# Patient Record
Sex: MALE | Race: WHITE | Employment: OTHER | URBAN - METROPOLITAN AREA
[De-identification: names, ages, dates, MRNs, and addresses within clinical notes are randomized per-mention and may not be internally consistent; named-entity substitution may affect disease eponyms.]

---

## 2017-02-15 ENCOUNTER — OFFICE VISIT (OUTPATIENT)
Dept: BARIATRICS/WEIGHT MGMT | Age: 47
End: 2017-02-15

## 2017-02-15 VITALS
DIASTOLIC BLOOD PRESSURE: 90 MMHG | HEIGHT: 69 IN | HEART RATE: 92 BPM | SYSTOLIC BLOOD PRESSURE: 134 MMHG | WEIGHT: 315 LBS | BODY MASS INDEX: 46.65 KG/M2

## 2017-02-15 DIAGNOSIS — L02.212 BACK ABSCESS: ICD-10-CM

## 2017-02-15 DIAGNOSIS — E13.9 DIABETES 1.5, MANAGED AS TYPE 2 (HCC): Primary | ICD-10-CM

## 2017-02-15 PROCEDURE — 99204 OFFICE O/P NEW MOD 45 MIN: CPT | Performed by: SURGERY

## 2017-02-16 ASSESSMENT — ENCOUNTER SYMPTOMS
WHEEZING: 0
SORE THROAT: 0
CONSTIPATION: 0
PHOTOPHOBIA: 0
COUGH: 0
NAUSEA: 0
ABDOMINAL PAIN: 0
TROUBLE SWALLOWING: 0
SHORTNESS OF BREATH: 0
COLOR CHANGE: 0
VOICE CHANGE: 0
DIARRHEA: 0
VOMITING: 0
BLOOD IN STOOL: 0
ANAL BLEEDING: 0

## 2017-03-04 PROBLEM — T81.49XA WOUND INFECTION FOLLOWING PROCEDURE: Status: ACTIVE | Noted: 2017-03-04

## 2017-05-12 PROBLEM — J44.9 COPD (CHRONIC OBSTRUCTIVE PULMONARY DISEASE) (HCC): Status: ACTIVE | Noted: 2017-05-12

## 2017-05-12 PROBLEM — G43.909 MIGRAINE: Status: ACTIVE | Noted: 2017-05-12

## 2017-05-12 PROBLEM — F41.9 ANXIETY: Status: ACTIVE | Noted: 2017-05-12

## 2017-05-12 PROBLEM — E11.65 HYPERGLYCEMIA DUE TO TYPE 2 DIABETES MELLITUS (HCC): Status: ACTIVE | Noted: 2017-05-12

## 2017-05-12 PROBLEM — E78.5 HYPERLIPIDEMIA: Status: ACTIVE | Noted: 2017-05-12

## 2017-05-15 PROBLEM — E11.65 HYPERGLYCEMIA DUE TO TYPE 2 DIABETES MELLITUS (HCC): Status: RESOLVED | Noted: 2017-05-12 | Resolved: 2017-05-15

## 2017-05-31 PROBLEM — E11.10 DKA, TYPE 2 (HCC): Status: ACTIVE | Noted: 2017-05-31

## 2017-06-01 PROBLEM — E87.6 HYPOKALEMIA: Status: ACTIVE | Noted: 2017-06-01

## 2017-06-01 PROBLEM — D69.6 THROMBOCYTOPENIA (HCC): Status: ACTIVE | Noted: 2017-06-01

## 2018-06-08 ENCOUNTER — TELEPHONE (OUTPATIENT)
Dept: CARDIOLOGY CLINIC | Age: 48
End: 2018-06-08

## 2018-11-26 ENCOUNTER — HOSPITAL ENCOUNTER (OUTPATIENT)
Age: 48
Discharge: HOME | End: 2018-11-26
Payer: MEDICARE

## 2018-11-26 ENCOUNTER — HOSPITAL ENCOUNTER (OUTPATIENT)
Dept: GENERAL RADIOLOGY | Age: 48
Discharge: HOME | End: 2018-11-26
Payer: MEDICARE

## 2018-11-26 DIAGNOSIS — M25.552 LEFT HIP PAIN: ICD-10-CM

## 2018-11-26 DIAGNOSIS — M54.5 LOW BACK PAIN, UNSPECIFIED BACK PAIN LATERALITY, UNSPECIFIED CHRONICITY, WITH SCIATICA PRESENCE UNSPECIFIED: ICD-10-CM

## 2018-11-26 PROCEDURE — 72110 X-RAY EXAM L-2 SPINE 4/>VWS: CPT

## 2018-11-26 PROCEDURE — 73501 X-RAY EXAM HIP UNI 1 VIEW: CPT

## 2018-12-23 ENCOUNTER — APPOINTMENT (OUTPATIENT)
Dept: GENERAL RADIOLOGY | Age: 48
DRG: 191 | End: 2018-12-23
Payer: MEDICARE

## 2018-12-23 ENCOUNTER — HOSPITAL ENCOUNTER (INPATIENT)
Age: 48
LOS: 1 days | Discharge: HOME | DRG: 191 | End: 2018-12-24
Attending: EMERGENCY MEDICINE | Admitting: FAMILY MEDICINE
Payer: MEDICARE

## 2018-12-23 DIAGNOSIS — J43.1 PANLOBULAR EMPHYSEMA (HCC): ICD-10-CM

## 2018-12-23 DIAGNOSIS — R73.9 HYPERGLYCEMIA: ICD-10-CM

## 2018-12-23 DIAGNOSIS — R65.10 SIRS (SYSTEMIC INFLAMMATORY RESPONSE SYNDROME) (HCC): ICD-10-CM

## 2018-12-23 DIAGNOSIS — R06.02 SHORTNESS OF BREATH: Primary | ICD-10-CM

## 2018-12-23 DIAGNOSIS — R05.9 COUGH: ICD-10-CM

## 2018-12-23 DIAGNOSIS — I10 ESSENTIAL HYPERTENSION: ICD-10-CM

## 2018-12-23 DIAGNOSIS — E87.20 LACTIC ACIDOSIS: ICD-10-CM

## 2018-12-23 DIAGNOSIS — J06.9 UPPER RESPIRATORY TRACT INFECTION, UNSPECIFIED TYPE: ICD-10-CM

## 2018-12-23 PROBLEM — J44.1 COPD EXACERBATION (HCC): Status: ACTIVE | Noted: 2018-12-23

## 2018-12-23 PROBLEM — E11.9 TYPE 2 DIABETES MELLITUS (HCC): Status: ACTIVE | Noted: 2018-12-23

## 2018-12-23 PROBLEM — E78.5 HYPERLIPIDEMIA: Status: ACTIVE | Noted: 2018-12-23

## 2018-12-23 PROBLEM — F32.A DEPRESSION: Status: ACTIVE | Noted: 2018-12-23

## 2018-12-23 LAB
ADENOVIRUS DETECTION BY PCR: NOT DETECTED
ALBUMIN SERPL-MCNC: 4.1 GM/DL (ref 3.4–5)
ALP BLD-CCNC: 139 IU/L (ref 40–129)
ALT SERPL-CCNC: 30 U/L (ref 10–40)
ANION GAP SERPL CALCULATED.3IONS-SCNC: 6 MMOL/L (ref 4–16)
ANION GAP SERPL CALCULATED.3IONS-SCNC: 7 MMOL/L (ref 4–16)
AST SERPL-CCNC: 22 IU/L (ref 15–37)
BASOPHILS ABSOLUTE: 0.1 K/CU MM
BASOPHILS ABSOLUTE: 0.1 K/CU MM
BASOPHILS RELATIVE PERCENT: 1 % (ref 0–1)
BASOPHILS RELATIVE PERCENT: 1.2 % (ref 0–1)
BILIRUB SERPL-MCNC: 0.2 MG/DL (ref 0–1)
BORDETELLA PERTUSSIS PCR: NOT DETECTED
BUN BLDV-MCNC: 12 MG/DL (ref 6–23)
BUN BLDV-MCNC: 12 MG/DL (ref 6–23)
CALCIUM SERPL-MCNC: 10 MG/DL (ref 8.3–10.6)
CALCIUM SERPL-MCNC: 9.8 MG/DL (ref 8.3–10.6)
CHLAMYDOPHILA PNEUMONIA PCR: NOT DETECTED
CHLORIDE BLD-SCNC: 109 MMOL/L (ref 99–110)
CHLORIDE BLD-SCNC: 110 MMOL/L (ref 99–110)
CO2: 26 MMOL/L (ref 21–32)
CO2: 29 MMOL/L (ref 21–32)
CORONAVIRUS 229E PCR: NOT DETECTED
CORONAVIRUS HKU1 PCR: NOT DETECTED
CORONAVIRUS NL63 PCR: NOT DETECTED
CORONAVIRUS OC43 PCR: NOT DETECTED
CREAT SERPL-MCNC: 0.8 MG/DL (ref 0.9–1.3)
CREAT SERPL-MCNC: 0.9 MG/DL (ref 0.9–1.3)
DIFFERENTIAL TYPE: ABNORMAL
DIFFERENTIAL TYPE: ABNORMAL
EOSINOPHILS ABSOLUTE: 0.3 K/CU MM
EOSINOPHILS ABSOLUTE: 0.3 K/CU MM
EOSINOPHILS RELATIVE PERCENT: 2.8 % (ref 0–3)
EOSINOPHILS RELATIVE PERCENT: 2.9 % (ref 0–3)
GFR AFRICAN AMERICAN: >60 ML/MIN/1.73M2
GFR AFRICAN AMERICAN: >60 ML/MIN/1.73M2
GFR NON-AFRICAN AMERICAN: >60 ML/MIN/1.73M2
GFR NON-AFRICAN AMERICAN: >60 ML/MIN/1.73M2
GLUCOSE BLD-MCNC: 182 MG/DL (ref 70–99)
GLUCOSE BLD-MCNC: 200 MG/DL (ref 70–99)
GLUCOSE BLD-MCNC: 240 MG/DL (ref 70–99)
GLUCOSE BLD-MCNC: 293 MG/DL (ref 70–99)
GLUCOSE BLD-MCNC: 316 MG/DL (ref 70–99)
GLUCOSE BLD-MCNC: 332 MG/DL (ref 70–99)
GLUCOSE BLD-MCNC: 385 MG/DL (ref 70–99)
HCT VFR BLD CALC: 38.8 % (ref 42–52)
HCT VFR BLD CALC: 40.2 % (ref 42–52)
HEMOGLOBIN: 13 GM/DL (ref 13.5–18)
HEMOGLOBIN: 13.6 GM/DL (ref 13.5–18)
HUMAN METAPNEUMOVIRUS PCR: NOT DETECTED
IMMATURE NEUTROPHIL %: 0.3 % (ref 0–0.43)
IMMATURE NEUTROPHIL %: 0.4 % (ref 0–0.43)
INFLUENZA A BY PCR: NOT DETECTED
INFLUENZA A H1 (2009) PCR: NOT DETECTED
INFLUENZA A H1 PANDEMIC PCR: NOT DETECTED
INFLUENZA A H3 PCR: NOT DETECTED
INFLUENZA B BY PCR: NOT DETECTED
LACTATE: 2.2 MMOL/L (ref 0.4–2)
LACTATE: 2.7 MMOL/L (ref 0.4–2)
LYMPHOCYTES ABSOLUTE: 2.7 K/CU MM
LYMPHOCYTES ABSOLUTE: 3.1 K/CU MM
LYMPHOCYTES RELATIVE PERCENT: 29.2 % (ref 24–44)
LYMPHOCYTES RELATIVE PERCENT: 30.9 % (ref 24–44)
MCH RBC QN AUTO: 30.9 PG (ref 27–31)
MCH RBC QN AUTO: 31 PG (ref 27–31)
MCHC RBC AUTO-ENTMCNC: 33.5 % (ref 32–36)
MCHC RBC AUTO-ENTMCNC: 33.8 % (ref 32–36)
MCV RBC AUTO: 91.6 FL (ref 78–100)
MCV RBC AUTO: 92.2 FL (ref 78–100)
MONOCYTES ABSOLUTE: 0.8 K/CU MM
MONOCYTES ABSOLUTE: 0.9 K/CU MM
MONOCYTES RELATIVE PERCENT: 8.7 % (ref 0–4)
MONOCYTES RELATIVE PERCENT: 9.1 % (ref 0–4)
MYCOPLASMA PNEUMONIAE PCR: NOT DETECTED
PARAINFLUENZA 1 PCR: NOT DETECTED
PARAINFLUENZA 2 PCR: NOT DETECTED
PARAINFLUENZA 3 PCR: NOT DETECTED
PARAINFLUENZA 4 PCR: NOT DETECTED
PDW BLD-RTO: 12.9 % (ref 11.7–14.9)
PDW BLD-RTO: 13 % (ref 11.7–14.9)
PLATELET # BLD: 139 K/CU MM (ref 140–440)
PLATELET # BLD: 149 K/CU MM (ref 140–440)
PMV BLD AUTO: 11.2 FL (ref 7.5–11.1)
PMV BLD AUTO: 11.2 FL (ref 7.5–11.1)
POTASSIUM SERPL-SCNC: 3.8 MMOL/L (ref 3.5–5.1)
POTASSIUM SERPL-SCNC: 4.1 MMOL/L (ref 3.5–5.1)
RBC # BLD: 4.21 M/CU MM (ref 4.6–6.2)
RBC # BLD: 4.39 M/CU MM (ref 4.6–6.2)
RHINOVIRUS ENTEROVIRUS PCR: NOT DETECTED
RSV PCR: NOT DETECTED
SEGMENTED NEUTROPHILS ABSOLUTE COUNT: 4.8 K/CU MM
SEGMENTED NEUTROPHILS ABSOLUTE COUNT: 6 K/CU MM
SEGMENTED NEUTROPHILS RELATIVE PERCENT: 55.6 % (ref 36–66)
SEGMENTED NEUTROPHILS RELATIVE PERCENT: 57.9 % (ref 36–66)
SODIUM BLD-SCNC: 143 MMOL/L (ref 135–145)
SODIUM BLD-SCNC: 144 MMOL/L (ref 135–145)
TOTAL IMMATURE NEUTOROPHIL: 0.03 K/CU MM
TOTAL IMMATURE NEUTOROPHIL: 0.04 K/CU MM
TOTAL PROTEIN: 6.7 GM/DL (ref 6.4–8.2)
WBC # BLD: 10.4 K/CU MM (ref 4–10.5)
WBC # BLD: 8.7 K/CU MM (ref 4–10.5)

## 2018-12-23 PROCEDURE — 94640 AIRWAY INHALATION TREATMENT: CPT

## 2018-12-23 PROCEDURE — 87040 BLOOD CULTURE FOR BACTERIA: CPT

## 2018-12-23 PROCEDURE — 99285 EMERGENCY DEPT VISIT HI MDM: CPT

## 2018-12-23 PROCEDURE — 83605 ASSAY OF LACTIC ACID: CPT

## 2018-12-23 PROCEDURE — 85025 COMPLETE CBC W/AUTO DIFF WBC: CPT

## 2018-12-23 PROCEDURE — 84145 PROCALCITONIN (PCT): CPT

## 2018-12-23 PROCEDURE — 80053 COMPREHEN METABOLIC PANEL: CPT

## 2018-12-23 PROCEDURE — 82962 GLUCOSE BLOOD TEST: CPT

## 2018-12-23 PROCEDURE — 6360000002 HC RX W HCPCS: Performed by: NURSE PRACTITIONER

## 2018-12-23 PROCEDURE — 6370000000 HC RX 637 (ALT 250 FOR IP): Performed by: NURSE PRACTITIONER

## 2018-12-23 PROCEDURE — 2580000003 HC RX 258: Performed by: NURSE PRACTITIONER

## 2018-12-23 PROCEDURE — 94660 CPAP INITIATION&MGMT: CPT

## 2018-12-23 PROCEDURE — 6360000002 HC RX W HCPCS: Performed by: EMERGENCY MEDICINE

## 2018-12-23 PROCEDURE — 2580000003 HC RX 258: Performed by: EMERGENCY MEDICINE

## 2018-12-23 PROCEDURE — 93010 ELECTROCARDIOGRAM REPORT: CPT | Performed by: INTERNAL MEDICINE

## 2018-12-23 PROCEDURE — 96375 TX/PRO/DX INJ NEW DRUG ADDON: CPT

## 2018-12-23 PROCEDURE — 80048 BASIC METABOLIC PNL TOTAL CA: CPT

## 2018-12-23 PROCEDURE — 93005 ELECTROCARDIOGRAM TRACING: CPT | Performed by: EMERGENCY MEDICINE

## 2018-12-23 PROCEDURE — 96365 THER/PROPH/DIAG IV INF INIT: CPT

## 2018-12-23 PROCEDURE — 6370000000 HC RX 637 (ALT 250 FOR IP): Performed by: EMERGENCY MEDICINE

## 2018-12-23 PROCEDURE — 71045 X-RAY EXAM CHEST 1 VIEW: CPT

## 2018-12-23 PROCEDURE — 87798 DETECT AGENT NOS DNA AMP: CPT

## 2018-12-23 PROCEDURE — 2140000000 HC CCU INTERMEDIATE R&B

## 2018-12-23 PROCEDURE — 36415 COLL VENOUS BLD VENIPUNCTURE: CPT

## 2018-12-23 RX ORDER — DEXTROSE MONOHYDRATE 50 MG/ML
100 INJECTION, SOLUTION INTRAVENOUS PRN
Status: DISCONTINUED | OUTPATIENT
Start: 2018-12-23 | End: 2018-12-24 | Stop reason: HOSPADM

## 2018-12-23 RX ORDER — ZONISAMIDE 100 MG/1
300 CAPSULE ORAL NIGHTLY
Status: DISCONTINUED | OUTPATIENT
Start: 2018-12-23 | End: 2018-12-24 | Stop reason: HOSPADM

## 2018-12-23 RX ORDER — ASPIRIN 81 MG/1
81 TABLET ORAL DAILY
Status: DISCONTINUED | OUTPATIENT
Start: 2018-12-23 | End: 2018-12-24 | Stop reason: HOSPADM

## 2018-12-23 RX ORDER — BENZONATATE 100 MG/1
100 CAPSULE ORAL 3 TIMES DAILY PRN
Status: DISCONTINUED | OUTPATIENT
Start: 2018-12-23 | End: 2018-12-24 | Stop reason: HOSPADM

## 2018-12-23 RX ORDER — GUAIFENESIN/DEXTROMETHORPHAN 100-10MG/5
5 SYRUP ORAL EVERY 4 HOURS PRN
Status: DISCONTINUED | OUTPATIENT
Start: 2018-12-23 | End: 2018-12-24 | Stop reason: HOSPADM

## 2018-12-23 RX ORDER — HYDROCODONE BITARTRATE AND ACETAMINOPHEN 5; 325 MG/1; MG/1
1 TABLET ORAL EVERY 6 HOURS PRN
Status: DISCONTINUED | OUTPATIENT
Start: 2018-12-23 | End: 2018-12-24 | Stop reason: HOSPADM

## 2018-12-23 RX ORDER — BUDESONIDE 0.5 MG/2ML
1 INHALANT ORAL 2 TIMES DAILY PRN
Status: DISCONTINUED | OUTPATIENT
Start: 2018-12-23 | End: 2018-12-24 | Stop reason: HOSPADM

## 2018-12-23 RX ORDER — BENZONATATE 100 MG/1
200 CAPSULE ORAL ONCE
Status: COMPLETED | OUTPATIENT
Start: 2018-12-23 | End: 2018-12-23

## 2018-12-23 RX ORDER — ALBUTEROL SULFATE 90 UG/1
2 AEROSOL, METERED RESPIRATORY (INHALATION) EVERY 6 HOURS PRN
Status: DISCONTINUED | OUTPATIENT
Start: 2018-12-23 | End: 2018-12-24 | Stop reason: HOSPADM

## 2018-12-23 RX ORDER — FLUTICASONE PROPIONATE 220 UG/1
1 AEROSOL, METERED RESPIRATORY (INHALATION) 2 TIMES DAILY PRN
Status: DISCONTINUED | OUTPATIENT
Start: 2018-12-23 | End: 2018-12-23 | Stop reason: CLARIF

## 2018-12-23 RX ORDER — ALBUTEROL SULFATE 2.5 MG/3ML
SOLUTION RESPIRATORY (INHALATION)
Status: DISCONTINUED
Start: 2018-12-23 | End: 2018-12-23

## 2018-12-23 RX ORDER — CARVEDILOL 25 MG/1
25 TABLET ORAL 2 TIMES DAILY WITH MEALS
Status: DISCONTINUED | OUTPATIENT
Start: 2018-12-23 | End: 2018-12-24 | Stop reason: HOSPADM

## 2018-12-23 RX ORDER — OMEGA-3-ACID ETHYL ESTERS 1 G/1
2 CAPSULE, LIQUID FILLED ORAL 2 TIMES DAILY
Status: DISCONTINUED | OUTPATIENT
Start: 2018-12-23 | End: 2018-12-24 | Stop reason: HOSPADM

## 2018-12-23 RX ORDER — ALBUTEROL SULFATE 2.5 MG/3ML
2.5 SOLUTION RESPIRATORY (INHALATION) ONCE
Status: COMPLETED | OUTPATIENT
Start: 2018-12-23 | End: 2018-12-23

## 2018-12-23 RX ORDER — 0.9 % SODIUM CHLORIDE 0.9 %
1000 INTRAVENOUS SOLUTION INTRAVENOUS ONCE
Status: COMPLETED | OUTPATIENT
Start: 2018-12-23 | End: 2018-12-23

## 2018-12-23 RX ORDER — DOXAZOSIN MESYLATE 1 MG/1
1 TABLET ORAL DAILY
Status: DISCONTINUED | OUTPATIENT
Start: 2018-12-23 | End: 2018-12-24 | Stop reason: HOSPADM

## 2018-12-23 RX ORDER — GUAIFENESIN 600 MG/1
600 TABLET, EXTENDED RELEASE ORAL 2 TIMES DAILY
Status: DISCONTINUED | OUTPATIENT
Start: 2018-12-23 | End: 2018-12-24 | Stop reason: HOSPADM

## 2018-12-23 RX ORDER — ONDANSETRON 2 MG/ML
4 INJECTION INTRAMUSCULAR; INTRAVENOUS EVERY 6 HOURS PRN
Status: DISCONTINUED | OUTPATIENT
Start: 2018-12-23 | End: 2018-12-24 | Stop reason: HOSPADM

## 2018-12-23 RX ORDER — NITROGLYCERIN 0.4 MG/1
0.4 TABLET SUBLINGUAL EVERY 5 MIN PRN
Status: DISCONTINUED | OUTPATIENT
Start: 2018-12-23 | End: 2018-12-24 | Stop reason: HOSPADM

## 2018-12-23 RX ORDER — LOSARTAN POTASSIUM 50 MG/1
50 TABLET ORAL DAILY
COMMUNITY
End: 2020-02-29

## 2018-12-23 RX ORDER — QUETIAPINE FUMARATE 100 MG/1
100 TABLET, FILM COATED ORAL DAILY
Status: DISCONTINUED | OUTPATIENT
Start: 2018-12-23 | End: 2018-12-24 | Stop reason: HOSPADM

## 2018-12-23 RX ORDER — SODIUM CHLORIDE 9 MG/ML
INJECTION, SOLUTION INTRAVENOUS CONTINUOUS
Status: DISCONTINUED | OUTPATIENT
Start: 2018-12-23 | End: 2018-12-24 | Stop reason: HOSPADM

## 2018-12-23 RX ORDER — TRAZODONE HYDROCHLORIDE 50 MG/1
50 TABLET ORAL NIGHTLY
Status: DISCONTINUED | OUTPATIENT
Start: 2018-12-23 | End: 2018-12-24 | Stop reason: HOSPADM

## 2018-12-23 RX ORDER — SODIUM CHLORIDE 0.9 % (FLUSH) 0.9 %
10 SYRINGE (ML) INJECTION EVERY 12 HOURS SCHEDULED
Status: DISCONTINUED | OUTPATIENT
Start: 2018-12-23 | End: 2018-12-24 | Stop reason: HOSPADM

## 2018-12-23 RX ORDER — IPRATROPIUM BROMIDE AND ALBUTEROL SULFATE 2.5; .5 MG/3ML; MG/3ML
1 SOLUTION RESPIRATORY (INHALATION) ONCE
Status: COMPLETED | OUTPATIENT
Start: 2018-12-23 | End: 2018-12-23

## 2018-12-23 RX ORDER — DEXTROSE MONOHYDRATE 25 G/50ML
12.5 INJECTION, SOLUTION INTRAVENOUS PRN
Status: DISCONTINUED | OUTPATIENT
Start: 2018-12-23 | End: 2018-12-24 | Stop reason: HOSPADM

## 2018-12-23 RX ORDER — ONDANSETRON 2 MG/ML
4 INJECTION INTRAMUSCULAR; INTRAVENOUS EVERY 30 MIN PRN
Status: DISCONTINUED | OUTPATIENT
Start: 2018-12-23 | End: 2018-12-23

## 2018-12-23 RX ORDER — ALBUTEROL SULFATE 2.5 MG/3ML
2.5 SOLUTION RESPIRATORY (INHALATION)
Status: DISCONTINUED | OUTPATIENT
Start: 2018-12-23 | End: 2018-12-24 | Stop reason: HOSPADM

## 2018-12-23 RX ORDER — NICOTINE POLACRILEX 4 MG
15 LOZENGE BUCCAL PRN
Status: DISCONTINUED | OUTPATIENT
Start: 2018-12-23 | End: 2018-12-24 | Stop reason: HOSPADM

## 2018-12-23 RX ORDER — SODIUM CHLORIDE 0.9 % (FLUSH) 0.9 %
10 SYRINGE (ML) INJECTION PRN
Status: DISCONTINUED | OUTPATIENT
Start: 2018-12-23 | End: 2018-12-24 | Stop reason: HOSPADM

## 2018-12-23 RX ORDER — METHYLPREDNISOLONE SODIUM SUCCINATE 40 MG/ML
40 INJECTION, POWDER, LYOPHILIZED, FOR SOLUTION INTRAMUSCULAR; INTRAVENOUS DAILY
Status: DISCONTINUED | OUTPATIENT
Start: 2018-12-23 | End: 2018-12-24

## 2018-12-23 RX ORDER — LANOLIN ALCOHOL/MO/W.PET/CERES
3 CREAM (GRAM) TOPICAL NIGHTLY
Status: DISCONTINUED | OUTPATIENT
Start: 2018-12-23 | End: 2018-12-24 | Stop reason: HOSPADM

## 2018-12-23 RX ORDER — DEXTROMETHORPHAN HYDROBROMIDE AND PROMETHAZINE HYDROCHLORIDE 15; 6.25 MG/5ML; MG/5ML
5 SYRUP ORAL ONCE
Status: COMPLETED | OUTPATIENT
Start: 2018-12-23 | End: 2018-12-23

## 2018-12-23 RX ORDER — TRAZODONE HYDROCHLORIDE 50 MG/1
100 TABLET ORAL NIGHTLY
COMMUNITY

## 2018-12-23 RX ORDER — LOSARTAN POTASSIUM 50 MG/1
50 TABLET ORAL DAILY
Status: DISCONTINUED | OUTPATIENT
Start: 2018-12-23 | End: 2018-12-24 | Stop reason: HOSPADM

## 2018-12-23 RX ORDER — GLIMEPIRIDE 2 MG/1
4 TABLET ORAL 2 TIMES DAILY WITH MEALS
Status: DISCONTINUED | OUTPATIENT
Start: 2018-12-23 | End: 2018-12-24 | Stop reason: HOSPADM

## 2018-12-23 RX ORDER — FOLIC ACID 1 MG/1
1 TABLET ORAL DAILY
Status: DISCONTINUED | OUTPATIENT
Start: 2018-12-23 | End: 2018-12-24 | Stop reason: HOSPADM

## 2018-12-23 RX ORDER — HYDROCHLOROTHIAZIDE 25 MG/1
12.5 TABLET ORAL DAILY
Status: DISCONTINUED | OUTPATIENT
Start: 2018-12-23 | End: 2018-12-24 | Stop reason: HOSPADM

## 2018-12-23 RX ORDER — IPRATROPIUM BROMIDE AND ALBUTEROL SULFATE 2.5; .5 MG/3ML; MG/3ML
1 SOLUTION RESPIRATORY (INHALATION) EVERY 4 HOURS
Status: DISCONTINUED | OUTPATIENT
Start: 2018-12-23 | End: 2018-12-24 | Stop reason: HOSPADM

## 2018-12-23 RX ORDER — ATORVASTATIN CALCIUM 40 MG/1
40 TABLET, FILM COATED ORAL NIGHTLY
Status: DISCONTINUED | OUTPATIENT
Start: 2018-12-23 | End: 2018-12-24 | Stop reason: HOSPADM

## 2018-12-23 RX ORDER — SODIUM CHLORIDE 9 MG/ML
INJECTION, SOLUTION INTRAVENOUS CONTINUOUS
Status: DISCONTINUED | OUTPATIENT
Start: 2018-12-23 | End: 2018-12-23

## 2018-12-23 RX ADMIN — INSULIN LISPRO 3 UNITS: 100 INJECTION, SOLUTION INTRAVENOUS; SUBCUTANEOUS at 12:48

## 2018-12-23 RX ADMIN — METHYLPREDNISOLONE SODIUM SUCCINATE 40 MG: 40 INJECTION, POWDER, FOR SOLUTION INTRAMUSCULAR; INTRAVENOUS at 09:50

## 2018-12-23 RX ADMIN — DOXAZOSIN 1 MG: 1 TABLET ORAL at 09:48

## 2018-12-23 RX ADMIN — ZONISAMIDE 300 MG: 100 CAPSULE ORAL at 20:47

## 2018-12-23 RX ADMIN — LOSARTAN POTASSIUM 50 MG: 50 TABLET ORAL at 09:49

## 2018-12-23 RX ADMIN — SODIUM CHLORIDE: 9 INJECTION, SOLUTION INTRAVENOUS at 18:19

## 2018-12-23 RX ADMIN — SODIUM CHLORIDE: 9 INJECTION, SOLUTION INTRAVENOUS at 04:58

## 2018-12-23 RX ADMIN — SODIUM CHLORIDE: 9 INJECTION, SOLUTION INTRAVENOUS at 02:46

## 2018-12-23 RX ADMIN — IPRATROPIUM BROMIDE AND ALBUTEROL SULFATE 3 ML: .5; 3 SOLUTION RESPIRATORY (INHALATION) at 11:14

## 2018-12-23 RX ADMIN — INSULIN LISPRO 6 UNITS: 100 INJECTION, SOLUTION INTRAVENOUS; SUBCUTANEOUS at 09:57

## 2018-12-23 RX ADMIN — OMEGA-3-ACID ETHYL ESTERS 2 G: 1 CAPSULE, LIQUID FILLED ORAL at 20:46

## 2018-12-23 RX ADMIN — BENZONATATE 200 MG: 100 CAPSULE ORAL at 02:47

## 2018-12-23 RX ADMIN — CEFTRIAXONE SODIUM 1 G: 1 INJECTION, POWDER, FOR SOLUTION INTRAMUSCULAR; INTRAVENOUS at 03:33

## 2018-12-23 RX ADMIN — CARVEDILOL 25 MG: 25 TABLET, FILM COATED ORAL at 17:13

## 2018-12-23 RX ADMIN — IPRATROPIUM BROMIDE AND ALBUTEROL SULFATE 1 AMPULE: .5; 3 SOLUTION RESPIRATORY (INHALATION) at 02:25

## 2018-12-23 RX ADMIN — OMEGA-3-ACID ETHYL ESTERS 2 G: 1 CAPSULE, LIQUID FILLED ORAL at 09:49

## 2018-12-23 RX ADMIN — GLIMEPIRIDE 4 MG: 2 TABLET ORAL at 09:48

## 2018-12-23 RX ADMIN — INSULIN LISPRO 15 UNITS: 100 INJECTION, SOLUTION INTRAVENOUS; SUBCUTANEOUS at 17:14

## 2018-12-23 RX ADMIN — HYDROCODONE BITARTRATE AND ACETAMINOPHEN 1 TABLET: 5; 325 TABLET ORAL at 20:46

## 2018-12-23 RX ADMIN — CARVEDILOL 25 MG: 25 TABLET, FILM COATED ORAL at 09:49

## 2018-12-23 RX ADMIN — GUAIFENESIN 600 MG: 600 TABLET, EXTENDED RELEASE ORAL at 20:47

## 2018-12-23 RX ADMIN — SODIUM CHLORIDE 1000 ML: 9 INJECTION, SOLUTION INTRAVENOUS at 03:34

## 2018-12-23 RX ADMIN — MELATONIN TAB 3 MG 3 MG: 3 TAB at 20:47

## 2018-12-23 RX ADMIN — ALBUTEROL SULFATE 2.5 MG: 2.5 SOLUTION RESPIRATORY (INHALATION) at 02:32

## 2018-12-23 RX ADMIN — IPRATROPIUM BROMIDE AND ALBUTEROL SULFATE 3 ML: .5; 3 SOLUTION RESPIRATORY (INHALATION) at 21:08

## 2018-12-23 RX ADMIN — ONDANSETRON 4 MG: 2 INJECTION INTRAMUSCULAR; INTRAVENOUS at 02:47

## 2018-12-23 RX ADMIN — INSULIN GLARGINE 100 UNITS: 100 INJECTION, SOLUTION SUBCUTANEOUS at 20:47

## 2018-12-23 RX ADMIN — Medication 5 ML: at 02:46

## 2018-12-23 RX ADMIN — HYDROCHLOROTHIAZIDE 12.5 MG: 25 TABLET ORAL at 09:49

## 2018-12-23 RX ADMIN — INSULIN LISPRO 25 UNITS: 100 INJECTION, SOLUTION INTRAVENOUS; SUBCUTANEOUS at 12:49

## 2018-12-23 RX ADMIN — ASPIRIN 81 MG: 81 TABLET, COATED ORAL at 09:49

## 2018-12-23 RX ADMIN — ATORVASTATIN CALCIUM 40 MG: 40 TABLET, FILM COATED ORAL at 20:47

## 2018-12-23 RX ADMIN — GUAIFENESIN 600 MG: 600 TABLET, EXTENDED RELEASE ORAL at 09:49

## 2018-12-23 RX ADMIN — INSULIN LISPRO 25 UNITS: 100 INJECTION, SOLUTION INTRAVENOUS; SUBCUTANEOUS at 17:16

## 2018-12-23 RX ADMIN — INSULIN LISPRO 2 UNITS: 100 INJECTION, SOLUTION INTRAVENOUS; SUBCUTANEOUS at 20:48

## 2018-12-23 RX ADMIN — FOLIC ACID 1 MG: 1 TABLET ORAL at 09:48

## 2018-12-23 RX ADMIN — GLIMEPIRIDE 4 MG: 2 TABLET ORAL at 17:14

## 2018-12-23 RX ADMIN — SODIUM CHLORIDE, PRESERVATIVE FREE 10 ML: 5 INJECTION INTRAVENOUS at 09:53

## 2018-12-23 RX ADMIN — IPRATROPIUM BROMIDE AND ALBUTEROL SULFATE 3 ML: .5; 3 SOLUTION RESPIRATORY (INHALATION) at 06:02

## 2018-12-23 RX ADMIN — INSULIN LISPRO 25 UNITS: 100 INJECTION, SOLUTION INTRAVENOUS; SUBCUTANEOUS at 09:59

## 2018-12-23 RX ADMIN — LINAGLIPTIN 5 MG: 5 TABLET, FILM COATED ORAL at 09:48

## 2018-12-23 RX ADMIN — SERTRALINE HYDROCHLORIDE 50 MG: 50 TABLET ORAL at 20:47

## 2018-12-23 RX ADMIN — QUETIAPINE FUMARATE 100 MG: 100 TABLET ORAL at 09:48

## 2018-12-23 RX ADMIN — TRAZODONE HYDROCHLORIDE 50 MG: 50 TABLET ORAL at 20:47

## 2018-12-23 ASSESSMENT — PAIN SCALES - GENERAL
PAINLEVEL_OUTOF10: 0
PAINLEVEL_OUTOF10: 6
PAINLEVEL_OUTOF10: 3
PAINLEVEL_OUTOF10: 0
PAINLEVEL_OUTOF10: 9
PAINLEVEL_OUTOF10: 0

## 2018-12-23 ASSESSMENT — PAIN DESCRIPTION - LOCATION
LOCATION: CHEST
LOCATION: CHEST

## 2018-12-23 ASSESSMENT — PAIN DESCRIPTION - PAIN TYPE
TYPE: ACUTE PAIN

## 2018-12-23 ASSESSMENT — PAIN DESCRIPTION - FREQUENCY: FREQUENCY: INTERMITTENT

## 2018-12-23 ASSESSMENT — PAIN DESCRIPTION - DESCRIPTORS: DESCRIPTORS: HEADACHE

## 2018-12-23 NOTE — ED PROVIDER NOTES
vial 0-6 Units  0-6 Units Subcutaneous TID  TAMEKA Gama - CNP        insulin lispro (HUMALOG) injection vial 0-3 Units  0-3 Units Subcutaneous Nightly TAMEKA Gama CNP        guaiFENesin (MUCINEX) extended release tablet 600 mg  600 mg Oral BID TAMEKA Gama CNP         Current Outpatient Prescriptions   Medication Sig Dispense Refill    traZODone (DESYREL) 50 MG tablet Take 50 mg by mouth nightly      losartan (COZAAR) 50 MG tablet Take 50 mg by mouth daily      nitroGLYCERIN (NITROSTAT) 0.4 MG SL tablet up to max of 3 total doses. If no relief after 1 dose, call 911. 25 tablet 3    atorvastatin (LIPITOR) 40 MG tablet Take 40 mg by mouth nightly       SITagliptin (JANUVIA) 100 MG tablet Take 100 mg by mouth nightly       Insulin Glargine (TOUJEO SOLOSTAR SC) Inject 128 Units into the skin nightly       glimepiride (AMARYL) 4 MG tablet Take 4 mg by mouth 2 times daily      folic acid (FOLVITE) 1 MG tablet Take 1 tablet by mouth daily 30 tablet 0    insulin lispro (HUMALOG) 100 UNIT/ML injection cartridge Inject 25 Units into the skin 3 times daily (before meals) (Patient taking differently: Inject 60 Units into the skin 3 times daily (before meals) ) 5 Cartridge 3    insulin lispro (HUMALOG) 100 UNIT/ML injection vial Inject 0-18 Units into the skin 3 times daily (with meals) Glucose: Dose:               No Insulin  140-199           3 Units  200-249 6 Units  250-299 9 Units  300-349 12 Units  350-400 15 Units  Over 400  18 Units 1 vial 0    terazosin (HYTRIN) 2 MG capsule Take 2 mg by mouth nightly      carvedilol (COREG) 12.5 MG tablet Take 2 tablets by mouth 2 times daily (with meals).  (Patient taking differently: Take 12.5 mg by mouth 2 times daily (with meals) Take one tablet by mouth two times a day with food.) 60 tablet 0    sertraline (ZOLOFT) 25 MG tablet Take 50 mg by mouth nightly       hydrochlorothiazide (HYDRODIURIL) 25 MG tablet Take 12.5 mg by mouth (L) 140 - 440 K/CU MM    MPV 11.2 (H) 7.5 - 11.1 FL    Differential Type AUTOMATED DIFFERENTIAL     Segs Relative 55.6 36 - 66 %    Lymphocytes % 30.9 24 - 44 %    Monocytes % 9.1 (H) 0 - 4 %    Eosinophils % 2.9 0 - 3 %    Basophils % 1.2 (H) 0 - 1 %    Segs Absolute 4.8 K/CU MM    Lymphocytes # 2.7 K/CU MM    Monocytes # 0.8 K/CU MM    Eosinophils # 0.3 K/CU MM    Basophils # 0.1 K/CU MM    Immature Neutrophil % 0.3 0 - 0.43 %    Total Immature Neutrophil 0.03 K/CU MM   Comprehensive Metabolic Panel   Result Value Ref Range    Sodium 144 135 - 145 MMOL/L    Potassium 3.8 3.5 - 5.1 MMOL/L    Chloride 109 99 - 110 mMol/L    CO2 29 21 - 32 MMOL/L    BUN 12 6 - 23 MG/DL    CREATININE 0.9 0.9 - 1.3 MG/DL    Glucose 332 (H) 70 - 99 MG/DL    Calcium 10.0 8.3 - 10.6 MG/DL    Alb 4.1 3.4 - 5.0 GM/DL    Total Protein 6.7 6.4 - 8.2 GM/DL    Total Bilirubin 0.2 0.0 - 1.0 MG/DL    ALT 30 10 - 40 U/L    AST 22 15 - 37 IU/L    Alkaline Phosphatase 139 (H) 40 - 129 IU/L    GFR Non-African American >60 >60 mL/min/1.73m2    GFR African American >60 >60 mL/min/1.73m2    Anion Gap 6 4 - 16   Lactic Acid, Plasma   Result Value Ref Range    Lactate 2.7 (HH) 0.4 - 2.0 mMOL/L   EKG 12 Lead   Result Value Ref Range    Ventricular Rate 85 BPM    Atrial Rate 85 BPM    P-R Interval 152 ms    QRS Duration 94 ms    Q-T Interval 362 ms    QTc Calculation (Bazett) 430 ms    P Axis 31 degrees    R Axis -9 degrees    T Axis 46 degrees    Diagnosis       Normal sinus rhythm  Normal ECG  When compared with ECG of 30-MAY-2018 15:04,  No significant change was found         EKG (if obtained): (All EKG's are interpreted by myself in the absence of a cardiologist)  The Ekg interpreted by me in the absence of a cardiologist shows. normal sinus rhythm with a rate of 85  Axis is   Normal  QTc is  430  Intervals and Durations are unremarkable. No specific ST-T wave changes appreciated. No evidence of acute ischemia.    No significant change from prior EKG dated 30 May 2018      Radiographs (if obtained):  [] The following radiograph was interpreted by myself in the absence of a radiologist:  [x] Radiologist's Report reviewed at time of ED visit:  XR CHEST PORTABLE   Final Result   1. Low lung volumes, unchanged. ED Course and MDM:  A since x-rays negative. He does have a lactic acidosis with a lactate level of 2.7. His white count is normal.  He did receive 2 breathing treatments and is doing better however he feels he needs admitted to the hospital.  Patient does have a blood sugar that is elevated and does meet surge criteria. The patient was discussed with Minor the hospitalist who has agreed to admit this patient. Final Impression:  1. Shortness of breath    2. Cough    3. Upper respiratory tract infection, unspecified type    4. Hyperglycemia    5. Lactic acidosis    6.  SIRS (systemic inflammatory response syndrome) ([facility]      DISPOSITION Admitted    Patient referred to:  Jay Conrad, [address] [address] [place] [address]  [phone]          Discharge medications:  New Prescriptions    No medications on file     (Please note that portions of this note may have been completed with a voice recognition program. Efforts were made to edit the dictations but occasionally words are mis-transcribed.)    Kerline Rogers DO, [facility]  Board certified in 3928 DO Juanjose  12/23/18 DO Joss  12/23/18 8311

## 2018-12-23 NOTE — H&P
History and Physical      Name:  Francisco Cordero /Age/Sex: 1970  (46 y.o. male)   MRN & CSN:  [id number] & [id number] Admission Date/Time: 2018  1:54 AM   Location:   PCP: GINNY Lopez       Francisco Cordero is a 46 y.o.  male  who presents with Shortness of Breath (Reports of having cough for the past 2 weeks along with other cold like S&S. Called EMS due to having some SOB and vomiting. Given duoneb in route via [facility]. ) and Cough     Day 1; Assessment and Plan:     1. COPD Exacerbation     Shortness of breath, Cough, wheezing     Oxygen supplement,    Breathing treatment (Albuterol, Duoneb)    Steroid (Solumedrol)    GI and DVT prophylaxis    2. Nausea and Vomiting    Zofran 4mg Q hrsPRN    3. Diabetes     Hyperglycemia (Glucose 332 (H) MG/DL)     POCT glucose     Home medication     Sliding scale Insulin with meal coverage. 4. Elevated lactate level and Hyponatremia     Lactate 2.7 (HH) mMOL/L     IV fluid    5. Hypertension    Continue Coreg and HCTZ, losartan    6. Depression    Continue Zoloft    7. Hyperlipidemia    Continue Lipitor    8. Obesity    Life style modification of diet and exercise encouraged.        Medications:   Medications:    albuterol        sodium chloride  1,000 mL Intravenous Once      Infusions:    sodium chloride Stopped (18)     PRN Meds:   ondansetron 4 mg Q30 Min PRN       Current Facility-Administered Medications:     ondansetron (ZOFRAN) injection 4 mg, 4 mg, Intravenous, Q30 Min PRN, Dorthea [place], DO, 4 mg at 18 0247    0.9 % sodium chloride infusion, , Intravenous, Continuous, Dorthea [place], DO, Stopped at 18    albuterol (PROVENTIL) (2.5 MG/3ML) 0.083% nebulizer solution, , , ,     0.9 % sodium chloride bolus, 1,000 mL, Intravenous, Once, Dorthea [place], DO, Last Rate: 1,000 mL/hr at 18, 1,000 mL at 18    Current Outpatient Prescriptions:     traZODone (DESYREL) 50 MG tablet, Take 2 times daily as needed. , Disp: , Rfl:     omega-3 acid ethyl esters (LOVAZA) 1 G capsule, Take 2 g by mouth 2 times daily. , Disp: , Rfl:     albuterol (PROVENTIL HFA) 108 (90 BASE) MCG/ACT inhaler, Inhale 2 puffs into the lungs every 6 hours as needed for Wheezing or Shortness of Breath., Disp: 1 Inhaler, Rfl: 0    aspirin 81 MG EC tablet, Take 81 mg by mouth daily. , Disp: , Rfl:     History of present illness     Chief Complaint: Shortness of Breath (Reports of having cough for the past 2 weeks along with other cold like S&S. Called EMS due to having some SOB and vomiting. Given duoneb in route via [facility]. ) and Cough      Alyssia Sol is a 46 y.o.  male   With PMH that include  Diabetes, depression, COPD, CHF and hypertension. He  presents with  Complaint of cough, body ache, nausea and vomiting that started 3 days ago and got worse last night. He denies abdominal and chest pain. Review of Systems        GENERAL:  Denies fever, chills, night sweats, or changes in weight. EYES:  Denies recent visual changes. ENT:  Denies ear pain, hearing loss or tinnitus  RESP:  Positive for  cough, dyspnea, or wheezing. CV:  Denies any chest pain with exertion or at rest, palpitations, syncope, or edema. GI:  Positive for  nausea, vomiting, denies abdominal pain, heartburn, changes in bowel habit, melena or rectal bleeding  MUSCULOSKELETAL:  Denies any joint swelling, joint pain, or loss of range of motion. NEURO:  Denies any headaches, tremors, dizziness, vertigo, memory loss, confusion, weakness, numbness or tingling. PSYCH:  Denies any sleeping problems, history of abuse,  HEME/LYMPHATIC/IMMUNO:  Denies , bruising, bleeding abnormalities   ENDO:  Denies any heat or cold intolerance, panemiaolyuria or polydipsia.       Objective:   No intake or output data in the 24 hours ending 12/23/18 0407   Vitals:   Vitals:    12/23/18 0350   BP: 129/71   Pulse: 82   Resp: 21   Temp:    SpO2: 94%     Physical grandfather, paternal grandmother, and paternal uncle; Heart Disease in his father.        Soc HX:   Social History     Social History    Marital status: Single     Spouse name: N/A    Number of children: 3    Years of education: N/A     Social History Main Topics    Smoking status: Former Smoker     Packs/day: 0.25     Years: 0.00     Types: Cigarettes     Quit date: 12/13/2017    Smokeless tobacco: Never Used      Comment: quit in 1994, started again 08/2014    Alcohol use No    Drug use: No    Sexual activity: Not Currently     Other Topics Concern    None     Social History Narrative    unemployed       Electronically signed by TAMEKA Garcia CNP on 12/23/2018 at 4:07 AM

## 2018-12-24 VITALS
BODY MASS INDEX: 46.65 KG/M2 | RESPIRATION RATE: 16 BRPM | TEMPERATURE: 96.8 F | HEIGHT: 69 IN | OXYGEN SATURATION: 95 % | SYSTOLIC BLOOD PRESSURE: 139 MMHG | WEIGHT: 315 LBS | DIASTOLIC BLOOD PRESSURE: 77 MMHG | HEART RATE: 92 BPM

## 2018-12-24 PROBLEM — J44.1 COPD EXACERBATION (HCC): Status: RESOLVED | Noted: 2018-12-23 | Resolved: 2018-12-24

## 2018-12-24 LAB
ANION GAP SERPL CALCULATED.3IONS-SCNC: 12 MMOL/L (ref 4–16)
BASOPHILS ABSOLUTE: 0.1 K/CU MM
BASOPHILS RELATIVE PERCENT: 0.4 % (ref 0–1)
BUN BLDV-MCNC: 16 MG/DL (ref 6–23)
CALCIUM SERPL-MCNC: 9.6 MG/DL (ref 8.3–10.6)
CHLORIDE BLD-SCNC: 109 MMOL/L (ref 99–110)
CO2: 23 MMOL/L (ref 21–32)
CREAT SERPL-MCNC: 0.9 MG/DL (ref 0.9–1.3)
DIFFERENTIAL TYPE: ABNORMAL
EOSINOPHILS ABSOLUTE: 0.1 K/CU MM
EOSINOPHILS RELATIVE PERCENT: 0.6 % (ref 0–3)
GFR AFRICAN AMERICAN: >60 ML/MIN/1.73M2
GFR NON-AFRICAN AMERICAN: >60 ML/MIN/1.73M2
GLUCOSE BLD-MCNC: 192 MG/DL (ref 70–99)
GLUCOSE BLD-MCNC: 209 MG/DL (ref 70–99)
GLUCOSE BLD-MCNC: 242 MG/DL (ref 70–99)
HCT VFR BLD CALC: 39.3 % (ref 42–52)
HEMOGLOBIN: 13.1 GM/DL (ref 13.5–18)
HIGH SENSITIVE C-REACTIVE PROTEIN: 2.5 MG/L
IMMATURE NEUTROPHIL %: 0.4 % (ref 0–0.43)
LYMPHOCYTES ABSOLUTE: 3.2 K/CU MM
LYMPHOCYTES RELATIVE PERCENT: 19.9 % (ref 24–44)
MCH RBC QN AUTO: 31 PG (ref 27–31)
MCHC RBC AUTO-ENTMCNC: 33.3 % (ref 32–36)
MCV RBC AUTO: 92.9 FL (ref 78–100)
MONOCYTES ABSOLUTE: 1.2 K/CU MM
MONOCYTES RELATIVE PERCENT: 7.4 % (ref 0–4)
PDW BLD-RTO: 13.2 % (ref 11.7–14.9)
PLATELET # BLD: 155 K/CU MM (ref 140–440)
PMV BLD AUTO: 11.6 FL (ref 7.5–11.1)
POTASSIUM SERPL-SCNC: 3.9 MMOL/L (ref 3.5–5.1)
PROCALCITONIN: 0.07
RBC # BLD: 4.23 M/CU MM (ref 4.6–6.2)
SEGMENTED NEUTROPHILS ABSOLUTE COUNT: 11.6 K/CU MM
SEGMENTED NEUTROPHILS RELATIVE PERCENT: 71.3 % (ref 36–66)
SODIUM BLD-SCNC: 144 MMOL/L (ref 135–145)
TOTAL IMMATURE NEUTOROPHIL: 0.06 K/CU MM
WBC # BLD: 16.3 K/CU MM (ref 4–10.5)

## 2018-12-24 PROCEDURE — 94660 CPAP INITIATION&MGMT: CPT

## 2018-12-24 PROCEDURE — 80048 BASIC METABOLIC PNL TOTAL CA: CPT

## 2018-12-24 PROCEDURE — 6370000000 HC RX 637 (ALT 250 FOR IP): Performed by: NURSE PRACTITIONER

## 2018-12-24 PROCEDURE — 85025 COMPLETE CBC W/AUTO DIFF WBC: CPT

## 2018-12-24 PROCEDURE — 86141 C-REACTIVE PROTEIN HS: CPT

## 2018-12-24 PROCEDURE — 36415 COLL VENOUS BLD VENIPUNCTURE: CPT

## 2018-12-24 PROCEDURE — 6370000000 HC RX 637 (ALT 250 FOR IP): Performed by: FAMILY MEDICINE

## 2018-12-24 PROCEDURE — 94640 AIRWAY INHALATION TREATMENT: CPT

## 2018-12-24 PROCEDURE — 82962 GLUCOSE BLOOD TEST: CPT

## 2018-12-24 PROCEDURE — 6360000002 HC RX W HCPCS: Performed by: FAMILY MEDICINE

## 2018-12-24 PROCEDURE — 2580000003 HC RX 258

## 2018-12-24 RX ORDER — PREDNISONE 20 MG/1
40 TABLET ORAL DAILY
Qty: 6 TABLET | Refills: 0 | Status: SHIPPED | OUTPATIENT
Start: 2018-12-24 | End: 2018-12-27

## 2018-12-24 RX ORDER — DOXYCYCLINE HYCLATE 100 MG
100 TABLET ORAL EVERY 12 HOURS SCHEDULED
Status: DISCONTINUED | OUTPATIENT
Start: 2018-12-24 | End: 2018-12-24 | Stop reason: HOSPADM

## 2018-12-24 RX ORDER — BUDESONIDE AND FORMOTEROL FUMARATE DIHYDRATE 160; 4.5 UG/1; UG/1
2 AEROSOL RESPIRATORY (INHALATION) 2 TIMES DAILY
Qty: 1 INHALER | Refills: 0 | Status: SHIPPED | OUTPATIENT
Start: 2018-12-24 | End: 2019-01-29 | Stop reason: ALTCHOICE

## 2018-12-24 RX ORDER — METHYLPREDNISOLONE SODIUM SUCCINATE 125 MG/2ML
125 INJECTION, POWDER, LYOPHILIZED, FOR SOLUTION INTRAMUSCULAR; INTRAVENOUS DAILY
Status: DISCONTINUED | OUTPATIENT
Start: 2018-12-24 | End: 2018-12-24 | Stop reason: HOSPADM

## 2018-12-24 RX ORDER — HYDROCHLOROTHIAZIDE 12.5 MG/1
12.5 TABLET ORAL DAILY
Qty: 30 TABLET | Refills: 0 | Status: SHIPPED | OUTPATIENT
Start: 2018-12-24 | End: 2019-01-29 | Stop reason: ALTCHOICE

## 2018-12-24 RX ORDER — DOXYCYCLINE HYCLATE 100 MG
100 TABLET ORAL EVERY 12 HOURS SCHEDULED
Qty: 18 TABLET | Refills: 0 | Status: SHIPPED | OUTPATIENT
Start: 2018-12-24 | End: 2019-01-02

## 2018-12-24 RX ADMIN — FOLIC ACID 1 MG: 1 TABLET ORAL at 11:08

## 2018-12-24 RX ADMIN — HYDROCODONE BITARTRATE AND ACETAMINOPHEN 1 TABLET: 5; 325 TABLET ORAL at 03:27

## 2018-12-24 RX ADMIN — INSULIN LISPRO 3 UNITS: 100 INJECTION, SOLUTION INTRAVENOUS; SUBCUTANEOUS at 11:28

## 2018-12-24 RX ADMIN — DOXYCYCLINE HYCLATE 100 MG: 100 TABLET, COATED ORAL at 11:08

## 2018-12-24 RX ADMIN — GLIMEPIRIDE 4 MG: 2 TABLET ORAL at 11:07

## 2018-12-24 RX ADMIN — GUAIFENESIN 600 MG: 600 TABLET, EXTENDED RELEASE ORAL at 11:08

## 2018-12-24 RX ADMIN — LINAGLIPTIN 5 MG: 5 TABLET, FILM COATED ORAL at 11:07

## 2018-12-24 RX ADMIN — METHYLPREDNISOLONE SODIUM SUCCINATE 125 MG: 125 INJECTION, POWDER, FOR SOLUTION INTRAMUSCULAR; INTRAVENOUS at 11:06

## 2018-12-24 RX ADMIN — HYDROCHLOROTHIAZIDE 12.5 MG: 25 TABLET ORAL at 11:07

## 2018-12-24 RX ADMIN — QUETIAPINE FUMARATE 100 MG: 100 TABLET ORAL at 11:07

## 2018-12-24 RX ADMIN — ASPIRIN 81 MG: 81 TABLET, COATED ORAL at 11:07

## 2018-12-24 RX ADMIN — INSULIN LISPRO 25 UNITS: 100 INJECTION, SOLUTION INTRAVENOUS; SUBCUTANEOUS at 11:28

## 2018-12-24 RX ADMIN — LOSARTAN POTASSIUM 50 MG: 50 TABLET ORAL at 11:08

## 2018-12-24 RX ADMIN — IPRATROPIUM BROMIDE AND ALBUTEROL SULFATE 3 ML: .5; 3 SOLUTION RESPIRATORY (INHALATION) at 00:42

## 2018-12-24 RX ADMIN — OMEGA-3-ACID ETHYL ESTERS 2 G: 1 CAPSULE, LIQUID FILLED ORAL at 11:07

## 2018-12-24 RX ADMIN — DOXAZOSIN 1 MG: 1 TABLET ORAL at 11:06

## 2018-12-24 RX ADMIN — IPRATROPIUM BROMIDE AND ALBUTEROL SULFATE 3 ML: .5; 3 SOLUTION RESPIRATORY (INHALATION) at 07:41

## 2018-12-24 RX ADMIN — CARVEDILOL 25 MG: 25 TABLET, FILM COATED ORAL at 11:08

## 2018-12-24 ASSESSMENT — PAIN SCALES - GENERAL
PAINLEVEL_OUTOF10: 0
PAINLEVEL_OUTOF10: 7

## 2018-12-24 ASSESSMENT — PAIN DESCRIPTION - PAIN TYPE: TYPE: CHRONIC PAIN

## 2018-12-24 NOTE — DISCHARGE SUMMARY
Ian  1970 [id number]  PCP:  GINNY Goff    Admit date: 12/23/2018  Admitting Physician: Enrique Naik MD    Discharge date: 12/24/2018 Discharge Physician: Enrique Naik MD      Reason for admission:   Chief Complaint   Patient presents with    Shortness of Breath     Reports of having cough for the past 2 weeks along with other cold like S&S. Called EMS due to having some SOB and vomiting. Given duoneb in route via [facility].  Cough     Present on Admission:   (Resolved) COPD exacerbation ([facility]   Type 2 diabetes mellitus ([facility]   Essential hypertension   Hyperlipidemia   Depression   COPD (chronic obstructive pulmonary disease) ([facility]       Discharge Diagnoses Include:  1. COPD  2. Diabetes  3. Hypertension  4. Depression  5. Morbid obesity    Hospital Course[de-identified] Patient was admitted to the hospital with an episode of shortness of breath secondary to COPD exacerbation. During his stay in the hospital patient edition he needed BiPAP but his by shortness of breath quickly responded to the treatment and he was off the BiPAP, off supplemental oxygen at the time of discharge was doing well without needing any supplemental oxygen. He was sent home to finish his course of antibiotics, steroids and does inhalers. Patient was also made aware that his blood sugars might be running high secondary to steroids.   Patient expressed understanding     /77   Pulse 92   Temp 96.8 °F (36 °C) (Temporal)   Resp 16   Ht 5' 9\" (1.753 m)   Wt (!) 317 lb 6.4 oz (144 kg)   SpO2 95%   BMI 46.87 kg/m²         Head: atraumatic & normocephalic  Ears: TM's bialterall normal with normal canals  Eyes: without pallor or icterus  Oral cavity: moist mucous membranes with normal dentition  Neck: supple with no lymphadenopathy, JVD down, trachea central  CVS: normal S1S2 with no additional heart sounds, no lower extremity edema  Respi: good air entry in both lung fields with no other adventious breath sounds  GI: soft & non tender, with +ve bowel sounds, no guarding ,rigidity or rebound tenderness  : no inguinal lymphadenopathy, no CVA tenderness  CNS: no focal weakness or sensory deficits peripherally, DTR's equal bilaterally, CN2-12 normal  Skin: no rash, purpurea or eruptions  MS: normal muscle strength, normal ROM in all major joints      Procedures:  Briefly on BiPAP    Significant Diagnostic Studies at discharge:   None significant    Patient Instructions:   Governor Lara   Home Medication Instructions SXM:[id number]    Printed on:12/24/18 1003   Medication Information                      albuterol (PROVENTIL HFA) 108 (90 BASE) MCG/ACT inhaler  Inhale 2 puffs into the lungs every 6 hours as needed for Wheezing or Shortness of Breath. aspirin 81 MG EC tablet  Take 81 mg by mouth daily. atorvastatin (LIPITOR) 40 MG tablet  Take 40 mg by mouth nightly              budesonide-formoterol (SYMBICORT) 160-4.5 MCG/ACT AERO  Inhale 2 puffs into the lungs 2 times daily             carvedilol (COREG) 12.5 MG tablet  Take 2 tablets by mouth 2 times daily (with meals). doxycycline hyclate (VIBRA-TABS) 100 MG tablet  Take 1 tablet by mouth every 12 hours for 9 days             fluticasone (FLOVENT HFA) 220 MCG/ACT inhaler  Inhale 1 puff into the lungs 2 times daily as needed.              folic acid (FOLVITE) 1 MG tablet  Take 1 tablet by mouth daily             glimepiride (AMARYL) 4 MG tablet  Take 4 mg by mouth 2 times daily             hydrochlorothiazide (HYDRODIURIL) 12.5 MG tablet  Take 1 tablet by mouth daily             Insulin Glargine (TOUJEO SOLOSTAR SC)  Inject 128 Units into the skin nightly              insulin lispro (HUMALOG) 100 UNIT/ML injection cartridge  Inject 25 Units into the skin 3 times daily (before meals)             insulin lispro (HUMALOG) 100 UNIT/ML injection vial  Inject 0-18 Units into the skin 3 times daily (with meals) Glucose: Dose:               No Insulin  140-199           3 Units  200-249 6 Units  250-299 9 Units  300-349 12 Units  350-400 15 Units  Over 400  18 Units             losartan (COZAAR) 50 MG tablet  Take 50 mg by mouth daily             nitroGLYCERIN (NITROSTAT) 0.4 MG SL tablet  up to max of 3 total doses. If no relief after 1 dose, call 911. omega-3 acid ethyl esters (LOVAZA) 1 G capsule  Take 2 g by mouth 2 times daily. predniSONE (DELTASONE) 20 MG tablet  Take 2 tablets by mouth daily for 3 days             sertraline (ZOLOFT) 25 MG tablet  Take 50 mg by mouth nightly              SITagliptin (JANUVIA) 100 MG tablet  Take 100 mg by mouth nightly              terazosin (HYTRIN) 2 MG capsule  Take 2 mg by mouth nightly             traZODone (DESYREL) 50 MG tablet  Take 50 mg by mouth nightly             zonisamide (ZONEGRAN) 100 MG capsule  Take 300 mg by mouth nightly Take 3 capsules by mouth every night at bedtime                  Code Status: Full Code     Consults: None    Diet: Diabetic    Activity: As tolerated   Work:    Discharged Condition: Fair    Prognosis: Fair    Disposition: Home      Follow-up with   1. PCP within   2-3    Days    Follow up labs: Basic       Discharge Physician Signed: Tyshawn Dai M.D. Time spent on discharge in the examination, evaluation, counseling and review of medications and discharge plan: 45 minutes    Electronically signed by Tyshawn Dai MD on 12/24/2018 at 10:09 AM      Comment: Please note this report has been produced using speech recognition software and may contain errors related to that system including errors in grammar, punctuation, and spelling, as well as words and phrases that may be inappropriate.  If there are any questions or concerns please feel free to contact the dictating provider for clarification

## 2018-12-27 LAB
EKG ATRIAL RATE: 85 BPM
EKG DIAGNOSIS: NORMAL
EKG P AXIS: 31 DEGREES
EKG P-R INTERVAL: 152 MS
EKG Q-T INTERVAL: 362 MS
EKG QRS DURATION: 94 MS
EKG QTC CALCULATION (BAZETT): 430 MS
EKG R AXIS: -9 DEGREES
EKG T AXIS: 46 DEGREES
EKG VENTRICULAR RATE: 85 BPM

## 2018-12-28 LAB
CULTURE: NORMAL
CULTURE: NORMAL
Lab: NORMAL
Lab: NORMAL
REPORT STATUS: NORMAL
REPORT STATUS: NORMAL
SPECIMEN: NORMAL
SPECIMEN: NORMAL

## 2019-01-29 ENCOUNTER — HOSPITAL ENCOUNTER (EMERGENCY)
Age: 49
Discharge: HOME | End: 2019-01-29
Attending: EMERGENCY MEDICINE
Payer: MEDICARE

## 2019-01-29 VITALS
HEART RATE: 92 BPM | RESPIRATION RATE: 18 BRPM | TEMPERATURE: 98.8 F | WEIGHT: 309.13 LBS | OXYGEN SATURATION: 98 % | DIASTOLIC BLOOD PRESSURE: 90 MMHG | SYSTOLIC BLOOD PRESSURE: 154 MMHG | BODY MASS INDEX: 44.26 KG/M2 | HEIGHT: 70 IN

## 2019-01-29 DIAGNOSIS — I10 HYPERTENSION, UNSPECIFIED TYPE: ICD-10-CM

## 2019-01-29 DIAGNOSIS — G43.019 INTRACTABLE MIGRAINE WITHOUT AURA AND WITHOUT STATUS MIGRAINOSUS: Primary | ICD-10-CM

## 2019-01-29 DIAGNOSIS — Z91.14 NONCOMPLIANCE WITH MEDICATION REGIMEN: ICD-10-CM

## 2019-01-29 PROCEDURE — 6370000000 HC RX 637 (ALT 250 FOR IP): Performed by: EMERGENCY MEDICINE

## 2019-01-29 PROCEDURE — 2580000003 HC RX 258: Performed by: EMERGENCY MEDICINE

## 2019-01-29 PROCEDURE — 96374 THER/PROPH/DIAG INJ IV PUSH: CPT

## 2019-01-29 PROCEDURE — 99283 EMERGENCY DEPT VISIT LOW MDM: CPT

## 2019-01-29 PROCEDURE — 6360000002 HC RX W HCPCS: Performed by: EMERGENCY MEDICINE

## 2019-01-29 PROCEDURE — 96375 TX/PRO/DX INJ NEW DRUG ADDON: CPT

## 2019-01-29 RX ORDER — METFORMIN HYDROCHLORIDE 500 MG/1
1000 TABLET, EXTENDED RELEASE ORAL 2 TIMES DAILY
Refills: 6 | COMMUNITY
Start: 2019-01-28

## 2019-01-29 RX ORDER — INSULIN GLARGINE 300 U/ML
144 INJECTION, SOLUTION SUBCUTANEOUS NIGHTLY
Refills: 6 | COMMUNITY
Start: 2019-01-28 | End: 2021-01-01

## 2019-01-29 RX ORDER — ZONISAMIDE 100 MG/1
100 CAPSULE ORAL ONCE
Status: COMPLETED | OUTPATIENT
Start: 2019-01-29 | End: 2019-01-29

## 2019-01-29 RX ORDER — CARVEDILOL 12.5 MG/1
12.5 TABLET ORAL 2 TIMES DAILY WITH MEALS
Status: DISCONTINUED | OUTPATIENT
Start: 2019-01-29 | End: 2019-01-29 | Stop reason: HOSPADM

## 2019-01-29 RX ORDER — HYDROCHLOROTHIAZIDE 50 MG/1
TABLET ORAL
Refills: 6 | COMMUNITY
Start: 2019-01-28 | End: 2020-02-29

## 2019-01-29 RX ORDER — DIPHENHYDRAMINE HYDROCHLORIDE 50 MG/ML
50 INJECTION INTRAMUSCULAR; INTRAVENOUS ONCE
Status: COMPLETED | OUTPATIENT
Start: 2019-01-29 | End: 2019-01-29

## 2019-01-29 RX ORDER — SERTRALINE HYDROCHLORIDE 100 MG/1
100 TABLET, FILM COATED ORAL DAILY
Refills: 6 | COMMUNITY
Start: 2019-01-28

## 2019-01-29 RX ORDER — SODIUM CHLORIDE 0.9 % (FLUSH) 0.9 %
10 SYRINGE (ML) INJECTION 2 TIMES DAILY
Status: DISCONTINUED | OUTPATIENT
Start: 2019-01-29 | End: 2019-01-29 | Stop reason: HOSPADM

## 2019-01-29 RX ADMIN — ZONISAMIDE 100 MG: 100 CAPSULE ORAL at 20:32

## 2019-01-29 RX ADMIN — SODIUM CHLORIDE, PRESERVATIVE FREE 10 ML: 5 INJECTION INTRAVENOUS at 17:56

## 2019-01-29 RX ADMIN — CARVEDILOL 12.5 MG: 12.5 TABLET, FILM COATED ORAL at 20:31

## 2019-01-29 RX ADMIN — SODIUM CHLORIDE, PRESERVATIVE FREE 10 ML: 5 INJECTION INTRAVENOUS at 17:52

## 2019-01-29 RX ADMIN — SODIUM CHLORIDE, PRESERVATIVE FREE 10 ML: 5 INJECTION INTRAVENOUS at 19:45

## 2019-01-29 RX ADMIN — PROCHLORPERAZINE EDISYLATE 10 MG: 5 INJECTION INTRAMUSCULAR; INTRAVENOUS at 17:56

## 2019-01-29 RX ADMIN — SODIUM CHLORIDE, PRESERVATIVE FREE 10 ML: 5 INJECTION INTRAVENOUS at 17:57

## 2019-01-29 RX ADMIN — DIPHENHYDRAMINE HYDROCHLORIDE 50 MG: 50 INJECTION INTRAMUSCULAR; INTRAVENOUS at 17:50

## 2019-01-29 RX ADMIN — SODIUM CHLORIDE, PRESERVATIVE FREE 10 ML: 5 INJECTION INTRAVENOUS at 17:58

## 2019-01-29 ASSESSMENT — PAIN SCALES - GENERAL
PAINLEVEL_OUTOF10: 10

## 2019-01-29 ASSESSMENT — PAIN DESCRIPTION - LOCATION: LOCATION: HEAD

## 2019-03-26 ENCOUNTER — HOSPITAL ENCOUNTER (OUTPATIENT)
Dept: SLEEP CENTER | Age: 49
Discharge: HOME | End: 2019-03-26
Payer: MEDICARE

## 2019-03-26 VITALS
DIASTOLIC BLOOD PRESSURE: 82 MMHG | HEART RATE: 88 BPM | BODY MASS INDEX: 45.1 KG/M2 | OXYGEN SATURATION: 96 % | SYSTOLIC BLOOD PRESSURE: 131 MMHG | WEIGHT: 315 LBS | HEIGHT: 70 IN

## 2019-03-26 DIAGNOSIS — G47.33 OSA (OBSTRUCTIVE SLEEP APNEA): Primary | ICD-10-CM

## 2019-03-26 PROCEDURE — 99211 OFF/OP EST MAY X REQ PHY/QHP: CPT | Performed by: INTERNAL MEDICINE

## 2019-03-26 ASSESSMENT — SLEEP AND FATIGUE QUESTIONNAIRES
HOW LIKELY ARE YOU TO NOD OFF OR FALL ASLEEP WHILE SITTING INACTIVE IN A PUBLIC PLACE: 0
HOW LIKELY ARE YOU TO NOD OFF OR FALL ASLEEP WHILE SITTING QUIETLY AFTER LUNCH WITHOUT ALCOHOL: 3
HOW LIKELY ARE YOU TO NOD OFF OR FALL ASLEEP WHILE LYING DOWN TO REST IN THE AFTERNOON WHEN CIRCUMSTANCES PERMIT: 3
HOW LIKELY ARE YOU TO NOD OFF OR FALL ASLEEP WHILE WATCHING TV: 3
HOW LIKELY ARE YOU TO NOD OFF OR FALL ASLEEP WHILE SITTING AND TALKING TO SOMEONE: 0
HOW LIKELY ARE YOU TO NOD OFF OR FALL ASLEEP IN A CAR, WHILE STOPPED FOR A FEW MINUTES IN TRAFFIC: 0
HOW LIKELY ARE YOU TO NOD OFF OR FALL ASLEEP WHEN YOU ARE A PASSENGER IN A CAR FOR AN HOUR WITHOUT A BREAK: 3
ESS TOTAL SCORE: 15
HOW LIKELY ARE YOU TO NOD OFF OR FALL ASLEEP WHILE SITTING AND READING: 3

## 2019-05-01 ENCOUNTER — HOSPITAL ENCOUNTER (OUTPATIENT)
Dept: SLEEP CENTER | Age: 49
Discharge: HOME | End: 2019-05-01
Payer: MEDICARE

## 2019-05-01 PROCEDURE — 95810 POLYSOM 6/> YRS 4/> PARAM: CPT

## 2019-05-08 NOTE — PROGRESS NOTES
Results for the most recent sleep study on Priscilla Bailey  1970 are finalized and available. Please see media tab.     Electronically signed by Demetrius Castillo on 5/7/2019 at 11:26 PM

## 2019-05-21 ENCOUNTER — HOSPITAL ENCOUNTER (OUTPATIENT)
Dept: SLEEP CENTER | Age: 49
Discharge: HOME | End: 2019-05-21
Payer: MEDICARE

## 2019-05-21 DIAGNOSIS — G47.33 OSA (OBSTRUCTIVE SLEEP APNEA): Primary | ICD-10-CM

## 2019-05-21 PROCEDURE — 9990000010 HC NO CHARGE VISIT: Performed by: INTERNAL MEDICINE

## 2019-05-21 NOTE — PROGRESS NOTES
Alonzo Garcia MD, Eddie Cano MD, Belinda Abbott MD, Michael Dooley MD, [facility]      [address] [facility].   [address], [address] [facility]   [place] [address]: [phone]  F: [phone]     Subjective:     Patient ID: Amanuel Ariza is a 44 y.o. male, referred to the sleep center for   Chief Complaint   Patient presents with    Sleep Apnea    Daytime Sleepiness    2 Week Follow-Up   .no change    Referring physician:  aleta Mccullough    History:    Social History     Socioeconomic History    Marital status: Single     Spouse name: Not on file    Number of children: 3    Years of education: Not on file    Highest education level: Not on file   Occupational History    Not on file   Social Needs    Financial resource strain: Not on file    Food insecurity:     Worry: Not on file     Inability: Not on file    Transportation needs:     Medical: Not on file     Non-medical: Not on file   Tobacco Use    Smoking status: Former Smoker     Packs/day: 0.25     Years: 0.00     Pack years: 0.00     Types: Cigarettes     Last attempt to quit: 2017     Years since quittin.4    Smokeless tobacco: Never Used    Tobacco comment: quit in , started again 2014   Substance and Sexual Activity    Alcohol use: No    Drug use: No    Sexual activity: Not Currently   Lifestyle    Physical activity:     Days per week: Not on file     Minutes per session: Not on file    Stress: Not on file   Relationships    Social connections:     Talks on phone: Not on file     Gets together: Not on file     Attends Yazidi service: Not on file     Active member of club or organization: Not on file     Attends meetings of clubs or organizations: Not on file     Relationship status: Not on file    Intimate partner violence:     Fear of current or ex partner: Not on file     Emotionally abused: Not on file     Physically abused: Not on file     Forced sexual activity: Not on file   Other Topics Concern    Not on file   Social History Narrative    unemployed       Prior to Admission medications    Medication Sig Start Date End Date Taking? Authorizing Provider   Cyanocobalamin (VITAMIN B 12 PO) Take by mouth   Yes Historical Provider, MD   Coenzyme Q10 (CO Q 10 PO) Take by mouth   Yes Historical Provider, MD   hydrochlorothiazide (HYDRODIURIL) 50 MG tablet  1/28/19  Yes Historical Provider, MD   Arina Daley SOLOSTAR 300 UNIT/ML injection pen  1/28/19  Yes Historical Provider, MD   metFORMIN (GLUCOPHAGE-XR) 500 MG extended release tablet  1/28/19  Yes Historical Provider, MD   sertraline (ZOLOFT) 50 MG tablet  1/28/19  Yes Historical Provider, MD   traZODone (DESYREL) 50 MG tablet Take 50 mg by mouth nightly   Yes Historical Provider, MD   losartan (COZAAR) 50 MG tablet Take 50 mg by mouth daily   Yes Historical Provider, MD   nitroGLYCERIN (NITROSTAT) 0.4 MG SL tablet up to max of 3 total doses.  If no relief after 1 dose, call 911. 6/1/18  Yes Jagdish Herr MD   atorvastatin (LIPITOR) 40 MG tablet Take 40 mg by mouth nightly    Yes Historical Provider, MD   SITagliptin (JANUVIA) 100 MG tablet Take 100 mg by mouth nightly    Yes Historical Provider, MD   glimepiride (AMARYL) 4 MG tablet Take 4 mg by mouth 2 times daily   Yes Historical Provider, MD   folic acid (FOLVITE) 1 MG tablet Take 1 tablet by mouth daily 8/19/16  Yes Sai Suarez MD   insulin lispro (HUMALOG) 100 UNIT/ML injection cartridge Inject 25 Units into the skin 3 times daily (before meals)  Patient taking differently: Inject 60 Units into the skin 3 times daily (before meals) 60 units with breakfast  50 units with lunch and supper 8/4/16  Yes Shayne Dance, MD   insulin lispro (HUMALOG) 100 UNIT/ML injection vial Inject 0-18 Units into the skin 3 times daily (with meals) Glucose: Dose:               No Insulin  140-199           3 Units  200-249 6 Units  250-299 9 Units  300-349 12 impairment    Infection of wound due to methicillin resistant Staphylococcus aureus (MRSA)    Shortness of breath    Sleep apnea    Cerebral artery occlusion with cerebral infarction (HCC)    Increased frequency of urination    Urinary urgency    Hypertensive emergency    Seizures ([facility]    Syncope    Wound infection following procedure    Wound infection    Infected sebaceous cyst    COPD (chronic obstructive pulmonary disease) (HCC)    Anxiety    Migraine    Hyperlipidemia    DKA, type 2 (HCC)    Hypokalemia    Thrombocytopenia (HCC)    Type 2 diabetes mellitus ([facility]    Essential hypertension    Hyperlipidemia    Depression       Past Medical History:   Diagnosis Date    Arthritis     Asthma     Atrial fibrillation (HCC)     CHF [facility]congestive [facility] failure) [facility]     COPD (chronic obstructive pulmonary disease) ([facility]     Depression     Diabetes mellitus ([facility])     Type 2    Diastolic heart failure ([facility])     Hyperlipidemia     Hypertension     Kidney stone     Migraine     Other disorders of kidney and ureter     Pneumonia     Psychiatric problem     Sleep apnea     Unspecified cerebral artery occlusion with cerebral infarction     \"10% loss of use of right arm and leg\"       Past Surgical History:   Procedure Laterality Date    ABDOMEN SURGERY      ASD REPAIR  2005    ASD REPAIR      BACK SURGERY  January 2012    Breeding    CARDIAC SURGERY  AUG 2013    REVEAL XT MONITOR #[id number]    CARPAL TUNNEL RELEASE      jeri    CHOLECYSTECTOMY      COLONOSCOPY      ELBOW SURGERY      EYE SURGERY      EYE SURGERY      bilateral    OTHER SURGICAL HISTORY  02/16/2017    I & D mid upper back    PERICARDIUM SURGERY  2005    post ASD repair with window       Family History   Problem Relation Age of Onset    Diabetes Mother     Diabetes Father     Heart Disease Father     Diabetes Paternal Uncle     Diabetes Maternal Grandmother     Diabetes Maternal Grandfather     Diabetes Paternal Grandmother     Diabetes Paternal Grandfather          Objective: There were no vitals filed for this visit. Inches  [place] -      Gen: No distress. Eyes: PERRL. No sclera icterus. No conjunctival injection. ENT: No discharge. Pharynx clear. External appearance of ears and nose normal.  Neck: Trachea midline. No obvious mass. Resp: No accessory muscle use. No crackles. No wheezes. No rhonchi. No dullness on percussion. CV: Regular rate. Regular rhythm. No murmur or rub. No edema. GI: Non-tender. Non-distended. No hernia. Skin: Warm, dry, normal texture and turgor. No nodule on exposed extremities. Lymph: No cervical LAD. No supraclavicular LAD. M/S: No cyanosis. No clubbing. No joint deformity. Psych: Oriented x 3. No anxiety. Awake. Alert. Intact judgement and insight.     Mallampati Airway Classification:   []1 []2 [x]3 []4        Sleep Complaints/Symptoms:    Normal Bedtime:      Normal Wake Time:   Average Sleep Time:      Number of Awakenings:    Duration of Sleep Complaints: 5 years    [x] Snoring     [] Improved [x] Not Improved    [] Choking/Gasping for Breath  [] Improved [] Not Improved       [x] Witnessed Apneas              [] Improved [x] Not Improved  [x] Daytime Sleepiness             [] Improved [x] Not Improved  [] Morning Headaches    [] Improved [] Not Improved  [] Frequent Awakenings       [] Improved [] Not Improved  [] Jerky Movements   [] Improved [] Not Improved   [] Restless Legs   [] Improved [] Not Improved   [] Difficulty Initiating Sleep  [] Improved [] Not Improved   [] Difficulty Maintaining Sleep  [] Improved [] Not Improved   [] Restless Sleep    [] Improved [] Not Improved   [] Sleep Paralysis    [] Improved [] Not Improved   [] Muscle Weakness w/ Emotion  [] Improved [] Not Improved  [] Other :     CPAP Usage:    [x]  Patient has never worn CPAP  []  Patient has worn CPAP previously but discontinued use  []  Current PAP user,  [years]   [] Patient Tolerates Well   []  Patient Does Not Tolerate     []  Patient Uses CPAP      []  More Than 4 Hours      []  Less Than 4 Hours  []  CPAP/BPAP/ASV Pressure Readings   []  CPAP Pressure      cm H20   []  BPAP Pressure       cm H20   []  ASV Pressure         cm H20      Assessment:      Diagnosis:  Severe jenni       Patient Active Problem List    Diagnosis Date Noted    Type 2 diabetes mellitus ([facility] 12/23/2018    Essential hypertension 12/23/2018    Hyperlipidemia 12/23/2018    Depression 12/23/2018    Hypokalemia 06/01/2017    Thrombocytopenia ([address] 06/01/2017    DKA, type 2 ([facility] 05/31/2017    COPD (chronic obstructive pulmonary disease) ([facility] 05/12/2017    Anxiety 05/12/2017    Migraine 05/12/2017    Hyperlipidemia 05/12/2017    Wound infection     Infected sebaceous cyst     Wound infection following procedure 03/04/2017    Syncope 08/18/2016    Anxiety 04/26/2016    Asthma 04/26/2016    Cardiac septal defect 04/26/2016     Overview Note:     Overview:   had hole in heart atrial - had open heart surgery to repair  2005      Chronic obstructive pulmonary disease ([facility] 04/26/2016    Hemiparesis following cerebrovascular accident (CVA) ([facility] 04/26/2016     Overview Note:     Overview:   right weakness      Depression 04/26/2016    Hyperlipidemia 04/26/2016    Calculus of kidney 04/26/2016     Overview Note:     Overview:   in the past had lithrotrepsy      Abnormal liver enzymes 04/26/2016    Memory impairment 04/26/2016     Overview Note:     Overview:   after stroke      Infection of wound due to methicillin resistant Staphylococcus aureus (MRSA) 04/26/2016    Shortness of breath 04/26/2016    Sleep apnea 04/26/2016     Overview Note:     Overview:   had C-PAP machine instructed to bring DOS      Cerebral artery occlusion with cerebral infarction ([facility] 04/26/2016     Overview Note:     Overview:   pt states had stroke prior to finding the atrial septal defect 2005-      months   []  4 months   []  6 months   []  1 year for CPAP compliance evaluation. Patient to return sooner, as needed. [x]  Follow up after sleep study   []  Other: ______________    No orders of the defined types were placed in this encounter.          Electronically signed by Lillian Oneill MD on 5/21/2019 at 12:14 PM

## 2019-06-14 ENCOUNTER — HOSPITAL ENCOUNTER (OUTPATIENT)
Dept: SLEEP CENTER | Age: 49
Discharge: HOME | End: 2019-06-14
Payer: MEDICARE

## 2019-06-14 PROCEDURE — 95811 POLYSOM 6/>YRS CPAP 4/> PARM: CPT

## 2019-06-15 NOTE — PROGRESS NOTES
6/15/2019  sleep study  for Joy Shaffer  1970 is complete. Results are pending physician review.     HME: [address]    Electronically signed by Priyanka Mathias on 6/15/2019 at 6:06 AM

## 2019-06-25 ENCOUNTER — TELEPHONE (OUTPATIENT)
Dept: SLEEP CENTER | Age: 49
End: 2019-06-25

## 2019-06-25 NOTE — TELEPHONE ENCOUNTER
Spoke with Mabel Blanton about Estefany Lose being out of network with his insurance. He chose [facility] instead to get his cpap equipment.

## 2019-07-19 ENCOUNTER — HOSPITAL ENCOUNTER (EMERGENCY)
Age: 49
Discharge: HOME | End: 2019-07-19
Attending: EMERGENCY MEDICINE
Payer: MEDICARE

## 2019-07-19 VITALS
OXYGEN SATURATION: 95 % | DIASTOLIC BLOOD PRESSURE: 90 MMHG | HEART RATE: 95 BPM | WEIGHT: 315 LBS | HEIGHT: 70 IN | SYSTOLIC BLOOD PRESSURE: 144 MMHG | TEMPERATURE: 98.9 F | BODY MASS INDEX: 45.1 KG/M2 | RESPIRATION RATE: 18 BRPM

## 2019-07-19 DIAGNOSIS — G89.29 CHRONIC MIDLINE LOW BACK PAIN WITHOUT SCIATICA: Primary | ICD-10-CM

## 2019-07-19 DIAGNOSIS — M54.50 CHRONIC MIDLINE LOW BACK PAIN WITHOUT SCIATICA: Primary | ICD-10-CM

## 2019-07-19 PROCEDURE — 99283 EMERGENCY DEPT VISIT LOW MDM: CPT

## 2019-07-19 PROCEDURE — 96372 THER/PROPH/DIAG INJ SC/IM: CPT

## 2019-07-19 PROCEDURE — 6370000000 HC RX 637 (ALT 250 FOR IP): Performed by: EMERGENCY MEDICINE

## 2019-07-19 PROCEDURE — 6360000002 HC RX W HCPCS: Performed by: EMERGENCY MEDICINE

## 2019-07-19 RX ORDER — LIDOCAINE 4 G/G
1 PATCH TOPICAL DAILY
Status: DISCONTINUED | OUTPATIENT
Start: 2019-07-19 | End: 2019-07-19 | Stop reason: HOSPADM

## 2019-07-19 RX ORDER — KETOROLAC TROMETHAMINE 30 MG/ML
30 INJECTION, SOLUTION INTRAMUSCULAR; INTRAVENOUS ONCE
Status: COMPLETED | OUTPATIENT
Start: 2019-07-19 | End: 2019-07-19

## 2019-07-19 RX ORDER — LIDOCAINE 50 MG/G
1 PATCH TOPICAL DAILY
Qty: 12 PATCH | Refills: 0 | Status: SHIPPED | OUTPATIENT
Start: 2019-07-19 | End: 2019-08-18

## 2019-07-19 RX ORDER — ACETAMINOPHEN 500 MG
500 TABLET ORAL EVERY 6 HOURS PRN
Qty: 30 TABLET | Refills: 0 | Status: ON HOLD | OUTPATIENT
Start: 2019-07-19 | End: 2021-01-01 | Stop reason: ALTCHOICE

## 2019-07-19 RX ORDER — CYCLOBENZAPRINE HCL 10 MG
10 TABLET ORAL 3 TIMES DAILY PRN
Qty: 15 TABLET | Refills: 0 | Status: SHIPPED | OUTPATIENT
Start: 2019-07-19 | End: 2019-07-29

## 2019-07-19 RX ADMIN — KETOROLAC TROMETHAMINE 30 MG: 30 INJECTION, SOLUTION INTRAMUSCULAR at 17:06

## 2019-07-19 ASSESSMENT — PAIN SCALES - GENERAL
PAINLEVEL_OUTOF10: 10
PAINLEVEL_OUTOF10: 10

## 2019-07-19 ASSESSMENT — PAIN DESCRIPTION - LOCATION: LOCATION: BACK

## 2019-07-19 ASSESSMENT — PAIN DESCRIPTION - PAIN TYPE: TYPE: CHRONIC PAIN;ACUTE PAIN

## 2019-07-19 NOTE — ED PROVIDER NOTES
History   Problem Relation Age of Onset    Diabetes Mother     Diabetes Father     Heart Disease Father     Diabetes Paternal Uncle     Diabetes Maternal Grandmother     Diabetes Maternal Grandfather     Diabetes Paternal Grandmother     Diabetes Paternal Grandfather      Social History     Socioeconomic History    Marital status: Single     Spouse name: Not on file    Number of children: 3    Years of education: Not on file    Highest education level: Not on file   Occupational History    Not on file   Social Needs    Financial resource strain: Not on file    Food insecurity:     Worry: Not on file     Inability: Not on file    Transportation needs:     Medical: Not on file     Non-medical: Not on file   Tobacco Use    Smoking status: Current Every Day Smoker     Packs/day: 0.25     Years: 0.00     Pack years: 0.00     Types: Cigarettes    Smokeless tobacco: Never Used    Tobacco comment: quit in 1994, started again 08/2014   Substance and Sexual Activity    Alcohol use: No    Drug use: No    Sexual activity: Not Currently   Lifestyle    Physical activity:     Days per week: Not on file     Minutes per session: Not on file    Stress: Not on file   Relationships    Social connections:     Talks on phone: Not on file     Gets together: Not on file     Attends Mormonism service: Not on file     Active member of club or organization: Not on file     Attends meetings of clubs or organizations: Not on file     Relationship status: Not on file    Intimate partner violence:     Fear of current or ex partner: Not on file     Emotionally abused: Not on file     Physically abused: Not on file     Forced sexual activity: Not on file   Other Topics Concern    Not on file   Social History Narrative    unemployed     Current Facility-Administered Medications   Medication Dose Route Frequency Provider Last Rate Last Dose    lidocaine 4 % external patch 1 patch  1 patch Transdermal Daily Leyda Andrade MD   1 patch at 07/19/19 9196     Current Outpatient Medications   Medication Sig Dispense Refill    lidocaine (LIDODERM) 5 % Place 1 patch onto the skin daily 12 hours on, 12 hours off. 12 patch 0    cyclobenzaprine (FLEXERIL) 10 MG tablet Take 1 tablet by mouth 3 times daily as needed for Muscle spasms 15 tablet 0    acetaminophen (APAP EXTRA STRENGTH) 500 MG tablet Take 1 tablet by mouth every 6 hours as needed for Pain 30 tablet 0    aspirin 81 MG EC tablet Take 81 mg by mouth daily.  Cyanocobalamin (VITAMIN B 12 PO) Take by mouth      Coenzyme Q10 (CO Q 10 PO) Take by mouth      hydrochlorothiazide (HYDRODIURIL) 50 MG tablet   6    TOUJEO SOLOSTAR 300 UNIT/ML injection pen   6    metFORMIN (GLUCOPHAGE-XR) 500 MG extended release tablet   6    sertraline (ZOLOFT) 50 MG tablet   6    traZODone (DESYREL) 50 MG tablet Take 50 mg by mouth nightly      losartan (COZAAR) 50 MG tablet Take 50 mg by mouth daily      nitroGLYCERIN (NITROSTAT) 0.4 MG SL tablet up to max of 3 total doses.  If no relief after 1 dose, call 911. 25 tablet 3    atorvastatin (LIPITOR) 40 MG tablet Take 40 mg by mouth nightly       SITagliptin (JANUVIA) 100 MG tablet Take 100 mg by mouth nightly       glimepiride (AMARYL) 4 MG tablet Take 4 mg by mouth 2 times daily      folic acid (FOLVITE) 1 MG tablet Take 1 tablet by mouth daily 30 tablet 0    insulin lispro (HUMALOG) 100 UNIT/ML injection cartridge Inject 25 Units into the skin 3 times daily (before meals) (Patient taking differently: Inject 60 Units into the skin 3 times daily (before meals) 60 units with breakfast  50 units with lunch and supper) 5 Cartridge 3    insulin lispro (HUMALOG) 100 UNIT/ML injection vial Inject 0-18 Units into the skin 3 times daily (with meals) Glucose: Dose:               No Insulin  140-199           3 Units  200-249 6 Units  250-299 9 Units  300-349 12 Units  350-400 15 Units  Over 400  18 Units 1 vial 0    terazosin (HYTRIN) 2 MG (if applicable):  No results found for this visit on 07/19/19. EKG: (All EKG's are interpreted by myself in the absence of a cardiologist)      MDM:  Patient's exam is unremarkable. Specifically he has no red flags on exam including no reported history of fever or chills, weakness. He has chronic diabetic neuropathy and states that he has some diminished sensation in both of his lower extremities from this. This is unchanged. Again I did watch the patient ambulate in the hallway using a cane which he states he does use at times. I explained to the patient that we do not treat chronic pain in the emergency department with narcotics. I did give him IM Toradol and a Lidoderm patch and will send him home with lidocaine patches, Tylenol and muscle relaxers. He was instructed to follow-up with his primary care physician. Clinical Impression:  1. Chronic midline low back pain without sciatica        Disposition Vitals:  [unfilled], [unfilled], [unfilled], [unfilled]    Disposition referral (if applicable):  GINNY Stewart  [address] [place] [address]    Schedule an appointment as soon as possible for a visit   If symptoms worsen      Disposition medications (if applicable):  New Prescriptions    ACETAMINOPHEN (APAP EXTRA STRENGTH) 500 MG TABLET    Take 1 tablet by mouth every 6 hours as needed for Pain    CYCLOBENZAPRINE (FLEXERIL) 10 MG TABLET    Take 1 tablet by mouth 3 times daily as needed for Muscle spasms    LIDOCAINE (LIDODERM) 5 %    Place 1 patch onto the skin daily 12 hours on, 12 hours off.          (Please note that portions of this note may have been completed with a voice recognition program. Efforts were made to edit the dictations but occasionally words are mis-transcribed.)    MD Dexter Bashir MD  07/19/19 8922

## 2019-08-28 ENCOUNTER — HOSPITAL ENCOUNTER (EMERGENCY)
Age: 49
Discharge: HOME | End: 2019-08-28
Attending: EMERGENCY MEDICINE
Payer: MEDICARE

## 2019-08-28 ENCOUNTER — APPOINTMENT (OUTPATIENT)
Dept: CT IMAGING | Age: 49
End: 2019-08-28
Payer: MEDICARE

## 2019-08-28 VITALS
HEIGHT: 70 IN | WEIGHT: 315 LBS | BODY MASS INDEX: 45.1 KG/M2 | RESPIRATION RATE: 18 BRPM | OXYGEN SATURATION: 94 % | TEMPERATURE: 98.3 F | DIASTOLIC BLOOD PRESSURE: 87 MMHG | SYSTOLIC BLOOD PRESSURE: 152 MMHG | HEART RATE: 79 BPM

## 2019-08-28 DIAGNOSIS — R10.9 RIGHT SIDED ABDOMINAL PAIN: Primary | ICD-10-CM

## 2019-08-28 DIAGNOSIS — R11.2 NON-INTRACTABLE VOMITING WITH NAUSEA, UNSPECIFIED VOMITING TYPE: ICD-10-CM

## 2019-08-28 LAB
ALBUMIN SERPL-MCNC: 4 GM/DL (ref 3.4–5)
ALP BLD-CCNC: 92 IU/L (ref 40–129)
ALT SERPL-CCNC: 36 U/L (ref 10–40)
ANION GAP SERPL CALCULATED.3IONS-SCNC: 11 MMOL/L (ref 4–16)
AST SERPL-CCNC: 36 IU/L (ref 15–37)
BASOPHILS ABSOLUTE: 0.1 K/CU MM
BASOPHILS RELATIVE PERCENT: 0.8 % (ref 0–1)
BILIRUB SERPL-MCNC: 0.4 MG/DL (ref 0–1)
BUN BLDV-MCNC: 12 MG/DL (ref 6–23)
CALCIUM SERPL-MCNC: 9.6 MG/DL (ref 8.3–10.6)
CHLORIDE BLD-SCNC: 107 MMOL/L (ref 99–110)
CO2: 22 MMOL/L (ref 21–32)
CREAT SERPL-MCNC: 0.9 MG/DL (ref 0.9–1.3)
DIFFERENTIAL TYPE: ABNORMAL
EOSINOPHILS ABSOLUTE: 0.2 K/CU MM
EOSINOPHILS RELATIVE PERCENT: 2.1 % (ref 0–3)
GFR AFRICAN AMERICAN: >60 ML/MIN/1.73M2
GFR NON-AFRICAN AMERICAN: >60 ML/MIN/1.73M2
GLUCOSE BLD-MCNC: 188 MG/DL (ref 70–99)
HCT VFR BLD CALC: 41.6 % (ref 42–52)
HEMOGLOBIN: 14 GM/DL (ref 13.5–18)
IMMATURE NEUTROPHIL %: 0.3 % (ref 0–0.43)
LIPASE: 49 IU/L (ref 13–60)
LYMPHOCYTES ABSOLUTE: 2.6 K/CU MM
LYMPHOCYTES RELATIVE PERCENT: 24.5 % (ref 24–44)
MCH RBC QN AUTO: 31 PG (ref 27–31)
MCHC RBC AUTO-ENTMCNC: 33.7 % (ref 32–36)
MCV RBC AUTO: 92 FL (ref 78–100)
MONOCYTES ABSOLUTE: 0.9 K/CU MM
MONOCYTES RELATIVE PERCENT: 8.7 % (ref 0–4)
NUCLEATED RBC %: 0 %
PDW BLD-RTO: 12.6 % (ref 11.7–14.9)
PLATELET # BLD: 143 K/CU MM (ref 140–440)
PMV BLD AUTO: 11 FL (ref 7.5–11.1)
POTASSIUM SERPL-SCNC: 4.2 MMOL/L (ref 3.5–5.1)
RBC # BLD: 4.52 M/CU MM (ref 4.6–6.2)
SEGMENTED NEUTROPHILS ABSOLUTE COUNT: 6.8 K/CU MM
SEGMENTED NEUTROPHILS RELATIVE PERCENT: 63.6 % (ref 36–66)
SODIUM BLD-SCNC: 140 MMOL/L (ref 135–145)
TOTAL IMMATURE NEUTOROPHIL: 0.03 K/CU MM
TOTAL NUCLEATED RBC: 0 K/CU MM
TOTAL PROTEIN: 7.2 GM/DL (ref 6.4–8.2)
WBC # BLD: 10.7 K/CU MM (ref 4–10.5)

## 2019-08-28 PROCEDURE — 96375 TX/PRO/DX INJ NEW DRUG ADDON: CPT

## 2019-08-28 PROCEDURE — 85025 COMPLETE CBC W/AUTO DIFF WBC: CPT

## 2019-08-28 PROCEDURE — 74177 CT ABD & PELVIS W/CONTRAST: CPT

## 2019-08-28 PROCEDURE — 99284 EMERGENCY DEPT VISIT MOD MDM: CPT

## 2019-08-28 PROCEDURE — 80053 COMPREHEN METABOLIC PANEL: CPT

## 2019-08-28 PROCEDURE — 2580000003 HC RX 258: Performed by: PHYSICIAN ASSISTANT

## 2019-08-28 PROCEDURE — 36415 COLL VENOUS BLD VENIPUNCTURE: CPT

## 2019-08-28 PROCEDURE — 6360000002 HC RX W HCPCS: Performed by: PHYSICIAN ASSISTANT

## 2019-08-28 PROCEDURE — 6360000004 HC RX CONTRAST MEDICATION: Performed by: PHYSICIAN ASSISTANT

## 2019-08-28 PROCEDURE — 83690 ASSAY OF LIPASE: CPT

## 2019-08-28 PROCEDURE — 96374 THER/PROPH/DIAG INJ IV PUSH: CPT

## 2019-08-28 RX ORDER — ONDANSETRON 2 MG/ML
4 INJECTION INTRAMUSCULAR; INTRAVENOUS ONCE
Status: COMPLETED | OUTPATIENT
Start: 2019-08-28 | End: 2019-08-28

## 2019-08-28 RX ORDER — DICYCLOMINE HYDROCHLORIDE 10 MG/1
10 CAPSULE ORAL 4 TIMES DAILY PRN
Qty: 30 CAPSULE | Refills: 0 | Status: SHIPPED | OUTPATIENT
Start: 2019-08-28 | End: 2020-02-29

## 2019-08-28 RX ORDER — MORPHINE SULFATE 4 MG/ML
4 INJECTION, SOLUTION INTRAMUSCULAR; INTRAVENOUS ONCE
Status: DISCONTINUED | OUTPATIENT
Start: 2019-08-28 | End: 2019-08-28

## 2019-08-28 RX ORDER — ONDANSETRON 4 MG/1
4 TABLET, ORALLY DISINTEGRATING ORAL EVERY 8 HOURS PRN
Qty: 10 TABLET | Refills: 0 | Status: SHIPPED | OUTPATIENT
Start: 2019-08-28 | End: 2020-02-29

## 2019-08-28 RX ORDER — SODIUM CHLORIDE 0.9 % (FLUSH) 0.9 %
10 SYRINGE (ML) INJECTION PRN
Status: DISCONTINUED | OUTPATIENT
Start: 2019-08-28 | End: 2019-08-28 | Stop reason: HOSPADM

## 2019-08-28 RX ADMIN — Medication 10 ML: at 14:12

## 2019-08-28 RX ADMIN — ONDANSETRON 4 MG: 2 INJECTION INTRAMUSCULAR; INTRAVENOUS at 13:25

## 2019-08-28 RX ADMIN — IOPAMIDOL 75 ML: 755 INJECTION, SOLUTION INTRAVENOUS at 14:10

## 2019-08-28 ASSESSMENT — PAIN DESCRIPTION - DESCRIPTORS: DESCRIPTORS: SHARP;BURNING;STABBING

## 2019-08-28 ASSESSMENT — PAIN DESCRIPTION - PAIN TYPE: TYPE: ACUTE PAIN

## 2019-08-28 ASSESSMENT — PAIN SCALES - GENERAL: PAINLEVEL_OUTOF10: 6

## 2019-08-28 ASSESSMENT — PAIN DESCRIPTION - LOCATION: LOCATION: ABDOMEN

## 2019-08-28 ASSESSMENT — PAIN DESCRIPTION - ORIENTATION: ORIENTATION: RIGHT

## 2019-08-28 NOTE — ED NOTES
Pt received Fentanyl 100mcg and Zofran 4mg via IV in route to ED.       Petros Tobin RN  08/28/19 9614

## 2019-08-28 NOTE — ED PROVIDER NOTES
folic acid (FOLVITE) 1 MG tablet Take 1 tablet by mouth daily 30 tablet 0    insulin lispro (HUMALOG) 100 UNIT/ML injection cartridge Inject 25 Units into the skin 3 times daily (before meals) (Patient taking differently: Inject 60 Units into the skin 3 times daily (before meals) 60 units with breakfast  50 units with lunch and supper) 5 Cartridge 3    insulin lispro (HUMALOG) 100 UNIT/ML injection vial Inject 0-18 Units into the skin 3 times daily (with meals) Glucose: Dose:               No Insulin  140-199           3 Units  200-249 6 Units  250-299 9 Units  300-349 12 Units  350-400 15 Units  Over 400  18 Units 1 vial 0    terazosin (HYTRIN) 2 MG capsule Take 2 mg by mouth nightly      carvedilol (COREG) 12.5 MG tablet Take 2 tablets by mouth 2 times daily (with meals). (Patient taking differently: Take 12.5 mg by mouth 2 times daily (with meals) Take one tablet by mouth two times a day with food.) 60 tablet 0    zonisamide (ZONEGRAN) 100 MG capsule Take 300 mg by mouth nightly Take 3 capsules by mouth every night at bedtime      omega-3 acid ethyl esters (LOVAZA) 1 G capsule Take 2 g by mouth 2 times daily.  albuterol (PROVENTIL HFA) 108 (90 BASE) MCG/ACT inhaler Inhale 2 puffs into the lungs every 6 hours as needed for Wheezing or Shortness of Breath. 1 Inhaler 0    aspirin 81 MG EC tablet Take 81 mg by mouth daily.          ALLERGIES    Allergies   Allergen Reactions    Azithromycin     Ciprofloxacin Other (See Comments)     lethargic       SOCIAL AND FAMILY HISTORY    Social History     Socioeconomic History    Marital status: Single     Spouse name: None    Number of children: 3    Years of education: None    Highest education level: None   Occupational History    None   Social Needs    Financial resource strain: None    Food insecurity:     Worry: None     Inability: None    Transportation needs:     Medical: None     Non-medical: None   Tobacco Use    Smoking status: Former GFR Non-African American >60 >60 mL/min/1.73m2    GFR African American >60 >60 mL/min/1.73m2    Anion Gap 11 4 - 16   Lipase   Result Value Ref Range    Lipase 49 13 - 60 IU/L           RADIOLOGY/PROCEDURES    CT ABDOMEN PELVIS W IV CONTRAST   Final Result   Multiple bilateral nephroliths with no evidence for obstruction      Degenerative facet arthropathy most pronounced along the right side L4-5 and   L5-S1 with neural foraminal narrowing at these levels. No acute abnormality. ED COURSE & MEDICAL DECISION MAKING       Vital signs and nursing notes reviewed during ED course. I have independently evaluated this patient. Supervising physician present in the Emergency Department, available for consultation, throughout entirety of  patient care. Patient presents as above which prompted workup. While in the ED today, labs and imaging were obtained. Labs reveal mild leukocytosis of 10.7 and elevated glucose of 188 without evidence concerning for DKA. CT of abdomen and pelvis obtained today and reveals no acute abnormality. Multiple bilateral nephroliths without evidence of obstruction. There is degenerative facet arthropathy most pronounced along the right side L4-L5 and L5-S1 with neuroforaminal narrowing but patient has no back pain or neurologic symptoms at this time. We did discuss these findings. Abdominal exam without peritoneal signs. Patient was treated with IV fentanyl and IV Zofran prior to arrival by EMS with resolution of pain and improvement in nausea. Patient then treated with IV Zofran with resolution of nausea and vomiting. Patient continues to have intermittent abdominal pain. Repeat abdominal exam also without peritoneal signs. Diagnosis of appendicitis was considered. At this time with a normal CT do feel patient is reasonable for discharge home with strict return precautions and trialing outpatient medications.   Patient I discussed that this could be another capsule Take 1 capsule by mouth 4 times daily as needed (abdominal pain), Disp-30 capsule, R-0Print      ondansetron (ZOFRAN ODT) 4 MG disintegrating tablet Take 1 tablet by mouth every 8 hours as needed for Nausea or Vomiting, Disp-10 tablet, R-0Print               Comment: Please note this report has been produced using speech recognition software and may contain errors related to that system including errors in grammar, punctuation, and spelling, as well as words and phrases that may be inappropriate. If there are any questions or concerns please feel free to contact the dictating provider for clarification.        Calvin Robins PA-C  08/28/19 9729

## 2019-11-20 ENCOUNTER — HOSPITAL ENCOUNTER (EMERGENCY)
Age: 49
Discharge: HOME | End: 2019-11-20
Attending: EMERGENCY MEDICINE
Payer: MEDICARE

## 2019-11-20 VITALS
TEMPERATURE: 98.2 F | HEIGHT: 69 IN | WEIGHT: 312 LBS | DIASTOLIC BLOOD PRESSURE: 90 MMHG | RESPIRATION RATE: 18 BRPM | BODY MASS INDEX: 46.21 KG/M2 | SYSTOLIC BLOOD PRESSURE: 167 MMHG | HEART RATE: 73 BPM | OXYGEN SATURATION: 96 %

## 2019-11-20 DIAGNOSIS — G43.001 MIGRAINE WITHOUT AURA AND WITH STATUS MIGRAINOSUS, NOT INTRACTABLE: Primary | ICD-10-CM

## 2019-11-20 PROCEDURE — 6370000000 HC RX 637 (ALT 250 FOR IP): Performed by: EMERGENCY MEDICINE

## 2019-11-20 PROCEDURE — 6360000002 HC RX W HCPCS: Performed by: EMERGENCY MEDICINE

## 2019-11-20 PROCEDURE — 96372 THER/PROPH/DIAG INJ SC/IM: CPT

## 2019-11-20 PROCEDURE — 99284 EMERGENCY DEPT VISIT MOD MDM: CPT

## 2019-11-20 RX ORDER — DIPHENHYDRAMINE HCL 25 MG
25 CAPSULE ORAL ONCE
Status: COMPLETED | OUTPATIENT
Start: 2019-11-20 | End: 2019-11-20

## 2019-11-20 RX ORDER — PROMETHAZINE HYDROCHLORIDE 25 MG/ML
25 INJECTION, SOLUTION INTRAMUSCULAR; INTRAVENOUS ONCE
Status: COMPLETED | OUTPATIENT
Start: 2019-11-20 | End: 2019-11-20

## 2019-11-20 RX ADMIN — BUTORPHANOL TARTRATE 2 MG: 2 INJECTION, SOLUTION INTRAMUSCULAR; INTRAVENOUS at 21:23

## 2019-11-20 RX ADMIN — PROMETHAZINE HYDROCHLORIDE 25 MG: 25 INJECTION INTRAMUSCULAR; INTRAVENOUS at 21:26

## 2019-11-20 RX ADMIN — DIPHENHYDRAMINE HYDROCHLORIDE 25 MG: 25 CAPSULE ORAL at 21:23

## 2019-11-20 ASSESSMENT — PAIN SCALES - GENERAL
PAINLEVEL_OUTOF10: 10
PAINLEVEL_OUTOF10: 7

## 2019-11-20 ASSESSMENT — ENCOUNTER SYMPTOMS
NAUSEA: 1
RESPIRATORY NEGATIVE: 1
BLURRED VISION: 0
EYES NEGATIVE: 1
EYE PAIN: 0
DIARRHEA: 0

## 2019-11-20 ASSESSMENT — PAIN DESCRIPTION - PAIN TYPE: TYPE: ACUTE PAIN

## 2019-11-20 ASSESSMENT — PAIN DESCRIPTION - LOCATION: LOCATION: HEAD

## 2019-12-17 ENCOUNTER — HOSPITAL ENCOUNTER (OUTPATIENT)
Dept: SLEEP CENTER | Age: 49
Discharge: HOME | End: 2019-12-17
Payer: MEDICARE

## 2019-12-17 PROCEDURE — 9990000010 HC NO CHARGE VISIT: Performed by: INTERNAL MEDICINE

## 2019-12-17 ASSESSMENT — SLEEP AND FATIGUE QUESTIONNAIRES
HOW LIKELY ARE YOU TO NOD OFF OR FALL ASLEEP WHEN YOU ARE A PASSENGER IN A CAR FOR AN HOUR WITHOUT A BREAK: 0
HOW LIKELY ARE YOU TO NOD OFF OR FALL ASLEEP WHILE WATCHING TV: 2
HOW LIKELY ARE YOU TO NOD OFF OR FALL ASLEEP IN A CAR, WHILE STOPPED FOR A FEW MINUTES IN TRAFFIC: 0
ESS TOTAL SCORE: 9
HOW LIKELY ARE YOU TO NOD OFF OR FALL ASLEEP WHILE SITTING AND READING: 2
HOW LIKELY ARE YOU TO NOD OFF OR FALL ASLEEP WHILE SITTING AND TALKING TO SOMEONE: 0
HOW LIKELY ARE YOU TO NOD OFF OR FALL ASLEEP WHILE SITTING INACTIVE IN A PUBLIC PLACE: 0
HOW LIKELY ARE YOU TO NOD OFF OR FALL ASLEEP WHILE SITTING QUIETLY AFTER LUNCH WITHOUT ALCOHOL: 2
HOW LIKELY ARE YOU TO NOD OFF OR FALL ASLEEP WHILE LYING DOWN TO REST IN THE AFTERNOON WHEN CIRCUMSTANCES PERMIT: 3

## 2020-02-29 ENCOUNTER — HOSPITAL ENCOUNTER (OUTPATIENT)
Age: 50
Setting detail: OBSERVATION
Discharge: HOME | End: 2020-03-02
Attending: EMERGENCY MEDICINE | Admitting: STUDENT IN AN ORGANIZED HEALTH CARE EDUCATION/TRAINING PROGRAM
Payer: MEDICARE

## 2020-02-29 PROBLEM — E11.9 DIABETES (HCC): Status: ACTIVE | Noted: 2020-02-29

## 2020-02-29 LAB
ALBUMIN SERPL-MCNC: 4.1 GM/DL (ref 3.4–5)
ALP BLD-CCNC: 106 IU/L (ref 40–129)
ALT SERPL-CCNC: 44 U/L (ref 10–40)
ANION GAP SERPL CALCULATED.3IONS-SCNC: 9 MMOL/L (ref 4–16)
AST SERPL-CCNC: 37 IU/L (ref 15–37)
BACTERIA: ABNORMAL /HPF
BASOPHILS ABSOLUTE: 0.1 K/CU MM
BASOPHILS RELATIVE PERCENT: 1 % (ref 0–1)
BILIRUB SERPL-MCNC: 0.5 MG/DL (ref 0–1)
BILIRUBIN URINE: NEGATIVE MG/DL
BLOOD, URINE: ABNORMAL
BUN BLDV-MCNC: 13 MG/DL (ref 6–23)
CALCIUM SERPL-MCNC: 8.8 MG/DL (ref 8.3–10.6)
CAST TYPE: ABNORMAL /HPF
CHLORIDE BLD-SCNC: 99 MMOL/L (ref 99–110)
CHP ED QC CHECK: NORMAL
CLARITY: ABNORMAL
CO2: 26 MMOL/L (ref 21–32)
COLOR: YELLOW
CREAT SERPL-MCNC: 0.9 MG/DL (ref 0.9–1.3)
CRYSTAL TYPE: ABNORMAL /HPF
DIFFERENTIAL TYPE: ABNORMAL
EOSINOPHILS ABSOLUTE: 0.2 K/CU MM
EOSINOPHILS RELATIVE PERCENT: 2.2 % (ref 0–3)
EPITHELIAL CELLS, UA: ABNORMAL /HPF
GFR AFRICAN AMERICAN: >60 ML/MIN/1.73M2
GFR NON-AFRICAN AMERICAN: >60 ML/MIN/1.73M2
GLUCOSE BLD-MCNC: 446 MG/DL
GLUCOSE BLD-MCNC: 446 MG/DL (ref 70–99)
GLUCOSE BLD-MCNC: 508 MG/DL (ref 70–99)
GLUCOSE, URINE: >1000 MG/DL
HCT VFR BLD CALC: 42.6 % (ref 42–52)
HEMOGLOBIN: 14.7 GM/DL (ref 13.5–18)
IMMATURE NEUTROPHIL %: 0.3 % (ref 0–0.43)
KETONES, URINE: NEGATIVE MG/DL
LACTATE: ABNORMAL MMOL/L (ref 0.4–2)
LEUKOCYTE ESTERASE, URINE: NEGATIVE
LIPASE: 38 IU/L (ref 13–60)
LYMPHOCYTES ABSOLUTE: 3.9 K/CU MM
LYMPHOCYTES RELATIVE PERCENT: 36.2 % (ref 24–44)
MCH RBC QN AUTO: 31.3 PG (ref 27–31)
MCHC RBC AUTO-ENTMCNC: 34.5 % (ref 32–36)
MCV RBC AUTO: 90.6 FL (ref 78–100)
MONOCYTES ABSOLUTE: 0.9 K/CU MM
MONOCYTES RELATIVE PERCENT: 8.5 % (ref 0–4)
MUCUS: NEGATIVE HPF
NITRITE URINE, QUANTITATIVE: NEGATIVE
PDW BLD-RTO: 12 % (ref 11.7–14.9)
PH, URINE: 6.5 (ref 5–8)
PLATELET # BLD: 178 K/CU MM (ref 140–440)
PMV BLD AUTO: 11.5 FL (ref 7.5–11.1)
POTASSIUM SERPL-SCNC: 4 MMOL/L (ref 3.5–5.1)
PRO-BNP: 37.81 PG/ML
PROTEIN UA: NEGATIVE MG/DL
RAPID INFLUENZA  B AGN: NEGATIVE
RAPID INFLUENZA A AGN: NEGATIVE
RBC # BLD: 4.7 M/CU MM (ref 4.6–6.2)
RBC URINE: ABNORMAL /HPF (ref 0–3)
SEGMENTED NEUTROPHILS ABSOLUTE COUNT: 5.5 K/CU MM
SEGMENTED NEUTROPHILS RELATIVE PERCENT: 51.8 % (ref 36–66)
SODIUM BLD-SCNC: 134 MMOL/L (ref 135–145)
SPECIFIC GRAVITY UA: 1 (ref 1–1.03)
TOTAL IMMATURE NEUTOROPHIL: 0.03 K/CU MM
TOTAL PROTEIN: 7.2 GM/DL (ref 6.4–8.2)
TROPONIN T: <0.01 NG/ML
UROBILINOGEN, URINE: 0.2 MG/DL (ref 0.2–1)
VOLUME, (UVOL): 12 ML (ref 10–12)
WBC # BLD: 10.7 K/CU MM (ref 4–10.5)
WBC UA: ABNORMAL /HPF (ref 0–2)

## 2020-02-29 PROCEDURE — 96372 THER/PROPH/DIAG INJ SC/IM: CPT

## 2020-02-29 PROCEDURE — 6370000000 HC RX 637 (ALT 250 FOR IP): Performed by: EMERGENCY MEDICINE

## 2020-02-29 PROCEDURE — 84484 ASSAY OF TROPONIN QUANT: CPT

## 2020-02-29 PROCEDURE — 96361 HYDRATE IV INFUSION ADD-ON: CPT

## 2020-02-29 PROCEDURE — 93005 ELECTROCARDIOGRAM TRACING: CPT | Performed by: EMERGENCY MEDICINE

## 2020-02-29 PROCEDURE — 80053 COMPREHEN METABOLIC PANEL: CPT

## 2020-02-29 PROCEDURE — 83690 ASSAY OF LIPASE: CPT

## 2020-02-29 PROCEDURE — 81001 URINALYSIS AUTO W/SCOPE: CPT

## 2020-02-29 PROCEDURE — 99285 EMERGENCY DEPT VISIT HI MDM: CPT

## 2020-02-29 PROCEDURE — 6360000002 HC RX W HCPCS: Performed by: EMERGENCY MEDICINE

## 2020-02-29 PROCEDURE — 83880 ASSAY OF NATRIURETIC PEPTIDE: CPT

## 2020-02-29 PROCEDURE — 83605 ASSAY OF LACTIC ACID: CPT

## 2020-02-29 PROCEDURE — 96374 THER/PROPH/DIAG INJ IV PUSH: CPT

## 2020-02-29 PROCEDURE — G0378 HOSPITAL OBSERVATION PER HR: HCPCS

## 2020-02-29 PROCEDURE — 2580000003 HC RX 258: Performed by: EMERGENCY MEDICINE

## 2020-02-29 PROCEDURE — 83036 HEMOGLOBIN GLYCOSYLATED A1C: CPT

## 2020-02-29 PROCEDURE — 87804 INFLUENZA ASSAY W/OPTIC: CPT

## 2020-02-29 PROCEDURE — 82962 GLUCOSE BLOOD TEST: CPT

## 2020-02-29 PROCEDURE — 85025 COMPLETE CBC W/AUTO DIFF WBC: CPT

## 2020-02-29 RX ORDER — FOLIC ACID 1 MG/1
1 TABLET ORAL DAILY
Status: DISCONTINUED | OUTPATIENT
Start: 2020-03-01 | End: 2020-03-02 | Stop reason: HOSPADM

## 2020-02-29 RX ORDER — ALBUTEROL SULFATE 90 UG/1
2 AEROSOL, METERED RESPIRATORY (INHALATION) EVERY 6 HOURS PRN
Status: DISCONTINUED | OUTPATIENT
Start: 2020-02-29 | End: 2020-03-02 | Stop reason: HOSPADM

## 2020-02-29 RX ORDER — POLYETHYLENE GLYCOL 3350 17 G/17G
17 POWDER, FOR SOLUTION ORAL DAILY PRN
Status: DISCONTINUED | OUTPATIENT
Start: 2020-02-29 | End: 2020-03-02 | Stop reason: HOSPADM

## 2020-02-29 RX ORDER — DOXAZOSIN MESYLATE 1 MG/1
2 TABLET ORAL DAILY
Status: DISCONTINUED | OUTPATIENT
Start: 2020-03-01 | End: 2020-03-02 | Stop reason: HOSPADM

## 2020-02-29 RX ORDER — DICYCLOMINE HYDROCHLORIDE 10 MG/1
10 CAPSULE ORAL 4 TIMES DAILY PRN
Status: DISCONTINUED | OUTPATIENT
Start: 2020-02-29 | End: 2020-03-02 | Stop reason: HOSPADM

## 2020-02-29 RX ORDER — 0.9 % SODIUM CHLORIDE 0.9 %
500 INTRAVENOUS SOLUTION INTRAVENOUS ONCE
Status: COMPLETED | OUTPATIENT
Start: 2020-02-29 | End: 2020-03-01

## 2020-02-29 RX ORDER — ZONISAMIDE 100 MG/1
300 CAPSULE ORAL NIGHTLY
Status: DISCONTINUED | OUTPATIENT
Start: 2020-03-01 | End: 2020-03-02 | Stop reason: HOSPADM

## 2020-02-29 RX ORDER — ACETAMINOPHEN 500 MG
500 TABLET ORAL EVERY 6 HOURS PRN
Status: DISCONTINUED | OUTPATIENT
Start: 2020-02-29 | End: 2020-03-01

## 2020-02-29 RX ORDER — DEXTROSE MONOHYDRATE 50 MG/ML
100 INJECTION, SOLUTION INTRAVENOUS PRN
Status: DISCONTINUED | OUTPATIENT
Start: 2020-02-29 | End: 2020-03-02 | Stop reason: HOSPADM

## 2020-02-29 RX ORDER — ACETAMINOPHEN 325 MG/1
650 TABLET ORAL EVERY 6 HOURS PRN
Status: DISCONTINUED | OUTPATIENT
Start: 2020-02-29 | End: 2020-03-02 | Stop reason: HOSPADM

## 2020-02-29 RX ORDER — TRAZODONE HYDROCHLORIDE 50 MG/1
50 TABLET ORAL NIGHTLY
Status: DISCONTINUED | OUTPATIENT
Start: 2020-03-01 | End: 2020-03-02 | Stop reason: HOSPADM

## 2020-02-29 RX ORDER — GLIMEPIRIDE 2 MG/1
4 TABLET ORAL 2 TIMES DAILY WITH MEALS
Status: DISCONTINUED | OUTPATIENT
Start: 2020-03-01 | End: 2020-03-02 | Stop reason: HOSPADM

## 2020-02-29 RX ORDER — NITROGLYCERIN 0.4 MG/1
0.4 TABLET SUBLINGUAL EVERY 5 MIN PRN
Status: DISCONTINUED | OUTPATIENT
Start: 2020-03-01 | End: 2020-03-02 | Stop reason: HOSPADM

## 2020-02-29 RX ORDER — ATORVASTATIN CALCIUM 40 MG/1
40 TABLET, FILM COATED ORAL NIGHTLY
Status: DISCONTINUED | OUTPATIENT
Start: 2020-03-01 | End: 2020-03-02 | Stop reason: HOSPADM

## 2020-02-29 RX ORDER — ONDANSETRON 2 MG/ML
4 INJECTION INTRAMUSCULAR; INTRAVENOUS ONCE
Status: COMPLETED | OUTPATIENT
Start: 2020-02-29 | End: 2020-02-29

## 2020-02-29 RX ORDER — ONDANSETRON 2 MG/ML
4 INJECTION INTRAMUSCULAR; INTRAVENOUS EVERY 6 HOURS PRN
Status: DISCONTINUED | OUTPATIENT
Start: 2020-02-29 | End: 2020-03-02 | Stop reason: HOSPADM

## 2020-02-29 RX ORDER — 0.9 % SODIUM CHLORIDE 0.9 %
500 INTRAVENOUS SOLUTION INTRAVENOUS ONCE
Status: COMPLETED | OUTPATIENT
Start: 2020-02-29 | End: 2020-02-29

## 2020-02-29 RX ORDER — ASPIRIN 81 MG/1
81 TABLET ORAL DAILY
Status: DISCONTINUED | OUTPATIENT
Start: 2020-03-01 | End: 2020-03-02 | Stop reason: HOSPADM

## 2020-02-29 RX ORDER — HYDROCHLOROTHIAZIDE 25 MG/1
25 TABLET ORAL DAILY
Status: DISCONTINUED | OUTPATIENT
Start: 2020-03-01 | End: 2020-03-02 | Stop reason: HOSPADM

## 2020-02-29 RX ORDER — CARVEDILOL 25 MG/1
25 TABLET ORAL 2 TIMES DAILY WITH MEALS
Status: DISCONTINUED | OUTPATIENT
Start: 2020-03-01 | End: 2020-03-02 | Stop reason: HOSPADM

## 2020-02-29 RX ORDER — ACETAMINOPHEN 650 MG/1
650 SUPPOSITORY RECTAL EVERY 6 HOURS PRN
Status: DISCONTINUED | OUTPATIENT
Start: 2020-02-29 | End: 2020-03-02 | Stop reason: HOSPADM

## 2020-02-29 RX ORDER — NICOTINE POLACRILEX 4 MG
15 LOZENGE BUCCAL PRN
Status: DISCONTINUED | OUTPATIENT
Start: 2020-02-29 | End: 2020-03-02 | Stop reason: HOSPADM

## 2020-02-29 RX ORDER — PROMETHAZINE HYDROCHLORIDE 25 MG/1
12.5 TABLET ORAL EVERY 6 HOURS PRN
Status: DISCONTINUED | OUTPATIENT
Start: 2020-02-29 | End: 2020-03-02 | Stop reason: HOSPADM

## 2020-02-29 RX ORDER — SODIUM CHLORIDE 0.9 % (FLUSH) 0.9 %
10 SYRINGE (ML) INJECTION EVERY 12 HOURS SCHEDULED
Status: DISCONTINUED | OUTPATIENT
Start: 2020-03-01 | End: 2020-03-02 | Stop reason: HOSPADM

## 2020-02-29 RX ORDER — SODIUM CHLORIDE 0.9 % (FLUSH) 0.9 %
10 SYRINGE (ML) INJECTION PRN
Status: DISCONTINUED | OUTPATIENT
Start: 2020-02-29 | End: 2020-03-02 | Stop reason: HOSPADM

## 2020-02-29 RX ORDER — DEXTROSE MONOHYDRATE 25 G/50ML
12.5 INJECTION, SOLUTION INTRAVENOUS PRN
Status: DISCONTINUED | OUTPATIENT
Start: 2020-02-29 | End: 2020-03-02 | Stop reason: HOSPADM

## 2020-02-29 RX ORDER — SODIUM CHLORIDE 9 MG/ML
INJECTION, SOLUTION INTRAVENOUS CONTINUOUS
Status: DISCONTINUED | OUTPATIENT
Start: 2020-03-01 | End: 2020-03-02 | Stop reason: HOSPADM

## 2020-02-29 RX ORDER — LOSARTAN POTASSIUM 50 MG/1
50 TABLET ORAL DAILY
Status: DISCONTINUED | OUTPATIENT
Start: 2020-03-01 | End: 2020-03-02 | Stop reason: HOSPADM

## 2020-02-29 RX ADMIN — SODIUM CHLORIDE 500 ML: 9 INJECTION, SOLUTION INTRAVENOUS at 22:54

## 2020-02-29 RX ADMIN — ONDANSETRON 4 MG: 2 INJECTION INTRAMUSCULAR; INTRAVENOUS at 21:45

## 2020-02-29 RX ADMIN — INSULIN HUMAN 10 UNITS: 100 INJECTION, SOLUTION PARENTERAL at 22:54

## 2020-02-29 RX ADMIN — SODIUM CHLORIDE 500 ML: 9 INJECTION, SOLUTION INTRAVENOUS at 21:45

## 2020-02-29 ASSESSMENT — PAIN DESCRIPTION - PAIN TYPE: TYPE: ACUTE PAIN

## 2020-02-29 ASSESSMENT — PAIN SCALES - GENERAL: PAINLEVEL_OUTOF10: 7

## 2020-02-29 ASSESSMENT — PAIN DESCRIPTION - ONSET: ONSET: PROGRESSIVE

## 2020-02-29 ASSESSMENT — PAIN - FUNCTIONAL ASSESSMENT: PAIN_FUNCTIONAL_ASSESSMENT: PREVENTS OR INTERFERES SOME ACTIVE ACTIVITIES AND ADLS

## 2020-02-29 ASSESSMENT — PAIN DESCRIPTION - LOCATION: LOCATION: GENERALIZED

## 2020-02-29 ASSESSMENT — PAIN DESCRIPTION - PROGRESSION: CLINICAL_PROGRESSION: GRADUALLY WORSENING

## 2020-02-29 ASSESSMENT — PAIN DESCRIPTION - DESCRIPTORS: DESCRIPTORS: ACHING

## 2020-02-29 ASSESSMENT — PAIN DESCRIPTION - FREQUENCY: FREQUENCY: CONTINUOUS

## 2020-03-01 LAB
ANION GAP SERPL CALCULATED.3IONS-SCNC: 11 MMOL/L (ref 4–16)
BASOPHILS ABSOLUTE: 0.1 K/CU MM
BASOPHILS RELATIVE PERCENT: 1.3 % (ref 0–1)
BUN BLDV-MCNC: 12 MG/DL (ref 6–23)
CALCIUM SERPL-MCNC: 8.6 MG/DL (ref 8.3–10.6)
CHLORIDE BLD-SCNC: 107 MMOL/L (ref 99–110)
CO2: 21 MMOL/L (ref 21–32)
CREAT SERPL-MCNC: 0.9 MG/DL (ref 0.9–1.3)
DIFFERENTIAL TYPE: ABNORMAL
EKG ATRIAL RATE: 75 BPM
EKG DIAGNOSIS: NORMAL
EKG P AXIS: 22 DEGREES
EKG P-R INTERVAL: 164 MS
EKG Q-T INTERVAL: 380 MS
EKG QRS DURATION: 96 MS
EKG QTC CALCULATION (BAZETT): 424 MS
EKG R AXIS: -11 DEGREES
EKG T AXIS: 48 DEGREES
EKG VENTRICULAR RATE: 75 BPM
EOSINOPHILS ABSOLUTE: 0.3 K/CU MM
EOSINOPHILS RELATIVE PERCENT: 2.7 % (ref 0–3)
ESTIMATED AVERAGE GLUCOSE: 243 MG/DL
GFR AFRICAN AMERICAN: >60 ML/MIN/1.73M2
GFR NON-AFRICAN AMERICAN: >60 ML/MIN/1.73M2
GLUCOSE BLD-MCNC: 173 MG/DL (ref 70–99)
GLUCOSE BLD-MCNC: 235 MG/DL (ref 70–99)
GLUCOSE BLD-MCNC: 253 MG/DL (ref 70–99)
GLUCOSE BLD-MCNC: 255 MG/DL (ref 70–99)
GLUCOSE BLD-MCNC: 300 MG/DL (ref 70–99)
GLUCOSE BLD-MCNC: 336 MG/DL (ref 70–99)
HBA1C MFR BLD: 10.1 % (ref 4.2–6.3)
HCT VFR BLD CALC: 41.3 % (ref 42–52)
HEMOGLOBIN: 14.1 GM/DL (ref 13.5–18)
IMMATURE NEUTROPHIL %: 0.2 % (ref 0–0.43)
LYMPHOCYTES ABSOLUTE: 4 K/CU MM
LYMPHOCYTES RELATIVE PERCENT: 41.2 % (ref 24–44)
MCH RBC QN AUTO: 30.8 PG (ref 27–31)
MCHC RBC AUTO-ENTMCNC: 34.1 % (ref 32–36)
MCV RBC AUTO: 90.2 FL (ref 78–100)
MONOCYTES ABSOLUTE: 0.9 K/CU MM
MONOCYTES RELATIVE PERCENT: 9.3 % (ref 0–4)
PDW BLD-RTO: 12.1 % (ref 11.7–14.9)
PLATELET # BLD: 168 K/CU MM (ref 140–440)
PMV BLD AUTO: 11.1 FL (ref 7.5–11.1)
POTASSIUM SERPL-SCNC: 3.8 MMOL/L (ref 3.5–5.1)
RBC # BLD: 4.58 M/CU MM (ref 4.6–6.2)
SEGMENTED NEUTROPHILS ABSOLUTE COUNT: 4.4 K/CU MM
SEGMENTED NEUTROPHILS RELATIVE PERCENT: 45.3 % (ref 36–66)
SODIUM BLD-SCNC: 139 MMOL/L (ref 135–145)
TOTAL IMMATURE NEUTOROPHIL: 0.02 K/CU MM
WBC # BLD: 9.6 K/CU MM (ref 4–10.5)

## 2020-03-01 PROCEDURE — 6360000002 HC RX W HCPCS: Performed by: STUDENT IN AN ORGANIZED HEALTH CARE EDUCATION/TRAINING PROGRAM

## 2020-03-01 PROCEDURE — 6360000002 HC RX W HCPCS: Performed by: NURSE PRACTITIONER

## 2020-03-01 PROCEDURE — 6370000000 HC RX 637 (ALT 250 FOR IP): Performed by: NURSE PRACTITIONER

## 2020-03-01 PROCEDURE — G0378 HOSPITAL OBSERVATION PER HR: HCPCS

## 2020-03-01 PROCEDURE — 94660 CPAP INITIATION&MGMT: CPT

## 2020-03-01 PROCEDURE — 85025 COMPLETE CBC W/AUTO DIFF WBC: CPT

## 2020-03-01 PROCEDURE — 6370000000 HC RX 637 (ALT 250 FOR IP): Performed by: STUDENT IN AN ORGANIZED HEALTH CARE EDUCATION/TRAINING PROGRAM

## 2020-03-01 PROCEDURE — 96372 THER/PROPH/DIAG INJ SC/IM: CPT

## 2020-03-01 PROCEDURE — 36415 COLL VENOUS BLD VENIPUNCTURE: CPT

## 2020-03-01 PROCEDURE — 82962 GLUCOSE BLOOD TEST: CPT

## 2020-03-01 PROCEDURE — 2580000003 HC RX 258: Performed by: NURSE PRACTITIONER

## 2020-03-01 PROCEDURE — 96375 TX/PRO/DX INJ NEW DRUG ADDON: CPT

## 2020-03-01 PROCEDURE — 80048 BASIC METABOLIC PNL TOTAL CA: CPT

## 2020-03-01 PROCEDURE — 93010 ELECTROCARDIOGRAM REPORT: CPT | Performed by: INTERNAL MEDICINE

## 2020-03-01 RX ORDER — KETOROLAC TROMETHAMINE 30 MG/ML
30 INJECTION, SOLUTION INTRAMUSCULAR; INTRAVENOUS ONCE
Status: COMPLETED | OUTPATIENT
Start: 2020-03-01 | End: 2020-03-01

## 2020-03-01 RX ORDER — DIPHENHYDRAMINE HCL 25 MG
50 CAPSULE ORAL ONCE
Status: COMPLETED | OUTPATIENT
Start: 2020-03-01 | End: 2020-03-01

## 2020-03-01 RX ORDER — PROCHLORPERAZINE EDISYLATE 5 MG/ML
10 INJECTION INTRAMUSCULAR; INTRAVENOUS ONCE
Status: COMPLETED | OUTPATIENT
Start: 2020-03-01 | End: 2020-03-01

## 2020-03-01 RX ADMIN — TRAZODONE HYDROCHLORIDE 50 MG: 50 TABLET ORAL at 00:57

## 2020-03-01 RX ADMIN — KETOROLAC TROMETHAMINE 30 MG: 30 INJECTION, SOLUTION INTRAMUSCULAR; INTRAVENOUS at 16:49

## 2020-03-01 RX ADMIN — PROCHLORPERAZINE EDISYLATE 10 MG: 5 INJECTION INTRAMUSCULAR; INTRAVENOUS at 16:49

## 2020-03-01 RX ADMIN — INSULIN LISPRO 3 UNITS: 100 INJECTION, SOLUTION INTRAVENOUS; SUBCUTANEOUS at 20:36

## 2020-03-01 RX ADMIN — INSULIN LISPRO 4 UNITS: 100 INJECTION, SOLUTION INTRAVENOUS; SUBCUTANEOUS at 00:57

## 2020-03-01 RX ADMIN — ATORVASTATIN CALCIUM 40 MG: 40 TABLET, FILM COATED ORAL at 20:31

## 2020-03-01 RX ADMIN — ZONISAMIDE 300 MG: 100 CAPSULE ORAL at 20:31

## 2020-03-01 RX ADMIN — SODIUM CHLORIDE: 9 INJECTION, SOLUTION INTRAVENOUS at 00:56

## 2020-03-01 RX ADMIN — LINAGLIPTIN 5 MG: 5 TABLET, FILM COATED ORAL at 09:12

## 2020-03-01 RX ADMIN — ATORVASTATIN CALCIUM 40 MG: 40 TABLET, FILM COATED ORAL at 00:57

## 2020-03-01 RX ADMIN — FOLIC ACID 1 MG: 1 TABLET ORAL at 09:16

## 2020-03-01 RX ADMIN — SODIUM CHLORIDE: 9 INJECTION, SOLUTION INTRAVENOUS at 12:15

## 2020-03-01 RX ADMIN — TRAZODONE HYDROCHLORIDE 50 MG: 50 TABLET ORAL at 20:31

## 2020-03-01 RX ADMIN — ZONISAMIDE 300 MG: 100 CAPSULE ORAL at 00:57

## 2020-03-01 RX ADMIN — CARVEDILOL 25 MG: 25 TABLET, FILM COATED ORAL at 09:13

## 2020-03-01 RX ADMIN — SERTRALINE 25 MG: 50 TABLET, FILM COATED ORAL at 09:13

## 2020-03-01 RX ADMIN — DIPHENHYDRAMINE HYDROCHLORIDE 50 MG: 25 CAPSULE ORAL at 16:49

## 2020-03-01 RX ADMIN — LOSARTAN POTASSIUM 50 MG: 50 TABLET ORAL at 09:13

## 2020-03-01 RX ADMIN — DOXAZOSIN 2 MG: 1 TABLET ORAL at 09:11

## 2020-03-01 RX ADMIN — ASPIRIN 81 MG: 81 TABLET, COATED ORAL at 09:13

## 2020-03-01 RX ADMIN — CARVEDILOL 25 MG: 25 TABLET, FILM COATED ORAL at 16:49

## 2020-03-01 RX ADMIN — ENOXAPARIN SODIUM 40 MG: 40 INJECTION SUBCUTANEOUS at 09:10

## 2020-03-01 ASSESSMENT — PAIN SCALES - GENERAL
PAINLEVEL_OUTOF10: 0
PAINLEVEL_OUTOF10: 0
PAINLEVEL_OUTOF10: 8
PAINLEVEL_OUTOF10: 0

## 2020-03-01 NOTE — PROGRESS NOTES
Mago was reported, by his nurse, that he initially refused his bed alarm. Mago was educated that the bed alarm was for his safety and using the bed alarm could help prevent him from falling is he woke up groggy and attempted to use the restroom in the dark. He was asked again if he would use the bed alarm and he then stated, \"I guess so. \" Michael Rivera RN was present for this conversation.

## 2020-03-01 NOTE — ED PROVIDER NOTES
Emergency Department Encounter    Patient: Aaron Okeefe  MRN: [id number]  : 1970  Date of Evaluation: 3/1/2020  ED Physician:  Yaquelin Higgins    Triage Chief Complaint:   Emesis (Patient c/o of N/V for the last couple days with generalized fatigue noted. Patient's blood glucose has been runningin the 500 at home. ); Nausea; and Fatigue    Tribal:  Aaron Okeefe is a 44 y.o. male that presents due to fatigue, vomiting, diarrhea for 3 days. Patient  states he has been very tired and has had a lot of trouble getting out of bed over the past 3 days. He has had repeated episodes of nonbloody diarrhea and nonbloody nonbilious vomiting. He denies fevers or chills. Denies new chest pain or shortness of breath. Has had some mild generalized abdominal pain, no radiation, worse with vomiting diarrhea, no other aggravating or relieving factors. Feels generally weak but denies focal weakness, numbness, sudden vision changes, dysarthria, facial asymmetry. Also states that his blood glucose has been running high over the past day.     ROS - a 10 point review of systems was performed and is negative except as per HPI    Past Medical History:   Diagnosis Date    Arthritis     Asthma     Atrial fibrillation ([address]     CHF (congestive heart failure) (HCC)     COPD (chronic obstructive pulmonary disease) ([address]     Depression     Diabetes mellitus ([address]     Type 2    Diastolic heart failure ([address]     Hyperlipidemia     Hypertension     Kidney stone     Migraine     Other disorders of kidney and ureter     Pneumonia     Psychiatric problem     Sleep apnea     Unspecified cerebral artery occlusion with cerebral infarction     \"10% loss of use of right arm and leg\"     Past Surgical History:   Procedure Laterality Date    ABDOMEN SURGERY      ASD REPAIR      ASD REPAIR      BACK SURGERY  2012    [facility] CARDIAC SURGERY  AUG 2013    REVEAL XT MONITOR #[id number]    CARPAL TUNNEL RELEASE jeri    CHOLECYSTECTOMY      COLONOSCOPY      ELBOW SURGERY      EYE SURGERY      EYE SURGERY      bilateral    OTHER SURGICAL HISTORY  02/16/2017    I & D mid upper back    PERICARDIUM SURGERY  2005    post ASD repair with window     Family History   Problem Relation Age of Onset    Diabetes Mother     Diabetes Father     Heart Disease Father     Diabetes Paternal Uncle     Diabetes Maternal Grandmother     Diabetes Maternal Grandfather     Diabetes Paternal Grandmother     Diabetes Paternal Grandfather      Social History     Socioeconomic History    Marital status: Single     Spouse name: Not on file    Number of children: 3    Years of education: Not on file    Highest education level: Not on file   Occupational History    Not on file   Social Needs    Financial resource strain: Not on file    Food insecurity:     Worry: Not on file     Inability: Not on file    Transportation needs:     Medical: Not on file     Non-medical: Not on file   Tobacco Use    Smoking status: Former Smoker     Packs/day: 0.25     Years: 0.00     Pack years: 0.00     Types: Cigarettes    Smokeless tobacco: Never Used    Tobacco comment: quit in 1994, started again 08/2014; quit in 7/2019   Substance and Sexual Activity    Alcohol use: No    Drug use: No    Sexual activity: Not Currently   Lifestyle    Physical activity:     Days per week: Not on file     Minutes per session: Not on file    Stress: Not on file   Relationships    Social connections:     Talks on phone: Not on file     Gets together: Not on file     Attends Mandaen service: Not on file     Active member of club or organization: Not on file     Attends meetings of clubs or organizations: Not on file     Relationship status: Not on file    Intimate partner violence:     Fear of current or ex partner: Not on file     Emotionally abused: Not on file     Physically abused: Not on file     Forced sexual activity: Not on file   Other Topics Concern    Not on file   Social History Narrative    unemployed     Current Facility-Administered Medications   Medication Dose Route Frequency Provider Last Rate Last Dose    acetaminophen (TYLENOL) tablet 500 mg  500 mg Oral Q6H PRN Arcadio Sanchez APRN - CNP        albuterol sulfate  (90 Base) MCG/ACT inhaler 2 puff  2 puff Inhalation Q6H PRN Arcadio Sanchez, APRN - CNP        aspirin EC tablet 81 mg  81 mg Oral Daily Arcadio Sanchez, APRN - RAVINDRA        atorvastatin (LIPITOR) tablet 40 mg  40 mg Oral Nightly Arcadio Akaeze, APRN - CNP        carvedilol (COREG) tablet 25 mg  25 mg Oral BID  Arcadio Sanchez APRN - RAVINDRA        dicyclomine (BENTYL) capsule 10 mg  10 mg Oral 4x Daily PRN Arcadio Sanchez, APRN - RAVINDRA        folic acid (FOLVITE) tablet 1 mg  1 mg Oral Daily Arcadio Laura, APRN - CNP        glimepiride (AMARYL) tablet 4 mg  4 mg Oral BID Arcadio Sanchez, APRN - CNP        hydroCHLOROthiazide (HYDRODIURIL) tablet 25 mg  25 mg Oral Daily Arcadio Sanchez, APRN - CNP        insulin lispro (HUMALOG) injection cartridge 25 Units  25 Units Subcutaneous TID  Arcadio Sanchez APRN - CNP        insulin lispro (HUMALOG) injection vial 0-18 Units  0-18 Units Subcutaneous TID  Arcadio Sanchez APRN - CNP        losartan (COZAAR) tablet 50 mg  50 mg Oral Daily Arcadio Sanchez, APRN - CNP        nitroGLYCERIN (NITROSTAT) SL tablet 0.4 mg  0.4 mg Sublingual TID Arcadio Sanchez, APRN - CNP        sertraline (ZOLOFT) tablet 25 mg  25 mg Oral Daily Arcadio Sanchez, APRN - CNP        alogliptin (NESINA) tablet 12.5 mg  12.5 mg Oral Daily Arcadio Sanchez, APRN - CNP        doxazosin (CARDURA) tablet 2 mg  2 mg Oral Daily Arcadio Akcarmelo, APRN - CNP        traZODone (DESYREL) tablet 50 mg  50 mg Oral Nightly Arcadio Akaeze, APRN - CNP        zonisamide (ZONEGRAN) capsule 300 mg  300 mg Oral Nightly Arcadio Akcarmelo, APRN - CNP        sodium chloride flush 0.9 % injection 10 mL  10 mL Intravenous 2 times per day Arcadio Levy Fish, APRN - CNP        sodium chloride flush 0.9 % injection 10 mL  10 mL Intravenous PRN Arcadio Akaeze, APRN - CNP        acetaminophen (TYLENOL) tablet 650 mg  650 mg Oral Q6H PRN Arcadio Akaeze, APRN - CNP        Or    acetaminophen (TYLENOL) suppository 650 mg  650 mg Rectal Q6H PRN Arcadio Akaeze, APRN - CNP        polyethylene glycol (GLYCOLAX) packet 17 g  17 g Oral Daily PRN Arcadio Akaeze, APRN - CNP        promethazine (PHENERGAN) tablet 12.5 mg  12.5 mg Oral Q6H PRN Arcadio Akaeze, APRN - CNP        Or    ondansetron (ZOFRAN) injection 4 mg  4 mg Intravenous Q6H PRN Arcadio Akaeze, APRN - CNP        enoxaparin (LOVENOX) injection 40 mg  40 mg Subcutaneous Daily Arcadio Akaeze, APRN - CNP        glucose (GLUTOSE) 40 % oral gel 15 g  15 g Oral PRN Arcadio Akaeze, APRN - CNP        dextrose 50 % IV solution  12.5 g Intravenous PRN Arcadio Akaeze, APRN - CNP        glucagon (rDNA) injection 1 mg  1 mg Intramuscular PRN Arcadio Akaeze, APRN - CNP        dextrose 5 % solution  100 mL/hr Intravenous PRN Arcadio Akaeze, APRN - CNP        insulin lispro (HUMALOG) injection vial 0-12 Units  0-12 Units Subcutaneous TID WC Arcadio Akaeze, APRN - CNP        insulin lispro (HUMALOG) injection vial 0-6 Units  0-6 Units Subcutaneous Nightly Arcadio Akaeze, APRN - CNP        0.9 % sodium chloride infusion   Intravenous Continuous Arcadio Akaeze, APRN - CNP         Allergies   Allergen Reactions    Azithromycin     Ciprofloxacin Other (See Comments)     lethargic       Nursing Notes Reviewed    Physical Exam:  Triage VS:    ED Triage Vitals [02/29/20 2033]   Enc Vitals Group      BP (!) 156/98      Pulse 80      Resp 18      Temp 97.7 °F (36.5 °C)      Temp Source Oral      SpO2 96 %      Weight (!) 312 lb (141.5 kg)      Height 5' 9\" (1.753 m)      Head Circumference       Peak Flow       Pain Score       Pain Loc       Pain Edu? Excl. in [address]?         Physical Exam  Vitals signs and User Index  [JH] Polina Robertson MD        No orders to display       Discussion:  49-year-old male with significant medical comorbidities presents due to vomiting, diarrhea, and fatigue. He was found to be significantly hyperglycemic here but no HHS or DKA on chemistry panel. Had mild lactic acidosis and mild leukocytosis likely as a result of dehydration from vomiting diarrhea. His abdominal exam was benign as he had very mild diffuse tenderness without any guarding or rebound. I do not believe that he warrants CT of his abdomen and pelvis at this time given his benign exam and because his vitals are within normal limits. Given his evidence of dehydration on labs as well as his many medical comorbidities I believe he warrants observation and admission for IV rehydration. He was admitted to the hospitalist for further care. Clinical Impression:  1. Dehydration    2. Nausea vomiting and diarrhea      Disposition referral (if applicable):  [facility]  [phone]          Disposition medications (if applicable):  Current Discharge Medication List        ED Provider Disposition Time  DISPOSITION Admitted 02/29/2020 11:09:20 PM      Comment: Please note this report has been produced using speech recognition software and may contain errors related to that system including errors in grammar, punctuation, and spelling, as well as words and phrases that may be inappropriate. Efforts were made to edit the dictations.        Polina Robertson MD  03/01/20 8693

## 2020-03-01 NOTE — H&P
injection vial 0-12 Units, 0-12 Units, Subcutaneous, TID WC, Arcadio Starze, APRN - CNP    insulin lispro (HUMALOG) injection vial 0-6 Units, 0-6 Units, Subcutaneous, Nightly, Arcadio Sanchez, APRN - CNP    0.9 % sodium chloride infusion, , Intravenous, Continuous, Arcadio Akjovanyze, APRN - CNP    Current Outpatient Medications:     acetaminophen (APAP EXTRA STRENGTH) 500 MG tablet, Take 1 tablet by mouth every 6 hours as needed for Pain, Disp: 30 tablet, Rfl: 0    Cyanocobalamin (VITAMIN B 12 PO), Take by mouth, Disp: , Rfl:     Coenzyme Q10 (CO Q 10 PO), Take by mouth, Disp: , Rfl:     TOUJEO SOLOSTAR 300 UNIT/ML injection pen, , Disp: , Rfl: 6    metFORMIN (GLUCOPHAGE-XR) 500 MG extended release tablet, , Disp: , Rfl: 6    sertraline (ZOLOFT) 50 MG tablet, , Disp: , Rfl: 6    traZODone (DESYREL) 50 MG tablet, Take 50 mg by mouth nightly, Disp: , Rfl:     nitroGLYCERIN (NITROSTAT) 0.4 MG SL tablet, up to max of 3 total doses.  If no relief after 1 dose, call 911., Disp: 25 tablet, Rfl: 3    atorvastatin (LIPITOR) 40 MG tablet, Take 40 mg by mouth nightly , Disp: , Rfl:     SITagliptin (JANUVIA) 100 MG tablet, Take 100 mg by mouth nightly , Disp: , Rfl:     folic acid (FOLVITE) 1 MG tablet, Take 1 tablet by mouth daily, Disp: 30 tablet, Rfl: 0    insulin lispro (HUMALOG) 100 UNIT/ML injection cartridge, Inject 25 Units into the skin 3 times daily (before meals) (Patient taking differently: Inject 60 Units into the skin 3 times daily (before meals) 60 units with breakfast  50 units with lunch and supper), Disp: 5 Cartridge, Rfl: 3    insulin lispro (HUMALOG) 100 UNIT/ML injection vial, Inject 0-18 Units into the skin 3 times daily (with meals) Glucose: Dose:              No Insulin 140-199           3 Units 200-249 6 Units 250-299 9 Units 300-349 12 Units 350-400 15 Units Over 400  18 Units, Disp: 1 vial, Rfl: 0    terazosin (HYTRIN) 2 MG capsule, Take 2 mg by mouth nightly, Disp: , Rfl:    HEME/LYMPHATIC/IMMUNO:  Denies , bruising, bleeding abnormalities   ENDO:  Denies any heat or cold intolerance, panemiaolyuria       Objective: Intake/Output Summary (Last 24 hours) at 2/29/2020 2314  Last data filed at 2/29/2020 2245  Gross per 24 hour   Intake 500 ml   Output --   Net 500 ml      Vitals:   Vitals:    02/29/20 2303   BP: (!) 158/86   Pulse: 74   Resp: 15   Temp:    SpO2: 97%     Physical Exam:     General:  awake, alert, cooperative, no  acute  distress  EYES:Lids and lashes normal, pupils equal, round ,extra ocular muscles intact, sclera clear, conjunctiva normal  ENT:  Normocephalic, oral pharynx with moist mucus membranes  NECK:  Supple, symmetrical, trachea midline, no adenopathy,  LUNGS:  Clear to auscultate bilaterally, no rales ronchi or wheezing noted. CARDIOVASCULAR:  regular rate and rhythm, normal S1 and S2,no murmur noted, peripheral pulses 2+, no pitting edema  ABDOMEN: Normal BS, Non tender, non distended, . MUSCULOSKELETAL:  ROM of all extremities grossly wnl  NEUROLOGIC: AOx 3,  Cranial nerves II-XII are grossly intact. Motor is 5 out of 5 bilaterally. Sensory is intact, no lateralizing findings. SKIN:  no bruising or bleeding, normal skin color, turgor, no redness, warmth,       Past Medical History:      Past Medical History:   Diagnosis Date    Arthritis     Asthma     [facility] ([facility]     [facility]     COPD (chronic obstructive pulmonary disease) ([facility])     Depression     Diabetes mellitus (HCC)     Type 2    Diastolic heart failure ([facility])     Hyperlipidemia     Hypertension     Kidney stone     Migraine     Other disorders of kidney and ureter     Pneumonia     Psychiatric problem     Sleep apnea     Unspecified cerebral artery occlusion with cerebral infarction     \"10% loss of use of right arm and leg\"     PSHX:  has a past surgical history that includes Cholecystectomy;  Carpal tunnel release; Elbow surgery; ASD repair (2005); back surgery (January 2012); Pericardium surgery (2005); Abdomen surgery; Colonoscopy; eye surgery; eye surgery; ASD repair; other surgical history (02/16/2017); and Cardiac surgery (AUG 2013). Allergies: Allergies   Allergen Reactions    Azithromycin     Ciprofloxacin Other (See Comments)     lethargic       FAM HX: family history includes Diabetes in his father, maternal grandfather, maternal grandmother, mother, paternal grandfather, paternal grandmother, and paternal uncle; Heart Disease in his father.       Soc HX:   Social History     Socioeconomic History    Marital status: Single     Spouse name: None    Number of children: 3    Years of education: None    Highest education level: None   Occupational History    None   Social Needs    Financial resource strain: None    Food insecurity:     Worry: None     Inability: None    Transportation needs:     Medical: None     Non-medical: None   Tobacco Use    Smoking status: Former Smoker     Packs/day: 0.25     Years: 0.00     Pack years: 0.00     Types: Cigarettes    Smokeless tobacco: Never Used    Tobacco comment: quit in 1994, started again 08/2014; quit in 7/2019   Substance and Sexual Activity    Alcohol use: No    Drug use: No    Sexual activity: Not Currently   Lifestyle    Physical activity:     Days per week: None     Minutes per session: None    Stress: None   Relationships    Social connections:     Talks on phone: None     Gets together: None     Attends Scientology service: None     Active member of club or organization: None     Attends meetings of clubs or organizations: None     Relationship status: None    Intimate partner violence:     Fear of current or ex partner: None     Emotionally abused: None     Physically abused: None     Forced sexual activity: None   Other Topics Concern    None   Social History Narrative    unemployed       Electronically signed by TAMEKA Chou CNP on 2/29/2020 at 11:14 PM

## 2020-03-01 NOTE — ED NOTES
Patient arrived to room 3 by [facility] with complaints of hyperglycemia. Patient states he hasnt been feeling well the past 3 days, started having nausea/vomiting and diarrhea today. Patient states home blood glucose today 505 and 525. Patient contacted insurance tele nurse who recommended patient be evaluated in the ED. Patient has NOT taken any medication today. In route [facility] established IV in the left [facility] and obtained BS of 330.       Ramy Green RN  02/29/20 0714

## 2020-03-01 NOTE — ED NOTES
Arcadio called and speaking with Dr Trell Maravilla regarding admission of this pt.      Lorri Heimlich, RN  02/29/20 8501

## 2020-03-02 VITALS
TEMPERATURE: 97.6 F | HEIGHT: 69 IN | SYSTOLIC BLOOD PRESSURE: 121 MMHG | RESPIRATION RATE: 16 BRPM | HEART RATE: 78 BPM | WEIGHT: 312 LBS | DIASTOLIC BLOOD PRESSURE: 59 MMHG | OXYGEN SATURATION: 96 % | BODY MASS INDEX: 46.21 KG/M2

## 2020-03-02 LAB
ANION GAP SERPL CALCULATED.3IONS-SCNC: 11 MMOL/L (ref 4–16)
BASOPHILS ABSOLUTE: 0.1 K/CU MM
BASOPHILS RELATIVE PERCENT: 1 % (ref 0–1)
BUN BLDV-MCNC: 14 MG/DL (ref 6–23)
CALCIUM SERPL-MCNC: 8.9 MG/DL (ref 8.3–10.6)
CHLORIDE BLD-SCNC: 107 MMOL/L (ref 99–110)
CO2: 22 MMOL/L (ref 21–32)
CREAT SERPL-MCNC: 1 MG/DL (ref 0.9–1.3)
DIFFERENTIAL TYPE: ABNORMAL
EOSINOPHILS ABSOLUTE: 0.3 K/CU MM
EOSINOPHILS RELATIVE PERCENT: 2.9 % (ref 0–3)
ESTIMATED AVERAGE GLUCOSE: 246 MG/DL
GFR AFRICAN AMERICAN: >60 ML/MIN/1.73M2
GFR NON-AFRICAN AMERICAN: >60 ML/MIN/1.73M2
GLUCOSE BLD-MCNC: 238 MG/DL (ref 70–99)
GLUCOSE BLD-MCNC: 245 MG/DL (ref 70–99)
GLUCOSE BLD-MCNC: 261 MG/DL (ref 70–99)
HBA1C MFR BLD: 10.2 % (ref 4.2–6.3)
HCT VFR BLD CALC: 39.6 % (ref 42–52)
HEMOGLOBIN: 13.1 GM/DL (ref 13.5–18)
IMMATURE NEUTROPHIL %: 0.2 % (ref 0–0.43)
LYMPHOCYTES ABSOLUTE: 3.2 K/CU MM
LYMPHOCYTES RELATIVE PERCENT: 36.7 % (ref 24–44)
MCH RBC QN AUTO: 30.8 PG (ref 27–31)
MCHC RBC AUTO-ENTMCNC: 33.1 % (ref 32–36)
MCV RBC AUTO: 93 FL (ref 78–100)
MONOCYTES ABSOLUTE: 0.7 K/CU MM
MONOCYTES RELATIVE PERCENT: 8.3 % (ref 0–4)
PDW BLD-RTO: 12.4 % (ref 11.7–14.9)
PLATELET # BLD: 146 K/CU MM (ref 140–440)
PMV BLD AUTO: 11.5 FL (ref 7.5–11.1)
POTASSIUM SERPL-SCNC: 3.9 MMOL/L (ref 3.5–5.1)
RBC # BLD: 4.26 M/CU MM (ref 4.6–6.2)
SEGMENTED NEUTROPHILS ABSOLUTE COUNT: 4.4 K/CU MM
SEGMENTED NEUTROPHILS RELATIVE PERCENT: 50.9 % (ref 36–66)
SODIUM BLD-SCNC: 140 MMOL/L (ref 135–145)
TOTAL IMMATURE NEUTOROPHIL: 0.02 K/CU MM
WBC # BLD: 8.6 K/CU MM (ref 4–10.5)

## 2020-03-02 PROCEDURE — 85025 COMPLETE CBC W/AUTO DIFF WBC: CPT

## 2020-03-02 PROCEDURE — 83036 HEMOGLOBIN GLYCOSYLATED A1C: CPT

## 2020-03-02 PROCEDURE — 6360000002 HC RX W HCPCS: Performed by: NURSE PRACTITIONER

## 2020-03-02 PROCEDURE — G0378 HOSPITAL OBSERVATION PER HR: HCPCS

## 2020-03-02 PROCEDURE — 6370000000 HC RX 637 (ALT 250 FOR IP): Performed by: NURSE PRACTITIONER

## 2020-03-02 PROCEDURE — 82962 GLUCOSE BLOOD TEST: CPT

## 2020-03-02 PROCEDURE — 80048 BASIC METABOLIC PNL TOTAL CA: CPT

## 2020-03-02 PROCEDURE — 2580000003 HC RX 258: Performed by: NURSE PRACTITIONER

## 2020-03-02 PROCEDURE — 96372 THER/PROPH/DIAG INJ SC/IM: CPT

## 2020-03-02 RX ORDER — GLIMEPIRIDE 4 MG/1
4 TABLET ORAL 2 TIMES DAILY
Qty: 30 TABLET | Refills: 0 | Status: SHIPPED | OUTPATIENT
Start: 2020-03-02 | End: 2020-09-16 | Stop reason: ALTCHOICE

## 2020-03-02 RX ADMIN — SODIUM CHLORIDE, PRESERVATIVE FREE 10 ML: 5 INJECTION INTRAVENOUS at 09:00

## 2020-03-02 RX ADMIN — LINAGLIPTIN 5 MG: 5 TABLET, FILM COATED ORAL at 08:31

## 2020-03-02 RX ADMIN — FOLIC ACID 1 MG: 1 TABLET ORAL at 08:33

## 2020-03-02 RX ADMIN — HYDROCHLOROTHIAZIDE 25 MG: 25 TABLET ORAL at 08:33

## 2020-03-02 RX ADMIN — SERTRALINE 25 MG: 50 TABLET, FILM COATED ORAL at 08:32

## 2020-03-02 RX ADMIN — ASPIRIN 81 MG: 81 TABLET, COATED ORAL at 08:15

## 2020-03-02 RX ADMIN — DOXAZOSIN 2 MG: 1 TABLET ORAL at 08:32

## 2020-03-02 RX ADMIN — SODIUM CHLORIDE: 9 INJECTION, SOLUTION INTRAVENOUS at 01:42

## 2020-03-02 RX ADMIN — LOSARTAN POTASSIUM 50 MG: 50 TABLET ORAL at 08:31

## 2020-03-02 RX ADMIN — GLIMEPIRIDE 4 MG: 2 TABLET ORAL at 08:50

## 2020-03-02 RX ADMIN — CARVEDILOL 25 MG: 25 TABLET, FILM COATED ORAL at 08:31

## 2020-03-02 RX ADMIN — ENOXAPARIN SODIUM 40 MG: 40 INJECTION SUBCUTANEOUS at 08:33

## 2020-03-02 ASSESSMENT — PAIN SCALES - GENERAL
PAINLEVEL_OUTOF10: 0

## 2020-03-02 NOTE — CARE COORDINATION
CM met with the patient for discharge planning. Patient lives at home with his son, has insurance with Rx coverage & PCP, states he is independent with ADL's, and does not drive (uses [employer] for transportation). Patient uses a cane at home, an inhaler, and CPAP at bedtime. Patient is unable to identify any needs at this time, CM available if needs arise.

## 2020-03-02 NOTE — DISCHARGE SUMMARY
Discharge Summary    Name:  Aaron Okeefe /Age/Sex: 1970  (44 y.o. male)   MRN & CSN:  [id number] & [id number] Admission Date/Time: 2020  8:28 PM   Attending:  Daniela Henry DO Discharging Physician: Daniela Henry DO     HPI and Hospital Course:   Aaron Okeefe is a 44 y.o.  male  who presents with      Uncontrolled DM2 with long term insulin. No DKA.  Hyperglycemia due to poor DM2 control  · Before he left he went through his medications with the DM educator. He states his PCP has him connected with a nutritionalist.   · He seems eager to learn and take control of his DM2.  Hyponatremia, 134. Corrected with IVF   HTN, controlled with medications   HLD, medicated   Depression   COPD, chronic respiratory failure   GOPI on CPAP, compliant    The patient expressed appropriate understanding of and agreement with the discharge recommendations, medications, and plan. Consults this admission:  IP CONSULT TO HOSPITALIST  IP CONSULT TO SOCIAL WORK    Discharge Instruction:   Follow up appointments: none  Primary care physician:  within 2 weeks    Diet:  General/cardiac/ADA/as tolerated  Activity: {discharge activity: as tolerated  Disposition: Discharged to:   [x]Home, []C, []SNF, []Acute Rehab, []Hospice   Condition on discharge: Stable    Discharge Medications:      Mariela Meilssa   [facility] Medication Instructions WQU:[id number]    Printed on:20 1148   Medication Information                      acetaminophen (APAP EXTRA STRENGTH) 500 MG tablet  Take 1 tablet by mouth every 6 hours as needed for Pain             albuterol (PROVENTIL HFA) 108 (90 BASE) MCG/ACT inhaler  Inhale 2 puffs into the lungs every 6 hours as needed for Wheezing or Shortness of Breath. aspirin 81 MG EC tablet  Take 81 mg by mouth daily.              atorvastatin (LIPITOR) 40 MG tablet  Take 40 mg by mouth nightly              carvedilol (COREG) 12.5 MG tablet  Take 2 tablets by mouth 2 air.  CARDIO/VASC Peripheral pulses equal bilaterally and palpable. No peripheral edema. GI Abdomen is not distended. Rectal exam deferred. Central obesity.  Herr catheter is not present. HEME/LYMPH No petechiae or ecchymoses. MSK Spontaneous movement of BL upper extremities  SKIN Normal coloration, warm, dry. NEURO Cranial nerves appear grossly intact  PSYCH Awake, alert.  Oriented x3    BMP/CBC  Recent Labs     02/29/20  2130 03/01/20  0538 03/02/20  0430   * 139 140   K 4.0 3.8 3.9   CL 99 107 107   CO2 26 21 22   BUN 13 12 14   CREATININE 0.9 0.9 1.0   WBC 10.7* 9.6 8.6   HCT 42.6 41.3* 39.6*    168 146     SIGNIFICANT IMAGING AND LABS:  none    Discharge Time of 38 minutes    Electronically signed by Sayra Thacker DO on 3/2/2020 at 11:48 AM

## 2020-03-24 ENCOUNTER — HOSPITAL ENCOUNTER (EMERGENCY)
Age: 50
Discharge: HOME | End: 2020-03-24
Attending: EMERGENCY MEDICINE
Payer: MEDICARE

## 2020-03-24 ENCOUNTER — APPOINTMENT (OUTPATIENT)
Dept: CT IMAGING | Age: 50
End: 2020-03-24
Payer: MEDICARE

## 2020-03-24 VITALS
TEMPERATURE: 98.2 F | DIASTOLIC BLOOD PRESSURE: 90 MMHG | RESPIRATION RATE: 18 BRPM | HEART RATE: 88 BPM | SYSTOLIC BLOOD PRESSURE: 138 MMHG | OXYGEN SATURATION: 95 %

## 2020-03-24 LAB
ALBUMIN SERPL-MCNC: 4.1 GM/DL (ref 3.4–5)
ALP BLD-CCNC: 114 IU/L (ref 40–129)
ALT SERPL-CCNC: 42 U/L (ref 10–40)
ANION GAP SERPL CALCULATED.3IONS-SCNC: 12 MMOL/L (ref 4–16)
AST SERPL-CCNC: 34 IU/L (ref 15–37)
BACTERIA: ABNORMAL /HPF
BASOPHILS ABSOLUTE: 0.1 K/CU MM
BASOPHILS RELATIVE PERCENT: 1.1 % (ref 0–1)
BILIRUB SERPL-MCNC: 0.5 MG/DL (ref 0–1)
BILIRUBIN URINE: NEGATIVE MG/DL
BLOOD, URINE: NEGATIVE
BUN BLDV-MCNC: 13 MG/DL (ref 6–23)
CALCIUM SERPL-MCNC: 9.6 MG/DL (ref 8.3–10.6)
CAST TYPE: ABNORMAL /HPF
CHLORIDE BLD-SCNC: 100 MMOL/L (ref 99–110)
CLARITY: CLEAR
CO2: 24 MMOL/L (ref 21–32)
COLOR: YELLOW
CREAT SERPL-MCNC: 0.9 MG/DL (ref 0.9–1.3)
CRYSTAL TYPE: ABNORMAL /HPF
DIFFERENTIAL TYPE: ABNORMAL
EOSINOPHILS ABSOLUTE: 0.2 K/CU MM
EOSINOPHILS RELATIVE PERCENT: 2.7 % (ref 0–3)
EPITHELIAL CELLS, UA: ABNORMAL /HPF
GFR AFRICAN AMERICAN: >60 ML/MIN/1.73M2
GFR NON-AFRICAN AMERICAN: >60 ML/MIN/1.73M2
GLUCOSE BLD-MCNC: 450 MG/DL (ref 70–99)
GLUCOSE, URINE: >1000 MG/DL
HCT VFR BLD CALC: 42.6 % (ref 42–52)
HEMOGLOBIN: 14.5 GM/DL (ref 13.5–18)
IMMATURE NEUTROPHIL %: 0.4 % (ref 0–0.43)
KETONES, URINE: NEGATIVE MG/DL
LEUKOCYTE ESTERASE, URINE: NEGATIVE
LYMPHOCYTES ABSOLUTE: 2.6 K/CU MM
LYMPHOCYTES RELATIVE PERCENT: 34.6 % (ref 24–44)
MCH RBC QN AUTO: 31.3 PG (ref 27–31)
MCHC RBC AUTO-ENTMCNC: 34 % (ref 32–36)
MCV RBC AUTO: 92 FL (ref 78–100)
MONOCYTES ABSOLUTE: 0.6 K/CU MM
MONOCYTES RELATIVE PERCENT: 8.4 % (ref 0–4)
MUCUS: NEGATIVE HPF
NITRITE URINE, QUANTITATIVE: NEGATIVE
PDW BLD-RTO: 12.5 % (ref 11.7–14.9)
PH, URINE: 7 (ref 5–8)
PLATELET # BLD: 129 K/CU MM (ref 140–440)
PMV BLD AUTO: 11.7 FL (ref 7.5–11.1)
POTASSIUM SERPL-SCNC: 4.1 MMOL/L (ref 3.5–5.1)
PROTEIN UA: NEGATIVE MG/DL
RBC # BLD: 4.63 M/CU MM (ref 4.6–6.2)
RBC URINE: ABNORMAL /HPF (ref 0–3)
SEGMENTED NEUTROPHILS ABSOLUTE COUNT: 4 K/CU MM
SEGMENTED NEUTROPHILS RELATIVE PERCENT: 52.8 % (ref 36–66)
SODIUM BLD-SCNC: 136 MMOL/L (ref 135–145)
SPECIFIC GRAVITY UA: 1 (ref 1–1.03)
TOTAL IMMATURE NEUTOROPHIL: 0.03 K/CU MM
TOTAL PROTEIN: 6.9 GM/DL (ref 6.4–8.2)
UROBILINOGEN, URINE: 0.2 MG/DL (ref 0.2–1)
VOLUME, (UVOL): 12 ML (ref 10–12)
WBC # BLD: 7.5 K/CU MM (ref 4–10.5)
WBC UA: ABNORMAL /HPF (ref 0–2)

## 2020-03-24 PROCEDURE — 2580000003 HC RX 258: Performed by: EMERGENCY MEDICINE

## 2020-03-24 PROCEDURE — 96374 THER/PROPH/DIAG INJ IV PUSH: CPT

## 2020-03-24 PROCEDURE — 81001 URINALYSIS AUTO W/SCOPE: CPT

## 2020-03-24 PROCEDURE — 6360000002 HC RX W HCPCS: Performed by: EMERGENCY MEDICINE

## 2020-03-24 PROCEDURE — 96375 TX/PRO/DX INJ NEW DRUG ADDON: CPT

## 2020-03-24 PROCEDURE — 99284 EMERGENCY DEPT VISIT MOD MDM: CPT

## 2020-03-24 PROCEDURE — 80053 COMPREHEN METABOLIC PANEL: CPT

## 2020-03-24 PROCEDURE — 6360000004 HC RX CONTRAST MEDICATION: Performed by: EMERGENCY MEDICINE

## 2020-03-24 PROCEDURE — 6370000000 HC RX 637 (ALT 250 FOR IP): Performed by: EMERGENCY MEDICINE

## 2020-03-24 PROCEDURE — 85025 COMPLETE CBC W/AUTO DIFF WBC: CPT

## 2020-03-24 PROCEDURE — 74177 CT ABD & PELVIS W/CONTRAST: CPT

## 2020-03-24 RX ORDER — AMOXICILLIN 500 MG/1
500 CAPSULE ORAL 2 TIMES DAILY
COMMUNITY
End: 2020-09-16 | Stop reason: ALTCHOICE

## 2020-03-24 RX ORDER — PROMETHAZINE HYDROCHLORIDE 25 MG/1
25 TABLET ORAL EVERY 6 HOURS PRN
Qty: 12 TABLET | Refills: 0 | Status: SHIPPED | OUTPATIENT
Start: 2020-03-24 | End: 2020-03-27

## 2020-03-24 RX ORDER — METRONIDAZOLE 500 MG/1
500 TABLET ORAL 3 TIMES DAILY
Qty: 30 TABLET | Refills: 0 | Status: SHIPPED | OUTPATIENT
Start: 2020-03-24 | End: 2020-04-03

## 2020-03-24 RX ORDER — MORPHINE SULFATE 4 MG/ML
4 INJECTION, SOLUTION INTRAMUSCULAR; INTRAVENOUS ONCE
Status: COMPLETED | OUTPATIENT
Start: 2020-03-24 | End: 2020-03-24

## 2020-03-24 RX ORDER — METRONIDAZOLE 250 MG/1
500 TABLET ORAL ONCE
Status: COMPLETED | OUTPATIENT
Start: 2020-03-24 | End: 2020-03-24

## 2020-03-24 RX ORDER — ONDANSETRON 2 MG/ML
4 INJECTION INTRAMUSCULAR; INTRAVENOUS ONCE
Status: COMPLETED | OUTPATIENT
Start: 2020-03-24 | End: 2020-03-24

## 2020-03-24 RX ORDER — HYDROCODONE BITARTRATE AND ACETAMINOPHEN 5; 325 MG/1; MG/1
1-2 TABLET ORAL EVERY 8 HOURS PRN
Qty: 9 TABLET | Refills: 0 | Status: SHIPPED | OUTPATIENT
Start: 2020-03-24 | End: 2020-03-27

## 2020-03-24 RX ORDER — SODIUM CHLORIDE 9 MG/ML
1000 INJECTION, SOLUTION INTRAVENOUS CONTINUOUS
Status: DISCONTINUED | OUTPATIENT
Start: 2020-03-24 | End: 2020-03-24 | Stop reason: HOSPADM

## 2020-03-24 RX ADMIN — IOPAMIDOL 100 ML: 755 INJECTION, SOLUTION INTRAVENOUS at 18:13

## 2020-03-24 RX ADMIN — SODIUM CHLORIDE 1000 ML: 9 INJECTION, SOLUTION INTRAVENOUS at 18:28

## 2020-03-24 RX ADMIN — ONDANSETRON 4 MG: 2 INJECTION INTRAMUSCULAR; INTRAVENOUS at 18:31

## 2020-03-24 RX ADMIN — METRONIDAZOLE 500 MG: 250 TABLET ORAL at 18:30

## 2020-03-24 RX ADMIN — MORPHINE SULFATE 4 MG: 4 INJECTION, SOLUTION INTRAMUSCULAR; INTRAVENOUS at 18:32

## 2020-03-24 ASSESSMENT — PAIN DESCRIPTION - LOCATION
LOCATION: ABDOMEN
LOCATION: ABDOMEN

## 2020-03-24 ASSESSMENT — ENCOUNTER SYMPTOMS
ABDOMINAL PAIN: 1
RESPIRATORY NEGATIVE: 1
BLOOD IN STOOL: 1
NAUSEA: 1
DIARRHEA: 1

## 2020-03-24 ASSESSMENT — PAIN DESCRIPTION - FREQUENCY: FREQUENCY: CONTINUOUS

## 2020-03-24 ASSESSMENT — PAIN DESCRIPTION - PAIN TYPE: TYPE: ACUTE PAIN

## 2020-03-24 ASSESSMENT — PAIN SCALES - GENERAL
PAINLEVEL_OUTOF10: 9
PAINLEVEL_OUTOF10: 5
PAINLEVEL_OUTOF10: 9
PAINLEVEL_OUTOF10: 7

## 2020-03-24 ASSESSMENT — PAIN DESCRIPTION - DESCRIPTORS: DESCRIPTORS: STABBING

## 2020-03-24 NOTE — ED PROVIDER NOTES
History     Socioeconomic History    Marital status: Single     Spouse name: Not on file    Number of children: 3    Years of education: Not on file    Highest education level: Not on file   Occupational History    Not on file   Social Needs    Financial resource strain: Not on file    Food insecurity     Worry: Not on file     Inability: Not on file    Transportation needs     Medical: Not on file     Non-medical: Not on file   Tobacco Use    Smoking status: Former Smoker     Packs/day: 0.25     Years: 0.00     Pack years: 0.00     Types: Cigarettes    Smokeless tobacco: Never Used    Tobacco comment: quit in 1994, started again 08/2014; quit in 7/2019   Substance and Sexual Activity    Alcohol use: Yes     Comment: once in while    Drug use: No    Sexual activity: Not Currently   Lifestyle    Physical activity     Days per week: Not on file     Minutes per session: Not on file    Stress: Not on file   Relationships    Social connections     Talks on phone: Not on file     Gets together: Not on file     Attends Rastafarian service: Not on file     Active member of club or organization: Not on file     Attends meetings of clubs or organizations: Not on file     Relationship status: Not on file    Intimate partner violence     Fear of current or ex partner: Not on file     Emotionally abused: Not on file     Physically abused: Not on file     Forced sexual activity: Not on file   Other Topics Concern    Not on file   Social History Narrative    unemployed     Current Facility-Administered Medications   Medication Dose Route Frequency Provider Last Rate Last Dose    0.9 % sodium chloride infusion  1,000 mL Intravenous Continuous Jessica Lobe, DO        morphine sulfate (PF) injection 4 mg  4 mg Intravenous Once Jessica Lobe, DO        ondansetron [facility]) injection 4 mg  4 mg Intravenous Once Jessica Lobe, DO        iopamidol (ISOVUE-370) 76 % injection 100 mL  100 mL Intravenous ONCE PRN Charol Arm DO Kyle[place]         Current Outpatient Medications   Medication Sig Dispense Refill    amoxicillin (AMOXIL) 500 MG capsule Take 500 mg by mouth 2 times daily      glimepiride (AMARYL) 4 MG tablet Take 1 tablet by mouth 2 times daily 30 tablet 0    acetaminophen (APAP EXTRA STRENGTH) 500 MG tablet Take 1 tablet by mouth every 6 hours as needed for Pain 30 tablet 0    Cyanocobalamin (VITAMIN B 12 PO) Take by mouth      Coenzyme Q10 (CO Q 10 PO) Take by mouth      TOUJEO SOLOSTAR 300 UNIT/ML injection pen   6    metFORMIN (GLUCOPHAGE-XR) 500 MG extended release tablet   6    sertraline (ZOLOFT) 50 MG tablet   6    traZODone (DESYREL) 50 MG tablet Take 50 mg by mouth nightly      nitroGLYCERIN (NITROSTAT) 0.4 MG SL tablet up to max of 3 total doses. If no relief after 1 dose, call 911. 25 tablet 3    atorvastatin (LIPITOR) 40 MG tablet Take 40 mg by mouth nightly       SITagliptin (JANUVIA) 100 MG tablet Take 100 mg by mouth nightly       folic acid (FOLVITE) 1 MG tablet Take 1 tablet by mouth daily 30 tablet 0    insulin lispro (HUMALOG) 100 UNIT/ML injection cartridge Inject 25 Units into the skin 3 times daily (before meals) (Patient taking differently: Inject 60 Units into the skin 3 times daily (before meals) 60 units with breakfast  50 units with lunch and supper) 5 Cartridge 3    insulin lispro (HUMALOG) 100 UNIT/ML injection vial Inject 0-18 Units into the skin 3 times daily (with meals) Glucose: Dose:               No Insulin  140-199           3 Units  200-249 6 Units  250-299 9 Units  300-349 12 Units  350-400 15 Units  Over 400  18 Units 1 vial 0    terazosin (HYTRIN) 2 MG capsule Take 2 mg by mouth nightly      carvedilol (COREG) 12.5 MG tablet Take 2 tablets by mouth 2 times daily (with meals).  (Patient taking differently: Take 12.5 mg by mouth 2 times daily (with meals) Take one tablet by mouth two times a day with food.) 60 tablet 0    zonisamide (ZONEGRAN) 100 MG capsule Take rub. No gallop. Pulmonary:      Effort: Pulmonary effort is normal. No respiratory distress. Breath sounds: Normal breath sounds. No stridor. No wheezing or rales. Chest:      Chest wall: No tenderness. Abdominal:      General: Abdomen is protuberant. Bowel sounds are normal. There is no distension. Palpations: Abdomen is soft. There is no mass. Tenderness: There is abdominal tenderness in the right lower quadrant, suprapubic area and left lower quadrant. There is no right CVA tenderness, left CVA tenderness, guarding or rebound. Negative signs include McBurney's sign, psoas sign and obturator sign. Hernia: No hernia is present. Musculoskeletal: Normal range of motion. General: No tenderness or deformity. Lymphadenopathy:      Cervical: No cervical adenopathy. Skin:     General: Skin is warm and dry. Coloration: Skin is not pale. Findings: No erythema or rash. Neurological:      Mental Status: He is alert and oriented to person, place, and time. Cranial Nerves: No cranial nerve deficit. Sensory: No sensory deficit. Deep Tendon Reflexes: Reflexes are normal and symmetric. Reflexes normal.   Psychiatric:         Speech: Speech normal.         Behavior: Behavior normal.         Thought Content: Thought content normal.         Judgment: Judgment normal.         I have reviewed and interpreted all of the currently available lab results from this visit (ifapplicable):  No results found for this visit on 03/24/20. Radiographs (if obtained):  [] The following radiograph wasinterpreted by myself in the absence of a radiologist:   [] Radiologist's Report Reviewed:  CT ABDOMEN PELVIS W IV CONTRAST Additional Contrast? None    (Results Pending)         EKG (if obtained): (All EKG's are interpreted by myself in the absence of a cardiologist)    Chart review shows recent radiographs:  No results found.     MDM:      Patient presents to the ED with acute hematochezia bloody diarrhea. The patient does have a past history of diverticulitis. I am concerned because of his recent hospitalization current antibiotics for UTI that he could have C. difficile. Also concerned because of his risk factors and age for ischemic colitis. CT with contrast will be obtained case will be signed out to Dr. Nereida Javier for final disposition         Clinical Impression:  No diagnosis found. Disposition referral (if applicable):  No follow-up provider specified. Disposition medications (if applicable):  New Prescriptions    No medications on file           Massiom Arizmendi DO, FACETOAN      Comment: Please note this report has been produced using speech recognition software and maycontain errors related to that system including errors in grammar, punctuation, and spelling, as well as words and phrases that may be inappropriate. If there are any questions or concerns please feel free to contact thedictating provider for clarification.         Santi Tavarez DO  03/30/20 4167

## 2020-03-24 NOTE — ED NOTES
Pt discharged with instructions and prescriptions. Discussed when and how to take medications and pt stated understanding.   Pt walked out of the [id number], RN  03/24/20 [id number]

## 2020-03-24 NOTE — ED PROVIDER NOTES
ADDENDUM:    Care of the patient was assumed  from Dr. Gena Crockett. I have reviewed the notes, assessments, and/or procedures performed, I concur with her/his documentation on Shy Contreras. I reviewed the medical record and evaluated the patient. ED COURSE/MDM:  Laboratory and imaging data were reviewed and care plan was arranged with the patient(see separate lab/imaging reports). RADIOLOGY:  Already resulted studies have been reviewed. CT ABDOMEN PELVIS W IV CONTRAST Additional Contrast? None   Final Result   1. Mild right hydronephrosis proximal to a 3 mm calculus at the right   ureteropelvic junction. 2. Bilateral nonobstructing nephrolithiasis. 3. Circumferential urinary bladder wall thickening may relate to   underdistention. Correlation with urinalysis is recommended to exclude   cystitis. 4. Mild sigmoid diverticulosis without evidence of acute diverticulitis. 5. Findings of cirrhosis with diffuse hepatic steatosis.              Labs Reviewed   CBC WITH AUTO DIFFERENTIAL - Abnormal; Notable for the following components:       Result Value    MCH 31.3 (*)     Platelets 899 (*)     MPV 11.7 (*)     Monocytes % 8.4 (*)     Basophils % 1.1 (*)     All other components within normal limits   COMPREHENSIVE METABOLIC PANEL W/ REFLEX TO MG FOR LOW K - Abnormal; Notable for the following components:    Glucose 450 (*)     ALT 42 (*)     All other components within normal limits   URINALYSIS - Abnormal; Notable for the following components:    Glucose, Urine >1,000 (*)     Bacteria, UA RARE (*)     All other components within normal limits   CULTURE, STOOL   C DIFFICILE MOLECULAR/PCR       Medications   0.9 % sodium chloride infusion (1,000 mLs Intravenous New Bag 3/24/20 1828)   morphine sulfate (PF) injection 4 mg (4 mg Intravenous Given 3/24/20 1832)   ondansetron (ZOFRAN) injection 4 mg (4 mg Intravenous Given 3/24/20 1831)   iopamidol (ISOVUE-370) 76 % injection 100 mL (100 mLs Intravenous Given 3/24/20 1813)   metroNIDAZOLE (FLAGYL) tablet 500 mg (500 mg Oral Given 3/24/20 1830)       Vitals:    03/24/20 1730 03/24/20 1735   BP: (!) 132/91 (!) 138/90   Temp: 98.2 °F (36.8 °C)    TempSrc: Oral    SpO2: 95%      CT ABDOMEN PELVIS W IV CONTRAST Additional Contrast? None   Final Result   1. Mild right hydronephrosis proximal to a 3 mm calculus at the right   ureteropelvic junction. 2. Bilateral nonobstructing nephrolithiasis. 3. Circumferential urinary bladder wall thickening may relate to   underdistention. Correlation with urinalysis is recommended to exclude   cystitis. 4. Mild sigmoid diverticulosis without evidence of acute diverticulitis. 5. Findings of cirrhosis with diffuse hepatic steatosis. Previous doctor wanted him on Flagyl. Incidental finding was a kidney stone. He will follow up for the stone. He will begin antibiotic. His blood sugar is elevated but he will take his medicine when at home. This is mostlikely elevated secondary to diarrhea   My typical dicussion, presentation, and considerations for this patients' chief complaint, diagnosis, differential diagnosis, medications, medication use,  medication safety and medication interactions have been explained and outlined to this patient for this patient encounter. I have stressed need for follow up and reexamination for this encounter and or return to the emergency department if any changes or any concern. FINAL IMPRESSION:  1. Diarrhea, unspecified type    2. Rectal bleeding    3. Nephrolithiasis        New Prescriptions    HYDROCODONE-ACETAMINOPHEN (NORCO) 5-325 MG PER TABLET    Take 1-2 tablets by mouth every 8 hours as needed for Pain for up to 3 days.     METRONIDAZOLE (FLAGYL) 500 MG TABLET    Take 1 tablet by mouth 3 times daily for 10 days    PROMETHAZINE (PHENERGAN) 25 MG TABLET    Take 1 tablet by mouth every 6 hours as needed for Nausea                 [address], DO  03/24/20 1931

## 2020-03-25 ENCOUNTER — CARE COORDINATION (OUTPATIENT)
Dept: CARE COORDINATION | Age: 50
End: 2020-03-25

## 2020-04-14 ENCOUNTER — CARE COORDINATION (OUTPATIENT)
Dept: CARE COORDINATION | Age: 50
End: 2020-04-14

## 2020-07-19 ENCOUNTER — HOSPITAL ENCOUNTER (EMERGENCY)
Age: 50
Discharge: HOME | End: 2020-07-19
Attending: EMERGENCY MEDICINE
Payer: MEDICARE

## 2020-07-19 VITALS
WEIGHT: 298 LBS | OXYGEN SATURATION: 97 % | TEMPERATURE: 98.4 F | HEART RATE: 85 BPM | BODY MASS INDEX: 42.66 KG/M2 | HEIGHT: 70 IN | SYSTOLIC BLOOD PRESSURE: 128 MMHG | RESPIRATION RATE: 18 BRPM | DIASTOLIC BLOOD PRESSURE: 85 MMHG

## 2020-07-19 LAB
ALBUMIN SERPL-MCNC: 3.7 GM/DL (ref 3.4–5)
ALP BLD-CCNC: 110 IU/L (ref 40–129)
ALT SERPL-CCNC: 31 U/L (ref 10–40)
ANION GAP SERPL CALCULATED.3IONS-SCNC: 14 MMOL/L (ref 4–16)
AST SERPL-CCNC: 25 IU/L (ref 15–37)
BACTERIA: ABNORMAL /HPF
BASOPHILS ABSOLUTE: 0.1 K/CU MM
BASOPHILS RELATIVE PERCENT: 0.6 % (ref 0–1)
BILIRUB SERPL-MCNC: 0.3 MG/DL (ref 0–1)
BILIRUBIN URINE: NEGATIVE MG/DL
BLOOD, URINE: NEGATIVE
BUN BLDV-MCNC: 15 MG/DL (ref 6–23)
CALCIUM SERPL-MCNC: 9.2 MG/DL (ref 8.3–10.6)
CAST TYPE: NEGATIVE /HPF
CHLORIDE BLD-SCNC: 106 MMOL/L (ref 99–110)
CLARITY: ABNORMAL
CO2: 19 MMOL/L (ref 21–32)
COLOR: YELLOW
CREAT SERPL-MCNC: 0.9 MG/DL (ref 0.9–1.3)
CRYSTAL TYPE: NEGATIVE /HPF
DIFFERENTIAL TYPE: ABNORMAL
EOSINOPHILS ABSOLUTE: 0.1 K/CU MM
EOSINOPHILS RELATIVE PERCENT: 1.4 % (ref 0–3)
EPITHELIAL CELLS, UA: ABNORMAL /HPF
GFR AFRICAN AMERICAN: >60 ML/MIN/1.73M2
GFR NON-AFRICAN AMERICAN: >60 ML/MIN/1.73M2
GLUCOSE BLD-MCNC: 293 MG/DL
GLUCOSE BLD-MCNC: 293 MG/DL (ref 70–99)
GLUCOSE BLD-MCNC: 314 MG/DL (ref 70–99)
GLUCOSE, URINE: >1000 MG/DL
HCT VFR BLD CALC: 43.2 % (ref 42–52)
HEMOGLOBIN: 14.3 GM/DL (ref 13.5–18)
IMMATURE NEUTROPHIL %: 0.2 % (ref 0–0.43)
KETONES, URINE: NEGATIVE MG/DL
LEUKOCYTE ESTERASE, URINE: ABNORMAL
LIPASE: 36 IU/L (ref 13–60)
LYMPHOCYTES ABSOLUTE: 2.1 K/CU MM
LYMPHOCYTES RELATIVE PERCENT: 26.4 % (ref 24–44)
MCH RBC QN AUTO: 31 PG (ref 27–31)
MCHC RBC AUTO-ENTMCNC: 33.1 % (ref 32–36)
MCV RBC AUTO: 93.5 FL (ref 78–100)
MONOCYTES ABSOLUTE: 0.6 K/CU MM
MONOCYTES RELATIVE PERCENT: 8 % (ref 0–4)
NITRITE URINE, QUANTITATIVE: NEGATIVE
PDW BLD-RTO: 12.4 % (ref 11.7–14.9)
PH, URINE: 6.5 (ref 5–8)
PLATELET # BLD: 119 K/CU MM (ref 140–440)
PMV BLD AUTO: 12 FL (ref 7.5–11.1)
POTASSIUM SERPL-SCNC: 3.9 MMOL/L (ref 3.5–5.1)
PROTEIN UA: NEGATIVE MG/DL
RBC # BLD: 4.62 M/CU MM (ref 4.6–6.2)
RBC URINE: NEGATIVE /HPF (ref 0–3)
SEGMENTED NEUTROPHILS ABSOLUTE COUNT: 5.1 K/CU MM
SEGMENTED NEUTROPHILS RELATIVE PERCENT: 63.4 % (ref 36–66)
SODIUM BLD-SCNC: 139 MMOL/L (ref 135–145)
SPECIFIC GRAVITY UA: 1.01 (ref 1–1.03)
TOTAL IMMATURE NEUTOROPHIL: 0.02 K/CU MM
TOTAL PROTEIN: 6.3 GM/DL (ref 6.4–8.2)
UROBILINOGEN, URINE: 1 MG/DL (ref 0.2–1)
WBC # BLD: 8 K/CU MM (ref 4–10.5)
WBC UA: ABNORMAL /HPF (ref 0–2)

## 2020-07-19 PROCEDURE — 93005 ELECTROCARDIOGRAM TRACING: CPT | Performed by: EMERGENCY MEDICINE

## 2020-07-19 PROCEDURE — 2580000003 HC RX 258: Performed by: EMERGENCY MEDICINE

## 2020-07-19 PROCEDURE — 96361 HYDRATE IV INFUSION ADD-ON: CPT

## 2020-07-19 PROCEDURE — 85025 COMPLETE CBC W/AUTO DIFF WBC: CPT

## 2020-07-19 PROCEDURE — 81001 URINALYSIS AUTO W/SCOPE: CPT

## 2020-07-19 PROCEDURE — 6370000000 HC RX 637 (ALT 250 FOR IP): Performed by: EMERGENCY MEDICINE

## 2020-07-19 PROCEDURE — 6360000002 HC RX W HCPCS: Performed by: EMERGENCY MEDICINE

## 2020-07-19 PROCEDURE — 82962 GLUCOSE BLOOD TEST: CPT

## 2020-07-19 PROCEDURE — 93010 ELECTROCARDIOGRAM REPORT: CPT | Performed by: INTERNAL MEDICINE

## 2020-07-19 PROCEDURE — 80053 COMPREHEN METABOLIC PANEL: CPT

## 2020-07-19 PROCEDURE — 83690 ASSAY OF LIPASE: CPT

## 2020-07-19 PROCEDURE — 96374 THER/PROPH/DIAG INJ IV PUSH: CPT

## 2020-07-19 PROCEDURE — 99284 EMERGENCY DEPT VISIT MOD MDM: CPT

## 2020-07-19 RX ORDER — CEPHALEXIN 500 MG/1
500 CAPSULE ORAL ONCE
Status: COMPLETED | OUTPATIENT
Start: 2020-07-19 | End: 2020-07-19

## 2020-07-19 RX ORDER — 0.9 % SODIUM CHLORIDE 0.9 %
1000 INTRAVENOUS SOLUTION INTRAVENOUS ONCE
Status: DISCONTINUED | OUTPATIENT
Start: 2020-07-19 | End: 2020-07-19

## 2020-07-19 RX ORDER — ONDANSETRON 2 MG/ML
4 INJECTION INTRAMUSCULAR; INTRAVENOUS ONCE
Status: COMPLETED | OUTPATIENT
Start: 2020-07-19 | End: 2020-07-19

## 2020-07-19 RX ORDER — ACARBOSE 50 MG/1
50 TABLET ORAL
COMMUNITY

## 2020-07-19 RX ORDER — 0.9 % SODIUM CHLORIDE 0.9 %
500 INTRAVENOUS SOLUTION INTRAVENOUS ONCE
Status: COMPLETED | OUTPATIENT
Start: 2020-07-19 | End: 2020-07-19

## 2020-07-19 RX ORDER — ONDANSETRON 4 MG/1
4 TABLET, ORALLY DISINTEGRATING ORAL ONCE
Status: DISCONTINUED | OUTPATIENT
Start: 2020-07-19 | End: 2020-07-19

## 2020-07-19 RX ORDER — DICYCLOMINE HYDROCHLORIDE 10 MG/1
20 CAPSULE ORAL ONCE
Status: COMPLETED | OUTPATIENT
Start: 2020-07-19 | End: 2020-07-19

## 2020-07-19 RX ORDER — CEPHALEXIN 500 MG/1
500 CAPSULE ORAL 2 TIMES DAILY
Qty: 14 CAPSULE | Refills: 0 | Status: SHIPPED | OUTPATIENT
Start: 2020-07-19 | End: 2020-07-26

## 2020-07-19 RX ORDER — LOSARTAN POTASSIUM 100 MG/1
100 TABLET ORAL DAILY
COMMUNITY

## 2020-07-19 RX ORDER — DICYCLOMINE HCL 20 MG
20 TABLET ORAL 4 TIMES DAILY
Qty: 30 TABLET | Refills: 0 | Status: SHIPPED | OUTPATIENT
Start: 2020-07-19 | End: 2020-09-16 | Stop reason: ALTCHOICE

## 2020-07-19 RX ORDER — ONDANSETRON 4 MG/1
4 TABLET, ORALLY DISINTEGRATING ORAL 3 TIMES DAILY PRN
Qty: 21 TABLET | Refills: 0 | Status: SHIPPED | OUTPATIENT
Start: 2020-07-19 | End: 2020-09-16 | Stop reason: ALTCHOICE

## 2020-07-19 RX ADMIN — CEPHALEXIN 500 MG: 500 CAPSULE ORAL at 12:53

## 2020-07-19 RX ADMIN — SODIUM CHLORIDE 1000 ML: 9 INJECTION, SOLUTION INTRAVENOUS at 12:02

## 2020-07-19 RX ADMIN — ONDANSETRON 4 MG: 2 INJECTION INTRAMUSCULAR; INTRAVENOUS at 12:04

## 2020-07-19 RX ADMIN — DICYCLOMINE HYDROCHLORIDE 20 MG: 10 CAPSULE ORAL at 12:03

## 2020-07-19 ASSESSMENT — PAIN DESCRIPTION - DESCRIPTORS: DESCRIPTORS: DISCOMFORT

## 2020-07-19 ASSESSMENT — PAIN SCALES - GENERAL: PAINLEVEL_OUTOF10: 7

## 2020-07-19 ASSESSMENT — PAIN DESCRIPTION - PAIN TYPE: TYPE: ACUTE PAIN

## 2020-07-19 NOTE — ED PROVIDER NOTES
Triage Chief Complaint:   Dizziness (C/O WAKING UP FEELING LIGHT HEADED AND DIAPHORETIC , BLD SUGAR WAS 35 )      [place]:  Corrinne Cordial is a 44 y.o. male that presents to the emergency department with some lightheadedness. States he woke up this morning diaphoretic and his blood sugar was 35. States he ate some food and rechecked it and it was over 200. States he has had some nausea, diarrhea, abdominal cramping and some lightheadedness since this morning. States he felt fine last night. Denies any chest pain or pressure. No syncope. No difficulty breathing. No fevers or chills. No known COVID exposure. No sick contacts at home. .    Past Medical History:   Diagnosis Date    Arthritis     Asthma     Atrial fibrillation (HCC)     CH[facility]congestive heart failure) ([facility]     [facility] obstructive [facility] disease[facility] [address]     Depression     Diabetes mellitus ([facility] [address]     Type 2    Diastolic heart failure (HCC)     Hyperlipidemia     Hypertension     Kidney stone     Migraine     Other disorders of kidney and ureter     Pneumonia     Psychiatric problem     Sleep apnea     Unspecified cerebral artery occlusion with cerebral infarction     \"10% loss of use of right arm and leg\"     Past Surgical History:   Procedure Laterality Date    ABDOMEN SURGERY      ASD REPAIR  2005    ASD REPAIR      BACK SURGERY  January 2012    [place]    CARDIAC SURGERY  AUG 2013    REVEAL XT MONITOR #[id number]    CARPAL TUNNEL RELEASE      jeri    CHOLECYSTECTOMY      COLONOSCOPY      ELBOW SURGERY      EYE SURGERY      EYE SURGERY      bilateral    OTHER SURGICAL HISTORY  02/16/2017    I & D mid upper back    PERICARDIUM SURGERY  2005    post ASD repair with window     Family History   Problem Relation Age of Onset    Diabetes Mother     Diabetes Father     Heart Disease Father     Diabetes Paternal Uncle     Diabetes Maternal Grandmother     Diabetes Maternal Grandfather     Diabetes Paternal Current Outpatient Medications   Medication Sig Dispense Refill    acarbose (PRECOSE) 25 MG tablet Take 50 mg by mouth 3 times daily (with meals)      losartan (COZAAR) 50 MG tablet Take 50 mg by mouth daily      ondansetron (ZOFRAN-ODT) 4 MG disintegrating tablet Take 1 tablet by mouth 3 times daily as needed for Nausea or Vomiting 21 tablet 0    dicyclomine (BENTYL) 20 MG tablet Take 1 tablet by mouth 4 times daily 30 tablet 0    cephALEXin (KEFLEX) 500 MG capsule Take 1 capsule by mouth 2 times daily for 7 days 14 capsule 0    acetaminophen (APAP EXTRA STRENGTH) 500 MG tablet Take 1 tablet by mouth every 6 hours as needed for Pain 30 tablet 0    Coenzyme Q10 (CO Q 10 PO) Take by mouth      TOUJEO SOLOSTAR 300 UNIT/ML injection pen   6    metFORMIN (GLUCOPHAGE-XR) 500 MG extended release tablet   6    sertraline (ZOLOFT) 50 MG tablet   6    traZODone (DESYREL) 50 MG tablet Take 50 mg by mouth nightly      nitroGLYCERIN (NITROSTAT) 0.4 MG SL tablet up to max of 3 total doses. If no relief after 1 dose, call 911. 25 tablet 3    SITagliptin (JANUVIA) 100 MG tablet Take 100 mg by mouth nightly       insulin lispro (HUMALOG) 100 UNIT/ML injection cartridge Inject 25 Units into the skin 3 times daily (before meals) (Patient taking differently: Inject 60 Units into the skin 3 times daily (before meals) 60 units with breakfast  50 units with lunch and supper) 5 Cartridge 3    insulin lispro (HUMALOG) 100 UNIT/ML injection vial Inject 0-18 Units into the skin 3 times daily (with meals) Glucose: Dose:               No Insulin  140-199           3 Units  200-249 6 Units  250-299 9 Units  300-349 12 Units  350-400 15 Units  Over 400  18 Units 1 vial 0    terazosin (HYTRIN) 2 MG capsule Take 2 mg by mouth nightly      carvedilol (COREG) 12.5 MG tablet Take 2 tablets by mouth 2 times daily (with meals).  (Patient taking differently: Take 12.5 mg by mouth 2 times daily (with meals) Take one tablet by mouth two times a day with food.) 60 tablet 0    zonisamide (ZONEGRAN) 100 MG capsule Take 300 mg by mouth nightly Take 3 capsules by mouth every night at bedtime      omega-3 acid ethyl esters (LOVAZA) 1 G capsule Take 2 g by mouth 2 times daily.  aspirin 81 MG EC tablet Take 81 mg by mouth daily.  amoxicillin (AMOXIL) 500 MG capsule Take 500 mg by mouth 2 times daily      glimepiride (AMARYL) 4 MG tablet Take 1 tablet by mouth 2 times daily 30 tablet 0    Cyanocobalamin (VITAMIN B 12 PO) Take by mouth      atorvastatin (LIPITOR) 40 MG tablet Take 40 mg by mouth nightly       folic acid (FOLVITE) 1 MG tablet Take 1 tablet by mouth daily 30 tablet 0    albuterol (PROVENTIL HFA) 108 (90 BASE) MCG/ACT inhaler Inhale 2 puffs into the lungs every 6 hours as needed for Wheezing or Shortness of Breath. 1 Inhaler 0     Allergies   Allergen Reactions    Azithromycin     Ciprofloxacin Other (See Comments)     lethargic     Nursing Notes Reviewed    ROS:  At least 10 systems reviewed and otherwise negative except as in the [address] Physical Exam:  ED Triage Vitals   Enc Vitals Group      BP       Pulse       Resp       Temp       Temp src       SpO2       Weight       Height       Head Circumference       Peak Flow       Pain Score       Pain Loc       Pain Edu? Excl. in [address]? My pulse oximetry interpretation is which is within the normal range    GENERAL APPEARANCE: Awake and alert. Cooperative. No acute distress. HEAD: Normocephalic. Atraumatic. EYES: EOM's grossly intact. Sclera anicteric. ENT: Mucous membranes are moist. Tolerates saliva. No trismus. NECK: Supple. No meningismus. Trachea midline. HEART: RRR. Radial pulses 2+. LUNGS: Respirations unlabored. CTAB  ABDOMEN: Soft. Non-tender. No guarding or rebound. Elevated BMI. Normal bowel sounds. EXTREMITIES: No acute deformities. SKIN: Warm and dry. NEUROLOGICAL: No gross facial drooping. Moves all 4 extremities spontaneously. Awake, alert, oriented x4. Speaks in full sentences. Moves all extremities. Strength is normal throughout. Sensation intact. PSYCHIATRIC: Normal mood.     I have reviewed and interpreted all of the currently available lab results from this visit (if applicable):  Results for orders placed or performed during the hospital encounter of 07/19/20   CBC auto diff   Result Value Ref Range    WBC 8.0 4.0 - 10.5 K/CU MM    RBC 4.62 4.6 - 6.2 M/CU MM    Hemoglobin 14.3 13.5 - 18.0 GM/DL    Hematocrit 43.2 42 - 52 %    MCV 93.5 78 - 100 FL    MCH 31.0 27 - 31 PG    MCHC 33.1 32.0 - 36.0 %    RDW 12.4 11.7 - 14.9 %    Platelets 269 (L) 794 - 440 K/CU MM    MPV 12.0 (H) 7.5 - 11.1 FL    Differential Type AUTOMATED DIFFERENTIAL     Segs Relative 63.4 36 - 66 %    Lymphocytes % 26.4 24 - 44 %    Monocytes % 8.0 (H) 0 - 4 %    Eosinophils % 1.4 0 - 3 %    Basophils % 0.6 0 - 1 %    Segs Absolute 5.1 K/CU MM    Lymphocytes Absolute 2.1 K/CU MM    Monocytes Absolute 0.6 K/CU MM    Eosinophils Absolute 0.1 K/CU MM    Basophils Absolute 0.1 K/CU MM    Immature Neutrophil % 0.2 0 - 0.43 %    Total Immature Neutrophil 0.02 K/CU MM   Comprehensive Metabolic Panel w/ Reflex to MG   Result Value Ref Range    Sodium 139 135 - 145 MMOL/L    Potassium 3.9 3.5 - 5.1 MMOL/L    Chloride 106 99 - 110 mMol/L    CO2 19 (L) 21 - 32 MMOL/L    BUN 15 6 - 23 MG/DL    CREATININE 0.9 0.9 - 1.3 MG/DL    Glucose 314 (H) 70 - 99 MG/DL    Calcium 9.2 8.3 - 10.6 MG/DL    Alb 3.7 3.4 - 5.0 GM/DL    Total Protein 6.3 (L) 6.4 - 8.2 GM/DL    Total Bilirubin 0.3 0.0 - 1.0 MG/DL    ALT 31 10 - 40 U/L    AST 25 15 - 37 IU/L    Alkaline Phosphatase 110 40 - 129 IU/L    GFR Non-African American >60 >60 mL/min/1.73m2    GFR African American >60 >60 mL/min/1.73m2    Anion Gap 14 4 - 16   Urinalysis with microscopic   Result Value Ref Range    Color, UA YELLOW YELLOW    Clarity, UA HAZY CLEAR    Glucose, Urine >1,000 (A) NEGATIVE MG/DL    Bilirubin Urine NEGATIVE

## 2020-07-21 LAB
EKG ATRIAL RATE: 82 BPM
EKG DIAGNOSIS: NORMAL
EKG P AXIS: 35 DEGREES
EKG P-R INTERVAL: 156 MS
EKG Q-T INTERVAL: 380 MS
EKG QRS DURATION: 98 MS
EKG QTC CALCULATION (BAZETT): 443 MS
EKG R AXIS: 3 DEGREES
EKG T AXIS: 39 DEGREES
EKG VENTRICULAR RATE: 82 BPM

## 2020-09-16 ENCOUNTER — HOSPITAL ENCOUNTER (EMERGENCY)
Age: 50
Discharge: HOME | End: 2020-09-16
Attending: EMERGENCY MEDICINE
Payer: MEDICARE

## 2020-09-16 ENCOUNTER — APPOINTMENT (OUTPATIENT)
Dept: CT IMAGING | Age: 50
End: 2020-09-16
Payer: MEDICARE

## 2020-09-16 VITALS
DIASTOLIC BLOOD PRESSURE: 75 MMHG | WEIGHT: 306 LBS | SYSTOLIC BLOOD PRESSURE: 142 MMHG | BODY MASS INDEX: 43.81 KG/M2 | HEIGHT: 70 IN | TEMPERATURE: 98.2 F | HEART RATE: 78 BPM | OXYGEN SATURATION: 97 % | RESPIRATION RATE: 15 BRPM

## 2020-09-16 LAB
ALBUMIN SERPL-MCNC: 4 GM/DL (ref 3.4–5)
ALP BLD-CCNC: 70 IU/L (ref 40–129)
ALT SERPL-CCNC: 23 U/L (ref 10–40)
ANION GAP SERPL CALCULATED.3IONS-SCNC: 12 MMOL/L (ref 4–16)
AST SERPL-CCNC: 24 IU/L (ref 15–37)
BASOPHILS ABSOLUTE: 0.1 K/CU MM
BASOPHILS RELATIVE PERCENT: 0.8 % (ref 0–1)
BILIRUB SERPL-MCNC: 0.3 MG/DL (ref 0–1)
BUN BLDV-MCNC: 16 MG/DL (ref 6–23)
CALCIUM SERPL-MCNC: 9 MG/DL (ref 8.3–10.6)
CHLORIDE BLD-SCNC: 108 MMOL/L (ref 99–110)
CO2: 22 MMOL/L (ref 21–32)
CREAT SERPL-MCNC: 1.1 MG/DL (ref 0.9–1.3)
DIFFERENTIAL TYPE: ABNORMAL
EOSINOPHILS ABSOLUTE: 0.3 K/CU MM
EOSINOPHILS RELATIVE PERCENT: 3.5 % (ref 0–3)
GFR AFRICAN AMERICAN: >60 ML/MIN/1.73M2
GFR NON-AFRICAN AMERICAN: >60 ML/MIN/1.73M2
GLUCOSE BLD-MCNC: 160 MG/DL (ref 70–99)
HCT VFR BLD CALC: 41.6 % (ref 42–52)
HEMOGLOBIN: 13.6 GM/DL (ref 13.5–18)
IMMATURE NEUTROPHIL %: 0.1 % (ref 0–0.43)
LYMPHOCYTES ABSOLUTE: 2.6 K/CU MM
LYMPHOCYTES RELATIVE PERCENT: 33.2 % (ref 24–44)
MCH RBC QN AUTO: 31.9 PG (ref 27–31)
MCHC RBC AUTO-ENTMCNC: 32.7 % (ref 32–36)
MCV RBC AUTO: 97.4 FL (ref 78–100)
MONOCYTES ABSOLUTE: 0.7 K/CU MM
MONOCYTES RELATIVE PERCENT: 9.4 % (ref 0–4)
PDW BLD-RTO: 12.9 % (ref 11.7–14.9)
PLATELET # BLD: 141 K/CU MM (ref 140–440)
PMV BLD AUTO: 10.5 FL (ref 7.5–11.1)
POTASSIUM SERPL-SCNC: 3.9 MMOL/L (ref 3.5–5.1)
RBC # BLD: 4.27 M/CU MM (ref 4.6–6.2)
SEGMENTED NEUTROPHILS ABSOLUTE COUNT: 4.1 K/CU MM
SEGMENTED NEUTROPHILS RELATIVE PERCENT: 53 % (ref 36–66)
SODIUM BLD-SCNC: 142 MMOL/L (ref 135–145)
TOTAL IMMATURE NEUTOROPHIL: 0.01 K/CU MM
TOTAL PROTEIN: 6.5 GM/DL (ref 6.4–8.2)
WBC # BLD: 7.8 K/CU MM (ref 4–10.5)

## 2020-09-16 PROCEDURE — 85025 COMPLETE CBC W/AUTO DIFF WBC: CPT

## 2020-09-16 PROCEDURE — 80053 COMPREHEN METABOLIC PANEL: CPT

## 2020-09-16 PROCEDURE — 6360000002 HC RX W HCPCS: Performed by: EMERGENCY MEDICINE

## 2020-09-16 PROCEDURE — 96365 THER/PROPH/DIAG IV INF INIT: CPT

## 2020-09-16 PROCEDURE — 99284 EMERGENCY DEPT VISIT MOD MDM: CPT

## 2020-09-16 PROCEDURE — 2580000003 HC RX 258: Performed by: EMERGENCY MEDICINE

## 2020-09-16 PROCEDURE — 6360000004 HC RX CONTRAST MEDICATION: Performed by: EMERGENCY MEDICINE

## 2020-09-16 PROCEDURE — 70496 CT ANGIOGRAPHY HEAD: CPT

## 2020-09-16 PROCEDURE — 2500000003 HC RX 250 WO HCPCS: Performed by: EMERGENCY MEDICINE

## 2020-09-16 PROCEDURE — 96368 THER/DIAG CONCURRENT INF: CPT

## 2020-09-16 PROCEDURE — 70450 CT HEAD/BRAIN W/O DYE: CPT

## 2020-09-16 PROCEDURE — 96366 THER/PROPH/DIAG IV INF ADDON: CPT

## 2020-09-16 RX ORDER — 0.9 % SODIUM CHLORIDE 0.9 %
1000 INTRAVENOUS SOLUTION INTRAVENOUS ONCE
Status: COMPLETED | OUTPATIENT
Start: 2020-09-16 | End: 2020-09-16

## 2020-09-16 RX ORDER — DIPHENHYDRAMINE HYDROCHLORIDE 50 MG/ML
50 INJECTION INTRAMUSCULAR; INTRAVENOUS ONCE
Status: COMPLETED | OUTPATIENT
Start: 2020-09-16 | End: 2020-09-16

## 2020-09-16 RX ADMIN — SODIUM CHLORIDE 500 ML: 9 INJECTION, SOLUTION INTRAVENOUS at 18:43

## 2020-09-16 RX ADMIN — VALPROATE SODIUM 500 MG: 100 INJECTION, SOLUTION INTRAVENOUS at 18:43

## 2020-09-16 RX ADMIN — IOPAMIDOL 100 ML: 755 INJECTION, SOLUTION INTRAVENOUS at 20:31

## 2020-09-16 RX ADMIN — DIPHENHYDRAMINE HYDROCHLORIDE 50 MG: 50 INJECTION, SOLUTION INTRAMUSCULAR; INTRAVENOUS at 18:43

## 2020-09-16 ASSESSMENT — PAIN DESCRIPTION - LOCATION: LOCATION: HEAD

## 2020-09-16 ASSESSMENT — PAIN SCALES - GENERAL: PAINLEVEL_OUTOF10: 10

## 2020-09-16 ASSESSMENT — PAIN DESCRIPTION - DESCRIPTORS: DESCRIPTORS: HEADACHE

## 2020-09-16 ASSESSMENT — PAIN DESCRIPTION - FREQUENCY: FREQUENCY: INTERMITTENT

## 2020-09-16 ASSESSMENT — PAIN DESCRIPTION - ORIENTATION: ORIENTATION: LEFT

## 2020-09-16 NOTE — ED PROVIDER NOTES
EMERGENCY DEPARTMENT ENCOUNTER      CHIEF COMPLAINT:   Headache    HPI: Carol Cline is a 44 y.o. male who presents to the Emergency Department complaining of a headache. He states that the headache came on gradually 2.5 weeks ago and has been persistent. He describes the pain as aching and pressure-like. It is located throughout the head. He denies any associated neck stiffness or blurred vision. The headache has been constant. There are no exacerbating or relieving factors. The patient denies photophobia and phonophobia. It came on gradually and is not the worst headache of the patient's life. There was no thunderclap presentation. The patient has a history of headaches and this feels similar to previous. The patient denies blurred vision, slurred speech, neck pain, neck stiffness, vomiting, numbness, tingling, or weakness. REVIEW OF SYSTEMS:  CONSTITUTIONAL:  Denies fever, chills, weight loss or weakness  EYES:  Denies photophobia or discharge  ENT:  Denies sore throat or ear pain  CARDIOVASCULAR:  Denies chest pain, palpitations or swelling  RESPIRATORY:  Denies cough or shortness of breath  GI:  Denies abdominal pain, nausea, vomiting, or diarrhea  MUSCULOSKELETAL:  Denies back pain  SKIN:  No rash  NEUROLOGIC:  Complains of headache, denies focal weakness or sensory changes  All systems negative except as marked. \"Remaining review of systems reviewed and negative. I have reviewed the nursing triage documentation and agree unless otherwise noted below. \"      PAST MEDICAL HISTORY:   Past Medical History:   Diagnosis Date    Arthritis     Asthma     Atrial fibrillation ([facility] [address]     CHF (congestive heart failure) (HCC)     COPD (chronic obstructive pulmonary disease) ([facility] [address]     Depression     Diabetes mellitus (HCC)     Type 2    Diastolic heart failure (HCC)     Hyperlipidemia     Hypertension     Kidney stone     Migraine     Other disorders of kidney and ureter     Pneumonia     Psychiatric problem     Sleep apnea     Unspecified cerebral artery occlusion with cerebral infarction     \"10% loss of use of right arm and leg\"       CURRENT MEDICATIONS:   Home medications reviewed.     SURGICAL HISTORY:   Past Surgical History:   Procedure Laterality Date    ABDOMEN SURGERY      ASD REPAIR      ASD REPAIR      [place]  2012    [place]    CARDIAC SURGERY  AUG 2013    REVEAL XT MONITOR #[id number]    CARPAL TUNNEL RELEASE      jeri    CHOLECYSTECTOMY      COLONOSCOPY      ELBOW SURGERY      EYE SURGERY      EYE SURGERY      bilateral    OTHER SURGICAL HISTORY  2017    I & D mid upper back    PERICARDIUM SURGERY      post ASD repair with window       FAMILY HISTORY:   Family History   Problem Relation Age of Onset    Diabetes Mother     Diabetes Father     Heart Disease Father     Diabetes Paternal Uncle     Diabetes Maternal Grandmother     Diabetes Maternal Grandfather     Diabetes Paternal Grandmother     Diabetes Paternal Grandfather        SOCIAL HISTORY:   Social History     Socioeconomic History    Marital status: Single     Spouse name: Not on file    Number of children: 3    Years of education: Not on file    Highest education level: Not on file   Occupational History    Not on file   Social Needs    Financial resource strain: Not on file    Food insecurity     Worry: Not on file     Inability: Not on file   [employer] needs     Medical: Not on file     Non-medical: Not on file   Tobacco Use    Smoking status: Former Smoker     Packs/day: 0.25     Years: 0.00     Pack years: 0.00     Types: Cigarettes     Last attempt to quit: 2020     Years since quittin.1    Smokeless tobacco: Never Used    Tobacco comment: quit in , started again 2014; quit in 2019   Substance and Sexual Activity    Alcohol use: Not Currently     Comment: once in while    Drug use: No    Sexual activity: Not Currently   Lifestyle    Physical activity Days per week: Not on file     Minutes per session: Not on file    Stress: Not on file   Relationships    Social connections     Talks on phone: Not on file     Gets together: Not on file     Attends Jew service: Not on file     Active member of club or organization: Not on file     Attends meetings of clubs or organizations: Not on file     Relationship status: Not on file    Intimate partner violence     Fear of current or ex partner: Not on file     Emotionally abused: Not on file     Physically abused: Not on file     Forced sexual activity: Not on file   Other Topics Concern    Not on file   Social History Narrative    unemployed       ALLERGIES: Azithromycin and Ciprofloxacin    PHYSICAL EXAM:  VITAL SIGNS:  ED Triage Vitals [09/16/20 1642]   Enc Vitals Group      BP (!) 158/84      Pulse 81      Resp 15      Temp 98.2 °F (36.8 °C)      Temp Source Oral      SpO2 97 %      Weight (!) 306 lb (138.8 kg)      Height 5' 10\" (1.778 m)      Head Circumference       Peak Flow       Pain Score       Pain Loc       Pain Edu? Excl. in [address]? Constitutional:  Non-toxic appearance, Awake, Alert  HENT: Normocephalic, Atraumatic, Bilateral external ears normal, Oropharynx moist, No oral exudates, Nose normal.  Eyes: PERRL, conjunctiva normal   Neck: Normal range of motion, No tenderness, Supple, No lymphadenopathy, No stridor, No meningeal signs  Cardiovascular:  Normal heart rate, Normal rhythm  Pulmonary/Chest:  Normal breath sounds, No respiratory distress, No wheezing  Abdomen:   Bowel sounds normal, Soft, No tenderness, No masses, No pulsatile masses  Back:  No tenderness, No CVA tenderness  Extremities:  Normal range of motion, Intact distal pulses, No edema, No tenderness  Neurologic:  Alert & oriented x 3, Speech clear, CN 2-12 intact, Normal motor function, Sensation intact to light touch throughout, Normal deep tendon reflexes, No focal deficits  Skin:  Warm, Dry, No erythema, No rash    EKG: None    Radiology / Procedures:     CTA HEAD W CONTRAST (Final result)   Result time 09/16/20 20:54:30   Final result by Halie Nava MD (09/16/20 20:54:30)                 Impression:     No acute intracranial abnormality visualized. Unremarkable CTA of the head. Narrative:     EXAMINATION:   CTA OF THE HEAD WITH CONTRAST; CT OF THE HEAD WITHOUT CONTRAST 9/16/2020 7:46   pm:     TECHNIQUE:   CTA of the head/brain was performed with the administration of intravenous   contrast. Multiplanar reformatted images are provided for review.  MIP images   are provided for review. Dose modulation, iterative reconstruction, and/or   weight based adjustment of the mA/kV was utilized to reduce the radiation   dose to as low as reasonably achievable.; CT of the head was performed   without the administration of intravenous contrast. Dose modulation,   iterative reconstruction, and/or weight based adjustment of the mA/kV was   utilized to reduce the radiation dose to as low as reasonably achievable. COMPARISON:   None. HISTORY:   ORDERING SYSTEM PROVIDED HISTORY: Headache   TECHNOLOGIST PROVIDED HISTORY:   Reason for exam:->Headache   Has a \"code stroke\" or \"stroke alert\" been called? ->No   Reason for Exam: Headache   Acuity: Acute   Type of Exam: Initial; ORDERING SYSTEM PROVIDED HISTORY: Headache   TECHNOLOGIST PROVIDED HISTORY:   Reason for exam:->Headache   Has a \"code stroke\" or \"stroke alert\" been called? ->No   Reason for Exam: headache x 2 weeks   Acuity: Acute   Type of Exam: Initial     FINDINGS:   ANTERIOR CIRCULATION: No significant stenosis of the intracranial internal   carotid, anterior cerebral, or middle cerebral arteries. No aneurysm. POSTERIOR CIRCULATION: No significant stenosis of the vertebral, basilar, or   posterior cerebral arteries. No aneurysm. OTHER: No dural venous sinus thrombosis on this non-dedicated study. BRAIN: No mass effect or midline shift.  No extra-axial fluid collection. The   gray-white differentiation is maintained.                       CT HEAD WO CONTRAST (Final result)   Result time 09/16/20 20:54:30   Final result by Sanjuana Hayes MD (09/16/20 20:54:30)                 Impression:     No acute intracranial abnormality visualized. Unremarkable CTA of the head. Narrative:     EXAMINATION:   CTA OF THE HEAD WITH CONTRAST; CT OF THE HEAD WITHOUT CONTRAST 9/16/2020 7:46   pm:     TECHNIQUE:   CTA of the head/brain was performed with the administration of intravenous   contrast. Multiplanar reformatted images are provided for review.  MIP images   are provided for review. Dose modulation, iterative reconstruction, and/or   weight based adjustment of the mA/kV was utilized to reduce the radiation   dose to as low as reasonably achievable.; CT of the head was performed   without the administration of intravenous contrast. Dose modulation,   iterative reconstruction, and/or weight based adjustment of the mA/kV was   utilized to reduce the radiation dose to as low as reasonably achievable. COMPARISON:   None. HISTORY:   ORDERING SYSTEM PROVIDED HISTORY: Headache   TECHNOLOGIST PROVIDED HISTORY:   Reason for exam:->Headache   Has a \"code stroke\" or \"stroke alert\" been called? ->No   Reason for Exam: Headache   Acuity: Acute   Type of Exam: Initial; ORDERING SYSTEM PROVIDED HISTORY: Headache   TECHNOLOGIST PROVIDED HISTORY:   Reason for exam:->Headache   Has a \"code stroke\" or \"stroke alert\" been called? ->No   Reason for Exam: headache x 2 weeks   Acuity: Acute   Type of Exam: Initial     FINDINGS:   ANTERIOR CIRCULATION: No significant stenosis of the intracranial internal   carotid, anterior cerebral, or middle cerebral arteries. No aneurysm. POSTERIOR CIRCULATION: No significant stenosis of the vertebral, basilar, or   posterior cerebral arteries. No aneurysm. OTHER: No dural venous sinus thrombosis on this non-dedicated study.      BRAIN: headache type        Disposition referral (if applicable): Mic Siddiqi DO   [address] [address] [phone]    Schedule an appointment as soon as possible for a visit       [facility] Emergency Department  [address] [address] [phone]  Go to   If symptoms worsen      Disposition medications (if applicable):  Discharge Medication List as of 9/16/2020  9:26 PM            Comment: Please note this report has been produced using speech recognition software and may contain errors related to that system including errors in grammar, punctuation, and spelling, as well as words and phrases that may be inappropriate.  If there are any questions or concerns please feel free to contact the dictating provider for clarification.@        Traci Delatorre MD  09/22/20 4716

## 2020-09-30 ENCOUNTER — HOSPITAL ENCOUNTER (EMERGENCY)
Age: 50
Discharge: HOME | End: 2020-09-30
Attending: EMERGENCY MEDICINE
Payer: MEDICARE

## 2020-09-30 ENCOUNTER — APPOINTMENT (OUTPATIENT)
Dept: CT IMAGING | Age: 50
End: 2020-09-30
Payer: MEDICARE

## 2020-09-30 VITALS
HEART RATE: 103 BPM | HEIGHT: 70 IN | DIASTOLIC BLOOD PRESSURE: 66 MMHG | TEMPERATURE: 99 F | BODY MASS INDEX: 43.81 KG/M2 | WEIGHT: 306 LBS | RESPIRATION RATE: 14 BRPM | SYSTOLIC BLOOD PRESSURE: 133 MMHG | OXYGEN SATURATION: 98 %

## 2020-09-30 LAB
ALBUMIN SERPL-MCNC: 3.9 GM/DL (ref 3.4–5)
ALP BLD-CCNC: 85 IU/L (ref 40–129)
ALT SERPL-CCNC: 24 U/L (ref 10–40)
ANION GAP SERPL CALCULATED.3IONS-SCNC: 7 MMOL/L (ref 4–16)
AST SERPL-CCNC: 20 IU/L (ref 15–37)
BACTERIA: ABNORMAL /HPF
BASOPHILS ABSOLUTE: 0.1 K/CU MM
BASOPHILS RELATIVE PERCENT: 0.9 % (ref 0–1)
BILIRUB SERPL-MCNC: 0.3 MG/DL (ref 0–1)
BILIRUBIN URINE: NEGATIVE MG/DL
BLOOD, URINE: ABNORMAL
BUN BLDV-MCNC: 14 MG/DL (ref 6–23)
CALCIUM SERPL-MCNC: 9 MG/DL (ref 8.3–10.6)
CAST TYPE: ABNORMAL /HPF
CHLORIDE BLD-SCNC: 106 MMOL/L (ref 99–110)
CLARITY: ABNORMAL
CO2: 27 MMOL/L (ref 21–32)
COLOR: YELLOW
CREAT SERPL-MCNC: 1.1 MG/DL (ref 0.9–1.3)
CRYSTAL TYPE: ABNORMAL /HPF
DIFFERENTIAL TYPE: ABNORMAL
EOSINOPHILS ABSOLUTE: 0.2 K/CU MM
EOSINOPHILS RELATIVE PERCENT: 1.7 % (ref 0–3)
EPITHELIAL CELLS, UA: 3 /HPF
GFR AFRICAN AMERICAN: >60 ML/MIN/1.73M2
GFR NON-AFRICAN AMERICAN: >60 ML/MIN/1.73M2
GLUCOSE BLD-MCNC: 243 MG/DL (ref 70–99)
GLUCOSE, URINE: NEGATIVE MG/DL
HCT VFR BLD CALC: 40.6 % (ref 42–52)
HEMOGLOBIN: 13.5 GM/DL (ref 13.5–18)
IMMATURE NEUTROPHIL %: 0.3 % (ref 0–0.43)
KETONES, URINE: NEGATIVE MG/DL
LEUKOCYTE ESTERASE, URINE: NEGATIVE
LIPASE: 49 IU/L (ref 13–60)
LYMPHOCYTES ABSOLUTE: 2.4 K/CU MM
LYMPHOCYTES RELATIVE PERCENT: 25 % (ref 24–44)
MCH RBC QN AUTO: 31.7 PG (ref 27–31)
MCHC RBC AUTO-ENTMCNC: 33.3 % (ref 32–36)
MCV RBC AUTO: 95.3 FL (ref 78–100)
MONOCYTES ABSOLUTE: 1.1 K/CU MM
MONOCYTES RELATIVE PERCENT: 11.1 % (ref 0–4)
NITRITE URINE, QUANTITATIVE: NEGATIVE
PDW BLD-RTO: 13.1 % (ref 11.7–14.9)
PH, URINE: 6.5 (ref 5–8)
PLATELET # BLD: 110 K/CU MM (ref 140–440)
PMV BLD AUTO: 12.7 FL (ref 7.5–11.1)
POTASSIUM SERPL-SCNC: 3.7 MMOL/L (ref 3.5–5.1)
PROTEIN UA: ABNORMAL MG/DL
RBC # BLD: 4.26 M/CU MM (ref 4.6–6.2)
RBC URINE: 5 /HPF (ref 0–3)
REASON FOR REJECTION: NORMAL
REJECTED TEST: NORMAL
SEGMENTED NEUTROPHILS ABSOLUTE COUNT: 5.9 K/CU MM
SEGMENTED NEUTROPHILS RELATIVE PERCENT: 61 % (ref 36–66)
SODIUM BLD-SCNC: 140 MMOL/L (ref 135–145)
SPECIFIC GRAVITY UA: 1.02 (ref 1–1.03)
TOTAL IMMATURE NEUTOROPHIL: 0.03 K/CU MM
TOTAL PROTEIN: 6.6 GM/DL (ref 6.4–8.2)
UROBILINOGEN, URINE: 0.2 MG/DL (ref 0.2–1)
WBC # BLD: 9.7 K/CU MM (ref 4–10.5)
WBC UA: 26 /HPF (ref 0–2)

## 2020-09-30 PROCEDURE — 6360000004 HC RX CONTRAST MEDICATION: Performed by: EMERGENCY MEDICINE

## 2020-09-30 PROCEDURE — 6370000000 HC RX 637 (ALT 250 FOR IP): Performed by: EMERGENCY MEDICINE

## 2020-09-30 PROCEDURE — 6360000002 HC RX W HCPCS: Performed by: EMERGENCY MEDICINE

## 2020-09-30 PROCEDURE — 87086 URINE CULTURE/COLONY COUNT: CPT

## 2020-09-30 PROCEDURE — 80053 COMPREHEN METABOLIC PANEL: CPT

## 2020-09-30 PROCEDURE — 99284 EMERGENCY DEPT VISIT MOD MDM: CPT

## 2020-09-30 PROCEDURE — 74177 CT ABD & PELVIS W/CONTRAST: CPT

## 2020-09-30 PROCEDURE — 87077 CULTURE AEROBIC IDENTIFY: CPT

## 2020-09-30 PROCEDURE — 81001 URINALYSIS AUTO W/SCOPE: CPT

## 2020-09-30 PROCEDURE — 83690 ASSAY OF LIPASE: CPT

## 2020-09-30 PROCEDURE — 85025 COMPLETE CBC W/AUTO DIFF WBC: CPT

## 2020-09-30 PROCEDURE — 6360000002 HC RX W HCPCS: Performed by: FAMILY MEDICINE

## 2020-09-30 PROCEDURE — 87186 SC STD MICRODIL/AGAR DIL: CPT

## 2020-09-30 PROCEDURE — 2580000003 HC RX 258: Performed by: EMERGENCY MEDICINE

## 2020-09-30 RX ORDER — CEPHALEXIN 500 MG/1
500 CAPSULE ORAL 3 TIMES DAILY
Qty: 21 CAPSULE | Refills: 0 | Status: ON HOLD | OUTPATIENT
Start: 2020-09-30 | End: 2020-10-03 | Stop reason: HOSPADM

## 2020-09-30 RX ORDER — HYDROCODONE BITARTRATE AND ACETAMINOPHEN 5; 325 MG/1; MG/1
1 TABLET ORAL EVERY 6 HOURS PRN
Qty: 12 TABLET | Refills: 0 | Status: SHIPPED | OUTPATIENT
Start: 2020-09-30 | End: 2020-10-03

## 2020-09-30 RX ORDER — HYDROCODONE BITARTRATE AND ACETAMINOPHEN 5; 325 MG/1; MG/1
2 TABLET ORAL ONCE
Status: COMPLETED | OUTPATIENT
Start: 2020-09-30 | End: 2020-09-30

## 2020-09-30 RX ORDER — KETOROLAC TROMETHAMINE 30 MG/ML
30 INJECTION, SOLUTION INTRAMUSCULAR; INTRAVENOUS ONCE
Status: COMPLETED | OUTPATIENT
Start: 2020-09-30 | End: 2020-09-30

## 2020-09-30 RX ORDER — ONDANSETRON 2 MG/ML
4 INJECTION INTRAMUSCULAR; INTRAVENOUS EVERY 30 MIN PRN
Status: DISCONTINUED | OUTPATIENT
Start: 2020-09-30 | End: 2020-09-30 | Stop reason: HOSPADM

## 2020-09-30 RX ORDER — MORPHINE SULFATE 4 MG/ML
4 INJECTION, SOLUTION INTRAMUSCULAR; INTRAVENOUS EVERY 30 MIN PRN
Status: DISCONTINUED | OUTPATIENT
Start: 2020-09-30 | End: 2020-09-30 | Stop reason: HOSPADM

## 2020-09-30 RX ADMIN — ONDANSETRON 4 MG: 2 INJECTION INTRAMUSCULAR; INTRAVENOUS at 17:45

## 2020-09-30 RX ADMIN — MORPHINE SULFATE 4 MG: 4 INJECTION, SOLUTION INTRAMUSCULAR; INTRAVENOUS at 17:45

## 2020-09-30 RX ADMIN — CEFTRIAXONE SODIUM 1 G: 1 INJECTION, POWDER, FOR SOLUTION INTRAMUSCULAR; INTRAVENOUS at 20:11

## 2020-09-30 RX ADMIN — KETOROLAC TROMETHAMINE 30 MG: 30 INJECTION, SOLUTION INTRAMUSCULAR; INTRAVENOUS at 20:07

## 2020-09-30 RX ADMIN — LIDOCAINE HYDROCHLORIDE 138.8 MG: 20 INJECTION, SOLUTION INTRAVENOUS at 19:46

## 2020-09-30 RX ADMIN — IOPAMIDOL 100 ML: 755 INJECTION, SOLUTION INTRAVENOUS at 18:51

## 2020-09-30 RX ADMIN — HYDROCODONE BITARTRATE AND ACETAMINOPHEN 2 TABLET: 5; 325 TABLET ORAL at 21:01

## 2020-09-30 ASSESSMENT — PAIN SCALES - GENERAL
PAINLEVEL_OUTOF10: 5
PAINLEVEL_OUTOF10: 10
PAINLEVEL_OUTOF10: 4
PAINLEVEL_OUTOF10: 5
PAINLEVEL_OUTOF10: 8

## 2020-09-30 ASSESSMENT — PAIN DESCRIPTION - PAIN TYPE
TYPE: ACUTE PAIN
TYPE: ACUTE PAIN

## 2020-09-30 ASSESSMENT — PAIN DESCRIPTION - LOCATION
LOCATION: ABDOMEN

## 2020-09-30 ASSESSMENT — PAIN DESCRIPTION - ORIENTATION
ORIENTATION: RIGHT
ORIENTATION: RIGHT;LOWER
ORIENTATION: RIGHT;LOWER;MID

## 2020-09-30 NOTE — ED PROVIDER NOTES
bilateral    OTHER SURGICAL HISTORY  2017    I & D mid upper back    PERICARDIUM SURGERY  2005    post ASD repair with window     Family History   Problem Relation Age of Onset    Diabetes Mother     Diabetes Father     Heart Disease Father     Diabetes Paternal Uncle     Diabetes Maternal Grandmother     Diabetes Maternal Grandfather     Diabetes Paternal Grandmother     Diabetes Paternal Grandfather      Social History     Socioeconomic History    Marital status: Single     Spouse name: Not on file    Number of children: 3    Years of education: Not on file    Highest education level: Not on file   Occupational History    Not on file   Social Needs    Financial resource strain: Not on file    Food insecurity     Worry: Not on file     Inability: Not on file   [employer] needs     Medical: Not on file     Non-medical: Not on file   Tobacco Use    Smoking status: Former Smoker     Packs/day: 0.25     Years: 0.00     Pack years: 0.00     Types: Cigarettes     Last attempt to quit: 2020     Years since quittin.2    Smokeless tobacco: Never Used    Tobacco comment: quit in , started again 2014; quit in 2019   Substance and Sexual Activity    Alcohol use: Not Currently     Comment: once in while    Drug use: No    Sexual activity: Not Currently   Lifestyle    Physical activity     Days per week: Not on file     Minutes per session: Not on file    Stress: Not on file   Relationships    Social connections     Talks on phone: Not on file     Gets together: Not on file     Attends [employer] service: Not on file     Active member of club or organization: Not on file     Attends meetings of clubs or organizations: Not on file     Relationship status: Not on file    Intimate partner violence     Fear of current or ex partner: Not on file     Emotionally abused: Not on file     Physically abused: Not on file     Forced sexual activity: Not on file   Other Topics Concern  Not on file   Social History Narrative    unemployed     Current Facility-Administered Medications   Medication Dose Route Frequency Provider Last Rate Last Dose    morphine sulfate (PF) injection 4 mg  4 mg Intravenous Q30 Min PRN Kavita Card,         ondansetron [facility]) injection 4 mg  4 mg Intravenous Q30 Min PRN Kavita Card, DO         Current Outpatient Medications   Medication Sig Dispense Refill    acarbose (PRECOSE) 25 MG tablet Take 50 mg by mouth 3 times daily (with meals)      losartan (COZAAR) 50 MG tablet Take 50 mg by mouth daily      acetaminophen (APAP EXTRA STRENGTH) 500 MG tablet Take 1 tablet by mouth every 6 hours as needed for Pain 30 tablet 0    TOUJEO SOLOSTAR 300 UNIT/ML injection pen   6    metFORMIN (GLUCOPHAGE-XR) 500 MG extended release tablet   6    sertraline (ZOLOFT) 50 MG tablet   6    traZODone (DESYREL) 50 MG tablet Take 50 mg by mouth nightly      nitroGLYCERIN (NITROSTAT) 0.4 MG SL tablet up to max of 3 total doses. If no relief after 1 dose, call 911. 25 tablet 3    atorvastatin (LIPITOR) 40 MG tablet Take 40 mg by mouth nightly       SITagliptin (JANUVIA) 100 MG tablet Take 100 mg by mouth nightly       insulin lispro (HUMALOG) 100 UNIT/ML injection cartridge Inject 25 Units into the skin 3 times daily (before meals) (Patient taking differently: Inject 60 Units into the skin 3 times daily (before meals) 60 units with breakfast  50 units with lunch and supper) 5 Cartridge 3    terazosin (HYTRIN) 2 MG capsule Take 2 mg by mouth nightly      carvedilol (COREG) 12.5 MG tablet Take 2 tablets by mouth 2 times daily (with meals).  (Patient taking differently: Take 12.5 mg by mouth 2 times daily (with meals) Take one tablet by mouth two times a day with food.) 60 tablet 0    zonisamide (ZONEGRAN) 100 MG capsule Take 300 mg by mouth nightly Take 3 capsules by mouth every night at bedtime      omega-3 acid ethyl esters (LOVAZA) 1 G capsule Take 2 g by mouth 2 times daily.  albuterol (PROVENTIL HFA) 108 (90 BASE) MCG/ACT inhaler Inhale 2 puffs into the lungs every 6 hours as needed for Wheezing or Shortness of Breath. 1 Inhaler 0    aspirin 81 MG EC tablet Take 81 mg by mouth daily. Allergies   Allergen Reactions    Azithromycin     Ciprofloxacin Other (See Comments)     lethargic       Nursing Notes Reviewed    Physical Exam:  ED Triage Vitals [09/30/20 1710]   Enc Vitals Group      BP (!) 164/80      Pulse 84      Resp 16      Temp 99 °F (37.2 °C)      Temp Source Oral      SpO2 98 %      Weight (!) 306 lb (138.8 kg)      Height 5' 10\" (1.778 m)      Head Circumference       Peak Flow       Pain Score       Pain Loc       Pain Edu? Excl. in [address]? General appearance: Awake, alert, No acute distress. Neck:  Supple without rigidity  ENT: Normal posterior pharynx, moist mucus membranes  Heart:  Regular rate and rhythm, no murmurs  Respiratory:  Lungs clear to auscultation bilaterally. Normal effort. Abdominal:  Moderate RLQ tenderness to palpation, no rebound guarding or rigidity, normal bowel sounds, no masses, no organomegaly   Back:  No CVA tenderness. Extremity: normal ROM, no edema  Skin: Warm. Dry. No rash. Neurological:  Alert and oriented. No focal deficits. Speech normal.  Psyc: Normal mood and affect    I have reviewed and interpreted all of the currently available lab results from this visit (if applicable):  No results found for this visit on 09/30/20.    Labs Reviewed   CBC WITH AUTO DIFFERENTIAL - Abnormal; Notable for the following components:       Result Value    RBC 4.26 (*)     Hematocrit 40.6 (*)     MCH 31.7 (*)     Platelets 654 (*)     MPV 12.7 (*)     Monocytes % 11.1 (*)     All other components within normal limits   URINALYSIS - Abnormal; Notable for the following components:    Clarity, UA SLIGHTLY CLOUDY (*)     Blood, Urine SMALL (*)     Protein, UA TRACE (*)     RBC, UA 5 (*)     WBC, UA 26 (*)     Bacteria, UA FEW (*)     All other components within normal limits   COMPREHENSIVE METABOLIC PANEL - Abnormal; Notable for the following components:    Glucose 243 (*)     All other components within normal limits   CULTURE, URINE   SPECIMEN REJECTION   LIPASE       Chart review shows recent radiographs:  Ct Head Wo Contrast    Result Date: 9/16/2020  EXAMINATION: CTA OF THE HEAD WITH CONTRAST; CT OF THE HEAD WITHOUT CONTRAST 9/16/2020 7:46 pm: TECHNIQUE: CTA of the head/brain was performed with the administration of intravenous contrast. Multiplanar reformatted images are provided for review. MIP images are provided for review. Dose modulation, iterative reconstruction, and/or weight based adjustment of the mA/kV was utilized to reduce the radiation dose to as low as reasonably achievable.; CT of the head was performed without the administration of intravenous contrast. Dose modulation, iterative reconstruction, and/or weight based adjustment of the mA/kV was utilized to reduce the radiation dose to as low as reasonably achievable. COMPARISON: None. HISTORY: ORDERING SYSTEM PROVIDED HISTORY: Headache TECHNOLOGIST PROVIDED HISTORY: Reason for exam:->Headache Has a \"code stroke\" or \"stroke alert\" been called? ->No Reason for Exam: Headache Acuity: Acute Type of Exam: Initial; ORDERING SYSTEM PROVIDED HISTORY: Headache TECHNOLOGIST PROVIDED HISTORY: Reason for exam:->Headache Has a \"code stroke\" or \"stroke alert\" been called? ->No Reason for Exam: headache x 2 weeks Acuity: Acute Type of Exam: Initial FINDINGS: ANTERIOR CIRCULATION: No significant stenosis of the intracranial internal carotid, anterior cerebral, or middle cerebral arteries. No aneurysm. POSTERIOR CIRCULATION: No significant stenosis of the vertebral, basilar, or posterior cerebral arteries. No aneurysm. OTHER: No dural venous sinus thrombosis on this non-dedicated study. BRAIN: No mass effect or midline shift. No extra-axial fluid collection.  The gray-white differentiation is maintained. No acute intracranial abnormality visualized. Unremarkable CTA of the head. Ct Abdomen Pelvis W Iv Contrast    Result Date: 9/30/2020  EXAMINATION: CT OF THE ABDOMEN AND PELVIS WITH CONTRAST 9/30/2020 6:34 pm TECHNIQUE: CT of the abdomen and pelvis was performed with the administration of intravenous contrast. Multiplanar reformatted images are provided for review. Dose modulation, iterative reconstruction, and/or weight based adjustment of the mA/kV was utilized to reduce the radiation dose to as low as reasonably achievable. COMPARISON: 03/24/2020. HISTORY: ORDERING SYSTEM PROVIDED HISTORY: RLQ abdominal pain TECHNOLOGIST PROVIDED HISTORY: IV contrast only. Thank you. Reason for exam:->RLQ abdominal pain Reason for exam:->IV contrast only. Thank you. Reason for Exam: right abdominal pain Acuity: Acute Type of Exam: Initial Additional signs and symptoms: nausea, vomiting Relevant Medical/Surgical History: back sx, cholecysectomy FINDINGS: Lower Chest: The heart size is normal.  Calcified granulomas are noted in the lower lobes. No focal lung infiltrate. Organs: The liver has a nodular contour suggesting underlying cirrhosis. There is a small fluid collection adjacent to cholecystectomy clips along the undersurface of the right lobe of the liver measuring 2.5 x 1.8 cm, not appreciably changed allowing for differences in measurement. The adrenal glands and pancreas are unremarkable. The spleen is unremarkable. There are numerous bilateral nonobstructing renal calculi. There is an obstructing proximal right ureteral stone measuring 7 mm at the level of the L3 vertebral body with moderate right-sided hydronephrosis and hydroureter. No obstructing left ureteral calculi. GI/Bowel: Debris is noted in the stomach lumen. The duodenal sweep is unremarkable. There are several scattered colonic diverticula. The appendix is visualized and normal in appearance.   No evidence of a Petrona Estimable, DO  09/30/20 1912

## 2020-10-01 NOTE — ED PROVIDER NOTES
RLQ abdominal pain TECHNOLOGIST PROVIDED HISTORY: IV contrast only. Thank you. Reason for exam:->RLQ abdominal pain Reason for exam:->IV contrast only. Thank you. Reason for Exam: right abdominal pain Acuity: Acute Type of Exam: Initial Additional signs and symptoms: nausea, vomiting Relevant Medical/Surgical History: back sx, cholecysectomy FINDINGS: Lower Chest: The heart size is normal.  Calcified granulomas are noted in the lower lobes. No focal lung infiltrate. Organs: The liver has a nodular contour suggesting underlying cirrhosis. There is a small fluid collection adjacent to cholecystectomy clips along the undersurface of the right lobe of the liver measuring 2.5 x 1.8 cm, not appreciably changed allowing for differences in measurement. The adrenal glands and pancreas are unremarkable. The spleen is unremarkable. There are numerous bilateral nonobstructing renal calculi. There is an obstructing proximal right ureteral stone measuring 7 mm at the level of the L3 vertebral body with moderate right-sided hydronephrosis and hydroureter. No obstructing left ureteral calculi. GI/Bowel: Debris is noted in the stomach lumen. The duodenal sweep is unremarkable. There are several scattered colonic diverticula. The appendix is visualized and normal in appearance. No evidence of a bowel obstruction. Pelvis: The bladder, prostate, and seminal vesicles are unremarkable. No inguinal or pelvic sidewall adenopathy. There is a right inguinal hernia containing fat. Peritoneum/Retroperitoneum: The abdominal aorta is normal in caliber. No retroperitoneal adenopathy. There is an umbilical hernia containing fat. No appreciable soft tissue swelling is identified. Bones/Soft Tissues: Degenerative changes are noted in the spine and sacroiliac joints. No fracture.      1. Obstructing right proximal ureteral stone, at the level of the L3 vertebral body measuring 7 mm with resultant moderate right-sided hydronephrosis and in 50 mL D5W minibag  ONCE     Question:  Antimicrobial Indications  Answer:  Urinary Tract Infection    Last MAR action:  New Bag - by Tara HAMLIN on 09/30/20 at [address] [facility]    09/30/20 1915 09/30/20 1909  lidocaine (cardiac) (XYLOCAINE) 138.8 mg in sodium chloride 0.9 % 100 mL IVPB  ONCE      Last MAR action:  Stopped - by Tara HAMLIN on 09/30/20 at [place] [facility]    09/30/20 1915 09/30/20 1909  HYDROcodone-acetaminophen (NORCO) 5-325 MG per tablet 2 tablet  ONCE      Ordered NI[facility]OS[facility]    09/30/20 1846 09/30/20 1848  iopamidol (ISOVUE-370) 76 % injection 100 mL  IMG ONCE PRN      Last MAR action:  Given - by Bev RODRIGUEZ on 09/30/20 at [address] [facility]    09/30/20 1718 09/30/20 1718  ondansetron (ZOFRAN) injection 4 mg  EVERY 30 MIN PRN      Last MAR action:  Given - by Timur Davila on 09/30/20 at [facility]    09/30/20 1718 09/30/20 1718  morphine sulfate (PF) injection 4 mg  EVERY 30 MIN PRN      Last MAR action:  Given - by Timur Davila on 09/30/20 at [address]          Final Impression      1.  Ureterolithiasis        DISPOSITION Decision To Discharge 09/30/2020 08:13:41 PM     (Please note that portions of this note may have been completed with a voice recognition program. Efforts were made to edit the dictations but occasionally words are mis-transcribed.)    Kwame Mcelroy MD  [address] MD  09/30/20 2016

## 2020-10-01 NOTE — ED NOTES
\"If she would just keep giving me shots of morphine I would be fine\".        Mia Barrientos RN  09/30/20 2005

## 2020-10-02 ENCOUNTER — HOSPITAL ENCOUNTER (OUTPATIENT)
Age: 50
Setting detail: OBSERVATION
Discharge: HOME HEALTH | End: 2020-10-03
Attending: EMERGENCY MEDICINE | Admitting: INTERNAL MEDICINE
Payer: MEDICARE

## 2020-10-02 ENCOUNTER — APPOINTMENT (OUTPATIENT)
Dept: GENERAL RADIOLOGY | Age: 50
End: 2020-10-02
Payer: MEDICARE

## 2020-10-02 PROBLEM — N20.1 URETEROLITHIASIS: Status: ACTIVE | Noted: 2020-10-02

## 2020-10-02 LAB
ALBUMIN SERPL-MCNC: 3.9 GM/DL (ref 3.4–5)
ALP BLD-CCNC: 72 IU/L (ref 40–129)
ALT SERPL-CCNC: 21 U/L (ref 10–40)
AMORPHOUS: ABNORMAL /HPF
ANION GAP SERPL CALCULATED.3IONS-SCNC: 10 MMOL/L (ref 4–16)
AST SERPL-CCNC: 19 IU/L (ref 15–37)
BACTERIA: NEGATIVE /HPF
BASOPHILS ABSOLUTE: 0.1 K/CU MM
BASOPHILS RELATIVE PERCENT: 0.8 % (ref 0–1)
BILIRUB SERPL-MCNC: 0.3 MG/DL (ref 0–1)
BILIRUBIN URINE: NEGATIVE MG/DL
BLOOD, URINE: NEGATIVE
BUN BLDV-MCNC: 15 MG/DL (ref 6–23)
CALCIUM SERPL-MCNC: 8.9 MG/DL (ref 8.3–10.6)
CHLORIDE BLD-SCNC: 107 MMOL/L (ref 99–110)
CLARITY: ABNORMAL
CO2: 23 MMOL/L (ref 21–32)
COLOR: YELLOW
CREAT SERPL-MCNC: 1.1 MG/DL (ref 0.9–1.3)
DIFFERENTIAL TYPE: ABNORMAL
EOSINOPHILS ABSOLUTE: 0.2 K/CU MM
EOSINOPHILS RELATIVE PERCENT: 2.3 % (ref 0–3)
GFR AFRICAN AMERICAN: >60 ML/MIN/1.73M2
GFR NON-AFRICAN AMERICAN: >60 ML/MIN/1.73M2
GLUCOSE BLD-MCNC: 119 MG/DL (ref 70–99)
GLUCOSE BLD-MCNC: 74 MG/DL (ref 70–99)
GLUCOSE, URINE: NEGATIVE MG/DL
HCT VFR BLD CALC: 38 % (ref 42–52)
HEMOGLOBIN: 12.6 GM/DL (ref 13.5–18)
IMMATURE NEUTROPHIL %: 0.2 % (ref 0–0.43)
KETONES, URINE: NEGATIVE MG/DL
LEUKOCYTE ESTERASE, URINE: NEGATIVE
LYMPHOCYTES ABSOLUTE: 2.6 K/CU MM
LYMPHOCYTES RELATIVE PERCENT: 30.7 % (ref 24–44)
MCH RBC QN AUTO: 32.4 PG (ref 27–31)
MCHC RBC AUTO-ENTMCNC: 33.2 % (ref 32–36)
MCV RBC AUTO: 97.7 FL (ref 78–100)
MONOCYTES ABSOLUTE: 1.2 K/CU MM
MONOCYTES RELATIVE PERCENT: 13.6 % (ref 0–4)
MUCUS: ABNORMAL HPF
NITRITE URINE, QUANTITATIVE: NEGATIVE
NUCLEATED RBC %: 0 %
PDW BLD-RTO: 12.8 % (ref 11.7–14.9)
PH, URINE: 7 (ref 5–8)
PLATELET # BLD: 143 K/CU MM (ref 140–440)
PMV BLD AUTO: 10.9 FL (ref 7.5–11.1)
POTASSIUM SERPL-SCNC: 3.8 MMOL/L (ref 3.5–5.1)
PROTEIN UA: NEGATIVE MG/DL
RBC # BLD: 3.89 M/CU MM (ref 4.6–6.2)
RBC URINE: ABNORMAL /HPF (ref 0–3)
SEGMENTED NEUTROPHILS ABSOLUTE COUNT: 4.5 K/CU MM
SEGMENTED NEUTROPHILS RELATIVE PERCENT: 52.4 % (ref 36–66)
SODIUM BLD-SCNC: 140 MMOL/L (ref 135–145)
SPECIFIC GRAVITY UA: 1.01 (ref 1–1.03)
SQUAMOUS EPITHELIAL: 1 /HPF
TOTAL IMMATURE NEUTOROPHIL: 0.02 K/CU MM
TOTAL NUCLEATED RBC: 0 K/CU MM
TOTAL PROTEIN: 6.5 GM/DL (ref 6.4–8.2)
TRICHOMONAS: ABNORMAL /HPF
UROBILINOGEN, URINE: NORMAL MG/DL (ref 0.2–1)
WBC # BLD: 8.6 K/CU MM (ref 4–10.5)
WBC UA: ABNORMAL /HPF (ref 0–2)

## 2020-10-02 PROCEDURE — 85025 COMPLETE CBC W/AUTO DIFF WBC: CPT

## 2020-10-02 PROCEDURE — 96375 TX/PRO/DX INJ NEW DRUG ADDON: CPT

## 2020-10-02 PROCEDURE — 96374 THER/PROPH/DIAG INJ IV PUSH: CPT

## 2020-10-02 PROCEDURE — G0378 HOSPITAL OBSERVATION PER HR: HCPCS

## 2020-10-02 PROCEDURE — 96376 TX/PRO/DX INJ SAME DRUG ADON: CPT

## 2020-10-02 PROCEDURE — 96361 HYDRATE IV INFUSION ADD-ON: CPT

## 2020-10-02 PROCEDURE — 1200000000 HC SEMI PRIVATE

## 2020-10-02 PROCEDURE — 82962 GLUCOSE BLOOD TEST: CPT

## 2020-10-02 PROCEDURE — 74018 RADEX ABDOMEN 1 VIEW: CPT

## 2020-10-02 PROCEDURE — 96365 THER/PROPH/DIAG IV INF INIT: CPT

## 2020-10-02 PROCEDURE — 81001 URINALYSIS AUTO W/SCOPE: CPT

## 2020-10-02 PROCEDURE — 36415 COLL VENOUS BLD VENIPUNCTURE: CPT

## 2020-10-02 PROCEDURE — 80053 COMPREHEN METABOLIC PANEL: CPT

## 2020-10-02 PROCEDURE — 99285 EMERGENCY DEPT VISIT HI MDM: CPT

## 2020-10-02 PROCEDURE — 2580000003 HC RX 258: Performed by: EMERGENCY MEDICINE

## 2020-10-02 PROCEDURE — 6360000002 HC RX W HCPCS: Performed by: EMERGENCY MEDICINE

## 2020-10-02 RX ORDER — NICOTINE POLACRILEX 4 MG
15 LOZENGE BUCCAL PRN
Status: DISCONTINUED | OUTPATIENT
Start: 2020-10-02 | End: 2020-10-03 | Stop reason: HOSPADM

## 2020-10-02 RX ORDER — PROMETHAZINE HYDROCHLORIDE 25 MG/1
12.5 TABLET ORAL EVERY 6 HOURS PRN
Status: CANCELLED | OUTPATIENT
Start: 2020-10-02

## 2020-10-02 RX ORDER — ONDANSETRON 2 MG/ML
4 INJECTION INTRAMUSCULAR; INTRAVENOUS EVERY 6 HOURS PRN
Status: DISCONTINUED | OUTPATIENT
Start: 2020-10-02 | End: 2020-10-03 | Stop reason: HOSPADM

## 2020-10-02 RX ORDER — TRAZODONE HYDROCHLORIDE 50 MG/1
50 TABLET ORAL NIGHTLY
Status: CANCELLED | OUTPATIENT
Start: 2020-10-02

## 2020-10-02 RX ORDER — ACETAMINOPHEN 325 MG/1
650 TABLET ORAL EVERY 4 HOURS PRN
Status: CANCELLED | OUTPATIENT
Start: 2020-10-02

## 2020-10-02 RX ORDER — DEXTROSE MONOHYDRATE 50 MG/ML
100 INJECTION, SOLUTION INTRAVENOUS PRN
Status: DISCONTINUED | OUTPATIENT
Start: 2020-10-02 | End: 2020-10-03 | Stop reason: HOSPADM

## 2020-10-02 RX ORDER — KETOROLAC TROMETHAMINE 30 MG/ML
15 INJECTION, SOLUTION INTRAMUSCULAR; INTRAVENOUS EVERY 6 HOURS PRN
Status: CANCELLED | OUTPATIENT
Start: 2020-10-02 | End: 2020-10-07

## 2020-10-02 RX ORDER — LOSARTAN POTASSIUM 50 MG/1
50 TABLET ORAL DAILY
Status: CANCELLED | OUTPATIENT
Start: 2020-10-02

## 2020-10-02 RX ORDER — IPRATROPIUM BROMIDE AND ALBUTEROL SULFATE 2.5; .5 MG/3ML; MG/3ML
1 SOLUTION RESPIRATORY (INHALATION) EVERY 4 HOURS PRN
Status: CANCELLED | OUTPATIENT
Start: 2020-10-02

## 2020-10-02 RX ORDER — DOXAZOSIN MESYLATE 1 MG/1
2 TABLET ORAL DAILY
Status: CANCELLED | OUTPATIENT
Start: 2020-10-02

## 2020-10-02 RX ORDER — POLYETHYLENE GLYCOL 3350 17 G/17G
17 POWDER, FOR SOLUTION ORAL DAILY PRN
Status: CANCELLED | OUTPATIENT
Start: 2020-10-02

## 2020-10-02 RX ORDER — DEXTROSE MONOHYDRATE 25 G/50ML
12.5 INJECTION, SOLUTION INTRAVENOUS PRN
Status: DISCONTINUED | OUTPATIENT
Start: 2020-10-02 | End: 2020-10-03 | Stop reason: HOSPADM

## 2020-10-02 RX ORDER — HYDROCODONE BITARTRATE AND ACETAMINOPHEN 5; 325 MG/1; MG/1
1 TABLET ORAL EVERY 6 HOURS PRN
Status: CANCELLED | OUTPATIENT
Start: 2020-10-02

## 2020-10-02 RX ORDER — CARVEDILOL 12.5 MG/1
12.5 TABLET ORAL 2 TIMES DAILY WITH MEALS
Status: CANCELLED | OUTPATIENT
Start: 2020-10-02

## 2020-10-02 RX ORDER — SODIUM CHLORIDE 0.9 % (FLUSH) 0.9 %
10 SYRINGE (ML) INJECTION EVERY 12 HOURS SCHEDULED
Status: CANCELLED | OUTPATIENT
Start: 2020-10-02

## 2020-10-02 RX ORDER — ZONISAMIDE 100 MG/1
300 CAPSULE ORAL NIGHTLY
Status: CANCELLED | OUTPATIENT
Start: 2020-10-02

## 2020-10-02 RX ORDER — MORPHINE SULFATE 4 MG/ML
4 INJECTION, SOLUTION INTRAMUSCULAR; INTRAVENOUS EVERY 30 MIN PRN
Status: DISCONTINUED | OUTPATIENT
Start: 2020-10-02 | End: 2020-10-02 | Stop reason: ALTCHOICE

## 2020-10-02 RX ORDER — ATORVASTATIN CALCIUM 40 MG/1
40 TABLET, FILM COATED ORAL NIGHTLY
Status: CANCELLED | OUTPATIENT
Start: 2020-10-02

## 2020-10-02 RX ORDER — NITROGLYCERIN 0.4 MG/1
0.4 TABLET SUBLINGUAL EVERY 5 MIN PRN
Status: CANCELLED | OUTPATIENT
Start: 2020-10-02

## 2020-10-02 RX ORDER — OMEGA-3-ACID ETHYL ESTERS 1 G/1
2 CAPSULE, LIQUID FILLED ORAL 2 TIMES DAILY
Status: CANCELLED | OUTPATIENT
Start: 2020-10-02

## 2020-10-02 RX ORDER — MORPHINE SULFATE 2 MG/ML
2 INJECTION, SOLUTION INTRAMUSCULAR; INTRAVENOUS EVERY 4 HOURS PRN
Status: DISCONTINUED | OUTPATIENT
Start: 2020-10-02 | End: 2020-10-03 | Stop reason: HOSPADM

## 2020-10-02 RX ORDER — 0.9 % SODIUM CHLORIDE 0.9 %
1000 INTRAVENOUS SOLUTION INTRAVENOUS ONCE
Status: COMPLETED | OUTPATIENT
Start: 2020-10-02 | End: 2020-10-02

## 2020-10-02 RX ORDER — ONDANSETRON 2 MG/ML
4 INJECTION INTRAMUSCULAR; INTRAVENOUS EVERY 6 HOURS PRN
Status: CANCELLED | OUTPATIENT
Start: 2020-10-02

## 2020-10-02 RX ORDER — SODIUM CHLORIDE 0.9 % (FLUSH) 0.9 %
10 SYRINGE (ML) INJECTION PRN
Status: CANCELLED | OUTPATIENT
Start: 2020-10-02

## 2020-10-02 RX ORDER — MORPHINE SULFATE 4 MG/ML
2 INJECTION, SOLUTION INTRAMUSCULAR; INTRAVENOUS EVERY 4 HOURS PRN
Status: CANCELLED | OUTPATIENT
Start: 2020-10-02 | End: 2020-10-03

## 2020-10-02 RX ADMIN — SODIUM CHLORIDE 1000 ML: 9 INJECTION, SOLUTION INTRAVENOUS at 18:58

## 2020-10-02 RX ADMIN — MORPHINE SULFATE 4 MG: 4 INJECTION, SOLUTION INTRAMUSCULAR; INTRAVENOUS at 18:58

## 2020-10-02 RX ADMIN — ONDANSETRON 4 MG: 2 INJECTION INTRAMUSCULAR; INTRAVENOUS at 18:58

## 2020-10-02 RX ADMIN — CEFTRIAXONE 1 G: 1 INJECTION, POWDER, FOR SOLUTION INTRAMUSCULAR; INTRAVENOUS at 21:58

## 2020-10-02 RX ADMIN — MORPHINE SULFATE 4 MG: 4 INJECTION, SOLUTION INTRAMUSCULAR; INTRAVENOUS at 20:20

## 2020-10-02 ASSESSMENT — PAIN DESCRIPTION - PAIN TYPE
TYPE: ACUTE PAIN
TYPE: ACUTE PAIN

## 2020-10-02 ASSESSMENT — PAIN DESCRIPTION - PROGRESSION
CLINICAL_PROGRESSION: NOT CHANGED
CLINICAL_PROGRESSION: GRADUALLY IMPROVING

## 2020-10-02 ASSESSMENT — PAIN DESCRIPTION - ONSET: ONSET: ON-GOING

## 2020-10-02 ASSESSMENT — PAIN SCALES - GENERAL
PAINLEVEL_OUTOF10: 7
PAINLEVEL_OUTOF10: 10
PAINLEVEL_OUTOF10: 7

## 2020-10-02 ASSESSMENT — PAIN DESCRIPTION - DESCRIPTORS: DESCRIPTORS: STABBING

## 2020-10-02 ASSESSMENT — PAIN - FUNCTIONAL ASSESSMENT: PAIN_FUNCTIONAL_ASSESSMENT: ACTIVITIES ARE NOT PREVENTED

## 2020-10-02 ASSESSMENT — PAIN DESCRIPTION - ORIENTATION: ORIENTATION: RIGHT

## 2020-10-02 ASSESSMENT — PAIN DESCRIPTION - LOCATION: LOCATION: OTHER (COMMENT)

## 2020-10-02 ASSESSMENT — PAIN DESCRIPTION - FREQUENCY: FREQUENCY: CONTINUOUS

## 2020-10-02 NOTE — ED PROVIDER NOTES
eMERGENCY dEPARTMENT eNCOUnter        [address] [facility]    Chief Complaint   Patient presents with    Flank Pain     right flank pain, recently dx with a 7mm kidney stone       HPI    Chad Acosta is a 44 y.o. male who presents with Right flank pain. States that he continues to have right flank pain after he was diagnosed with kidney stone 2 days ago. States he was unable to fill his pain medication because he is under a pain contract and it was not time for him to refill his medication yet, thus they would not fill a new prescription. States that he did fill the Keflex and has been taking that. Denies fevers or chills. No nausea or vomiting. States pain is in the right flank, right lower quadrant, pain is constant, without exacerbating or alleviating factors. Denies dysuria, hematuria. He states that he did not contact the urology office for follow-up. REVIEW OF SYSTEMS    Constitutional:  Denies fever or chills. Eyes:  Denies vision change. HENT:  Denies sore throat or ear pain   Respiratory:  Denies cough or shortness of breath   Cardiovascular:  Denies chest pain, palpitations or swelling.    : Denies dysuria, hematuria, + flank pain  GI:  Denies diarrhea, constipation  Musculoskeletal:  Other than flank pain, denies back pain   Skin:  Denies rash, color change  Neurologic:  Denies headache, focal weakness or sensory changes     See HPI and nursing notes additional information  All other review of systems negative at this time      PAST MEDICAL HISTORY/SURGICAL HISTORY    Past Medical History:   Diagnosis Date    Arthritis     Asthma     Atrial fibrillation ([address]     CHF (congestive heart failure) (HCC)     COPD (chronic obstructive pulmonary disease) ([address]     Depression     Diabetes mellitus ([address]     Type 2    Diastolic heart failure (HCC)     Hyperlipidemia     Hypertension     Kidney stone     Migraine     Other disorders of kidney and ureter     Pneumonia     Psychiatric problem     Sleep apnea     Unspecified cerebral artery occlusion with cerebral infarction     \"10% loss of use of right arm and leg\"     Past Surgical History:   Procedure Laterality Date    ABDOMEN SURGERY      ASD REPAIR  2005    ASD REPAIR      [place]    CARDIAC SURGERY  AUG 2013    REVEAL XT MONITOR #[id number]    CARPAL TUNNEL RELEASE      jeri    CHOLECYSTECTOMY      COLONOSCOPY      ELBOW SURGERY      EYE SURGERY      EYE SURGERY      bilateral    OTHER SURGICAL HISTORY  02/16/2017    I & D mid upper back    PERICARDIUM SURGERY  2005    post ASD repair with window       CURRENT MEDICATIONS    Current Outpatient Rx   Medication Sig Dispense Refill    HYDROcodone-acetaminophen (NORCO) 5-325 MG per tablet Take 1 tablet by mouth every 6 hours as needed for Pain for up to 3 days. Intended supply: 3 days. Take lowest dose possible to manage pain 12 tablet 0    cephALEXin (KEFLEX) 500 MG capsule Take 1 capsule by mouth 3 times daily for 7 days 21 capsule 0    acarbose (PRECOSE) 25 MG tablet Take 50 mg by mouth 3 times daily (with meals)      losartan (COZAAR) 50 MG tablet Take 50 mg by mouth daily      acetaminophen (APAP EXTRA STRENGTH) 500 MG tablet Take 1 tablet by mouth every 6 hours as needed for Pain 30 tablet 0    TOUJEO SOLOSTAR 300 UNIT/ML injection pen   6    metFORMIN (GLUCOPHAGE-XR) 500 MG extended release tablet   6    sertraline (ZOLOFT) 50 MG tablet   6    traZODone (DESYREL) 50 MG tablet Take 50 mg by mouth nightly      nitroGLYCERIN (NITROSTAT) 0.4 MG SL tablet up to max of 3 total doses.  If no relief after 1 dose, call 911. 25 tablet 3    atorvastatin (LIPITOR) 40 MG tablet Take 40 mg by mouth nightly       SITagliptin (JANUVIA) 100 MG tablet Take 100 mg by mouth nightly       insulin lispro (HUMALOG) 100 UNIT/ML injection cartridge Inject 25 Units into the skin 3 times daily (before meals) (Patient taking differently: Inject 60 Units into the skin 3 times daily (before meals) 60 units with breakfast  50 units with lunch and supper) 5 Cartridge 3    terazosin (HYTRIN) 2 MG capsule Take 2 mg by mouth nightly      carvedilol (COREG) 12.5 MG tablet Take 2 tablets by mouth 2 times daily (with meals). (Patient taking differently: Take 12.5 mg by mouth 2 times daily (with meals) Take one tablet by mouth two times a day with food.) 60 tablet 0    zonisamide (ZONEGRAN) 100 MG capsule Take 300 mg by mouth nightly Take 3 capsules by mouth every night at bedtime      omega-3 acid ethyl esters (LOVAZA) 1 G capsule Take 2 g by mouth 2 times daily.  albuterol (PROVENTIL HFA) 108 (90 BASE) MCG/ACT inhaler Inhale 2 puffs into the lungs every 6 hours as needed for Wheezing or Shortness of Breath. 1 Inhaler 0    aspirin 81 MG EC tablet Take 81 mg by mouth daily.          ALLERGIES    Allergies   Allergen Reactions    Azithromycin     Ciprofloxacin Other (See Comments)     lethargic       FAMILY HISTORY/SOCIAL HISTORY  Family History   Problem Relation Age of Onset    Diabetes Mother     Diabetes Father     Heart Disease Father     Diabetes Paternal Uncle     Diabetes Maternal Grandmother     Diabetes Maternal Grandfather     Diabetes Paternal Grandmother     Diabetes Paternal Grandfather      Social History     Socioeconomic History    Marital status: Single     Spouse name: None    Number of children: 3    Years of education: None    Highest education level: None   Occupational History    None   Social Needs    Financial resource strain: None    Food insecurity     Worry: None     Inability: None    Transportation needs     Medical: None     Non-medical: None   Tobacco Use    Smoking status: Former Smoker     Packs/day: 0.25     Years: 0.00     Pack years: 0.00     Types: Cigarettes     Last attempt to quit: 2020     Years since quittin.2    Smokeless tobacco: Never Used    Tobacco comment: quit in , started again 2014; quit in 7/2019   Substance and Sexual Activity    Alcohol use: Not Currently     Comment: once in while    Drug use: No    Sexual activity: Not Currently   Lifestyle    Physical activity     Days per week: None     Minutes per session: None    Stress: None   Relationships    Social connections     Talks on phone: None     Gets together: None     Attends Cheondoism service: None     Active member of club or organization: None     Attends meetings of clubs or organizations: None     Relationship status: None    Intimate partner violence     Fear of current or ex partner: None     Emotionally abused: None     Physically abused: None     Forced sexual activity: None   Other Topics Concern    None   Social History Narrative    unemployed       PHYSICAL EXAM    VITAL SIGNS: BP (!) 163/81   Pulse 82   Temp 99 °F (37.2 °C) (Oral)   Resp 18   Ht 6' (1.829 m)   Wt 300 lb (136.1 kg)   SpO2 99%   BMI 40.69 kg/m²    Constitutional:  Awake, alert. No acute distress. Eyes:  PERRL. EOM intact. HENT:  Normocephalic, atraumatic, external ears normal, nose normal, oropharynx moist.  Neck: supple without rigidity  Respiratory:  No respiratory distress, normal breath sounds   Cardiovascular:  Regular rate and rhythm, no murmurs  GI:  RLQ tenderness, nondistended. No discoloration. Bowel sounds present. No abdominal bruit. No guarding or rebound. :  right CVA tenderness  Integument:  Well hydrated, nondiaphoretic, no obvious rashes,      LABS:  Labs Reviewed   CBC WITH AUTO DIFFERENTIAL   COMPREHENSIVE METABOLIC PANEL   URINALYSIS         RADIOLOGY/PROCEDURES    Ct Head Wo Contrast    Result Date: 9/16/2020  EXAMINATION: CTA OF THE HEAD WITH CONTRAST; CT OF THE HEAD WITHOUT CONTRAST 9/16/2020 7:46 pm: TECHNIQUE: CTA of the head/brain was performed with the administration of intravenous contrast. Multiplanar reformatted images are provided for review. MIP images are provided for review.  Dose modulation, iterative TECHNOLOGIST PROVIDED HISTORY: IV contrast only. Thank you. Reason for exam:->RLQ abdominal pain Reason for exam:->IV contrast only. Thank you. Reason for Exam: right abdominal pain Acuity: Acute Type of Exam: Initial Additional signs and symptoms: nausea, vomiting Relevant Medical/Surgical History: back sx, cholecysectomy FINDINGS: Lower Chest: The heart size is normal.  Calcified granulomas are noted in the lower lobes. No focal lung infiltrate. Organs: The liver has a nodular contour suggesting underlying cirrhosis. There is a small fluid collection adjacent to cholecystectomy clips along the undersurface of the right lobe of the liver measuring 2.5 x 1.8 cm, not appreciably changed allowing for differences in measurement. The adrenal glands and pancreas are unremarkable. The spleen is unremarkable. There are numerous bilateral nonobstructing renal calculi. There is an obstructing proximal right ureteral stone measuring 7 mm at the level of the L3 vertebral body with moderate right-sided hydronephrosis and hydroureter. No obstructing left ureteral calculi. GI/Bowel: Debris is noted in the stomach lumen. The duodenal sweep is unremarkable. There are several scattered colonic diverticula. The appendix is visualized and normal in appearance. No evidence of a bowel obstruction. Pelvis: The bladder, prostate, and seminal vesicles are unremarkable. No inguinal or pelvic sidewall adenopathy. There is a right inguinal hernia containing fat. Peritoneum/Retroperitoneum: The abdominal aorta is normal in caliber. No retroperitoneal adenopathy. There is an umbilical hernia containing fat. No appreciable soft tissue swelling is identified. Bones/Soft Tissues: Degenerative changes are noted in the spine and sacroiliac joints. No fracture. 1. Obstructing right proximal ureteral stone, at the level of the L3 vertebral body measuring 7 mm with resultant moderate right-sided hydronephrosis and hydroureter.  2. the head. ED COURSE & MEDICAL DECISION MAKING      Vital signs and nursing notes reviewed during ED course. All pertinent Lab data and radiographic results reviewed with patient at bedside. The patient and / or the family were informed of the results of any tests, a time was given to answer questions, a plan was proposed and they agreed with plan. This is a 28-year-old male who presented with flank pain. He was found to have a 7 mm ureterolithiasis 2 days ago on a previous visit. KUB today shows no movement at this stone, appears in the same position. Urinalysis shows no sign of infection, creatinine is 1.1, white count is 8.6. Was given morphine, Zofran and IV fluids in the emergency department. Given that the stone has not progressed and the patient is continued to have pain I did speak with Evonne Alicia the physician assistant with urology. We will plan for admission to the hospital, stent placement likely tomorrow. Clinical  IMPRESSION    Ureterolithiasis      Diagnosis and plan discussed in detail with patient who understands and agrees. Patient agrees to return emergency department if symptoms worsen or any new symptoms develop.       (Please note the MDM and HPI sections of this note were completed with a voice recognition program.  Efforts were made to edit the dictations but occasionally words are mis-transcribed.)      Jonas Dakins, DO  10/02/20 2051

## 2020-10-03 ENCOUNTER — ANESTHESIA (OUTPATIENT)
Dept: OPERATING ROOM | Age: 50
End: 2020-10-03
Payer: MEDICARE

## 2020-10-03 ENCOUNTER — ANESTHESIA EVENT (OUTPATIENT)
Dept: OPERATING ROOM | Age: 50
End: 2020-10-03
Payer: MEDICARE

## 2020-10-03 ENCOUNTER — APPOINTMENT (OUTPATIENT)
Dept: GENERAL RADIOLOGY | Age: 50
End: 2020-10-03
Payer: MEDICARE

## 2020-10-03 VITALS
TEMPERATURE: 98.1 F | WEIGHT: 315 LBS | HEIGHT: 73 IN | SYSTOLIC BLOOD PRESSURE: 162 MMHG | DIASTOLIC BLOOD PRESSURE: 82 MMHG | HEART RATE: 79 BPM | OXYGEN SATURATION: 95 % | RESPIRATION RATE: 16 BRPM | BODY MASS INDEX: 41.75 KG/M2

## 2020-10-03 VITALS
OXYGEN SATURATION: 95 % | RESPIRATION RATE: 2 BRPM | DIASTOLIC BLOOD PRESSURE: 84 MMHG | TEMPERATURE: 98.6 F | SYSTOLIC BLOOD PRESSURE: 124 MMHG

## 2020-10-03 PROBLEM — N20.1 URETERAL STONE: Status: ACTIVE | Noted: 2020-10-03

## 2020-10-03 LAB
CULTURE: ABNORMAL
CULTURE: ABNORMAL
GLUCOSE BLD-MCNC: 59 MG/DL (ref 70–99)
GLUCOSE BLD-MCNC: 64 MG/DL (ref 70–99)
Lab: ABNORMAL
SPECIMEN: ABNORMAL

## 2020-10-03 PROCEDURE — G0378 HOSPITAL OBSERVATION PER HR: HCPCS

## 2020-10-03 PROCEDURE — 7100000000 HC PACU RECOVERY - FIRST 15 MIN: Performed by: UROLOGY

## 2020-10-03 PROCEDURE — 2580000003 HC RX 258: Performed by: NURSE PRACTITIONER

## 2020-10-03 PROCEDURE — 82962 GLUCOSE BLOOD TEST: CPT

## 2020-10-03 PROCEDURE — 2709999900 HC NON-CHARGEABLE SUPPLY: Performed by: UROLOGY

## 2020-10-03 PROCEDURE — 3600000003 HC SURGERY LEVEL 3 BASE: Performed by: UROLOGY

## 2020-10-03 PROCEDURE — 3700000000 HC ANESTHESIA ATTENDED CARE: Performed by: UROLOGY

## 2020-10-03 PROCEDURE — 3700000001 HC ADD 15 MINUTES (ANESTHESIA): Performed by: UROLOGY

## 2020-10-03 PROCEDURE — C2617 STENT, NON-COR, TEM W/O DEL: HCPCS | Performed by: UROLOGY

## 2020-10-03 PROCEDURE — 6360000002 HC RX W HCPCS: Performed by: NURSE ANESTHETIST, CERTIFIED REGISTERED

## 2020-10-03 PROCEDURE — 96376 TX/PRO/DX INJ SAME DRUG ADON: CPT

## 2020-10-03 PROCEDURE — 6360000002 HC RX W HCPCS: Performed by: PHYSICIAN ASSISTANT

## 2020-10-03 PROCEDURE — 2580000003 HC RX 258: Performed by: INTERNAL MEDICINE

## 2020-10-03 PROCEDURE — 7100000001 HC PACU RECOVERY - ADDTL 15 MIN: Performed by: UROLOGY

## 2020-10-03 PROCEDURE — 76000 FLUOROSCOPY <1 HR PHYS/QHP: CPT

## 2020-10-03 PROCEDURE — 6360000004 HC RX CONTRAST MEDICATION: Performed by: UROLOGY

## 2020-10-03 PROCEDURE — 6360000002 HC RX W HCPCS: Performed by: INTERNAL MEDICINE

## 2020-10-03 PROCEDURE — 2580000003 HC RX 258: Performed by: NURSE ANESTHETIST, CERTIFIED REGISTERED

## 2020-10-03 PROCEDURE — 3600000013 HC SURGERY LEVEL 3 ADDTL 15MIN: Performed by: UROLOGY

## 2020-10-03 PROCEDURE — 2500000003 HC RX 250 WO HCPCS: Performed by: NURSE ANESTHETIST, CERTIFIED REGISTERED

## 2020-10-03 DEVICE — VARIABLE LENGTH URETERAL STENT
Type: IMPLANTABLE DEVICE | Site: URETER | Status: FUNCTIONAL
Brand: CONTOUR VL™

## 2020-10-03 RX ORDER — ONDANSETRON 2 MG/ML
4 INJECTION INTRAMUSCULAR; INTRAVENOUS
Status: DISCONTINUED | OUTPATIENT
Start: 2020-10-03 | End: 2020-10-03 | Stop reason: HOSPADM

## 2020-10-03 RX ORDER — LABETALOL HYDROCHLORIDE 5 MG/ML
5 INJECTION, SOLUTION INTRAVENOUS EVERY 10 MIN PRN
Status: DISCONTINUED | OUTPATIENT
Start: 2020-10-03 | End: 2020-10-03 | Stop reason: HOSPADM

## 2020-10-03 RX ORDER — DEXAMETHASONE SODIUM PHOSPHATE 4 MG/ML
INJECTION, SOLUTION INTRA-ARTICULAR; INTRALESIONAL; INTRAMUSCULAR; INTRAVENOUS; SOFT TISSUE PRN
Status: DISCONTINUED | OUTPATIENT
Start: 2020-10-03 | End: 2020-10-03 | Stop reason: SDUPTHER

## 2020-10-03 RX ORDER — SODIUM CHLORIDE, SODIUM LACTATE, POTASSIUM CHLORIDE, CALCIUM CHLORIDE 600; 310; 30; 20 MG/100ML; MG/100ML; MG/100ML; MG/100ML
INJECTION, SOLUTION INTRAVENOUS CONTINUOUS PRN
Status: DISCONTINUED | OUTPATIENT
Start: 2020-10-03 | End: 2020-10-03 | Stop reason: SDUPTHER

## 2020-10-03 RX ORDER — HYDRALAZINE HYDROCHLORIDE 20 MG/ML
5 INJECTION INTRAMUSCULAR; INTRAVENOUS EVERY 10 MIN PRN
Status: DISCONTINUED | OUTPATIENT
Start: 2020-10-03 | End: 2020-10-03 | Stop reason: HOSPADM

## 2020-10-03 RX ORDER — FENTANYL CITRATE 50 UG/ML
50 INJECTION, SOLUTION INTRAMUSCULAR; INTRAVENOUS EVERY 5 MIN PRN
Status: DISCONTINUED | OUTPATIENT
Start: 2020-10-03 | End: 2020-10-03 | Stop reason: HOSPADM

## 2020-10-03 RX ORDER — ONDANSETRON 2 MG/ML
INJECTION INTRAMUSCULAR; INTRAVENOUS PRN
Status: DISCONTINUED | OUTPATIENT
Start: 2020-10-03 | End: 2020-10-03 | Stop reason: SDUPTHER

## 2020-10-03 RX ORDER — LIDOCAINE HYDROCHLORIDE 20 MG/ML
INJECTION, SOLUTION INFILTRATION; PERINEURAL PRN
Status: DISCONTINUED | OUTPATIENT
Start: 2020-10-03 | End: 2020-10-03 | Stop reason: SDUPTHER

## 2020-10-03 RX ORDER — FENTANYL CITRATE 50 UG/ML
INJECTION, SOLUTION INTRAMUSCULAR; INTRAVENOUS PRN
Status: DISCONTINUED | OUTPATIENT
Start: 2020-10-03 | End: 2020-10-03 | Stop reason: SDUPTHER

## 2020-10-03 RX ORDER — AMOXICILLIN 500 MG/1
500 CAPSULE ORAL 3 TIMES DAILY
Qty: 9 CAPSULE | Refills: 0 | Status: SHIPPED | OUTPATIENT
Start: 2020-10-03 | End: 2020-10-06

## 2020-10-03 RX ORDER — FENTANYL CITRATE 50 UG/ML
25 INJECTION, SOLUTION INTRAMUSCULAR; INTRAVENOUS EVERY 5 MIN PRN
Status: DISCONTINUED | OUTPATIENT
Start: 2020-10-03 | End: 2020-10-03 | Stop reason: HOSPADM

## 2020-10-03 RX ORDER — SODIUM CHLORIDE 9 MG/ML
INJECTION, SOLUTION INTRAVENOUS CONTINUOUS
Status: DISCONTINUED | OUTPATIENT
Start: 2020-10-03 | End: 2020-10-03 | Stop reason: HOSPADM

## 2020-10-03 RX ORDER — PROPOFOL 10 MG/ML
INJECTION, EMULSION INTRAVENOUS PRN
Status: DISCONTINUED | OUTPATIENT
Start: 2020-10-03 | End: 2020-10-03 | Stop reason: SDUPTHER

## 2020-10-03 RX ADMIN — MORPHINE SULFATE 2 MG: 2 INJECTION, SOLUTION INTRAMUSCULAR; INTRAVENOUS at 13:18

## 2020-10-03 RX ADMIN — SODIUM CHLORIDE: 9 INJECTION, SOLUTION INTRAVENOUS at 14:28

## 2020-10-03 RX ADMIN — SODIUM CHLORIDE, POTASSIUM CHLORIDE, SODIUM LACTATE AND CALCIUM CHLORIDE: 600; 310; 30; 20 INJECTION, SOLUTION INTRAVENOUS at 09:49

## 2020-10-03 RX ADMIN — CEFTRIAXONE SODIUM 1 G: 1 INJECTION, POWDER, FOR SOLUTION INTRAMUSCULAR; INTRAVENOUS at 10:02

## 2020-10-03 RX ADMIN — SODIUM CHLORIDE: 9 INJECTION, SOLUTION INTRAVENOUS at 11:17

## 2020-10-03 RX ADMIN — ONDANSETRON 4 MG: 2 INJECTION INTRAMUSCULAR; INTRAVENOUS at 10:06

## 2020-10-03 RX ADMIN — LIDOCAINE HYDROCHLORIDE 100 MG: 20 INJECTION, SOLUTION INFILTRATION; PERINEURAL at 09:57

## 2020-10-03 RX ADMIN — MORPHINE SULFATE 2 MG: 2 INJECTION, SOLUTION INTRAMUSCULAR; INTRAVENOUS at 00:01

## 2020-10-03 RX ADMIN — DEXAMETHASONE SODIUM PHOSPHATE 4 MG: 4 INJECTION, SOLUTION INTRAMUSCULAR; INTRAVENOUS at 10:06

## 2020-10-03 RX ADMIN — FENTANYL CITRATE 100 MCG: 50 INJECTION INTRAMUSCULAR; INTRAVENOUS at 09:54

## 2020-10-03 RX ADMIN — PROPOFOL 200 MG: 10 INJECTION, EMULSION INTRAVENOUS at 09:57

## 2020-10-03 RX ADMIN — MORPHINE SULFATE 2 MG: 2 INJECTION, SOLUTION INTRAMUSCULAR; INTRAVENOUS at 05:06

## 2020-10-03 ASSESSMENT — PAIN DESCRIPTION - ORIENTATION
ORIENTATION: RIGHT

## 2020-10-03 ASSESSMENT — PAIN DESCRIPTION - PAIN TYPE
TYPE: ACUTE PAIN

## 2020-10-03 ASSESSMENT — PULMONARY FUNCTION TESTS
PIF_VALUE: 13
PIF_VALUE: 13
PIF_VALUE: 12
PIF_VALUE: 13
PIF_VALUE: 0
PIF_VALUE: 2
PIF_VALUE: 13
PIF_VALUE: 12
PIF_VALUE: 13
PIF_VALUE: 19
PIF_VALUE: 1
PIF_VALUE: 13
PIF_VALUE: 0
PIF_VALUE: 12
PIF_VALUE: 13
PIF_VALUE: 2
PIF_VALUE: 0
PIF_VALUE: 4
PIF_VALUE: 11
PIF_VALUE: 1
PIF_VALUE: 13
PIF_VALUE: 7
PIF_VALUE: 12
PIF_VALUE: 1
PIF_VALUE: 13
PIF_VALUE: 13
PIF_VALUE: 1
PIF_VALUE: 0
PIF_VALUE: 13
PIF_VALUE: 25
PIF_VALUE: 5

## 2020-10-03 ASSESSMENT — PAIN - FUNCTIONAL ASSESSMENT
PAIN_FUNCTIONAL_ASSESSMENT: ACTIVITIES ARE NOT PREVENTED

## 2020-10-03 ASSESSMENT — PAIN DESCRIPTION - FREQUENCY
FREQUENCY: CONTINUOUS

## 2020-10-03 ASSESSMENT — PAIN DESCRIPTION - LOCATION
LOCATION: OTHER (COMMENT)

## 2020-10-03 ASSESSMENT — PAIN DESCRIPTION - ONSET
ONSET: ON-GOING

## 2020-10-03 ASSESSMENT — PAIN SCALES - GENERAL
PAINLEVEL_OUTOF10: 9
PAINLEVEL_OUTOF10: 9
PAINLEVEL_OUTOF10: 0
PAINLEVEL_OUTOF10: 10
PAINLEVEL_OUTOF10: 0
PAINLEVEL_OUTOF10: 9

## 2020-10-03 ASSESSMENT — PAIN DESCRIPTION - PROGRESSION
CLINICAL_PROGRESSION: GRADUALLY IMPROVING
CLINICAL_PROGRESSION: NOT CHANGED

## 2020-10-03 ASSESSMENT — PAIN DESCRIPTION - DESCRIPTORS
DESCRIPTORS: STABBING

## 2020-10-03 NOTE — H&P
History and Physical      Name:  Narciso Benjamin /Age/Sex: 1970  (44 y.o. male)   MRN & CSN:  [id number] & [id number] Admission Date/Time: 10/2/2020  6:25 PM   Location:  ED[facility] PCP: Lyric Moralez DO       Admitting Physicians : Dr. Raheem Thomas is a 44 y.o.  male  who presents with Flank Pain (right flank pain, recently dx with a 7mm kidney stone)    Assessment and Plan:   Ureterolithiasis  # Moderate right-sided hydronephrosis and hydroureter  CT abd: Obstructing right proximal ureteral stone at the level of the L3 vertebral body measuring 7 mm with resultang moderate right-sided hydronephrosis and hydroureter. Bilateral nonobstructing renal calculi.    -Aggressive hydration  -Rocephin IV empirically  -Pain control  -Terazosin changed to Cardura as we do not carry  -Urology consulted in ED    Obesity class III with BMI of 41.11  -Educated about lifestyle changes    Other chronic issues  -COPD  -CHF  -A. Fib  -GOPI  -HTN  -HLD  -Depression  -Chronic diastolic heart failure      DVT-PPX: Lovenox  Diet: Carb controlled, n.p.o. after midnight      Medications:   Medications:    cefTRIAXone (ROCEPHIN) IV  1 g Intravenous Once      Infusions:   PRN Meds: morphine, 4 mg, Q30 Min PRN  ondansetron, 4 mg, Q6H PRN        Current Facility-Administered Medications:     morphine sulfate (PF) injection 4 mg, 4 mg, Intravenous, Q30 Min PRN, Leonel Loly, DO, 4 mg at 10/02/20 1858    ondansetron [facility]) injection 4 mg, 4 mg, Intravenous, Q6H PRN, Leonel Loly, DO, 4 mg at 10/02/20 1858    cefTRIAXone (ROCEPHIN) 1 g IVPB in 50 mL D5W minibag, 1 g, Intravenous, Once, Leonel Loly, DO    Current Outpatient Medications:     HYDROcodone-acetaminophen (NORCO) 5-325 MG per tablet, Take 1 tablet by mouth every 6 hours as needed for Pain for up to 3 days. Intended supply: 3 days.  Take lowest dose possible to manage pain, Disp: 12 tablet, Rfl: 0    cephALEXin (KEFLEX) 500 MG capsule, Take 1 capsule by mouth 3 times daily for 7 days, Disp: 21 capsule, Rfl: 0    acarbose (PRECOSE) 25 MG tablet, Take 50 mg by mouth 3 times daily (with meals), Disp: , Rfl:     losartan (COZAAR) 50 MG tablet, Take 50 mg by mouth daily, Disp: , Rfl:     acetaminophen (APAP EXTRA STRENGTH) 500 MG tablet, Take 1 tablet by mouth every 6 hours as needed for Pain, Disp: 30 tablet, Rfl: 0    TOUJEO SOLOSTAR 300 UNIT/ML injection pen, , Disp: , Rfl: 6    metFORMIN (GLUCOPHAGE-XR) 500 MG extended release tablet, , Disp: , Rfl: 6    sertraline (ZOLOFT) 50 MG tablet, , Disp: , Rfl: 6    traZODone (DESYREL) 50 MG tablet, Take 50 mg by mouth nightly, Disp: , Rfl:     nitroGLYCERIN (NITROSTAT) 0.4 MG SL tablet, up to max of 3 total doses. If no relief after 1 dose, call 911., Disp: 25 tablet, Rfl: 3    atorvastatin (LIPITOR) 40 MG tablet, Take 40 mg by mouth nightly , Disp: , Rfl:     SITagliptin (JANUVIA) 100 MG tablet, Take 100 mg by mouth nightly , Disp: , Rfl:     insulin lispro (HUMALOG) 100 UNIT/ML injection cartridge, Inject 25 Units into the skin 3 times daily (before meals) (Patient taking differently: Inject 60 Units into the skin 3 times daily (before meals) 60 units with breakfast  50 units with lunch and supper), Disp: 5 Cartridge, Rfl: 3    terazosin (HYTRIN) 2 MG capsule, Take 2 mg by mouth nightly, Disp: , Rfl:     carvedilol (COREG) 12.5 MG tablet, Take 2 tablets by mouth 2 times daily (with meals). (Patient taking differently: Take 12.5 mg by mouth 2 times daily (with meals) Take one tablet by mouth two times a day with food.), Disp: 60 tablet, Rfl: 0    zonisamide (ZONEGRAN) 100 MG capsule, Take 300 mg by mouth nightly Take 3 capsules by mouth every night at bedtime, Disp: , Rfl:     omega-3 acid ethyl esters (LOVAZA) 1 G capsule, Take 2 g by mouth 2 times daily. , Disp: , Rfl:     albuterol (PROVENTIL HFA) 108 (90 BASE) MCG/ACT inhaler, Inhale 2 puffs into the lungs every 6 hours as needed for Wheezing or Shortness of Breath., Disp: 1 Inhaler, Rfl: 0    aspirin 81 MG EC tablet, Take 81 mg by mouth daily. , Disp: , Rfl:     History of present illness     Chief Complaint: Flank Pain (right flank pain, recently dx with a 7mm kidney stone)      Gabby Rodriguez is a 44 y.o.  male  who presents with 10 out of 10 constant right flank pain that started 2 days ago. Patient was seen at [facility] 2 days ago and was diagnosed with kidney stone of 7 mm. He was sent home with pain medication prescription and Keflex. Patient stated he was unable to fill his pain medication because he is under pain contract and they were  unable to refill his medication. Will admit for urology evaluation and possible stent placement in the morning. Denies nausea, vomiting, diarrhea, chest pain, fever, shortness of breath,or  urinary symptoms. Hx of      Review of Systems   Ten point ROS reviewed and negative, unless as noted below. GENERAL:  Denies fever, chills, night sweats, or changes in weight. EYES:  Denies recent visual changes. ENT:  Denies ear pain, hearing loss or tinnitus  RESP:  Denies any cough, dyspnea, or wheezing. CV:  Denies any chest pain with exertion or at rest, palpitations, syncope, or edema. GI:  Denies any dysphagia, nausea, vomiting, abdominal pain, heartburn, changes in bowel habit, melena or rectal bleeding  MUSCULOSKELETAL:  Denies any joint swelling, joint pain, or loss of range of motion. NEURO:  Denies any headaches, tremors, dizziness, vertigo, memory loss, confusion, weakness, numbness or tingling. PSYCH:  Denies any sleeping problems, history of abuse, marital discord. HEME/LYMPHATIC/IMMUNO:  Denies , bruising, bleeding abnormalities   ENDO:  Denies any heat or cold intolerance, panemiaolyuria or polydipsia.       Objective:   No intake or output data in the 24 hours ending 10/02/20 2148   Vitals:   Vitals:    10/02/20 1847   BP: (!) 148/73   Pulse: 77   Resp: 16   Temp:    SpO2: 98%     Physical Exam:   Gen:  awake, alert, cooperative, no apparent distress. Morbid obese  EYES:Lids and lashes normal, pupils equal, round ,extra ocular muscles intact, sclera clear, conjunctiva normal  ENT:  Normocephalic, oral pharynx with moist mucus membranes  NECK:  Supple, symmetrical, trachea midline, no adenopathy,  LUNGS:  Clear to auscultate bilaterally, no rales ronchi or wheezing noted. CARDIOVASCULAR:  regular rate and rhythm, normal S1 and S2,no murmur noted, peripheral pulses 2+, no pitting edema  ABDOMEN: Normal BS, Non tender, non distended, no HSM noted. Back: Moderate right CVA tenderness  MUSCULOSKELETAL:  ROM of all extremities grossly wnl  NEUROLOGIC: AOx 3,  Cranial nerves II-XII are grossly intact. Motor is 5 out of 5 bilaterally. Sensory is intact, no lateralizing findings. SKIN:  no bruising or bleeding, normal skin color, turgor, no redness, warmth, or swelling      Past Medical History:      Past Medical History:   Diagnosis Date    Arthritis     Asthma     [facility]     [facility]) [facility]     COPD (chronic obstructive pulmonary disease) ([facility])     Depression     Diabetes mellitus ([facility])     Type 2    Diastolic heart failure ([facility])     Hyperlipidemia     Hypertension     Kidney stone     Migraine     Other disorders of kidney and ureter     Pneumonia     Psychiatric problem     Sleep apnea     Unspecified cerebral artery occlusion with cerebral infarction     \"10% loss of use of right arm and leg\"     PSHX:  has a past surgical history that includes Cholecystectomy; Carpal tunnel release; Elbow surgery; ASD repair (2005); back surgery (January 2012); Pericardium surgery (2005); Abdomen surgery; Colonoscopy; eye surgery; eye surgery; ASD repair; other surgical history (02/16/2017); and Cardiac surgery (AUG 2013). Allergies:    Allergies   Allergen Reactions    Azithromycin     Ciprofloxacin Other (See Comments)     lethargic       FAM HX: family history includes Diabetes in his father, maternal grandfather, maternal grandmother, mother, paternal grandfather, paternal grandmother, and paternal uncle; Heart Disease in his father. Family history reviewed and is essentially negative unless as stated above.      Soc HX:   Social History     Socioeconomic History    Marital status: Single     Spouse name: None    Number of children: 3    Years of education: None    Highest education level: None   Occupational History    None   Social Needs    Financial resource strain: None    Food insecurity     Worry: None     Inability: None    Transportation needs     Medical: None     Non-medical: None   Tobacco Use    Smoking status: Former Smoker     Packs/day: 0.25     Years: 0.00     Pack years: 0.00     Types: Cigarettes     Last attempt to quit: 2020     Years since quittin.2    Smokeless tobacco: Never Used    Tobacco comment: quit in , started again 2014; quit in 2019   Substance and Sexual Activity    Alcohol use: Not Currently     Comment: once in while    Drug use: No    Sexual activity: Not Currently   Lifestyle    Physical activity     Days per week: None     Minutes per session: None    Stress: None   Relationships    Social connections     Talks on phone: None     Gets together: None     Attends Rastafari service: None     Active member of club or organization: None     Attends meetings of clubs or organizations: None     Relationship status: None    Intimate partner violence     Fear of current or ex partner: None     Emotionally abused: None     Physically abused: None     Forced sexual activity: None   Other Topics Concern    None   Social History Narrative    unemployed       Electronically signed by TAMEKA Nixon CNP on 10/2/2020 at 9:48 PM

## 2020-10-03 NOTE — ANESTHESIA PRE PROCEDURE
Department of Anesthesiology  Preprocedure Note       Name:  Chad Acosta   Age:  44 y.o.  :  1970                                          MRN:  [id number]         Date:  10/3/2020      Surgeon: Kristina Goldberg):  Eddie Logan MD    Procedure: Procedure(s):  CYSTOSCOPY RETROGRADE PYELOGRAM STENT INSERTION    Medications prior to admission:   Prior to Admission medications    Medication Sig Start Date End Date Taking? Authorizing Provider   HYDROcodone-acetaminophen (NORCO) 5-325 MG per tablet Take 1 tablet by mouth every 6 hours as needed for Pain for up to 3 days. Intended supply: 3 days. Take lowest dose possible to manage pain 9/30/20 10/3/20 Yes Jamal Hernandez DO   cephALEXin [facility]) 500 MG capsule Take 1 capsule by mouth 3 times daily for 7 days 9/30/20 10/7/20 Yes Jamal Hernandez DO   acarbose (PRECOSE) 25 MG tablet Take 50 mg by mouth 3 times daily (with meals)   Yes Historical Provider, MD   losartan (COZAAR) 50 MG tablet Take 50 mg by mouth daily   Yes Historical Provider, MD   TOUJEO SOLOSTAR 300 UNIT/ML injection pen 144 Units nightly  19  Yes Historical Provider, MD   metFORMIN (GLUCOPHAGE-XR) 500 MG extended release tablet 1,000 mg 2 times daily  19  Yes Historical Provider, MD   sertraline (ZOLOFT) 50 MG tablet 50 mg daily  19  Yes Historical Provider, MD   traZODone (DESYREL) 50 MG tablet Take 100 mg by mouth nightly    Yes Historical Provider, MD   nitroGLYCERIN (NITROSTAT) 0.4 MG SL tablet up to max of 3 total doses. If no relief after 1 dose, call 911. Patient taking differently: as needed up to max of 3 total doses.  If no relief after 1 dose, call 911. 18  Yes Asha Hugo MD   atorvastatin (LIPITOR) 40 MG tablet Take 40 mg by mouth nightly    Yes Historical Provider, MD   SITagliptin (JANUVIA) 100 MG tablet Take 100 mg by mouth nightly    Yes Historical Provider, MD   insulin lispro (HUMALOG) 100 UNIT/ML injection cartridge Inject 25 Units into the skin 3 times daily (before meals)  Patient taking differently: Inject 60 Units into the skin 3 times daily (before meals) 60 units with breakfast  50 units with lunch and supper 8/4/16  Yes Krista Tracey MD   terazosin (HYTRIN) 2 MG capsule Take 2 mg by mouth nightly   Yes Historical Provider, MD   carvedilol (COREG) 12.5 MG tablet Take 2 tablets by mouth 2 times daily (with meals). Patient taking differently: Take 12.5 mg by mouth 2 times daily (with meals) Take one tablet by mouth two times a day with food. 2/19/15  Yes Kelvin Vu MD   zonisamide (ZONEGRAN) 100 MG capsule Take 300 mg by mouth nightly Take 3 capsules by mouth every night at bedtime   Yes Historical Provider, MD   omega-3 acid ethyl esters (LOVAZA) 1 G capsule Take 2 g by mouth 2 times daily. Yes Historical Provider, MD   albuterol (PROVENTIL HFA) 108 (90 BASE) MCG/ACT inhaler Inhale 2 puffs into the lungs every 6 hours as needed for Wheezing or Shortness of Breath. 1/10/13  Yes Sylvia Marin PA-C   aspirin 81 MG EC tablet Take 81 mg by mouth daily.    Yes Historical Provider, MD   acetaminophen (APAP EXTRA STRENGTH) 500 MG tablet Take 1 tablet by mouth every 6 hours as needed for Pain 7/19/19   Abhilash West MD       Current medications:    Current Facility-Administered Medications   Medication Dose Route Frequency Provider Last Rate Last Dose    [START ON 10/4/2020] influenza quadrivalent split vaccine (FLUZONE;FLUARIX;FLULAVAL;AFLURIA) injection 0.5 mL  0.5 mL Intramuscular Once Mouna Villalobos PA-C        fentaNYL (SUBLIMAZE) injection 25 mcg  25 mcg Intravenous Q5 Min PRN Charlene Bolanos MD        fentaNYL (SUBLIMAZE) injection 50 mcg  50 mcg Intravenous Q5 Min PRN Charlene Bolanos MD        fentaNYL (SUBLIMAZE) injection 25 mcg  25 mcg Intravenous Q5 Min PRN Charlene Bolanos MD        fentaNYL (SUBLIMAZE) injection 50 mcg  50 mcg Intravenous Q5 Min PRN Charlene Bolanos MD        ondansetron [facility]) injection 4 mg  4 mg Intravenous Once PRN Junior Primo MD        ondansetron [facility]) injection 4 mg  4 mg Intravenous Once PRN Junior Primo MD        labetalol (NORMODYNE;TRANDATE) injection 5 mg  5 mg Intravenous Q10 Min PRN Junior Primo MD        hydrALAZINE (APRESOLINE) injection 5 mg  5 mg Intravenous Q10 Min PRN Junior Primo MD        ondansetron [facility]) injection 4 mg  4 mg Intravenous Q6H PRN Janice Carrasquillo DO   4 mg at 10/02/20 1858    insulin lispro (HUMALOG) injection vial 0-6 Units  0-6 Units Subcutaneous TID WC Lise Sibomana, APRN - CNP        insulin lispro (HUMALOG) injection vial 0-3 Units  0-3 Units Subcutaneous Nightly Lise Taylora, APRN - CNP        glucose (GLUTOSE) 40 % oral gel 15 g  15 g Oral PRN Melburn Deist, APRN - CNP        dextrose 50 % IV solution  12.5 g Intravenous PRN Melburn Deist, APRN - CNP        glucagon (rDNA) injection 1 mg  1 mg Intramuscular PRN Melburn Deist, APRN - CNP        dextrose 5 % solution  100 mL/hr Intravenous PRN Melburn Deist, APRN - CNP        morphine injection 2 mg  2 mg Intravenous Q4H PRN Coralyn Single, PA-C   2 mg at 10/03/20 0506     Facility-Administered Medications Ordered in Other Encounters   Medication Dose Route Frequency Provider Last Rate Last Dose    lactated ringers infusion    Continuous PRN Jessica Hoose, APRN - CRNA        propofol injection    PRN Jessica Hoose, APRN - CRNA   200 mg at 10/03/20 0957    lidocaine 2 % injection    PRN Jessica Hoose, APRN - CRNA   100 mg at 10/03/20 0957    dexamethasone (DECADRON) injection    PRN Jessica Hoose, APRN - CRNA   4 mg at 10/03/20 1006    ondansetron (ZOFRAN) injection    PRN Jessica Hoose, APRN - CRNA   4 mg at 10/03/20 1006    fentaNYL (SUBLIMAZE) injection    PRN Jessica Hoose, APRN - CRNA   100 mcg at 10/03/20 0954       Allergies:     Allergies   Allergen Reactions    Azithromycin     Ciprofloxacin Other (See Comments)     lethargic       Problem List:    Patient Active Problem List   Diagnosis Code    Hypertensive urgency I16.0    Migraine G43.909    Hypertension I10    Diabetes mellitus ([facility] Q33.6    Diastolic heart failure (HCC) I50.30    TIA (transient ischemic attack) G45.9    Persistent headaches R51.9    Cerebral infarction ([facility]) I63.9    Abdominal pain, acute, right upper quadrant R10.11    Chronic back pain M54.9, G89.29    Chronic pain G89.29    Migrainous headache without aura G43.009    COPD with acute exacerbation ([facility]) J44.1    DM (diabetes mellitus), type 2, uncontrolled ([facility] [address] E11.65    Hyperglycemia R73.9    Acute chest pain R07.9    Anxiety F41.9    Asthma J45.909    Cardiac septal defect Q21.9    Chronic obstructive pulmonary disease ([facility]) J44.9    Hemiparesis following cerebrovascular accident (CVA) ([facility]) I69.359    Depression F32.9    Displacement of lumbar intervertebral disc without myelopathy M51.26    Hyperlipidemia E78.5    Calculus of kidney N20.0    Abnormal liver enzymes R74.8    Herniated lumbar intervertebral disc M51.26    Memory impairment R41.3    Infection of wound due to methicillin resistant Staphylococcus aureus (MRSA) A49.02    Shortness of breath R06.02    Sleep apnea G47.30    Cerebral artery occlusion with cerebral infarction ([facility]) I63.50    Increased frequency of urination R35.0    Urinary urgency R39.15    Hypertensive emergency I16.1    Seizures ([facility]) R56.9    Syncope R55    Wound infection following procedure T81.49XA    Wound infection T14. 8XXA, L08.9    Infected sebaceous cyst L72.3, L08.9    COPD (chronic obstructive pulmonary disease) ([facility]) J44.9    Anxiety F41.9    Migraine G43.909    Hyperlipidemia E78.5    DKA, type 2 (HCC) E11.10    Hypokalemia E87.6    Thrombocytopenia ([facility]) D69.6    Type 2 diabetes mellitus (HCC) E11.9    Essential hypertension I10    Hyperlipidemia E78.5    Depression F32.9    Diabetes ([facility] [address] E11.9    Ureterolithiasis N20.1       Past Medical History: BP Readings from Last 3 Encounters:   10/02/20 (!) 142/76   10/03/20 124/84   09/30/20 133/66       NPO Status:                                                                                 BMI:   Wt Readings from Last 3 Encounters:   10/03/20 (!) 315 lb (142.9 kg)   09/30/20 (!) 306 lb (138.8 kg)   09/16/20 (!) 306 lb (138.8 kg)     Body mass index is 41.56 kg/m².     CBC:   Lab Results   Component Value Date    WBC 8.6 10/02/2020    RBC 3.89 10/02/2020    HGB 12.6 10/02/2020    HCT 38.0 10/02/2020    MCV 97.7 10/02/2020    RDW 12.8 10/02/2020     10/02/2020       CMP:   Lab Results   Component Value Date     10/02/2020    K 3.8 10/02/2020     10/02/2020    CO2 23 10/02/2020    BUN 15 10/02/2020    CREATININE 1.1 10/02/2020    GFRAA >60 10/02/2020    LABGLOM >60 10/02/2020    GLUCOSE 119 10/02/2020    PROT 6.5 10/02/2020    PROT 7.1 03/08/2013    CALCIUM 8.9 10/02/2020    BILITOT 0.3 10/02/2020    ALKPHOS 72 10/02/2020    AST 19 10/02/2020    ALT 21 10/02/2020       POC Tests:   Recent Labs     10/02/20  2328   POCGLU 74       Coags:   Lab Results   Component Value Date    PROTIME 11.5 05/30/2018    PROTIME 10.6 06/02/2012    INR 1.01 05/30/2018    APTT 26.0 05/30/2018       HCG (If Applicable): No results found for: PREGTESTUR, PREGSERUM, HCG, HCGQUANT     ABGs:   Lab Results   Component Value Date    PO2ART 63 04/17/2014    UQM5VCD 35.0 04/17/2014    XOZ2WJU 20.2 04/17/2014    BEART 4 04/17/2014        Type & Screen (If Applicable):  No results found for: LABABO, LABRH    Drug/Infectious Status (If Applicable):  No results found for: HIV, HEPCAB    COVID-19 Screening (If Applicable): No results found for: COVID19      Anesthesia Evaluation    Airway: Mallampati: III  TM distance: >3 FB   Neck ROM: full  Mouth opening: > = 3 FB Dental:      Comment: Poor dentition    Pulmonary:normal exam    (+) COPD:  sleep apnea:  asthma: Cardiovascular:  Exercise tolerance: poor (<4 METS),   (+) hypertension:, CHF:, hyperlipidemia        Rhythm: regular  Rate: normal                 ROS comment: S/P atrial septal defect repair 2005     Neuro/Psych:   (+) seizures:, CVA:, TIA, headaches:, depression/anxiety             GI/Hepatic/Renal:             Endo/Other:    (+) DiabetesType II DM, , .                 Abdominal:   (+) obese,         Vascular:                                        Anesthesia Plan      general     ASA 3 - emergent       Induction: intravenous. Anesthetic plan and risks discussed with patient. Plan discussed with CRNA and attending.                   TAMEKA Tripp - JULIO CESAR   10/3/2020

## 2020-10-03 NOTE — PROGRESS NOTES
1022: Patient arrived to PACU from OR. Monitors applied, alarms on. Patient drowsy but arousable. Report obtained from [facility] and [facility]. CRNA.   4067: Post op BS 59, Dr. Samanhta Benito informed. 1030: Patient tolerating apple juice at this time. 1035: Patient requested need to urinate, urinal given. 75ml of urine noted. 1040: Patient turned and repositioned in bed. Tolerated well. 1048: BS retaken, now 59.   1055: Report called to [facility] for room 1119.   1100: Patient transferred to room 1119. Scotty NA at bedside.

## 2020-10-03 NOTE — DISCHARGE SUMMARY
Discharge Summary    Name:  Sol Maldonado /Age/Sex: 1970  (44 y.o. male)   MRN & CSN:  [id number] & [id number] Admission Date/Time: 10/2/2020  6:25 PM   Attending:  Artur Cha MD Discharging Physician: Artur Cha MD     Hospital Course:   Sol Maldonado is a 44 y.o.  male  who presents with right flank pain. --- Obstructing right ureteral stone  CT abdomen-7 mm right ureteral stone with moderate right-sided hydronephrosis and hydroureter. Pain was controlled with IV opiates. Urology performed cystoscopy, right retrograde pyelogram and right ureteral stent insertion on 10/3/2020. Urologist advised patient to follow-up in clinic for ureteroscopy versus ESWL. --- Probable complicated UTI  Patient urine culture on 2020, prior to hospitalization grew pansensitive enterococcus faecalis. He denied fever or chills. He had been on oral cephalexin prior to hospitalization and received IV Rocephin x2 doses prior to urologic procedures. I discussed with urology PA who advised oral amoxicillin for another 3 days after discharge. They will follow-up patient in office in about a week and repeat urine culture at that time. Other diagnoses, medications continued unless contraindicated  Hypertension  Hyperlipidemia  Diabetes mellitus  COPD-stable  Chronic CHF-stable  Depression  Recurrent kidney stones  Sleep apnea  Morbid obesity [BMI 41]    The patient expressed appropriate understanding of and agreement with the discharge recommendations, medications, and plan.      Consults this admission:  IP CONSULT TO [address] 141 TO HOSPITALIST    Discharge Instruction:   Follow up appointments:    Urology:  in 1 week    Diet:  diabetic diet   Activity: activity as tolerated  Disposition: Discharged to:   [x]Home, []HHC, []SNF, []Acute Rehab, []Hospice   Condition on discharge: Stable    Discharge Medications:      K[place]oe , [address] [place] Medication Instructions [id number]    Printed night at bedtime                 Objective Findings at Discharge:   BP (!) 162/82   Pulse 79   Temp 98.1 °F (36.7 °C) (Oral)   Resp 16   Ht 6' 1\" (1.854 m)   Wt (!) 315 lb (142.9 kg)   SpO2 95%   BMI 41.56 kg/m²            PHYSICAL EXAM    GEN Awake male, sitting upright in bed in no apparent distress. RESP Clear to auscultation, no wheezes, rales or rhonchi. Symmetric chest movement while on room air. CARDIO/VASC S1/S2 auscultated. Regular rate without appreciable murmurs. No peripheral edema. GI Abdomen is soft without significant tenderness, masses, or guarding. Bowel sounds are normoactive.  Rt costovertebral angle tenderness. MSK No gross joint deformities. SKIN Normal coloration, warm, dry. NEURO normal speech, no lateralizing weakness. PSYCH Awake, alert, oriented x 3. BMP/CBC  Recent Labs     09/30/20  1730 09/30/20  1759 10/02/20  1932   NA  --  140 140   K  --  3.7 3.8   CL  --  106 107   CO2  --  27 23   BUN  --  14 15   CREATININE  --  1.1 1.1   WBC 9.7  --  8.6   HCT 40.6*  --  38.0*   *  --  143       IMAGING:  CT abd/pel  1. Obstructing right proximal ureteral stone, at the level of the L3    vertebral body measuring 7 mm with resultant moderate right-sided    hydronephrosis and hydroureter. 2. Bilateral nonobstructing renal calculi. 3. Small well-circumscribed fluid collection adjacent to cholecystectomy    clips along the undersurface of the liver, unchanged.     4. Colonic diverticulosis.             Discharge Time of 35 minutes    Electronically signed by Georgia Harvey MD on 10/3/2020 at 2:26 PM

## 2020-10-03 NOTE — ANESTHESIA POSTPROCEDURE EVALUATION
Department of Anesthesiology  Postprocedure Note    Patient: Elena Serrato  MRN: [id number]  YOB: 1970  Date of evaluation: 10/3/2020  Time:  10:36 AM     Procedure Summary     Date:  10/03/20 Room / Location:  [place] [address] / [facility]    Anesthesia Start:  3389 Anesthesia Stop:  6480    Procedure:  [address] (Right ) Diagnosis:  (kidney stone)    Surgeon:  Jai Szymanski MD Responsible Provider:  TAMEKA Mckeon CRNA    Anesthesia Type:  general ASA Status:  3 - Emergent          Anesthesia Type: No value filed. Carolyn Phase I: Carolyn Score: 8    Carolyn Phase II:      Last vitals: Reviewed and per EMR flowsheets.        Anesthesia Post Evaluation    Patient location during evaluation: PACU  Patient participation: complete - patient participated  Level of consciousness: awake and alert  Pain score: 0  Airway patency: patent  Nausea & Vomiting: no vomiting and no nausea  Complications: no  Cardiovascular status: blood pressure returned to baseline  Respiratory status: acceptable, face mask and spontaneous ventilation  Hydration status: stable

## 2020-10-03 NOTE — PROGRESS NOTES
43 yo diabetic male. 7 mm right proximal ureteral stone s/p stent insertion 10/3/20    If afebrile anticipate safe for discharge home. Will arrange for ureteroscopy vs. ESWL after urine confirmed sterile (stone visible on KUB)    Pt declined to have me call anyone postop. Please call with any questions, thanks.

## 2020-10-03 NOTE — DISCHARGE INSTR - COC
Continuity of Care Form    Patient Name: Kaylyn Moreno   :  1970  MRN:  [id number]    Admit date:  10/2/2020  Discharge date:  ***    Code Status Order: Prior   Advance Directives:   [facility] Documentation     Date/Time Healthcare Directive Type of Healthcare Directive Copy in [facility] [address] Agent's Name Healthcare Agent's Phone Number    10/03/20 0037  No, patient does not have an advance directive for healthcare treatment -- -- -- -- --          Admitting Physician:  Alpesh Mac MD  PCP: Cresencio Martin DO    Discharging Nurse: [facility] Unit/Room#: 1119/1119-A  Discharging Unit Phone Number: ***    Emergency Contact:   Extended Emergency Contact Information  Primary Emergency Contact: none,per pt  Home Phone: [phone]  Relation: Other   needed?  No    Past Surgical History:  Past Surgical History:   Procedure Laterality Date    ABDOMEN SURGERY      ASD REPAIR      ASD REPAIR      BACK SURGERY  2012    [facility] CARDIAC SURGERY  AUG 2013    REVEAL XT MONITOR #[id number]    CARPAL TUNNEL RELEASE      jeri    CHOLECYSTECTOMY      COLONOSCOPY      ELBOW SURGERY      EYE SURGERY      EYE SURGERY      bilateral    OTHER SURGICAL HISTORY  2017    I & D mid upper back    PERICARDIUM SURGERY      post ASD repair with window       Immunization History:   Immunization History   Administered Date(s) Administered    Influenza Virus Vaccine 2012, 2013, 10/17/2015    Pneumococcal Conjugate 7-valent (Elizabeth Stamp) 2010       Active Problems:  Patient Active Problem List   Diagnosis Code    Hypertensive urgency I16.0    Migraine G43.909    Hypertension I10    Diabetes mellitus ([address] N63.7    Diastolic heart failure (HCC) I50.30    TIA (transient ischemic attack) G45.9    Persistent headaches R51.9    Cerebral infarction ([address] I63.9    Abdominal pain, acute, right upper quadrant difficile Molecular/PCR (Ordered)        MRSA  02/20/17 02/20/17 Zakia Wilks        3/4/17 wound; 2/16/17 MRSA wound          Nurse Assessment:  Last Vital Signs: BP (!) 162/82   Pulse 79   Temp 98.1 °F (36.7 °C) (Oral)   Resp 16   Ht 6' 1\" (1.854 m)   Wt (!) 315 lb (142.9 kg)   SpO2 95%   BMI 41.56 kg/m²     Last documented pain score (0-10 scale): Pain Level: 0  Last Weight:   Wt Readings from Last 1 Encounters:   10/03/20 (!) 315 lb (142.9 kg)     Mental Status:  {IP PT MENTAL STATUS:20030}    IV Access:  { MAC IV ACCESS:668466848}    Nursing Mobility/ADLs:  Walking   {CHP DME WHXS:154773898}  Transfer  {CHP DME OSOC:202107528}  Bathing  {CHP DME PPXW:878572881}  Dressing  {CHP DME JEHL:906127256}  Toileting  {CHP DME FJXD:173201754}  Feeding  {CHP DME BMLS:084219192}  Med Admin  {CHP DME PKYJ:829924482}  Med Delivery   { MAC MED Delivery:550635779}    Wound Care Documentation and Therapy:        Elimination:  Continence:   · Bowel: {YES / OY:32522}  · Bladder: {YES / EL:73580}  Urinary Catheter: {Urinary Catheter:027551782}   Colostomy/Ileostomy/Ileal Conduit: {YES / KH:76000}       Date of Last BM: ***    Intake/Output Summary (Last 24 hours) at 10/3/2020 1439  Last data filed at 10/3/2020 1424  Gross per 24 hour   Intake 420 ml   Output 375 ml   Net 45 ml     No intake/output data recorded.     Safety Concerns:     [address] Safety Concerns:774873599}    Impairments/Disabilities:      [address] Impairments/Disabilities:525353053}    Nutrition Therapy:  Current Nutrition Therapy:   [address] Diet List:360391558}    Routes of Feeding: {CHP DME Other Feedings:964098325}  Liquids: {Slp liquid thickness:46307}  Daily Fluid Restriction: {CHP DME Yes amt example:863564060}  Last Modified Barium Swallow with Video (Video Swallowing Test): {Done Not Done LKUD:194317595}    Treatments at the Time of Hospital Discharge:   Respiratory Treatments: ***  Oxygen Therapy:  {Therapy; copd oxygen:98285}  Ventilator:    [facility] CC Vent CIG}    Rehab Therapies: {THERAPEUTIC INTERVENTION:0554953029}  Weight Bearing Status/Restrictions: {[facility] Weight Bearin}  Other Medical Equipment (for information only, NOT a DME order):  {EQUIPMENT:706168248}  Other Treatments: ***    Patient's personal belongings (please select all that are sent with patient):  {CHP DME Belongings:145075838}    RN SIGNATURE:  {Esignature:442467608}    CASE MANAGEMENT/SOCIAL WORK SECTION    Inpatient Status Date: ***    Readmission Risk Assessment Score:  Readmission Risk              Risk of Unplanned Readmission:        16           Discharging to Facility/ Agency   · Name:   · Address:  · Phone:  · Fax:    Dialysis Facility (if applicable)   · Name:  · Address:  · Dialysis Schedule:  · Phone:  · Fax:    / signature: {Esignature:817644652}    PHYSICIAN SECTION    Prognosis: {Prognosis:2042473528}    Condition at Discharge: [address] [place] [address] Patient Condition:142480004}    Rehab Potential (if transferring to Rehab): {Prognosis:2703098767}    Recommended Labs or Other Treatments After Discharge: ***    Physician Certification: I certify the above information and transfer of Andrew Torres  is necessary for the continuing treatment of the diagnosis listed and that he requires {Admit to Appropriate Level of Care:82279} for {GREATER/LESS:065634475} 30 days.      Update Admission H&P: {CHP DME Changes in OFEGW:809681737}    PHYSICIAN SIGNATURE:  {Esignature:685695244}

## 2020-10-03 NOTE — CONSULTS
[facility] [address]   Consult Note  [facility] 1 2 3 4 5    Date: 10/3/2020   Patient: Narciso Benjamin   : 1970   DOA: 10/2/2020   MRN: [id number]   ROOM#: [id number]     Reason for Consult: right ureterolithiasis  Requesting Physician:  Dr. Kirk Larkin  Collaborating Urologist on Call at time of admission: Dr. Trisha Chu:  Right flank pain    History Obtained From:  patient, electronic medical record    HISTORY OF PRESENT ILLNESS:                The patient is a 44 y.o. male with significant past medical history of afib, CHF, DM, COPD, HLD, HTN, and kidney stones requiring intervention who presented with right flank pain. Pt states pain started in right flank 3 days ago and has gradually worsened with radiation to RLQ. Unable to control pain at home. Denies f/c/n/v, gross hematuria, dysuria, or other sx. Pt endorses strong h/o kidney stones requiring intervention. Discussed tx options to include MET vs ureteroscopy. Pt would like to proceed with ureteroscopy. Discussed risks to include bleeding, infection, organ damage, and death. Pt verbalizes understanding and is agreeable to proceed. ED Provider's HPI 10/2/20: Narciso Benjamin is a 44 y.o. male who presents with Right flank pain. States that he continues to have right flank pain after he was diagnosed with kidney stone 2 days ago. States he was unable to fill his pain medication because he is under a pain contract and it was not time for him to refill his medication yet, thus they would not fill a new prescription. States that he did fill the Keflex and has been taking that. Denies fevers or chills. No nausea or vomiting. States pain is in the right flank, right lower quadrant, pain is constant, without exacerbating or alleviating factors. Denies dysuria, hematuria. He states that he did not contact the urology office for follow-up.     Past Medical History:        Diagnosis Date    Arthritis     Asthma     [facility] fibrillation [facility] [address]     CHF (congestive heart failure) (HCC)     COPD [facility]chronic obstructive pulmonary disease) [facility] [address] [facility]     Depression     Diabetes mellitus ([facility] [address]     Type 2    Diastolic heart failure ([facility] [address]     Hyperlipidemia     Hypertension     Kidney stone     Migraine     Other disorders of kidney and ureter     Pneumonia     Psychiatric problem     Sleep apnea     Unspecified cerebral artery occlusion with cerebral infarction     \"10% loss of use of right arm and leg\"     Past Surgical History:        Procedure Laterality Date    ABDOMEN SURGERY      ASD REPAIR  2005    ASD REPAIR      [place]    CARDIAC SURGERY  AUG 2013    REVEAL XT MONITOR #[id number]    CARPAL TUNNEL RELEASE      jeri    CHOLECYSTECTOMY      COLONOSCOPY      ELBOW SURGERY      EYE SURGERY      EYE SURGERY      bilateral    OTHER SURGICAL HISTORY  02/16/2017    I & D mid upper back    PERICARDIUM SURGERY  2005    post ASD repair with window     Current Medications:   Current Facility-Administered Medications: [START ON 10/4/2020] influenza quadrivalent split vaccine (FLUZONE;FLUARIX;FLULAVAL;AFLURIA) injection 0.5 mL, 0.5 mL, Intramuscular, Once  ondansetron (ZOFRAN) injection 4 mg, 4 mg, Intravenous, Q6H PRN  insulin lispro (HUMALOG) injection vial 0-6 Units, 0-6 Units, Subcutaneous, TID WC  insulin lispro (HUMALOG) injection vial 0-3 Units, 0-3 Units, Subcutaneous, Nightly  glucose (GLUTOSE) 40 % oral gel 15 g, 15 g, Oral, PRN  dextrose 50 % IV solution, 12.5 g, Intravenous, PRN  glucagon (rDNA) injection 1 mg, 1 mg, Intramuscular, PRN  dextrose 5 % solution, 100 mL/hr, Intravenous, PRN  morphine injection 2 mg, 2 mg, Intravenous, Q4H PRN    Allergies:  Azithromycin and Ciprofloxacin    Social History:   TOBACCO:   reports that he quit smoking about 2 months ago. His smoking use included cigarettes. He smoked 0.25 packs per day for 0.00 years.  He has never used smokeless tobacco.  ETOH: ONELIA  Status     ampicillin  Sensitive  <=2  Final     nitrofurantoin  Sensitive  <=16  Final     tetracycline  Sensitive  <=1  Final     vancomycin  Sensitive  1  Final           Imaging:  Xr Abdomen (kub) (single Ap View)    Result Date: 10/2/2020  EXAMINATION: ONE SUPINE XRAY VIEW(S) OF THE ABDOMEN 10/2/2020 6:56 pm COMPARISON: CT abdomen/pelvis 09/30/2020. HISTORY: ORDERING SYSTEM PROVIDED HISTORY: right ureteric stone 2 days ago TECHNOLOGIST PROVIDED HISTORY: Abd KUB Reason for exam:->right ureteric stone 2 days ago Reason for Exam: right ureteric stone 2 days ago Acuity: Acute Type of Exam: Initial Additional signs and symptoms: na Relevant Medical/Surgical History: na FINDINGS: The obstructing proximal right ureteral calculus measuring approximately 7 mm is unchanged in position from the CT study of 09/30/2020. There are bilateral nonobstructing renal calculi as noted on the recent CT study. The proximal right ureteral calculus is unchanged in position from 09/30/2020. Ct Abdomen Pelvis W Iv Contrast    Result Date: 9/30/2020  EXAMINATION: CT OF THE ABDOMEN AND PELVIS WITH CONTRAST 9/30/2020 6:34 pm TECHNIQUE: CT of the abdomen and pelvis was performed with the administration of intravenous contrast. Multiplanar reformatted images are provided for review. Dose modulation, iterative reconstruction, and/or weight based adjustment of the mA/kV was utilized to reduce the radiation dose to as low as reasonably achievable. COMPARISON: 03/24/2020. HISTORY: ORDERING SYSTEM PROVIDED HISTORY: RLQ abdominal pain TECHNOLOGIST PROVIDED HISTORY: IV contrast only. Thank you. Reason for exam:->RLQ abdominal pain Reason for exam:->IV contrast only. Thank you.  Reason for Exam: right abdominal pain Acuity: Acute Type of Exam: Initial Additional signs and symptoms: nausea, vomiting Relevant Medical/Surgical History: back sx, cholecysectomy FINDINGS: Lower Chest: The heart size is normal.  Calcified granulomas are noted a/p 9/30/20: Reviewed. Obstructing right proximal ureteral stone, at the level of the L3 vertebral body measuring 7 mm with resultant moderate right-sided hydronephrosis and hydroureter. Bilateral nonobstructing renal calculi. KUB 10/2/20: The proximal right ureteral calculus is unchanged in position from 09/30/2020. Cr 1.1   No anticoagulation   NPO since midnight   Rapid covid test pending   Plan for cystoscopy, right RGPG, possible stone manipulation, and right ureteral stent placement today  2) Complicated UTI   Urine culture 9/30/20 positive for enterococcus faecalis, pansensitive   Pt has been on Keflex since 9/30/20   UA dip 10/2/20 negative; no UTI symptoms   On IV Rocephin   Will follow    Patient seen and examined, chart reviewed.      Electronically signed by Annabelle Lal PA-C on 10/3/2020 at 8:40 AM

## 2020-10-03 NOTE — OP NOTE
with a cystoscope. It was noted to be in good position proximally fluoroscopically and distally cystoscopically. The patient's bladder was drained. Brittany Acosta tolerated the procedure well & without difficulty and was then transferred to PACU to recover. Jonathan Moore will need to have intervention of the stone as an outpatient once the urine is deemed to be sterile. This may be via ureteroscopy/laser stone manipulation or ESWL.     KUB revealed the stone is radio-opaque. Right retrograde pyelogram was as follows:  Using a cone-tip catheter 5 cc's of contrast was injected in the right collecting system opacifying it to completion. A right retrograde pyelogram with a cone tipped catheter was performed opacifying the collecting system with findings of right proximal ureteral filling defect with mild right HN.         Katlin Benito MD  Electronically signed at 10/3/2020

## 2020-10-05 ENCOUNTER — CARE COORDINATION (OUTPATIENT)
Dept: CASE MANAGEMENT | Age: 50
End: 2020-10-05

## 2020-10-05 NOTE — CARE COORDINATION
[facility] Transitions Initial Follow Up Call    Call within 2 business days of discharge: Yes    Patient: Narciso Benjamin Patient : 1970   MRN: [id number]  Reason for Admission: Obstructing right ureteral stone s/p cystoscopy, right retrograde pyelogram and right ureteral stent insertion on 1499; Probable complicated UTI  Discharge Date: 10/3/20 RARS: Readmission Risk Score: 16  Facility: [address] [place] [address]       Complaint Diagnosis Description Type Department Provider    10/2/20 Flank Pain Ureterolithiasis ED to Hosp-Admission (Discharged) (ADMITTED) [facility] Satya Colmenares MD; Leonel Salcido. .. Non-face-to-face services provided:  Obtained and reviewed discharge summary and/or continuity of care documents    Care Transitions 24 Hour Call    Schedule Follow Up Appointment with PCP:  Declined  Do you have any ongoing symptoms?:  Yes  Patient-reported symptoms:  Pain  Interventions for patient-reported symptoms:  Other (Comment: Patient denied need)  Do you have a copy of your discharge instructions?:  Yes  Do you have all of your prescriptions and are they filled?:  No  Have you been contacted by a [id number] Pharmacist?:  No  Have you scheduled your follow up appointment?:  Yes (Comment: Awaiting call back from Dr Jyoti Miranda office)  How are you going to get to your appointment?:  Car - drive self  Were you discharged with any Home Care or Post Acute Services:  No  Do you feel like you have everything you need to keep you well at home?:  Yes  Care Transitions Interventions   Home Care Waiver:  Declined        DME Assistance:  Declined        Follow Up  No future appointments. Juaquin Goldstein COVID19 RISK MONITORING  COVID19 SCREEN: Not tested this admission  PATIENT RISK FACTORS: Copd, CHF, DM, Asthma    Was this a readmission? No    Care Transition Nurse (CTN) contacted Patient by telephone to perform post hospital discharge assessment. Verified name and   as identifiers.  Provided protocols and quarantine From [facility]: Are you at higher risk for severe illness?   and How to Protect Yourself     Patient given information for [employer] Loop and agrees to enroll : Yes  Patient's preferred e-mail: [email or web address]  Patient's preferred phone number: [phone]  Loop enrollment completed by this CTN. CTN provided contact information for future needs.       Sandra Machado RN

## 2020-11-03 PROBLEM — E78.5 HYPERLIPIDEMIA: Status: RESOLVED | Noted: 2017-05-12 | Resolved: 2020-11-03

## 2020-11-03 PROBLEM — I10 HYPERTENSION: Status: RESOLVED | Noted: 2020-11-03 | Resolved: 2020-11-03

## 2021-01-01 ENCOUNTER — TELEPHONE (OUTPATIENT)
Dept: INFUSION THERAPY | Age: 51
End: 2021-01-01

## 2021-01-01 ENCOUNTER — HOSPITAL ENCOUNTER (OUTPATIENT)
Dept: RADIATION ONCOLOGY | Age: 51
Discharge: HOME | End: 2021-12-10
Attending: RADIOLOGY
Payer: MEDICARE

## 2021-01-01 ENCOUNTER — HOSPITAL ENCOUNTER (OUTPATIENT)
Dept: INFUSION THERAPY | Age: 51
Discharge: HOME | End: 2021-11-16
Payer: MEDICARE

## 2021-01-01 ENCOUNTER — APPOINTMENT (OUTPATIENT)
Dept: GENERAL RADIOLOGY | Age: 51
End: 2021-01-01
Payer: MEDICARE

## 2021-01-01 ENCOUNTER — APPOINTMENT (OUTPATIENT)
Dept: CT IMAGING | Age: 51
End: 2021-01-01
Payer: MEDICARE

## 2021-01-01 ENCOUNTER — HOSPITAL ENCOUNTER (OUTPATIENT)
Dept: RADIATION ONCOLOGY | Age: 51
Discharge: HOME | End: 2021-11-23
Attending: RADIOLOGY
Payer: MEDICARE

## 2021-01-01 ENCOUNTER — HOSPITAL ENCOUNTER (OUTPATIENT)
Dept: RADIATION ONCOLOGY | Age: 51
Discharge: HOME | End: 2021-12-09
Attending: RADIOLOGY
Payer: MEDICARE

## 2021-01-01 ENCOUNTER — HOSPITAL ENCOUNTER (OUTPATIENT)
Dept: INFUSION THERAPY | Age: 51
Discharge: HOME | End: 2021-11-15
Payer: MEDICARE

## 2021-01-01 ENCOUNTER — HOSPITAL ENCOUNTER (OUTPATIENT)
Dept: RADIATION ONCOLOGY | Age: 51
Discharge: HOME | End: 2021-12-22
Attending: RADIOLOGY
Payer: MEDICARE

## 2021-01-01 ENCOUNTER — TELEPHONE (OUTPATIENT)
Dept: ONCOLOGY | Age: 51
End: 2021-01-01

## 2021-01-01 ENCOUNTER — HOSPITAL ENCOUNTER (OUTPATIENT)
Dept: INFUSION THERAPY | Age: 51
Discharge: HOME | End: 2021-12-07
Payer: MEDICARE

## 2021-01-01 ENCOUNTER — HOSPITAL ENCOUNTER (OUTPATIENT)
Dept: RADIATION ONCOLOGY | Age: 51
Discharge: HOME | End: 2021-12-17
Attending: RADIOLOGY
Payer: MEDICARE

## 2021-01-01 ENCOUNTER — CLINICAL DOCUMENTATION (OUTPATIENT)
Dept: RADIATION ONCOLOGY | Age: 51
End: 2021-01-01

## 2021-01-01 ENCOUNTER — APPOINTMENT (OUTPATIENT)
Dept: RADIATION ONCOLOGY | Age: 51
End: 2021-01-01
Attending: RADIOLOGY
Payer: MEDICARE

## 2021-01-01 ENCOUNTER — HOSPITAL ENCOUNTER (OUTPATIENT)
Dept: RADIATION ONCOLOGY | Age: 51
Discharge: HOME | End: 2021-11-18
Attending: RADIOLOGY
Payer: MEDICARE

## 2021-01-01 ENCOUNTER — HOSPITAL ENCOUNTER (OUTPATIENT)
Age: 51
Setting detail: OBSERVATION
Discharge: HOME | End: 2021-02-19
Attending: EMERGENCY MEDICINE | Admitting: EMERGENCY MEDICINE
Payer: MEDICARE

## 2021-01-01 ENCOUNTER — HOSPITAL ENCOUNTER (EMERGENCY)
Age: 51
Discharge: HOME | End: 2021-11-09
Attending: EMERGENCY MEDICINE
Payer: MEDICARE

## 2021-01-01 ENCOUNTER — HOSPITAL ENCOUNTER (OUTPATIENT)
Dept: RADIATION ONCOLOGY | Age: 51
Discharge: HOME | End: 2021-12-13
Attending: RADIOLOGY
Payer: MEDICARE

## 2021-01-01 ENCOUNTER — INITIAL CONSULT (OUTPATIENT)
Dept: ONCOLOGY | Age: 51
End: 2021-01-01
Payer: MEDICARE

## 2021-01-01 ENCOUNTER — HOSPITAL ENCOUNTER (OUTPATIENT)
Dept: RADIATION ONCOLOGY | Age: 51
Discharge: HOME | End: 2021-11-24
Attending: RADIOLOGY
Payer: MEDICARE

## 2021-01-01 ENCOUNTER — HOSPITAL ENCOUNTER (OUTPATIENT)
Dept: RADIATION ONCOLOGY | Age: 51
Discharge: HOME | End: 2021-12-21
Attending: RADIOLOGY
Payer: MEDICARE

## 2021-01-01 ENCOUNTER — HOSPITAL ENCOUNTER (OUTPATIENT)
Age: 51
Discharge: HOME | End: 2021-11-01
Payer: MEDICARE

## 2021-01-01 ENCOUNTER — HOSPITAL ENCOUNTER (OUTPATIENT)
Age: 51
Setting detail: SPECIMEN
Discharge: HOME | End: 2021-10-06
Payer: MEDICARE

## 2021-01-01 ENCOUNTER — HOSPITAL ENCOUNTER (OUTPATIENT)
Dept: RADIATION ONCOLOGY | Age: 51
Discharge: HOME | End: 2021-12-27
Attending: RADIOLOGY
Payer: MEDICARE

## 2021-01-01 ENCOUNTER — HOSPITAL ENCOUNTER (OUTPATIENT)
Dept: RADIATION ONCOLOGY | Age: 51
Discharge: HOME | End: 2021-12-14
Attending: RADIOLOGY
Payer: MEDICARE

## 2021-01-01 ENCOUNTER — HOSPITAL ENCOUNTER (OUTPATIENT)
Dept: INFUSION THERAPY | Age: 51
Discharge: HOME | End: 2021-12-14
Payer: MEDICARE

## 2021-01-01 ENCOUNTER — HOSPITAL ENCOUNTER (OUTPATIENT)
Dept: RADIATION ONCOLOGY | Age: 51
Discharge: HOME | End: 2021-12-07
Attending: RADIOLOGY
Payer: MEDICARE

## 2021-01-01 ENCOUNTER — OFFICE VISIT (OUTPATIENT)
Dept: ONCOLOGY | Age: 51
End: 2021-01-01
Payer: MEDICARE

## 2021-01-01 ENCOUNTER — HOSPITAL ENCOUNTER (OUTPATIENT)
Dept: INFUSION THERAPY | Age: 51
Discharge: HOME | End: 2021-10-29
Payer: MEDICARE

## 2021-01-01 ENCOUNTER — HOSPITAL ENCOUNTER (OUTPATIENT)
Dept: RADIATION ONCOLOGY | Age: 51
Discharge: HOME | End: 2021-12-20
Attending: RADIOLOGY
Payer: MEDICARE

## 2021-01-01 ENCOUNTER — HOSPITAL ENCOUNTER (OUTPATIENT)
Dept: INFUSION THERAPY | Age: 51
Discharge: HOME | End: 2021-11-30
Payer: MEDICARE

## 2021-01-01 ENCOUNTER — HOSPITAL ENCOUNTER (OUTPATIENT)
Dept: RADIATION ONCOLOGY | Age: 51
Discharge: HOME | End: 2021-12-16
Attending: RADIOLOGY
Payer: MEDICARE

## 2021-01-01 ENCOUNTER — HOSPITAL ENCOUNTER (EMERGENCY)
Age: 51
Discharge: HOME | End: 2021-07-30
Attending: EMERGENCY MEDICINE
Payer: MEDICARE

## 2021-01-01 ENCOUNTER — HOSPITAL ENCOUNTER (OUTPATIENT)
Dept: RADIATION ONCOLOGY | Age: 51
Discharge: HOME | End: 2021-10-21
Payer: MEDICARE

## 2021-01-01 ENCOUNTER — HOSPITAL ENCOUNTER (OUTPATIENT)
Dept: RADIATION ONCOLOGY | Age: 51
Discharge: HOME | End: 2021-11-30
Attending: RADIOLOGY
Payer: MEDICARE

## 2021-01-01 ENCOUNTER — HOSPITAL ENCOUNTER (OUTPATIENT)
Dept: RADIATION ONCOLOGY | Age: 51
Discharge: HOME | End: 2021-12-15
Attending: RADIOLOGY
Payer: MEDICARE

## 2021-01-01 ENCOUNTER — HOSPITAL ENCOUNTER (OUTPATIENT)
Dept: RADIATION ONCOLOGY | Age: 51
Discharge: HOME | End: 2021-12-01
Attending: RADIOLOGY
Payer: MEDICARE

## 2021-01-01 ENCOUNTER — HOSPITAL ENCOUNTER (OUTPATIENT)
Dept: INFUSION THERAPY | Age: 51
Discharge: HOME | End: 2021-12-06
Payer: MEDICARE

## 2021-01-01 ENCOUNTER — HOSPITAL ENCOUNTER (OUTPATIENT)
Dept: RADIATION ONCOLOGY | Age: 51
Discharge: HOME | End: 2021-12-02
Attending: RADIOLOGY
Payer: MEDICARE

## 2021-01-01 ENCOUNTER — HOSPITAL ENCOUNTER (OUTPATIENT)
Dept: RADIATION ONCOLOGY | Age: 51
Discharge: HOME | End: 2021-11-16
Attending: RADIOLOGY
Payer: MEDICARE

## 2021-01-01 ENCOUNTER — HOSPITAL ENCOUNTER (EMERGENCY)
Age: 51
Discharge: HOME | End: 2021-09-05
Attending: EMERGENCY MEDICINE
Payer: MEDICARE

## 2021-01-01 ENCOUNTER — HOSPITAL ENCOUNTER (OUTPATIENT)
Dept: INFUSION THERAPY | Age: 51
Discharge: HOME | End: 2021-12-13
Payer: MEDICARE

## 2021-01-01 ENCOUNTER — HOSPITAL ENCOUNTER (OUTPATIENT)
Dept: INFUSION THERAPY | Age: 51
Discharge: HOME | End: 2021-11-29
Payer: MEDICARE

## 2021-01-01 ENCOUNTER — HOSPITAL ENCOUNTER (OUTPATIENT)
Dept: INFUSION THERAPY | Age: 51
Discharge: HOME | End: 2021-12-20
Payer: MEDICARE

## 2021-01-01 ENCOUNTER — HOSPITAL ENCOUNTER (OUTPATIENT)
Dept: INFUSION THERAPY | Age: 51
Discharge: HOME | End: 2021-12-10
Payer: MEDICARE

## 2021-01-01 ENCOUNTER — HOSPITAL ENCOUNTER (OUTPATIENT)
Dept: RADIATION ONCOLOGY | Age: 51
Discharge: HOME | End: 2021-12-03
Attending: RADIOLOGY
Payer: MEDICARE

## 2021-01-01 ENCOUNTER — HOSPITAL ENCOUNTER (OUTPATIENT)
Dept: INFUSION THERAPY | Age: 51
Discharge: HOME | End: 2021-12-21
Payer: MEDICARE

## 2021-01-01 ENCOUNTER — CLINICAL DOCUMENTATION (OUTPATIENT)
Dept: ONCOLOGY | Age: 51
End: 2021-01-01

## 2021-01-01 ENCOUNTER — HOSPITAL ENCOUNTER (OUTPATIENT)
Dept: RADIATION ONCOLOGY | Age: 51
Discharge: HOME | End: 2021-11-02
Attending: RADIOLOGY
Payer: MEDICARE

## 2021-01-01 ENCOUNTER — HOSPITAL ENCOUNTER (OUTPATIENT)
Dept: RADIATION ONCOLOGY | Age: 51
Discharge: HOME | End: 2021-12-08
Attending: RADIOLOGY
Payer: MEDICARE

## 2021-01-01 ENCOUNTER — HOSPITAL ENCOUNTER (OUTPATIENT)
Dept: RADIATION ONCOLOGY | Age: 51
Discharge: HOME | End: 2021-12-06
Attending: RADIOLOGY
Payer: MEDICARE

## 2021-01-01 ENCOUNTER — HOSPITAL ENCOUNTER (OUTPATIENT)
Dept: INFUSION THERAPY | Age: 51
Discharge: HOME | End: 2021-10-12
Payer: MEDICARE

## 2021-01-01 ENCOUNTER — HOSPITAL ENCOUNTER (OUTPATIENT)
Dept: RADIATION ONCOLOGY | Age: 51
Discharge: HOME | End: 2021-12-23
Attending: RADIOLOGY
Payer: MEDICARE

## 2021-01-01 ENCOUNTER — HOSPITAL ENCOUNTER (OUTPATIENT)
Dept: RADIATION ONCOLOGY | Age: 51
Discharge: HOME | End: 2021-12-28
Attending: RADIOLOGY
Payer: MEDICARE

## 2021-01-01 ENCOUNTER — HOSPITAL ENCOUNTER (OUTPATIENT)
Dept: RADIATION ONCOLOGY | Age: 51
Discharge: HOME | End: 2021-11-19
Attending: RADIOLOGY
Payer: MEDICARE

## 2021-01-01 ENCOUNTER — HOSPITAL ENCOUNTER (OUTPATIENT)
Dept: PET IMAGING | Age: 51
Discharge: HOME | End: 2021-10-28
Payer: MEDICARE

## 2021-01-01 ENCOUNTER — HOSPITAL ENCOUNTER (OUTPATIENT)
Dept: RADIATION ONCOLOGY | Age: 51
Discharge: HOME | End: 2021-11-29
Attending: RADIOLOGY
Payer: MEDICARE

## 2021-01-01 ENCOUNTER — HOSPITAL ENCOUNTER (OUTPATIENT)
Dept: RADIATION ONCOLOGY | Age: 51
Discharge: HOME | End: 2021-11-17
Attending: RADIOLOGY
Payer: MEDICARE

## 2021-01-01 ENCOUNTER — HOSPITAL ENCOUNTER (EMERGENCY)
Age: 51
Discharge: HOME | End: 2021-05-23
Attending: EMERGENCY MEDICINE
Payer: MEDICARE

## 2021-01-01 ENCOUNTER — HOSPITAL ENCOUNTER (OUTPATIENT)
Dept: RADIATION ONCOLOGY | Age: 51
Discharge: HOME | End: 2021-11-04
Attending: RADIOLOGY
Payer: MEDICARE

## 2021-01-01 ENCOUNTER — NURSE ONLY (OUTPATIENT)
Dept: ONCOLOGY | Age: 51
End: 2021-01-01

## 2021-01-01 ENCOUNTER — HOSPITAL ENCOUNTER (OUTPATIENT)
Dept: RADIATION ONCOLOGY | Age: 51
Discharge: HOME | End: 2021-11-22
Attending: RADIOLOGY
Payer: MEDICARE

## 2021-01-01 ENCOUNTER — HOSPITAL ENCOUNTER (OUTPATIENT)
Dept: INFUSION THERAPY | Age: 51
Discharge: HOME | End: 2021-11-23
Payer: MEDICARE

## 2021-01-01 ENCOUNTER — HOSPITAL ENCOUNTER (OUTPATIENT)
Dept: INFUSION THERAPY | Age: 51
Discharge: HOME | End: 2021-11-22
Payer: MEDICARE

## 2021-01-01 VITALS
HEIGHT: 70 IN | OXYGEN SATURATION: 97 % | SYSTOLIC BLOOD PRESSURE: 134 MMHG | WEIGHT: 264.4 LBS | RESPIRATION RATE: 18 BRPM | TEMPERATURE: 98.4 F | DIASTOLIC BLOOD PRESSURE: 75 MMHG | BODY MASS INDEX: 37.85 KG/M2 | HEART RATE: 71 BPM

## 2021-01-01 VITALS
WEIGHT: 297.4 LBS | BODY MASS INDEX: 44.05 KG/M2 | DIASTOLIC BLOOD PRESSURE: 63 MMHG | TEMPERATURE: 96.2 F | RESPIRATION RATE: 16 BRPM | OXYGEN SATURATION: 97 % | HEIGHT: 69 IN | HEART RATE: 76 BPM | SYSTOLIC BLOOD PRESSURE: 131 MMHG

## 2021-01-01 VITALS
WEIGHT: 278 LBS | TEMPERATURE: 97.7 F | SYSTOLIC BLOOD PRESSURE: 116 MMHG | BODY MASS INDEX: 38.92 KG/M2 | RESPIRATION RATE: 16 BRPM | HEART RATE: 86 BPM | HEIGHT: 71 IN | RESPIRATION RATE: 16 BRPM | HEART RATE: 85 BPM | OXYGEN SATURATION: 95 % | HEIGHT: 70 IN | OXYGEN SATURATION: 97 % | DIASTOLIC BLOOD PRESSURE: 70 MMHG | BODY MASS INDEX: 37.22 KG/M2 | SYSTOLIC BLOOD PRESSURE: 125 MMHG | DIASTOLIC BLOOD PRESSURE: 66 MMHG | TEMPERATURE: 98.1 F | WEIGHT: 260 LBS

## 2021-01-01 VITALS
HEIGHT: 70 IN | WEIGHT: 261.8 LBS | RESPIRATION RATE: 16 BRPM | SYSTOLIC BLOOD PRESSURE: 124 MMHG | HEART RATE: 78 BPM | OXYGEN SATURATION: 98 % | TEMPERATURE: 96 F | DIASTOLIC BLOOD PRESSURE: 77 MMHG | BODY MASS INDEX: 37.48 KG/M2

## 2021-01-01 VITALS
DIASTOLIC BLOOD PRESSURE: 52 MMHG | HEART RATE: 77 BPM | SYSTOLIC BLOOD PRESSURE: 111 MMHG | TEMPERATURE: 97.9 F | RESPIRATION RATE: 16 BRPM | OXYGEN SATURATION: 95 % | BODY MASS INDEX: 38.5 KG/M2 | WEIGHT: 275 LBS | HEIGHT: 71 IN

## 2021-01-01 VITALS
WEIGHT: 278.2 LBS | HEART RATE: 88 BPM | DIASTOLIC BLOOD PRESSURE: 60 MMHG | OXYGEN SATURATION: 98 % | BODY MASS INDEX: 38.95 KG/M2 | TEMPERATURE: 98.9 F | SYSTOLIC BLOOD PRESSURE: 121 MMHG | HEIGHT: 71 IN

## 2021-01-01 VITALS
BODY MASS INDEX: 46.65 KG/M2 | WEIGHT: 315 LBS | RESPIRATION RATE: 16 BRPM | OXYGEN SATURATION: 97 % | SYSTOLIC BLOOD PRESSURE: 152 MMHG | HEIGHT: 69 IN | TEMPERATURE: 98.6 F | HEART RATE: 64 BPM | DIASTOLIC BLOOD PRESSURE: 74 MMHG

## 2021-01-01 VITALS
OXYGEN SATURATION: 97 % | HEIGHT: 70 IN | BODY MASS INDEX: 38.08 KG/M2 | RESPIRATION RATE: 18 BRPM | TEMPERATURE: 97.2 F | HEART RATE: 79 BPM | DIASTOLIC BLOOD PRESSURE: 67 MMHG | SYSTOLIC BLOOD PRESSURE: 120 MMHG | WEIGHT: 266 LBS

## 2021-01-01 VITALS — BODY MASS INDEX: 37.97 KG/M2 | WEIGHT: 264.6 LBS

## 2021-01-01 VITALS
TEMPERATURE: 98.4 F | OXYGEN SATURATION: 96 % | SYSTOLIC BLOOD PRESSURE: 137 MMHG | WEIGHT: 261 LBS | DIASTOLIC BLOOD PRESSURE: 82 MMHG | HEART RATE: 79 BPM | HEIGHT: 70 IN | BODY MASS INDEX: 37.37 KG/M2

## 2021-01-01 VITALS
DIASTOLIC BLOOD PRESSURE: 87 MMHG | HEART RATE: 77 BPM | OXYGEN SATURATION: 97 % | WEIGHT: 268 LBS | TEMPERATURE: 97.4 F | HEIGHT: 70 IN | SYSTOLIC BLOOD PRESSURE: 129 MMHG | BODY MASS INDEX: 38.37 KG/M2

## 2021-01-01 VITALS — WEIGHT: 261 LBS | BODY MASS INDEX: 37.45 KG/M2

## 2021-01-01 VITALS
BODY MASS INDEX: 36.99 KG/M2 | WEIGHT: 258.4 LBS | OXYGEN SATURATION: 97 % | DIASTOLIC BLOOD PRESSURE: 78 MMHG | SYSTOLIC BLOOD PRESSURE: 130 MMHG | HEIGHT: 70 IN | RESPIRATION RATE: 18 BRPM

## 2021-01-01 VITALS
DIASTOLIC BLOOD PRESSURE: 84 MMHG | HEART RATE: 81 BPM | RESPIRATION RATE: 20 BRPM | SYSTOLIC BLOOD PRESSURE: 151 MMHG | TEMPERATURE: 98.4 F | OXYGEN SATURATION: 97 %

## 2021-01-01 VITALS
SYSTOLIC BLOOD PRESSURE: 147 MMHG | DIASTOLIC BLOOD PRESSURE: 92 MMHG | BODY MASS INDEX: 41.09 KG/M2 | WEIGHT: 287 LBS | OXYGEN SATURATION: 97 % | HEART RATE: 92 BPM | RESPIRATION RATE: 16 BRPM | HEIGHT: 70 IN | TEMPERATURE: 98.2 F

## 2021-01-01 VITALS
SYSTOLIC BLOOD PRESSURE: 139 MMHG | OXYGEN SATURATION: 97 % | HEIGHT: 70 IN | TEMPERATURE: 98 F | WEIGHT: 270.2 LBS | RESPIRATION RATE: 18 BRPM | HEART RATE: 69 BPM | BODY MASS INDEX: 38.68 KG/M2 | DIASTOLIC BLOOD PRESSURE: 80 MMHG

## 2021-01-01 VITALS
WEIGHT: 289 LBS | SYSTOLIC BLOOD PRESSURE: 122 MMHG | TEMPERATURE: 99.1 F | HEART RATE: 71 BPM | RESPIRATION RATE: 18 BRPM | DIASTOLIC BLOOD PRESSURE: 78 MMHG | HEIGHT: 70 IN | BODY MASS INDEX: 41.37 KG/M2 | OXYGEN SATURATION: 97 %

## 2021-01-01 DIAGNOSIS — C15.5 MALIGNANT NEOPLASM OF LOWER THIRD OF ESOPHAGUS (HCC): ICD-10-CM

## 2021-01-01 DIAGNOSIS — C15.5 MALIGNANT NEOPLASM OF LOWER THIRD OF ESOPHAGUS (HCC): Primary | ICD-10-CM

## 2021-01-01 DIAGNOSIS — R59.9 ENLARGED LYMPH NODE: ICD-10-CM

## 2021-01-01 DIAGNOSIS — R10.13 ABDOMINAL PAIN, EPIGASTRIC: Primary | ICD-10-CM

## 2021-01-01 DIAGNOSIS — N20.1 URETEROLITHIASIS: Primary | ICD-10-CM

## 2021-01-01 DIAGNOSIS — K21.00 GASTROESOPHAGEAL REFLUX DISEASE WITH ESOPHAGITIS WITHOUT HEMORRHAGE: ICD-10-CM

## 2021-01-01 DIAGNOSIS — R03.0 ELEVATED BLOOD PRESSURE READING: Primary | ICD-10-CM

## 2021-01-01 DIAGNOSIS — R07.9 CHEST PAIN, UNSPECIFIED TYPE: Primary | ICD-10-CM

## 2021-01-01 LAB
ALBUMIN SERPL-MCNC: 3.8 GM/DL (ref 3.4–5)
ALBUMIN SERPL-MCNC: 3.9 GM/DL (ref 3.4–5)
ALBUMIN SERPL-MCNC: 4 GM/DL (ref 3.4–5)
ALBUMIN SERPL-MCNC: 4.1 GM/DL (ref 3.4–5)
ALBUMIN SERPL-MCNC: 4.1 GM/DL (ref 3.4–5)
ALBUMIN SERPL-MCNC: 4.2 GM/DL (ref 3.4–5)
ALBUMIN SERPL-MCNC: 4.2 GM/DL (ref 3.4–5)
ALBUMIN SERPL-MCNC: 4.3 GM/DL (ref 3.4–5)
ALBUMIN SERPL-MCNC: 4.3 GM/DL (ref 3.4–5)
ALP BLD-CCNC: 102 IU/L (ref 40–129)
ALP BLD-CCNC: 84 IU/L (ref 40–128)
ALP BLD-CCNC: 84 IU/L (ref 40–128)
ALP BLD-CCNC: 86 IU/L (ref 40–128)
ALP BLD-CCNC: 88 IU/L (ref 40–128)
ALP BLD-CCNC: 93 IU/L (ref 40–129)
ALP BLD-CCNC: 95 IU/L (ref 40–129)
ALP BLD-CCNC: 96 IU/L (ref 40–128)
ALP BLD-CCNC: 99 IU/L (ref 40–129)
ALT SERPL-CCNC: 12 U/L (ref 10–40)
ALT SERPL-CCNC: 13 U/L (ref 10–40)
ALT SERPL-CCNC: 17 U/L (ref 10–40)
ALT SERPL-CCNC: 18 U/L (ref 10–40)
ALT SERPL-CCNC: 20 U/L (ref 10–40)
ALT SERPL-CCNC: 21 U/L (ref 10–40)
ALT SERPL-CCNC: 28 U/L (ref 10–40)
ANION GAP SERPL CALCULATED.3IONS-SCNC: 1 MMOL/L (ref 4–16)
ANION GAP SERPL CALCULATED.3IONS-SCNC: 10 MMOL/L (ref 4–16)
ANION GAP SERPL CALCULATED.3IONS-SCNC: 11 MMOL/L (ref 4–16)
ANION GAP SERPL CALCULATED.3IONS-SCNC: 11 MMOL/L (ref 4–16)
ANION GAP SERPL CALCULATED.3IONS-SCNC: 12 MMOL/L (ref 4–16)
ANION GAP SERPL CALCULATED.3IONS-SCNC: 12 MMOL/L (ref 4–16)
ANION GAP SERPL CALCULATED.3IONS-SCNC: 9 MMOL/L (ref 4–16)
AST SERPL-CCNC: 11 IU/L (ref 15–37)
AST SERPL-CCNC: 13 IU/L (ref 15–37)
AST SERPL-CCNC: 20 IU/L (ref 15–37)
AST SERPL-CCNC: 8 IU/L (ref 15–37)
AST SERPL-CCNC: 9 IU/L (ref 15–37)
BACTERIA: ABNORMAL /HPF
BACTERIA: ABNORMAL /HPF
BACTERIA: NEGATIVE /HPF
BACTERIA: NEGATIVE /HPF
BASE EXCESS MIXED: 4.1 (ref 0–1.2)
BASE EXCESS: ABNORMAL (ref 0–3.3)
BASOPHILS ABSOLUTE: 0 K/CU MM
BASOPHILS ABSOLUTE: 0.1 K/CU MM
BASOPHILS RELATIVE PERCENT: 0.3 % (ref 0–1)
BASOPHILS RELATIVE PERCENT: 0.3 % (ref 0–1)
BASOPHILS RELATIVE PERCENT: 0.4 % (ref 0–1)
BASOPHILS RELATIVE PERCENT: 0.4 % (ref 0–1)
BASOPHILS RELATIVE PERCENT: 0.5 % (ref 0–1)
BASOPHILS RELATIVE PERCENT: 0.5 % (ref 0–1)
BASOPHILS RELATIVE PERCENT: 0.7 % (ref 0–1)
BASOPHILS RELATIVE PERCENT: 0.7 % (ref 0–1)
BASOPHILS RELATIVE PERCENT: 0.9 % (ref 0–1)
BASOPHILS RELATIVE PERCENT: 1 % (ref 0–1)
BASOPHILS RELATIVE PERCENT: 1 % (ref 0–1)
BILIRUB SERPL-MCNC: 0.2 MG/DL (ref 0–1)
BILIRUB SERPL-MCNC: 0.3 MG/DL (ref 0–1)
BILIRUB SERPL-MCNC: 0.4 MG/DL (ref 0–1)
BILIRUB SERPL-MCNC: 0.5 MG/DL (ref 0–1)
BILIRUB SERPL-MCNC: 0.5 MG/DL (ref 0–1)
BILIRUB SERPL-MCNC: 0.6 MG/DL (ref 0–1)
BILIRUB SERPL-MCNC: 0.7 MG/DL (ref 0–1)
BILIRUBIN URINE: NEGATIVE MG/DL
BLOOD, URINE: ABNORMAL
BLOOD, URINE: ABNORMAL
BLOOD, URINE: NEGATIVE
BLOOD, URINE: NEGATIVE
BUN BLDV-MCNC: 11 MG/DL (ref 6–23)
BUN BLDV-MCNC: 12 MG/DL (ref 6–23)
BUN BLDV-MCNC: 13 MG/DL (ref 6–23)
BUN BLDV-MCNC: 15 MG/DL (ref 6–23)
BUN BLDV-MCNC: 15 MG/DL (ref 6–23)
BUN BLDV-MCNC: 16 MG/DL (ref 6–23)
BUN BLDV-MCNC: 18 MG/DL (ref 6–23)
BUN BLDV-MCNC: 8 MG/DL (ref 6–23)
BUN BLDV-MCNC: 8 MG/DL (ref 6–23)
CALCIUM SERPL-MCNC: 8.8 MG/DL (ref 8.3–10.6)
CALCIUM SERPL-MCNC: 8.9 MG/DL (ref 8.3–10.6)
CALCIUM SERPL-MCNC: 9 MG/DL (ref 8.3–10.6)
CALCIUM SERPL-MCNC: 9.2 MG/DL (ref 8.3–10.6)
CALCIUM SERPL-MCNC: 9.2 MG/DL (ref 8.3–10.6)
CALCIUM SERPL-MCNC: 9.3 MG/DL (ref 8.3–10.6)
CALCIUM SERPL-MCNC: 9.3 MG/DL (ref 8.3–10.6)
CAST TYPE: ABNORMAL /HPF
CAST TYPE: ABNORMAL /HPF
CAST TYPE: NEGATIVE /HPF
CHLORIDE BLD-SCNC: 101 MMOL/L (ref 99–110)
CHLORIDE BLD-SCNC: 105 MMOL/L (ref 99–110)
CHLORIDE BLD-SCNC: 106 MMOL/L (ref 99–110)
CHLORIDE BLD-SCNC: 107 MMOL/L (ref 99–110)
CHLORIDE BLD-SCNC: 108 MMOL/L (ref 99–110)
CHLORIDE BLD-SCNC: 109 MMOL/L (ref 99–110)
CHLORIDE BLD-SCNC: 109 MMOL/L (ref 99–110)
CHP ED QC CHECK: YES
CLARITY: ABNORMAL
CLARITY: CLEAR
CO2 CONTENT: 21.8 MMOL/L (ref 19–24)
CO2: 20 MMOL/L (ref 21–32)
CO2: 21 MMOL/L (ref 21–32)
CO2: 21 MMOL/L (ref 21–32)
CO2: 22 MMOL/L (ref 21–32)
CO2: 23 MMOL/L (ref 21–32)
CO2: 23 MMOL/L (ref 21–32)
CO2: 24 MMOL/L (ref 21–32)
CO2: 24 MMOL/L (ref 21–32)
CO2: 25 MMOL/L (ref 21–32)
CO2: 25 MMOL/L (ref 21–32)
CO2: 34 MMOL/L (ref 21–32)
COLOR: ABNORMAL
COLOR: ABNORMAL
COLOR: YELLOW
COLOR: YELLOW
CREAT SERPL-MCNC: 0.7 MG/DL (ref 0.9–1.3)
CREAT SERPL-MCNC: 0.8 MG/DL (ref 0.9–1.3)
CREAT SERPL-MCNC: 0.9 MG/DL (ref 0.9–1.3)
CREAT SERPL-MCNC: 1.1 MG/DL (ref 0.9–1.3)
CREAT SERPL-MCNC: 1.3 MG/DL (ref 0.9–1.3)
CRYSTAL TYPE: ABNORMAL /HPF
CRYSTAL TYPE: ABNORMAL /HPF
CRYSTAL TYPE: NEGATIVE /HPF
CULTURE: ABNORMAL
CULTURE: ABNORMAL
DIFFERENTIAL TYPE: ABNORMAL
EKG ATRIAL RATE: 64 BPM
EKG ATRIAL RATE: 73 BPM
EKG ATRIAL RATE: 74 BPM
EKG ATRIAL RATE: 80 BPM
EKG ATRIAL RATE: 94 BPM
EKG DIAGNOSIS: NORMAL
EKG P AXIS: 17 DEGREES
EKG P AXIS: 24 DEGREES
EKG P AXIS: 37 DEGREES
EKG P AXIS: 38 DEGREES
EKG P AXIS: 69 DEGREES
EKG P-R INTERVAL: 126 MS
EKG P-R INTERVAL: 152 MS
EKG P-R INTERVAL: 160 MS
EKG Q-T INTERVAL: 350 MS
EKG Q-T INTERVAL: 374 MS
EKG Q-T INTERVAL: 380 MS
EKG Q-T INTERVAL: 398 MS
EKG Q-T INTERVAL: 416 MS
EKG QRS DURATION: 92 MS
EKG QRS DURATION: 96 MS
EKG QRS DURATION: 96 MS
EKG QRS DURATION: 98 MS
EKG QRS DURATION: 98 MS
EKG QTC CALCULATION (BAZETT): 421 MS
EKG QTC CALCULATION (BAZETT): 429 MS
EKG QTC CALCULATION (BAZETT): 431 MS
EKG QTC CALCULATION (BAZETT): 437 MS
EKG QTC CALCULATION (BAZETT): 438 MS
EKG R AXIS: -12 DEGREES
EKG R AXIS: -3 DEGREES
EKG R AXIS: -5 DEGREES
EKG R AXIS: -8 DEGREES
EKG R AXIS: 6 DEGREES
EKG T AXIS: 28 DEGREES
EKG T AXIS: 30 DEGREES
EKG T AXIS: 40 DEGREES
EKG T AXIS: 50 DEGREES
EKG T AXIS: 75 DEGREES
EKG VENTRICULAR RATE: 64 BPM
EKG VENTRICULAR RATE: 73 BPM
EKG VENTRICULAR RATE: 74 BPM
EKG VENTRICULAR RATE: 80 BPM
EKG VENTRICULAR RATE: 94 BPM
EOSINOPHILS ABSOLUTE: 0 K/CU MM
EOSINOPHILS ABSOLUTE: 0 K/CU MM
EOSINOPHILS ABSOLUTE: 0.1 K/CU MM
EOSINOPHILS ABSOLUTE: 0.2 K/CU MM
EOSINOPHILS RELATIVE PERCENT: 0.3 % (ref 0–3)
EOSINOPHILS RELATIVE PERCENT: 0.5 % (ref 0–3)
EOSINOPHILS RELATIVE PERCENT: 1.2 % (ref 0–3)
EOSINOPHILS RELATIVE PERCENT: 1.5 % (ref 0–3)
EOSINOPHILS RELATIVE PERCENT: 1.5 % (ref 0–3)
EOSINOPHILS RELATIVE PERCENT: 1.6 % (ref 0–3)
EOSINOPHILS RELATIVE PERCENT: 1.7 % (ref 0–3)
EOSINOPHILS RELATIVE PERCENT: 1.8 % (ref 0–3)
EOSINOPHILS RELATIVE PERCENT: 1.9 % (ref 0–3)
EOSINOPHILS RELATIVE PERCENT: 2.2 % (ref 0–3)
EOSINOPHILS RELATIVE PERCENT: 2.5 % (ref 0–3)
EPITHELIAL CELLS, UA: ABNORMAL /HPF
GFR AFRICAN AMERICAN: >60 ML/MIN/1.73M2
GFR NON-AFRICAN AMERICAN: 58 ML/MIN/1.73M2
GFR NON-AFRICAN AMERICAN: >60 ML/MIN/1.73M2
GLUCOSE BLD-MCNC: 110 MG/DL (ref 70–99)
GLUCOSE BLD-MCNC: 122 MG/DL (ref 70–99)
GLUCOSE BLD-MCNC: 129 MG/DL (ref 70–99)
GLUCOSE BLD-MCNC: 142 MG/DL (ref 70–99)
GLUCOSE BLD-MCNC: 146 MG/DL (ref 70–99)
GLUCOSE BLD-MCNC: 152 MG/DL (ref 70–99)
GLUCOSE BLD-MCNC: 154 MG/DL (ref 70–99)
GLUCOSE BLD-MCNC: 173 MG/DL (ref 70–99)
GLUCOSE BLD-MCNC: 180 MG/DL (ref 70–99)
GLUCOSE BLD-MCNC: 193 MG/DL (ref 70–99)
GLUCOSE BLD-MCNC: 206 MG/DL (ref 70–99)
GLUCOSE BLD-MCNC: 211 MG/DL (ref 70–99)
GLUCOSE BLD-MCNC: 224 MG/DL (ref 70–99)
GLUCOSE BLD-MCNC: 304 MG/DL (ref 70–99)
GLUCOSE BLD-MCNC: 312 MG/DL
GLUCOSE BLD-MCNC: 312 MG/DL (ref 70–99)
GLUCOSE BLD-MCNC: 511 MG/DL
GLUCOSE BLD-MCNC: 511 MG/DL (ref 70–99)
GLUCOSE BLD-MCNC: 97 MG/DL (ref 70–99)
GLUCOSE BLD-MCNC: ABNORMAL MG/DL (ref 70–99)
GLUCOSE, URINE: >1000 MG/DL
GLUCOSE, URINE: NEGATIVE MG/DL
HCO3 VENOUS: 20.7 MMOL/L (ref 19–25)
HCT VFR BLD CALC: 31.8 % (ref 42–52)
HCT VFR BLD CALC: 34.2 % (ref 42–52)
HCT VFR BLD CALC: 35.7 % (ref 42–52)
HCT VFR BLD CALC: 37.1 % (ref 42–52)
HCT VFR BLD CALC: 37.4 % (ref 42–52)
HCT VFR BLD CALC: 38.3 % (ref 42–52)
HCT VFR BLD CALC: 38.7 % (ref 42–52)
HCT VFR BLD CALC: 40.8 % (ref 42–52)
HCT VFR BLD CALC: 40.8 % (ref 42–52)
HCT VFR BLD CALC: 40.9 % (ref 42–52)
HCT VFR BLD CALC: 41.4 % (ref 42–52)
HCT VFR BLD CALC: 45.2 % (ref 42–52)
HEMOGLOBIN: 11.4 GM/DL (ref 13.5–18)
HEMOGLOBIN: 12.1 GM/DL (ref 13.5–18)
HEMOGLOBIN: 12.3 GM/DL (ref 13.5–18)
HEMOGLOBIN: 12.6 GM/DL (ref 13.5–18)
HEMOGLOBIN: 12.7 GM/DL (ref 13.5–18)
HEMOGLOBIN: 12.9 GM/DL (ref 13.5–18)
HEMOGLOBIN: 13 GM/DL (ref 13.5–18)
HEMOGLOBIN: 13.8 GM/DL (ref 13.5–18)
HEMOGLOBIN: 13.8 GM/DL (ref 13.5–18)
HEMOGLOBIN: 14.1 GM/DL (ref 13.5–18)
HEMOGLOBIN: 14.3 GM/DL (ref 13.5–18)
HEMOGLOBIN: 15 GM/DL (ref 13.5–18)
HYALINE CASTS: 0 /LPF
IMMATURE NEUTROPHIL %: 0.1 % (ref 0–0.43)
IMMATURE NEUTROPHIL %: 0.3 % (ref 0–0.43)
IMMATURE NEUTROPHIL %: 0.3 % (ref 0–0.43)
IMMATURE NEUTROPHIL %: 0.4 % (ref 0–0.43)
IMMATURE NEUTROPHIL %: 0.4 % (ref 0–0.43)
KETONES, URINE: NEGATIVE MG/DL
LACTATE: 1.1 MMOL/L (ref 0.4–2)
LEUKOCYTE ESTERASE, URINE: ABNORMAL
LEUKOCYTE ESTERASE, URINE: NEGATIVE
LIPASE: 27 IU/L (ref 13–60)
LIPASE: 67 IU/L (ref 13–60)
LYMPHOCYTES ABSOLUTE: 0.2 K/CU MM
LYMPHOCYTES ABSOLUTE: 0.2 K/CU MM
LYMPHOCYTES ABSOLUTE: 0.4 K/CU MM
LYMPHOCYTES ABSOLUTE: 0.6 K/CU MM
LYMPHOCYTES ABSOLUTE: 0.9 K/CU MM
LYMPHOCYTES ABSOLUTE: 1.7 K/CU MM
LYMPHOCYTES ABSOLUTE: 2 K/CU MM
LYMPHOCYTES ABSOLUTE: 2.3 K/CU MM
LYMPHOCYTES ABSOLUTE: 2.4 K/CU MM
LYMPHOCYTES ABSOLUTE: 2.8 K/CU MM
LYMPHOCYTES ABSOLUTE: 3 K/CU MM
LYMPHOCYTES RELATIVE PERCENT: 11.4 % (ref 24–44)
LYMPHOCYTES RELATIVE PERCENT: 11.6 % (ref 24–44)
LYMPHOCYTES RELATIVE PERCENT: 20 % (ref 24–44)
LYMPHOCYTES RELATIVE PERCENT: 24.8 % (ref 24–44)
LYMPHOCYTES RELATIVE PERCENT: 24.9 % (ref 24–44)
LYMPHOCYTES RELATIVE PERCENT: 24.9 % (ref 24–44)
LYMPHOCYTES RELATIVE PERCENT: 28.9 % (ref 24–44)
LYMPHOCYTES RELATIVE PERCENT: 32.2 % (ref 24–44)
LYMPHOCYTES RELATIVE PERCENT: 5 % (ref 24–44)
LYMPHOCYTES RELATIVE PERCENT: 5.7 % (ref 24–44)
LYMPHOCYTES RELATIVE PERCENT: 9.5 % (ref 24–44)
Lab: ABNORMAL
MAGNESIUM: 1.6 MG/DL (ref 1.8–2.4)
MAGNESIUM: 1.8 MG/DL (ref 1.8–2.4)
MCH RBC QN AUTO: 29.9 PG (ref 27–31)
MCH RBC QN AUTO: 30 PG (ref 27–31)
MCH RBC QN AUTO: 30.4 PG (ref 27–31)
MCH RBC QN AUTO: 30.5 PG (ref 27–31)
MCH RBC QN AUTO: 30.9 PG (ref 27–31)
MCH RBC QN AUTO: 31.2 PG (ref 27–31)
MCH RBC QN AUTO: 31.3 PG (ref 27–31)
MCH RBC QN AUTO: 31.4 PG (ref 27–31)
MCH RBC QN AUTO: 31.4 PG (ref 27–31)
MCH RBC QN AUTO: 31.6 PG (ref 27–31)
MCH RBC QN AUTO: 32 PG (ref 27–31)
MCH RBC QN AUTO: 32 PG (ref 27–31)
MCHC RBC AUTO-ENTMCNC: 33.2 % (ref 32–36)
MCHC RBC AUTO-ENTMCNC: 33.6 % (ref 32–36)
MCHC RBC AUTO-ENTMCNC: 33.8 % (ref 32–36)
MCHC RBC AUTO-ENTMCNC: 33.8 % (ref 32–36)
MCHC RBC AUTO-ENTMCNC: 34.5 % (ref 32–36)
MCHC RBC AUTO-ENTMCNC: 35.3 % (ref 32–36)
MCHC RBC AUTO-ENTMCNC: 35.4 % (ref 32–36)
MCHC RBC AUTO-ENTMCNC: 35.8 % (ref 32–36)
MCV RBC AUTO: 89 FL (ref 78–100)
MCV RBC AUTO: 89.3 FL (ref 78–100)
MCV RBC AUTO: 90 FL (ref 78–100)
MCV RBC AUTO: 90.1 FL (ref 78–100)
MCV RBC AUTO: 90.3 FL (ref 78–100)
MCV RBC AUTO: 90.5 FL (ref 78–100)
MCV RBC AUTO: 90.6 FL (ref 78–100)
MCV RBC AUTO: 90.9 FL (ref 78–100)
MCV RBC AUTO: 91 FL (ref 78–100)
MCV RBC AUTO: 91.5 FL (ref 78–100)
MCV RBC AUTO: 91.7 FL (ref 78–100)
MCV RBC AUTO: 94 FL (ref 78–100)
MONOCYTES ABSOLUTE: 0.4 K/CU MM
MONOCYTES ABSOLUTE: 0.4 K/CU MM
MONOCYTES ABSOLUTE: 0.5 K/CU MM
MONOCYTES ABSOLUTE: 0.8 K/CU MM
MONOCYTES ABSOLUTE: 0.8 K/CU MM
MONOCYTES ABSOLUTE: 0.9 K/CU MM
MONOCYTES ABSOLUTE: 1 K/CU MM
MONOCYTES RELATIVE PERCENT: 10 % (ref 0–4)
MONOCYTES RELATIVE PERCENT: 10.3 % (ref 0–4)
MONOCYTES RELATIVE PERCENT: 10.4 % (ref 0–4)
MONOCYTES RELATIVE PERCENT: 10.4 % (ref 0–4)
MONOCYTES RELATIVE PERCENT: 11.7 % (ref 0–4)
MONOCYTES RELATIVE PERCENT: 12.9 % (ref 0–4)
MONOCYTES RELATIVE PERCENT: 15.4 % (ref 0–4)
MONOCYTES RELATIVE PERCENT: 4.7 % (ref 0–4)
MONOCYTES RELATIVE PERCENT: 9.2 % (ref 0–4)
MONOCYTES RELATIVE PERCENT: 9.3 % (ref 0–4)
MONOCYTES RELATIVE PERCENT: 9.5 % (ref 0–4)
MUCUS: ABNORMAL HPF
MUCUS: ABNORMAL HPF
MUCUS: NEGATIVE HPF
NITRITE URINE, QUANTITATIVE: NEGATIVE
NUCLEATED RBC %: 0 %
O2 SAT, VEN: 96.1 % (ref 50–70)
PCO2, VEN: 36.3 MMHG (ref 38–52)
PDW BLD-RTO: 12.2 % (ref 11.7–14.9)
PDW BLD-RTO: 12.4 % (ref 11.7–14.9)
PDW BLD-RTO: 12.7 % (ref 11.7–14.9)
PDW BLD-RTO: 12.8 % (ref 11.7–14.9)
PDW BLD-RTO: 12.9 % (ref 11.7–14.9)
PDW BLD-RTO: 13 % (ref 11.7–14.9)
PDW BLD-RTO: 13.2 % (ref 11.7–14.9)
PDW BLD-RTO: 13.2 % (ref 11.7–14.9)
PDW BLD-RTO: 13.5 % (ref 11.7–14.9)
PDW BLD-RTO: 14.8 % (ref 11.7–14.9)
PH VENOUS: 7.36 (ref 7.32–7.42)
PH, URINE: 6 (ref 5–8)
PH, URINE: 6 (ref 5–8)
PH, URINE: 7 (ref 5–8)
PH, URINE: 7 (ref 5–8)
PHOSPHORUS: 2.1 MG/DL (ref 2.5–4.9)
PLATELET # BLD: 111 K/CU MM (ref 140–440)
PLATELET # BLD: 136 K/CU MM (ref 140–440)
PLATELET # BLD: 138 K/CU MM (ref 140–440)
PLATELET # BLD: 142 K/CU MM (ref 140–440)
PLATELET # BLD: 148 K/CU MM (ref 140–440)
PLATELET # BLD: 166 K/CU MM (ref 140–440)
PLATELET # BLD: 169 K/CU MM (ref 140–440)
PLATELET # BLD: 172 K/CU MM (ref 140–440)
PLATELET # BLD: 174 K/CU MM (ref 140–440)
PLATELET # BLD: 65 K/CU MM (ref 140–440)
PLATELET # BLD: 69 K/CU MM (ref 140–440)
PLATELET # BLD: 92 K/CU MM (ref 140–440)
PMV BLD AUTO: 10.4 FL (ref 7.5–11.1)
PMV BLD AUTO: 10.9 FL (ref 7.5–11.1)
PMV BLD AUTO: 11.2 FL (ref 7.5–11.1)
PMV BLD AUTO: 11.2 FL (ref 7.5–11.1)
PMV BLD AUTO: 11.4 FL (ref 7.5–11.1)
PMV BLD AUTO: 11.5 FL (ref 7.5–11.1)
PMV BLD AUTO: 12 FL (ref 7.5–11.1)
PMV BLD AUTO: 12.3 FL (ref 7.5–11.1)
PMV BLD AUTO: 9.8 FL (ref 7.5–11.1)
PO2, VEN: 84.9 MMHG (ref 28–48)
POTASSIUM SERPL-SCNC: 3.6 MMOL/L (ref 3.5–5.1)
POTASSIUM SERPL-SCNC: 3.8 MMOL/L (ref 3.5–5.1)
POTASSIUM SERPL-SCNC: 3.8 MMOL/L (ref 3.5–5.1)
POTASSIUM SERPL-SCNC: 3.9 MMOL/L (ref 3.5–5.1)
POTASSIUM SERPL-SCNC: 3.9 MMOL/L (ref 3.5–5.1)
POTASSIUM SERPL-SCNC: 4 MMOL/L (ref 3.5–5.1)
POTASSIUM SERPL-SCNC: 4.1 MMOL/L (ref 3.5–5.1)
POTASSIUM SERPL-SCNC: 4.1 MMOL/L (ref 3.5–5.1)
POTASSIUM SERPL-SCNC: 4.3 MMOL/L (ref 3.5–5.1)
POTASSIUM SERPL-SCNC: 4.3 MMOL/L (ref 3.5–5.1)
POTASSIUM SERPL-SCNC: 4.5 MMOL/L (ref 3.5–5.1)
PROTEIN UA: 100 MG/DL
PROTEIN UA: 100 MG/DL
PROTEIN UA: NEGATIVE MG/DL
PROTEIN UA: NEGATIVE MG/DL
RBC # BLD: 3.56 M/CU MM (ref 4.6–6.2)
RBC # BLD: 3.78 M/CU MM (ref 4.6–6.2)
RBC # BLD: 4.01 M/CU MM (ref 4.6–6.2)
RBC # BLD: 4.08 M/CU MM (ref 4.6–6.2)
RBC # BLD: 4.12 M/CU MM (ref 4.6–6.2)
RBC # BLD: 4.24 M/CU MM (ref 4.6–6.2)
RBC # BLD: 4.27 M/CU MM (ref 4.6–6.2)
RBC # BLD: 4.46 M/CU MM (ref 4.6–6.2)
RBC # BLD: 4.5 M/CU MM (ref 4.6–6.2)
RBC # BLD: 4.53 M/CU MM (ref 4.6–6.2)
RBC # BLD: 4.55 M/CU MM (ref 4.6–6.2)
RBC # BLD: 4.81 M/CU MM (ref 4.6–6.2)
RBC URINE: 3 /HPF (ref 0–3)
RBC URINE: >100 /HPF (ref 0–3)
RBC URINE: >100 /HPF (ref 0–3)
RBC URINE: ABNORMAL /HPF (ref 0–3)
SEGMENTED NEUTROPHILS ABSOLUTE COUNT: 2.6 K/CU MM
SEGMENTED NEUTROPHILS ABSOLUTE COUNT: 2.7 K/CU MM
SEGMENTED NEUTROPHILS ABSOLUTE COUNT: 3 K/CU MM
SEGMENTED NEUTROPHILS ABSOLUTE COUNT: 3.9 K/CU MM
SEGMENTED NEUTROPHILS ABSOLUTE COUNT: 5 K/CU MM
SEGMENTED NEUTROPHILS ABSOLUTE COUNT: 5 K/CU MM
SEGMENTED NEUTROPHILS ABSOLUTE COUNT: 5.6 K/CU MM
SEGMENTED NEUTROPHILS ABSOLUTE COUNT: 5.7 K/CU MM
SEGMENTED NEUTROPHILS ABSOLUTE COUNT: 5.7 K/CU MM
SEGMENTED NEUTROPHILS ABSOLUTE COUNT: 6 K/CU MM
SEGMENTED NEUTROPHILS ABSOLUTE COUNT: 6.6 K/CU MM
SEGMENTED NEUTROPHILS RELATIVE PERCENT: 54.5 % (ref 36–66)
SEGMENTED NEUTROPHILS RELATIVE PERCENT: 58.7 % (ref 36–66)
SEGMENTED NEUTROPHILS RELATIVE PERCENT: 61.8 % (ref 36–66)
SEGMENTED NEUTROPHILS RELATIVE PERCENT: 61.9 % (ref 36–66)
SEGMENTED NEUTROPHILS RELATIVE PERCENT: 62.7 % (ref 36–66)
SEGMENTED NEUTROPHILS RELATIVE PERCENT: 68 % (ref 36–66)
SEGMENTED NEUTROPHILS RELATIVE PERCENT: 76.6 % (ref 36–66)
SEGMENTED NEUTROPHILS RELATIVE PERCENT: 76.7 % (ref 36–66)
SEGMENTED NEUTROPHILS RELATIVE PERCENT: 77.8 % (ref 36–66)
SEGMENTED NEUTROPHILS RELATIVE PERCENT: 81.7 % (ref 36–66)
SEGMENTED NEUTROPHILS RELATIVE PERCENT: 82 % (ref 36–66)
SODIUM BLD-SCNC: 132 MMOL/L (ref 135–145)
SODIUM BLD-SCNC: 140 MMOL/L (ref 135–145)
SODIUM BLD-SCNC: 141 MMOL/L (ref 135–145)
SODIUM BLD-SCNC: 142 MMOL/L (ref 135–145)
SOURCE, BLOOD GAS: ABNORMAL
SPECIFIC GRAVITY UA: 1.01 (ref 1–1.03)
SPECIMEN: ABNORMAL
SQUAMOUS EPITHELIAL: 1 /HPF
TOTAL IMMATURE NEUTOROPHIL: 0.01 K/CU MM
TOTAL IMMATURE NEUTOROPHIL: 0.03 K/CU MM
TOTAL IMMATURE NEUTOROPHIL: 0.03 K/CU MM
TOTAL IMMATURE NEUTOROPHIL: 0.04 K/CU MM
TOTAL IMMATURE NEUTOROPHIL: 0.04 K/CU MM
TOTAL NUCLEATED RBC: 0 K/CU MM
TOTAL PROTEIN: 5.8 GM/DL (ref 6.4–8.2)
TOTAL PROTEIN: 6.1 GM/DL (ref 6.4–8.2)
TOTAL PROTEIN: 6.2 GM/DL (ref 6.4–8.2)
TOTAL PROTEIN: 6.2 GM/DL (ref 6.4–8.2)
TOTAL PROTEIN: 6.3 GM/DL (ref 6.4–8.2)
TOTAL PROTEIN: 6.6 GM/DL (ref 6.4–8.2)
TOTAL PROTEIN: 6.7 GM/DL (ref 6.4–8.2)
TOTAL PROTEIN: 6.7 GM/DL (ref 6.4–8.2)
TOTAL PROTEIN: 7.4 GM/DL (ref 6.4–8.2)
TRICHOMONAS: ABNORMAL /HPF
TROPONIN T: <0.01 NG/ML
UROBILINOGEN, URINE: 0.2 MG/DL (ref 0.2–1)
UROBILINOGEN, URINE: 0.2 MG/DL (ref 0.2–1)
UROBILINOGEN, URINE: 1 MG/DL (ref 0.2–1)
UROBILINOGEN, URINE: 2 MG/DL (ref 0.2–1)
VOLUME, (UVOL): 12 ML (ref 10–12)
VOLUME, (UVOL): 12 ML (ref 10–12)
WBC # BLD: 3.3 K/CU MM (ref 4–10.5)
WBC # BLD: 3.4 K/CU MM (ref 4–10.5)
WBC # BLD: 3.9 K/CU MM (ref 4–10.5)
WBC # BLD: 5.1 K/CU MM (ref 4–10.5)
WBC # BLD: 8 K/CU MM (ref 4–10.5)
WBC # BLD: 8 K/CU MM (ref 4–10.5)
WBC # BLD: 8.3 K/CU MM (ref 4–10.5)
WBC # BLD: 9.1 K/CU MM (ref 4–10.5)
WBC # BLD: 9.2 K/CU MM (ref 4–10.5)
WBC # BLD: 9.6 K/CU MM (ref 4–10.5)
WBC # BLD: 9.7 K/CU MM (ref 4–10.5)
WBC # BLD: 9.7 K/CU MM (ref 4–10.5)
WBC UA: 35 /HPF (ref 0–2)
WBC UA: ABNORMAL /HPF (ref 0–2)
WBC UA: ABNORMAL /HPF (ref 0–2)
WBC UA: NEGATIVE /HPF (ref 0–2)

## 2021-01-01 PROCEDURE — 36415 COLL VENOUS BLD VENIPUNCTURE: CPT

## 2021-01-01 PROCEDURE — 80053 COMPREHEN METABOLIC PANEL: CPT

## 2021-01-01 PROCEDURE — 6360000002 HC RX W HCPCS: Performed by: INTERNAL MEDICINE

## 2021-01-01 PROCEDURE — 77386 HC NTSTY MODUL RAD TX DLVR CPLX: CPT | Performed by: RADIOLOGY

## 2021-01-01 PROCEDURE — G8484 FLU IMMUNIZE NO ADMIN: HCPCS | Performed by: INTERNAL MEDICINE

## 2021-01-01 PROCEDURE — 99215 OFFICE O/P EST HI 40 MIN: CPT | Performed by: NURSE PRACTITIONER

## 2021-01-01 PROCEDURE — 96365 THER/PROPH/DIAG IV INF INIT: CPT

## 2021-01-01 PROCEDURE — 6370000000 HC RX 637 (ALT 250 FOR IP): Performed by: EMERGENCY MEDICINE

## 2021-01-01 PROCEDURE — 77338 DESIGN MLC DEVICE FOR IMRT: CPT | Performed by: RADIOLOGY

## 2021-01-01 PROCEDURE — 6360000002 HC RX W HCPCS: Performed by: EMERGENCY MEDICINE

## 2021-01-01 PROCEDURE — 83735 ASSAY OF MAGNESIUM: CPT

## 2021-01-01 PROCEDURE — 93010 ELECTROCARDIOGRAM REPORT: CPT | Performed by: INTERNAL MEDICINE

## 2021-01-01 PROCEDURE — 77427 RADIATION TX MANAGEMENT X5: CPT | Performed by: RADIOLOGY

## 2021-01-01 PROCEDURE — 77014 PR CT GUIDANCE PLACEMENT RAD THERAPY FIELDS: CPT | Performed by: RADIOLOGY

## 2021-01-01 PROCEDURE — 96372 THER/PROPH/DIAG INJ SC/IM: CPT

## 2021-01-01 PROCEDURE — 96375 TX/PRO/DX INJ NEW DRUG ADDON: CPT

## 2021-01-01 PROCEDURE — 99203 OFFICE O/P NEW LOW 30 MIN: CPT

## 2021-01-01 PROCEDURE — 71045 X-RAY EXAM CHEST 1 VIEW: CPT

## 2021-01-01 PROCEDURE — 83690 ASSAY OF LIPASE: CPT

## 2021-01-01 PROCEDURE — 2500000003 HC RX 250 WO HCPCS: Performed by: INTERNAL MEDICINE

## 2021-01-01 PROCEDURE — G8427 DOCREV CUR MEDS BY ELIG CLIN: HCPCS | Performed by: INTERNAL MEDICINE

## 2021-01-01 PROCEDURE — 77300 RADIATION THERAPY DOSE PLAN: CPT | Performed by: RADIOLOGY

## 2021-01-01 PROCEDURE — 80048 BASIC METABOLIC PNL TOTAL CA: CPT

## 2021-01-01 PROCEDURE — 2580000003 HC RX 258: Performed by: INTERNAL MEDICINE

## 2021-01-01 PROCEDURE — 87077 CULTURE AEROBIC IDENTIFY: CPT

## 2021-01-01 PROCEDURE — 96361 HYDRATE IV INFUSION ADD-ON: CPT

## 2021-01-01 PROCEDURE — 84484 ASSAY OF TROPONIN QUANT: CPT

## 2021-01-01 PROCEDURE — 93005 ELECTROCARDIOGRAM TRACING: CPT | Performed by: EMERGENCY MEDICINE

## 2021-01-01 PROCEDURE — 4004F PT TOBACCO SCREEN RCVD TLK: CPT | Performed by: INTERNAL MEDICINE

## 2021-01-01 PROCEDURE — 83605 ASSAY OF LACTIC ACID: CPT

## 2021-01-01 PROCEDURE — 3430000000 HC RX DIAGNOSTIC RADIOPHARMACEUTICAL: Performed by: INTERNAL MEDICINE

## 2021-01-01 PROCEDURE — 82962 GLUCOSE BLOOD TEST: CPT

## 2021-01-01 PROCEDURE — 99285 EMERGENCY DEPT VISIT HI MDM: CPT

## 2021-01-01 PROCEDURE — 96413 CHEMO IV INFUSION 1 HR: CPT

## 2021-01-01 PROCEDURE — 3017F COLORECTAL CA SCREEN DOC REV: CPT | Performed by: NURSE PRACTITIONER

## 2021-01-01 PROCEDURE — 77301 RADIOTHERAPY DOSE PLAN IMRT: CPT | Performed by: RADIOLOGY

## 2021-01-01 PROCEDURE — G8417 CALC BMI ABV UP PARAM F/U: HCPCS | Performed by: NURSE PRACTITIONER

## 2021-01-01 PROCEDURE — 85025 COMPLETE CBC W/AUTO DIFF WBC: CPT

## 2021-01-01 PROCEDURE — 3017F COLORECTAL CA SCREEN DOC REV: CPT | Performed by: INTERNAL MEDICINE

## 2021-01-01 PROCEDURE — 99211 OFF/OP EST MAY X REQ PHY/QHP: CPT

## 2021-01-01 PROCEDURE — 84100 ASSAY OF PHOSPHORUS: CPT

## 2021-01-01 PROCEDURE — 74177 CT ABD & PELVIS W/CONTRAST: CPT

## 2021-01-01 PROCEDURE — 74176 CT ABD & PELVIS W/O CONTRAST: CPT

## 2021-01-01 PROCEDURE — 77336 RADIATION PHYSICS CONSULT: CPT | Performed by: RADIOLOGY

## 2021-01-01 PROCEDURE — 96374 THER/PROPH/DIAG INJ IV PUSH: CPT

## 2021-01-01 PROCEDURE — 4004F PT TOBACCO SCREEN RCVD TLK: CPT | Performed by: NURSE PRACTITIONER

## 2021-01-01 PROCEDURE — 96367 TX/PROPH/DG ADDL SEQ IV INF: CPT

## 2021-01-01 PROCEDURE — 81001 URINALYSIS AUTO W/SCOPE: CPT

## 2021-01-01 PROCEDURE — 78815 PET IMAGE W/CT SKULL-THIGH: CPT

## 2021-01-01 PROCEDURE — 6360000002 HC RX W HCPCS: Performed by: NURSE PRACTITIONER

## 2021-01-01 PROCEDURE — 99283 EMERGENCY DEPT VISIT LOW MDM: CPT

## 2021-01-01 PROCEDURE — G8427 DOCREV CUR MEDS BY ELIG CLIN: HCPCS | Performed by: NURSE PRACTITIONER

## 2021-01-01 PROCEDURE — 99214 OFFICE O/P EST MOD 30 MIN: CPT | Performed by: INTERNAL MEDICINE

## 2021-01-01 PROCEDURE — 77334 RADIATION TREATMENT AID(S): CPT | Performed by: RADIOLOGY

## 2021-01-01 PROCEDURE — 6360000004 HC RX CONTRAST MEDICATION

## 2021-01-01 PROCEDURE — G0378 HOSPITAL OBSERVATION PER HR: HCPCS

## 2021-01-01 PROCEDURE — G8484 FLU IMMUNIZE NO ADMIN: HCPCS | Performed by: NURSE PRACTITIONER

## 2021-01-01 PROCEDURE — 77293 RESPIRATOR MOTION MGMT SIMUL: CPT | Performed by: RADIOLOGY

## 2021-01-01 PROCEDURE — G8417 CALC BMI ABV UP PARAM F/U: HCPCS | Performed by: INTERNAL MEDICINE

## 2021-01-01 PROCEDURE — 99999 PR OFFICE/OUTPT VISIT,PROCEDURE ONLY: CPT | Performed by: RADIOLOGY

## 2021-01-01 PROCEDURE — 96417 CHEMO IV INFUS EACH ADDL SEQ: CPT

## 2021-01-01 PROCEDURE — 94761 N-INVAS EAR/PLS OXIMETRY MLT: CPT

## 2021-01-01 PROCEDURE — 99202 OFFICE O/P NEW SF 15 MIN: CPT

## 2021-01-01 PROCEDURE — 77470 SPECIAL RADIATION TREATMENT: CPT | Performed by: RADIOLOGY

## 2021-01-01 PROCEDURE — 82565 ASSAY OF CREATININE: CPT

## 2021-01-01 PROCEDURE — 96360 HYDRATION IV INFUSION INIT: CPT

## 2021-01-01 PROCEDURE — 99214 OFFICE O/P EST MOD 30 MIN: CPT | Performed by: NURSE PRACTITIONER

## 2021-01-01 PROCEDURE — 87086 URINE CULTURE/COLONY COUNT: CPT

## 2021-01-01 PROCEDURE — 2580000003 HC RX 258: Performed by: NURSE PRACTITIONER

## 2021-01-01 PROCEDURE — 2580000003 HC RX 258: Performed by: EMERGENCY MEDICINE

## 2021-01-01 PROCEDURE — 6360000004 HC RX CONTRAST MEDICATION: Performed by: EMERGENCY MEDICINE

## 2021-01-01 PROCEDURE — A9552 F18 FDG: HCPCS | Performed by: INTERNAL MEDICINE

## 2021-01-01 PROCEDURE — 6370000000 HC RX 637 (ALT 250 FOR IP): Performed by: INTERNAL MEDICINE

## 2021-01-01 PROCEDURE — 84520 ASSAY OF UREA NITROGEN: CPT

## 2021-01-01 PROCEDURE — 93005 ELECTROCARDIOGRAM TRACING: CPT | Performed by: INTERNAL MEDICINE

## 2021-01-01 PROCEDURE — 99205 OFFICE O/P NEW HI 60 MIN: CPT | Performed by: RADIOLOGY

## 2021-01-01 PROCEDURE — 77332 RADIATION TREATMENT AID(S): CPT | Performed by: RADIOLOGY

## 2021-01-01 PROCEDURE — 85027 COMPLETE CBC AUTOMATED: CPT

## 2021-01-01 PROCEDURE — 99204 OFFICE O/P NEW MOD 45 MIN: CPT | Performed by: INTERNAL MEDICINE

## 2021-01-01 PROCEDURE — 87186 SC STD MICRODIL/AGAR DIL: CPT

## 2021-01-01 RX ORDER — OXYCODONE HYDROCHLORIDE AND ACETAMINOPHEN 5; 325 MG/1; MG/1
TABLET ORAL
COMMUNITY
Start: 2021-01-01 | End: 2021-01-01

## 2021-01-01 RX ORDER — SODIUM CHLORIDE 9 MG/ML
INJECTION, SOLUTION INTRAVENOUS CONTINUOUS
Status: CANCELLED | OUTPATIENT
Start: 2021-01-01

## 2021-01-01 RX ORDER — MEPERIDINE HYDROCHLORIDE 25 MG/ML
12.5 INJECTION INTRAMUSCULAR; INTRAVENOUS; SUBCUTANEOUS ONCE
Status: CANCELLED | OUTPATIENT
Start: 2021-01-01 | End: 2021-01-01

## 2021-01-01 RX ORDER — SODIUM CHLORIDE 9 MG/ML
20 INJECTION, SOLUTION INTRAVENOUS ONCE
Status: DISCONTINUED | OUTPATIENT
Start: 2021-01-01 | End: 2021-01-01 | Stop reason: HOSPADM

## 2021-01-01 RX ORDER — SODIUM CHLORIDE 9 MG/ML
25 INJECTION, SOLUTION INTRAVENOUS PRN
Status: CANCELLED | OUTPATIENT
Start: 2021-01-01

## 2021-01-01 RX ORDER — DEXAMETHASONE SODIUM PHOSPHATE 4 MG/ML
8 INJECTION, SOLUTION INTRA-ARTICULAR; INTRALESIONAL; INTRAMUSCULAR; INTRAVENOUS; SOFT TISSUE ONCE
Status: COMPLETED | OUTPATIENT
Start: 2021-01-01 | End: 2021-01-01

## 2021-01-01 RX ORDER — ASPIRIN 81 MG/1
81 TABLET, CHEWABLE ORAL DAILY
Status: DISCONTINUED | OUTPATIENT
Start: 2021-01-01 | End: 2021-01-01 | Stop reason: HOSPADM

## 2021-01-01 RX ORDER — DIPHENHYDRAMINE HYDROCHLORIDE 50 MG/ML
50 INJECTION INTRAMUSCULAR; INTRAVENOUS ONCE
Status: COMPLETED | OUTPATIENT
Start: 2021-01-01 | End: 2021-01-01

## 2021-01-01 RX ORDER — HEPARIN SODIUM (PORCINE) LOCK FLUSH IV SOLN 100 UNIT/ML 100 UNIT/ML
500 SOLUTION INTRAVENOUS PRN
Status: DISCONTINUED | OUTPATIENT
Start: 2021-01-01 | End: 2021-01-01 | Stop reason: HOSPADM

## 2021-01-01 RX ORDER — ONDANSETRON 2 MG/ML
4 INJECTION INTRAMUSCULAR; INTRAVENOUS EVERY 6 HOURS PRN
Status: DISCONTINUED | OUTPATIENT
Start: 2021-01-01 | End: 2021-01-01 | Stop reason: HOSPADM

## 2021-01-01 RX ORDER — ACETAMINOPHEN 650 MG/1
650 SUPPOSITORY RECTAL EVERY 6 HOURS PRN
Status: DISCONTINUED | OUTPATIENT
Start: 2021-01-01 | End: 2021-01-01 | Stop reason: HOSPADM

## 2021-01-01 RX ORDER — FAMOTIDINE 20 MG/1
20 TABLET, FILM COATED ORAL 2 TIMES DAILY
Status: DISCONTINUED | OUTPATIENT
Start: 2021-01-01 | End: 2021-01-01 | Stop reason: HOSPADM

## 2021-01-01 RX ORDER — HEPARIN SODIUM (PORCINE) LOCK FLUSH IV SOLN 100 UNIT/ML 100 UNIT/ML
500 SOLUTION INTRAVENOUS PRN
Status: CANCELLED | OUTPATIENT
Start: 2021-01-01

## 2021-01-01 RX ORDER — ZONISAMIDE 100 MG/1
200 CAPSULE ORAL 2 TIMES DAILY
Status: DISCONTINUED | OUTPATIENT
Start: 2021-01-01 | End: 2021-01-01 | Stop reason: HOSPADM

## 2021-01-01 RX ORDER — PALONOSETRON 0.05 MG/ML
0.25 INJECTION, SOLUTION INTRAVENOUS ONCE
Status: COMPLETED | OUTPATIENT
Start: 2021-01-01 | End: 2021-01-01

## 2021-01-01 RX ORDER — DIPHENHYDRAMINE HYDROCHLORIDE 50 MG/ML
50 INJECTION INTRAMUSCULAR; INTRAVENOUS ONCE
Status: CANCELLED | OUTPATIENT
Start: 2021-01-01 | End: 2021-01-01

## 2021-01-01 RX ORDER — MORPHINE SULFATE 2 MG/ML
4 INJECTION, SOLUTION INTRAMUSCULAR; INTRAVENOUS ONCE
Status: COMPLETED | OUTPATIENT
Start: 2021-01-01 | End: 2021-01-01

## 2021-01-01 RX ORDER — SODIUM CHLORIDE 0.9 % (FLUSH) 0.9 %
5-40 SYRINGE (ML) INJECTION PRN
Status: DISCONTINUED | OUTPATIENT
Start: 2021-01-01 | End: 2021-01-01 | Stop reason: HOSPADM

## 2021-01-01 RX ORDER — SODIUM CHLORIDE 0.9 % (FLUSH) 0.9 %
10 SYRINGE (ML) INJECTION PRN
Status: COMPLETED | OUTPATIENT
Start: 2021-01-01 | End: 2021-01-01

## 2021-01-01 RX ORDER — ALOGLIPTIN 12.5 MG/1
25 TABLET, FILM COATED ORAL DAILY
Status: DISCONTINUED | OUTPATIENT
Start: 2021-01-01 | End: 2021-01-01 | Stop reason: HOSPADM

## 2021-01-01 RX ORDER — ONDANSETRON 8 MG/1
8 TABLET, ORALLY DISINTEGRATING ORAL EVERY 8 HOURS PRN
Qty: 30 TABLET | Refills: 1 | Status: SHIPPED | OUTPATIENT
Start: 2021-01-01

## 2021-01-01 RX ORDER — SODIUM CHLORIDE 9 MG/ML
20 INJECTION, SOLUTION INTRAVENOUS ONCE
Status: CANCELLED | OUTPATIENT
Start: 2021-01-01 | End: 2021-01-01

## 2021-01-01 RX ORDER — 0.9 % SODIUM CHLORIDE 0.9 %
1000 INTRAVENOUS SOLUTION INTRAVENOUS ONCE
Status: DISCONTINUED | OUTPATIENT
Start: 2021-01-01 | End: 2021-01-01

## 2021-01-01 RX ORDER — 0.9 % SODIUM CHLORIDE 0.9 %
1000 INTRAVENOUS SOLUTION INTRAVENOUS ONCE
Status: CANCELLED | OUTPATIENT
Start: 2021-01-01 | End: 2021-01-01

## 2021-01-01 RX ORDER — SODIUM CHLORIDE 9 MG/ML
INJECTION, SOLUTION INTRAVENOUS ONCE
Status: COMPLETED | OUTPATIENT
Start: 2021-01-01 | End: 2021-01-01

## 2021-01-01 RX ORDER — DEXTROSE MONOHYDRATE 25 G/50ML
12.5 INJECTION, SOLUTION INTRAVENOUS PRN
Status: DISCONTINUED | OUTPATIENT
Start: 2021-01-01 | End: 2021-01-01 | Stop reason: HOSPADM

## 2021-01-01 RX ORDER — DOXAZOSIN MESYLATE 1 MG/1
2 TABLET ORAL DAILY
Status: DISCONTINUED | OUTPATIENT
Start: 2021-01-01 | End: 2021-01-01 | Stop reason: HOSPADM

## 2021-01-01 RX ORDER — METHYLPREDNISOLONE SODIUM SUCCINATE 125 MG/2ML
125 INJECTION, POWDER, LYOPHILIZED, FOR SOLUTION INTRAMUSCULAR; INTRAVENOUS ONCE
Status: CANCELLED | OUTPATIENT
Start: 2021-01-01 | End: 2021-01-01

## 2021-01-01 RX ORDER — SUCRALFATE ORAL 1 G/10ML
1 SUSPENSION ORAL 4 TIMES DAILY
Qty: 1200 ML | Refills: 3 | Status: SHIPPED | OUTPATIENT
Start: 2021-01-01

## 2021-01-01 RX ORDER — SODIUM CHLORIDE 0.9 % (FLUSH) 0.9 %
10 SYRINGE (ML) INJECTION PRN
Status: DISCONTINUED | OUTPATIENT
Start: 2021-01-01 | End: 2021-01-01 | Stop reason: HOSPADM

## 2021-01-01 RX ORDER — SODIUM CHLORIDE 0.9 % (FLUSH) 0.9 %
5-40 SYRINGE (ML) INJECTION PRN
Status: CANCELLED | OUTPATIENT
Start: 2021-01-01

## 2021-01-01 RX ORDER — KETOROLAC TROMETHAMINE 15 MG/ML
15 INJECTION, SOLUTION INTRAMUSCULAR; INTRAVENOUS ONCE
Status: COMPLETED | OUTPATIENT
Start: 2021-01-01 | End: 2021-01-01

## 2021-01-01 RX ORDER — DIPHENHYDRAMINE HYDROCHLORIDE 50 MG/ML
50 INJECTION INTRAMUSCULAR; INTRAVENOUS ONCE
Status: CANCELLED | OUTPATIENT
Start: 2021-01-01

## 2021-01-01 RX ORDER — EPINEPHRINE 1 MG/ML
0.3 INJECTION, SOLUTION, CONCENTRATE INTRAVENOUS PRN
Status: CANCELLED | OUTPATIENT
Start: 2021-01-01

## 2021-01-01 RX ORDER — CARVEDILOL 12.5 MG/1
12.5 TABLET ORAL 2 TIMES DAILY WITH MEALS
Status: DISCONTINUED | OUTPATIENT
Start: 2021-01-01 | End: 2021-01-01 | Stop reason: HOSPADM

## 2021-01-01 RX ORDER — MORPHINE SULFATE 4 MG/ML
4 INJECTION, SOLUTION INTRAMUSCULAR; INTRAVENOUS EVERY 30 MIN PRN
Status: DISCONTINUED | OUTPATIENT
Start: 2021-01-01 | End: 2021-01-01 | Stop reason: HOSPADM

## 2021-01-01 RX ORDER — ONDANSETRON 2 MG/ML
8 INJECTION INTRAMUSCULAR; INTRAVENOUS ONCE
Status: CANCELLED
Start: 2021-01-01 | End: 2021-01-01

## 2021-01-01 RX ORDER — ZINC OXIDE 216 MG/ML
1 LOTION TOPICAL 2 TIMES DAILY
Qty: 60 EACH | Refills: 1 | Status: SHIPPED | OUTPATIENT
Start: 2021-01-01

## 2021-01-01 RX ORDER — POLYETHYLENE GLYCOL 3350 17 G/17G
17 POWDER, FOR SOLUTION ORAL DAILY PRN
Status: DISCONTINUED | OUTPATIENT
Start: 2021-01-01 | End: 2021-01-01 | Stop reason: HOSPADM

## 2021-01-01 RX ORDER — ACETAMINOPHEN 325 MG/1
650 TABLET ORAL EVERY 6 HOURS PRN
Status: DISCONTINUED | OUTPATIENT
Start: 2021-01-01 | End: 2021-01-01 | Stop reason: HOSPADM

## 2021-01-01 RX ORDER — NICOTINE POLACRILEX 4 MG
15 LOZENGE BUCCAL PRN
Status: DISCONTINUED | OUTPATIENT
Start: 2021-01-01 | End: 2021-01-01 | Stop reason: HOSPADM

## 2021-01-01 RX ORDER — ONDANSETRON 2 MG/ML
4 INJECTION INTRAMUSCULAR; INTRAVENOUS ONCE
Status: COMPLETED | OUTPATIENT
Start: 2021-01-01 | End: 2021-01-01

## 2021-01-01 RX ORDER — OXYCODONE AND ACETAMINOPHEN 7.5; 325 MG/1; MG/1
1 TABLET ORAL ONCE
Status: COMPLETED | OUTPATIENT
Start: 2021-01-01 | End: 2021-01-01

## 2021-01-01 RX ORDER — RIZATRIPTAN BENZOATE 10 MG/1
10 TABLET ORAL
COMMUNITY

## 2021-01-01 RX ORDER — LOSARTAN POTASSIUM 100 MG/1
100 TABLET ORAL DAILY
Status: DISCONTINUED | OUTPATIENT
Start: 2021-01-01 | End: 2021-01-01 | Stop reason: HOSPADM

## 2021-01-01 RX ORDER — PIOGLITAZONEHYDROCHLORIDE 45 MG/1
45 TABLET ORAL DAILY
COMMUNITY

## 2021-01-01 RX ORDER — OXYCODONE AND ACETAMINOPHEN 7.5; 325 MG/1; MG/1
1 TABLET ORAL EVERY 6 HOURS PRN
Qty: 60 TABLET | Refills: 0 | Status: SHIPPED | OUTPATIENT
Start: 2021-01-01 | End: 2021-01-01

## 2021-01-01 RX ORDER — NICOTINE 21 MG/24HR
1 PATCH, TRANSDERMAL 24 HOURS TRANSDERMAL DAILY
Status: DISCONTINUED | OUTPATIENT
Start: 2021-01-01 | End: 2021-01-01 | Stop reason: HOSPADM

## 2021-01-01 RX ORDER — 0.9 % SODIUM CHLORIDE 0.9 %
500 INTRAVENOUS SOLUTION INTRAVENOUS ONCE
Status: COMPLETED | OUTPATIENT
Start: 2021-01-01 | End: 2021-01-01

## 2021-01-01 RX ORDER — 0.9 % SODIUM CHLORIDE 0.9 %
1000 INTRAVENOUS SOLUTION INTRAVENOUS ONCE
Status: COMPLETED | OUTPATIENT
Start: 2021-01-01 | End: 2021-01-01

## 2021-01-01 RX ORDER — DEXAMETHASONE SODIUM PHOSPHATE 10 MG/ML
8 INJECTION, SOLUTION INTRAMUSCULAR; INTRAVENOUS ONCE
Status: CANCELLED
Start: 2021-01-01 | End: 2021-01-01

## 2021-01-01 RX ORDER — ONDANSETRON 2 MG/ML
4 INJECTION INTRAMUSCULAR; INTRAVENOUS EVERY 30 MIN PRN
Status: DISCONTINUED | OUTPATIENT
Start: 2021-01-01 | End: 2021-01-01 | Stop reason: HOSPADM

## 2021-01-01 RX ORDER — OMEGA-3-ACID ETHYL ESTERS 1 G/1
1 CAPSULE, LIQUID FILLED ORAL DAILY
Status: DISCONTINUED | OUTPATIENT
Start: 2021-01-01 | End: 2021-01-01 | Stop reason: HOSPADM

## 2021-01-01 RX ORDER — SODIUM CHLORIDE 0.9 % (FLUSH) 0.9 %
10 SYRINGE (ML) INJECTION EVERY 12 HOURS SCHEDULED
Status: DISCONTINUED | OUTPATIENT
Start: 2021-01-01 | End: 2021-01-01 | Stop reason: HOSPADM

## 2021-01-01 RX ORDER — TRAZODONE HYDROCHLORIDE 100 MG/1
100 TABLET ORAL NIGHTLY
Status: DISCONTINUED | OUTPATIENT
Start: 2021-01-01 | End: 2021-01-01 | Stop reason: HOSPADM

## 2021-01-01 RX ORDER — ONDANSETRON 2 MG/ML
8 INJECTION INTRAMUSCULAR; INTRAVENOUS ONCE
Status: COMPLETED | OUTPATIENT
Start: 2021-01-01 | End: 2021-01-01

## 2021-01-01 RX ORDER — ACARBOSE 25 MG/1
50 TABLET ORAL
Status: DISCONTINUED | OUTPATIENT
Start: 2021-01-01 | End: 2021-01-01 | Stop reason: HOSPADM

## 2021-01-01 RX ORDER — FLUDEOXYGLUCOSE F 18 200 MCI/ML
15.44 INJECTION, SOLUTION INTRAVENOUS
Status: COMPLETED | OUTPATIENT
Start: 2021-01-01 | End: 2021-01-01

## 2021-01-01 RX ORDER — PROMETHAZINE HYDROCHLORIDE 12.5 MG/1
12.5 TABLET ORAL EVERY 6 HOURS PRN
Status: DISCONTINUED | OUTPATIENT
Start: 2021-01-01 | End: 2021-01-01 | Stop reason: HOSPADM

## 2021-01-01 RX ORDER — PALONOSETRON 0.05 MG/ML
0.25 INJECTION, SOLUTION INTRAVENOUS ONCE
Status: CANCELLED | OUTPATIENT
Start: 2021-01-01

## 2021-01-01 RX ORDER — PANTOPRAZOLE SODIUM 40 MG/1
40 TABLET, DELAYED RELEASE ORAL DAILY
Qty: 30 TABLET | Refills: 5 | Status: SHIPPED | OUTPATIENT
Start: 2021-01-01 | End: 2022-01-01

## 2021-01-01 RX ORDER — NITROGLYCERIN 0.4 MG/1
0.4 TABLET SUBLINGUAL EVERY 5 MIN PRN
Status: DISCONTINUED | OUTPATIENT
Start: 2021-01-01 | End: 2021-01-01 | Stop reason: HOSPADM

## 2021-01-01 RX ORDER — SERTRALINE HYDROCHLORIDE 100 MG/1
100 TABLET, FILM COATED ORAL DAILY
Status: DISCONTINUED | OUTPATIENT
Start: 2021-01-01 | End: 2021-01-01 | Stop reason: HOSPADM

## 2021-01-01 RX ORDER — DEXTROSE MONOHYDRATE 50 MG/ML
100 INJECTION, SOLUTION INTRAVENOUS PRN
Status: DISCONTINUED | OUTPATIENT
Start: 2021-01-01 | End: 2021-01-01 | Stop reason: HOSPADM

## 2021-01-01 RX ORDER — ATORVASTATIN CALCIUM 40 MG/1
40 TABLET, FILM COATED ORAL NIGHTLY
Status: DISCONTINUED | OUTPATIENT
Start: 2021-01-01 | End: 2021-01-01 | Stop reason: HOSPADM

## 2021-01-01 RX ORDER — DEXAMETHASONE 2 MG/1
TABLET ORAL
Qty: 20 TABLET | Refills: 0 | Status: SHIPPED | OUTPATIENT
Start: 2021-01-01 | End: 2022-01-01

## 2021-01-01 RX ORDER — HYDROCODONE BITARTRATE AND ACETAMINOPHEN 5; 325 MG/1; MG/1
1 TABLET ORAL ONCE
Status: DISCONTINUED | OUTPATIENT
Start: 2021-01-01 | End: 2021-01-01

## 2021-01-01 RX ORDER — CLOPIDOGREL BISULFATE 75 MG/1
75 TABLET ORAL DAILY
COMMUNITY

## 2021-01-01 RX ADMIN — FAMOTIDINE 20 MG: 10 INJECTION, SOLUTION INTRAVENOUS at 11:09

## 2021-01-01 RX ADMIN — DIPHENHYDRAMINE HYDROCHLORIDE 50 MG: 50 INJECTION INTRAMUSCULAR; INTRAVENOUS at 10:44

## 2021-01-01 RX ADMIN — PALONOSETRON 0.25 MG: 0.25 INJECTION, SOLUTION INTRAVENOUS at 10:05

## 2021-01-01 RX ADMIN — SODIUM CHLORIDE 20 ML/HR: 9 INJECTION, SOLUTION INTRAVENOUS at 10:43

## 2021-01-01 RX ADMIN — NITROGLYCERIN 0.4 MG: 0.4 TABLET SUBLINGUAL at 15:48

## 2021-01-01 RX ADMIN — DEXAMETHASONE SODIUM PHOSPHATE 10 MG: 10 INJECTION, SOLUTION INTRAMUSCULAR; INTRAVENOUS at 11:33

## 2021-01-01 RX ADMIN — LOSARTAN POTASSIUM 100 MG: 100 TABLET, FILM COATED ORAL at 18:58

## 2021-01-01 RX ADMIN — IOPAMIDOL 75 ML: 755 INJECTION, SOLUTION INTRAVENOUS at 23:50

## 2021-01-01 RX ADMIN — CARVEDILOL 12.5 MG: 12.5 TABLET, FILM COATED ORAL at 19:00

## 2021-01-01 RX ADMIN — ACETAMINOPHEN 650 MG: 325 TABLET ORAL at 20:15

## 2021-01-01 RX ADMIN — DEXAMETHASONE SODIUM PHOSPHATE 10 MG: 10 INJECTION INTRAMUSCULAR; INTRAVENOUS at 11:09

## 2021-01-01 RX ADMIN — OMEGA-3-ACID ETHYL ESTERS 1 G: 1 CAPSULE, LIQUID FILLED ORAL at 10:53

## 2021-01-01 RX ADMIN — SODIUM CHLORIDE 1000 ML: 9 INJECTION, SOLUTION INTRAVENOUS at 15:48

## 2021-01-01 RX ADMIN — ONDANSETRON 4 MG: 2 INJECTION INTRAMUSCULAR; INTRAVENOUS at 15:28

## 2021-01-01 RX ADMIN — FLUDEOXYGLUCOSE F 18 15.44 MILLICURIE: 200 INJECTION, SOLUTION INTRAVENOUS at 07:59

## 2021-01-01 RX ADMIN — ONDANSETRON 4 MG: 2 INJECTION INTRAMUSCULAR; INTRAVENOUS at 22:48

## 2021-01-01 RX ADMIN — ALOGLIPTIN 25 MG: 12.5 TABLET, FILM COATED ORAL at 18:57

## 2021-01-01 RX ADMIN — ENOXAPARIN SODIUM 40 MG: 40 INJECTION SUBCUTANEOUS at 18:50

## 2021-01-01 RX ADMIN — MORPHINE SULFATE 4 MG: 4 INJECTION, SOLUTION INTRAMUSCULAR; INTRAVENOUS at 15:29

## 2021-01-01 RX ADMIN — SODIUM CHLORIDE: 9 INJECTION, SOLUTION INTRAVENOUS at 15:29

## 2021-01-01 RX ADMIN — LOSARTAN POTASSIUM 100 MG: 100 TABLET, FILM COATED ORAL at 10:51

## 2021-01-01 RX ADMIN — SODIUM CHLORIDE 20 ML/HR: 9 INJECTION, SOLUTION INTRAVENOUS at 10:03

## 2021-01-01 RX ADMIN — MAGNESIUM SULFATE HEPTAHYDRATE: 500 INJECTION, SOLUTION INTRAMUSCULAR; INTRAVENOUS at 10:48

## 2021-01-01 RX ADMIN — CARBOPLATIN 300 MG: 10 INJECTION, SOLUTION INTRAVENOUS at 12:41

## 2021-01-01 RX ADMIN — OXYCODONE AND ACETAMINOPHEN 1 TABLET: 7.5; 325 TABLET ORAL at 01:29

## 2021-01-01 RX ADMIN — PACLITAXEL 126 MG: 6 INJECTION, SOLUTION INTRAVENOUS at 10:43

## 2021-01-01 RX ADMIN — SODIUM CHLORIDE 20 ML/HR: 9 INJECTION, SOLUTION INTRAVENOUS at 11:08

## 2021-01-01 RX ADMIN — SERTRALINE 100 MG: 100 TABLET, FILM COATED ORAL at 10:51

## 2021-01-01 RX ADMIN — DEXAMETHASONE SODIUM PHOSPHATE 10 MG: 10 INJECTION INTRAMUSCULAR; INTRAVENOUS at 10:09

## 2021-01-01 RX ADMIN — MORPHINE SULFATE 4 MG: 2 INJECTION, SOLUTION INTRAMUSCULAR; INTRAVENOUS at 22:49

## 2021-01-01 RX ADMIN — PACLITAXEL 103 MG: 6 INJECTION, SOLUTION INTRAVENOUS at 12:03

## 2021-01-01 RX ADMIN — FAMOTIDINE 20 MG: 10 INJECTION, SOLUTION INTRAVENOUS at 10:43

## 2021-01-01 RX ADMIN — DIPHENHYDRAMINE HYDROCHLORIDE 50 MG: 50 INJECTION INTRAMUSCULAR; INTRAVENOUS at 10:03

## 2021-01-01 RX ADMIN — FAMOTIDINE 20 MG: 10 INJECTION, SOLUTION INTRAVENOUS at 11:28

## 2021-01-01 RX ADMIN — ATORVASTATIN CALCIUM 40 MG: 40 TABLET, FILM COATED ORAL at 20:15

## 2021-01-01 RX ADMIN — INSULIN HUMAN 10 UNITS: 100 INJECTION, SOLUTION PARENTERAL at 17:37

## 2021-01-01 RX ADMIN — ENOXAPARIN SODIUM 40 MG: 40 INJECTION SUBCUTANEOUS at 10:54

## 2021-01-01 RX ADMIN — SODIUM CHLORIDE, PRESERVATIVE FREE 10 ML: 5 INJECTION INTRAVENOUS at 10:55

## 2021-01-01 RX ADMIN — PACLITAXEL 126 MG: 6 INJECTION, SOLUTION INTRAVENOUS at 11:23

## 2021-01-01 RX ADMIN — ZONISAMIDE 200 MG: 100 CAPSULE ORAL at 20:15

## 2021-01-01 RX ADMIN — SODIUM CHLORIDE, PRESERVATIVE FREE 10 ML: 5 INJECTION INTRAVENOUS at 21:30

## 2021-01-01 RX ADMIN — DIPHENHYDRAMINE HYDROCHLORIDE 50 MG: 50 INJECTION INTRAMUSCULAR; INTRAVENOUS at 11:09

## 2021-01-01 RX ADMIN — SODIUM CHLORIDE, PRESERVATIVE FREE 10 ML: 5 INJECTION INTRAVENOUS at 13:34

## 2021-01-01 RX ADMIN — FAMOTIDINE 20 MG: 20 TABLET ORAL at 10:51

## 2021-01-01 RX ADMIN — SODIUM CHLORIDE 20 ML/HR: 9 INJECTION, SOLUTION INTRAVENOUS at 11:22

## 2021-01-01 RX ADMIN — FAMOTIDINE 20 MG: 10 INJECTION, SOLUTION INTRAVENOUS at 10:07

## 2021-01-01 RX ADMIN — SODIUM CHLORIDE 1000 ML: 9 INJECTION, SOLUTION INTRAVENOUS at 11:26

## 2021-01-01 RX ADMIN — PALONOSETRON 0.25 MG: 0.25 INJECTION, SOLUTION INTRAVENOUS at 11:28

## 2021-01-01 RX ADMIN — TRAZODONE HYDROCHLORIDE 100 MG: 100 TABLET ORAL at 20:16

## 2021-01-01 RX ADMIN — FAMOTIDINE 20 MG: 20 TABLET ORAL at 20:16

## 2021-01-01 RX ADMIN — CARBOPLATIN 300 MG: 10 INJECTION, SOLUTION INTRAVENOUS at 11:55

## 2021-01-01 RX ADMIN — ZONISAMIDE 200 MG: 100 CAPSULE ORAL at 10:50

## 2021-01-01 RX ADMIN — PACLITAXEL 126 MG: 6 INJECTION, SOLUTION INTRAVENOUS at 11:35

## 2021-01-01 RX ADMIN — CARVEDILOL 12.5 MG: 12.5 TABLET, FILM COATED ORAL at 10:51

## 2021-01-01 RX ADMIN — SODIUM CHLORIDE, PRESERVATIVE FREE 10 ML: 5 INJECTION INTRAVENOUS at 07:59

## 2021-01-01 RX ADMIN — IOPAMIDOL 100 ML: 755 INJECTION, SOLUTION INTRAVENOUS at 17:23

## 2021-01-01 RX ADMIN — ASPIRIN 81 MG: 81 TABLET, CHEWABLE ORAL at 10:51

## 2021-01-01 RX ADMIN — DEXAMETHASONE SODIUM PHOSPHATE 10 MG: 10 INJECTION, SOLUTION INTRAMUSCULAR; INTRAVENOUS at 10:44

## 2021-01-01 RX ADMIN — CARBOPLATIN 300 MG: 10 INJECTION, SOLUTION INTRAVENOUS at 12:48

## 2021-01-01 RX ADMIN — KETOROLAC TROMETHAMINE 15 MG: 15 INJECTION, SOLUTION INTRAMUSCULAR; INTRAVENOUS at 22:14

## 2021-01-01 RX ADMIN — SODIUM CHLORIDE 500 ML: 9 INJECTION, SOLUTION INTRAVENOUS at 17:39

## 2021-01-01 RX ADMIN — CARBOPLATIN 255 MG: 10 INJECTION, SOLUTION INTRAVENOUS at 13:17

## 2021-01-01 RX ADMIN — OMEGA-3-ACID ETHYL ESTERS 1 G: 1 CAPSULE, LIQUID FILLED ORAL at 18:57

## 2021-01-01 RX ADMIN — DOXAZOSIN 2 MG: 1 TABLET ORAL at 18:51

## 2021-01-01 RX ADMIN — INSULIN GLARGINE 144 UNITS: 100 INJECTION, SOLUTION SUBCUTANEOUS at 21:29

## 2021-01-01 RX ADMIN — DIPHENHYDRAMINE HYDROCHLORIDE 50 MG: 50 INJECTION INTRAMUSCULAR; INTRAVENOUS at 11:28

## 2021-01-01 RX ADMIN — PALONOSETRON 0.25 MG: 0.25 INJECTION, SOLUTION INTRAVENOUS at 10:44

## 2021-01-01 RX ADMIN — PALONOSETRON 0.25 MG: 0.25 INJECTION, SOLUTION INTRAVENOUS at 11:08

## 2021-01-01 RX ADMIN — DEXAMETHASONE SODIUM PHOSPHATE 8 MG: 4 INJECTION, SOLUTION INTRAMUSCULAR; INTRAVENOUS at 11:57

## 2021-01-01 RX ADMIN — ONDANSETRON 8 MG: 2 INJECTION INTRAMUSCULAR; INTRAVENOUS at 11:57

## 2021-01-01 ASSESSMENT — ENCOUNTER SYMPTOMS
ORTHOPNEA: 0
COUGH: 0
NAUSEA: 0
HEARTBURN: 0
EYES NEGATIVE: 1
BACK PAIN: 0
GASTROINTESTINAL NEGATIVE: 1
ABDOMINAL PAIN: 0
SHORTNESS OF BREATH: 1

## 2021-01-01 ASSESSMENT — PAIN DESCRIPTION - LOCATION
LOCATION: ABDOMEN
LOCATION: ABDOMEN
LOCATION: HEAD
LOCATION: CHEST
LOCATION: CHEST

## 2021-01-01 ASSESSMENT — PATIENT HEALTH QUESTIONNAIRE - PHQ9
1. LITTLE INTEREST OR PLEASURE IN DOING THINGS: 0
SUM OF ALL RESPONSES TO PHQ QUESTIONS 1-9: 0
SUM OF ALL RESPONSES TO PHQ QUESTIONS 1-9: 0
2. FEELING DOWN, DEPRESSED OR HOPELESS: 0
SUM OF ALL RESPONSES TO PHQ9 QUESTIONS 1 & 2: 0
SUM OF ALL RESPONSES TO PHQ QUESTIONS 1-9: 0

## 2021-01-01 ASSESSMENT — PAIN SCALES - GENERAL
PAINLEVEL_OUTOF10: 10
PAINLEVEL_OUTOF10: 0
PAINLEVEL_OUTOF10: 9
PAINLEVEL_OUTOF10: 8
PAINLEVEL_OUTOF10: 3
PAINLEVEL_OUTOF10: 9
PAINLEVEL_OUTOF10: 8
PAINLEVEL_OUTOF10: 0
PAINLEVEL_OUTOF10: 10
PAINLEVEL_OUTOF10: 0
PAINLEVEL_OUTOF10: 10
PAINLEVEL_OUTOF10: 10
PAINLEVEL_OUTOF10: 8
PAINLEVEL_OUTOF10: 0
PAINLEVEL_OUTOF10: 7

## 2021-01-01 ASSESSMENT — PAIN DESCRIPTION - PAIN TYPE
TYPE: ACUTE PAIN

## 2021-01-01 ASSESSMENT — PAIN DESCRIPTION - FREQUENCY
FREQUENCY: CONTINUOUS
FREQUENCY: CONTINUOUS
FREQUENCY: INTERMITTENT
FREQUENCY: INTERMITTENT

## 2021-01-01 ASSESSMENT — PAIN DESCRIPTION - PROGRESSION
CLINICAL_PROGRESSION: NOT CHANGED
CLINICAL_PROGRESSION: GRADUALLY WORSENING

## 2021-01-01 ASSESSMENT — PAIN DESCRIPTION - DESCRIPTORS
DESCRIPTORS: ACHING
DESCRIPTORS: ACHING;HEAVINESS
DESCRIPTORS: SHARP

## 2021-01-01 ASSESSMENT — PAIN DESCRIPTION - ONSET
ONSET: ON-GOING

## 2021-01-01 ASSESSMENT — PAIN - FUNCTIONAL ASSESSMENT
PAIN_FUNCTIONAL_ASSESSMENT: ACTIVITIES ARE NOT PREVENTED
PAIN_FUNCTIONAL_ASSESSMENT: PREVENTS OR INTERFERES SOME ACTIVE ACTIVITIES AND ADLS

## 2021-01-01 ASSESSMENT — PAIN DESCRIPTION - ORIENTATION
ORIENTATION: UPPER
ORIENTATION: MID
ORIENTATION: UPPER
ORIENTATION: RIGHT;LEFT;LOWER
ORIENTATION: MID

## 2021-01-01 ASSESSMENT — HEART SCORE: ECG: 0

## 2021-02-18 PROBLEM — R07.9 CHEST PAIN: Status: ACTIVE | Noted: 2021-01-01

## 2021-02-18 NOTE — H&P
4.  COPD/tobacco abuse: Patient states 1 pack of cigarettes will last him 3 to 4 days. However, counseled on cessation. Nicotine patch provided today. 5.  GOPI: Patient wears CPAP at home lab respiratory provide same or attempt to bring CPAP from home. 6.  History of CHF: Most recent echo on 5/30/2018 normal EF visually estimated 55%. Grade 2 diastolic dysfunction with pseudonormal filling pattern. Mild to moderate LVH. Mildly dilated left atrium. No significant valvular abnormalities. No evidence of any pericardial effusion. History of ASD repair in 2000 fine no sign of reoccurrence. Patient is euvolemic, no JVD or pedal edema, no basilar crackles. Follow-up as an outpatient    7. Morbid obesity: BMI of 43.9. Will discuss calorie restriction and carb counting and daily eating habits prior to discharge. 8.  CAD: Patient on GDMT as above. This time patient admitted under observation we will trend troponins continuous telemetry repeat EKG in a.m. If no acute events anticipate discharge with follow-up with cardiology will provide referrals and if possible make appointment prior to discharge. Diet DIET CARDIAC; Carb Control: 3 carb choices (45 gms)/meal; No Added Salt (3-4 GM);  Daily Fluid Restriction: 1500 ml; No Caffeine   DVT Prophylaxis [x] Lovenox, []  Heparin, [] SCDs, [] No VTE prophylaxis, patient ambulating   GI Prophylaxis [] PPI, [x] H2 Blocker, [] No GI prophylaxis, patient is receiving diet/Tube Feeds   Code Status Full Code   Disposition Patient requires continued admission due to continued medical management   MDM [] Low, [x] Moderate,[x]  High  Patient's risk as above due to as above     History of Present Illness:     Chief Complaint: Chest pain x24 hours Rhea Sims is a 44 y.o.  male  who presents with chest pain under 24 hours. Patient states he was shoveling snow yesterday and had no symptoms during activity. However, patient started having chest pressure as he describes substernal persistent through the night. Patient did not take anything to alleviate symptoms. Denies shortness of breath or worsening symptoms on exertion. Denies nausea or vomiting or excessive sweating. Worse with deep breathing. 10-14 point ROS reviewed negative, unless as noted above    Objective:   No intake or output data in the 24 hours ending 02/18/21 1643   Vitals:   Vitals:    02/18/21 1548   BP: 127/79   Pulse: 74   Resp: 20   Temp: 97.8 °F (36.6 °C)   SpO2: 99%     Physical Exam:    GEN Awake morbidly obese white male, sitting upright in bed in no apparent distress. Appears given age. EYES Pupils are equally round. No scleral erythema, discharge, or conjunctivitis. HENT Mucous membranes are moist.   NECK No apparent thyromegaly or masses. RESP Clear to auscultation, no wheezes, rales or rhonchi. Symmetric chest movement while on room air. CARDIO/VASC S1/S2 auscultated. Regular rate without appreciable murmurs, rubs, or gallops. Peripheral pulses equal bilaterally and palpable. No peripheral edema. GI Abdomen protuberant is soft without significant tenderness, masses, or guarding. Bowel sounds are normoactive. Rectal exam deferred.  Herr catheter is not present. HEME/LYMPH No petechiae or ecchymoses. MSK No gross joint deformities. Spontaneous movement of all extremities  SKIN Normal coloration, warm, dry. NEURO Cranial nerves appear grossly intact, normal speech, no lateralizing weakness. PSYCH Awake, alert, oriented x 4. Affect appropriate.     Past Medical History:      Past Medical History:   Diagnosis Date    Arthritis     Asthma     Atrial fibrillation ([address]     [facility]  COPD (chronic obstructive pulmonary disease) (HCC)     Depression     Diabetes mellitus (HCC)     Type 2    Diastolic heart failure (HCC)     Hyperlipidemia     Hypertension     Kidney stone     Migraine     Other disorders of kidney and ureter     Pneumonia     Psychiatric problem     Sleep apnea     Unspecified cerebral artery occlusion with cerebral infarction     \"10% loss of use of right arm and leg\"     PSHX:  has a past surgical history that includes Cholecystectomy; Carpal tunnel release; Elbow surgery; ASD repair (); back surgery (2012); Pericardium surgery (); Abdomen surgery; Colonoscopy; eye surgery; eye surgery; ASD repair; other surgical history (2017); Cardiac surgery (AUG 2013); and CYSTOSCOPY INSERTION / REMOVAL STENT / STONE (Right, 10/3/2020). Allergies: Allergies   Allergen Reactions    Azithromycin     Ciprofloxacin Other (See Comments)     lethargic       FAM HX: family history includes Diabetes in his father, maternal grandfather, maternal grandmother, mother, paternal grandfather, paternal grandmother, and paternal uncle; Heart Disease in his father; Other in his mother. Soc HX:   Social History     Socioeconomic History    Marital status: Single     Spouse name: None    Number of children: 3    Years of education: None    Highest education level: None   Occupational History    None   Social Needs    Financial resource strain: None    Food insecurity     Worry: None     Inability: None    Transportation needs     Medical: None     Non-medical: None   Tobacco Use    Smoking status: Former Smoker     Packs/day: 0.25     Years: 0.00     Pack years: 0.00     Types: Cigarettes     Quit date: 2020     Years since quittin.6    Smokeless tobacco: Never Used    Tobacco comment: quit in , started again 2014; quit in 2019   Substance and Sexual Activity    Alcohol use: Not Currently     Comment: once in while    Drug use:  No  Sexual activity: Not Currently   Lifestyle    Physical activity     Days per week: None     Minutes per session: None    Stress: None   Relationships    Social connections     Talks on phone: None     Gets together: None     Attends Spiritism service: None     Active member of club or organization: None     Attends meetings of clubs or organizations: None     Relationship status: None    Intimate partner violence     Fear of current or ex partner: None     Emotionally abused: None     Physically abused: None     Forced sexual activity: None   Other Topics Concern    None   Social History Narrative    unemployed       Medications:   Medications:    sodium chloride  1,000 mL Intravenous Once    sodium chloride flush  10 mL Intravenous 2 times per day    [START ON 2/19/2021] aspirin  81 mg Oral Daily    atorvastatin  40 mg Oral Nightly    famotidine  20 mg Oral BID    nicotine  1 patch Transdermal Daily    enoxaparin  40 mg Subcutaneous Daily      Infusions:   PRN Meds:     nitroGLYCERIN, 0.4 mg, Q5 Min PRN      sodium chloride flush, 10 mL, PRN      promethazine, 12.5 mg, Q6H PRN    Or      ondansetron, 4 mg, Q6H PRN      acetaminophen, 650 mg, Q6H PRN    Or      acetaminophen, 650 mg, Q6H PRN      polyethylene glycol, 17 g, Daily PRN          Electronically signed by Tung Cortez MD on 2/18/2021 at 4:43 PM

## 2021-02-18 NOTE — ED PROVIDER NOTES
Triage Chief Complaint:   Chest Pain (chest pain started two days ago feels getting worse and no energy  has taken thre nitro today with no relief with nitro)    [place]:  Marlene Soriano is a 44 y.o. male that presents with chest pain. Patient was in baseline state of health until the past two days. Patient reports that ever since shoveling snow two nights ago he has been having chest pain. Chest pain is described as a pressure in the central chest that is nonradiating and constant. Pain is currently 8 out of 10. Patient does report associated nausea and sweating. No shortness of breath. No cough. Patient called his primary care physician and they encouraged him to come to the emergency department for further evaluation. Patient does have a known history of coronary artery disease status post cardiac cath in 2018 with stent placement to [facility]. No more recent cardiac cath or stress test.  Patient is still smoking some. Patient is adherent to his aspirin regimen. No history of thromboembolic disease. No prolonged immobilization or recent travel. No recent hospitalizations.     ROS:  General:  No fevers, no chills, no weakness  Eyes:  No recent vison changes, no discharge  ENT:  No sore throat, no nasal congestion  Cardiovascular:  + chest pain, no palpitations  Respiratory:  No shortness of breath, no cough, no wheezing  Gastrointestinal:  No pain, + nausea, no vomiting, no diarrhea  Musculoskeletal:  No muscle pain, no joint pain  Skin:  No rash, no pruritis, no easy bruising, + sweating  Neurologic:  No speech problems, no headache, no extremity numbness, no extremity tingling, no extremity weakness  Psychiatric:  No anxiety  Genitourinary:  No dysuria, no increased urinary frequency  Extremities:  no edema, no pain    Past Medical History:   Diagnosis Date    Arthritis     Asthma     Atrial fibrillation (HCC)     [facility]estive [facility] [address]  COPD (chronic obstructive pulmonary disease) ([facility] [address]     Depression     Diabetes mellitus (HCC)     Type 2    Diastolic heart failure (HCC)     Hyperlipidemia     Hypertension     Kidney stone     Migraine     Other disorders of kidney and ureter     Pneumonia     Psychiatric problem     Sleep apnea     Unspecified cerebral artery occlusion with cerebral infarction     \"10% loss of use of right arm and leg\"     Past Surgical History:   Procedure Laterality Date    ABDOMEN SURGERY      ASD REPAIR  2005    ASD REPAIR      BACK SURGERY  January 2012    [facility] CARDIAC SURGERY  AUG 2013    REVEAL XT MONITOR #[id number]    CARPAL TUNNEL RELEASE      jeri    CHOLECYSTECTOMY      COLONOSCOPY      CYSTOSCOPY INSERTION / REMOVAL STENT / STONE Right 10/3/2020    CYSTOSCOPY RETROGRADE PYELOGRAM STENT INSERTION performed by Kevin Kelly MD at [address] [facility]      bilateral    OTHER SURGICAL HISTORY  02/16/2017    I & D mid upper back    PERICARDIUM SURGERY  2005    post ASD repair with window     Family History   Problem Relation Age of Onset    Diabetes Mother     Other Mother     Diabetes Father     Heart Disease Father     Diabetes Paternal Uncle     Diabetes Maternal Grandmother     Diabetes Maternal Grandfather     Diabetes Paternal Grandmother     Diabetes Paternal Grandfather      Social History     Socioeconomic History    Marital status: Single     Spouse name: Not on file    Number of children: 3    Years of education: Not on file    Highest education level: Not on file   Occupational History    Not on file   Social Needs    Financial resource strain: Not on file    Food insecurity     Worry: Not on file     Inability: Not on file    Transportation needs     Medical: Not on file     Non-medical: Not on file   Tobacco Use    Smoking status: Former Smoker     Packs/day: 0.25     Years: 0.00     Pack years: 0.00     Types: Cigarettes Quit date: 2020     Years since quittin.6    Smokeless tobacco: Never Used    Tobacco comment: quit in , started again 2014; quit in 2019   Substance and Sexual Activity    Alcohol use: Not Currently     Comment: once in while    Drug use: No    Sexual activity: Not Currently   Lifestyle    Physical activity     Days per week: Not on file     Minutes per session: Not on file    Stress: Not on file   Relationships    Social connections     Talks on phone: Not on file     Gets together: Not on file     Attends Advent service: Not on file     Active member of club or organization: Not on file     Attends meetings of clubs or organizations: Not on file     Relationship status: Not on file    Intimate partner violence     Fear of current or ex partner: Not on file     Emotionally abused: Not on file     Physically abused: Not on file     Forced sexual activity: Not on file   Other Topics Concern    Not on file   Social History Narrative    unemployed     Current Facility-Administered Medications   Medication Dose Route Frequency Provider Last Rate Last Admin    0.9 % sodium chloride bolus  1,000 mL Intravenous Once Karla Weems MD        nitroGLYCERIN (NITROSTAT) SL tablet 0.4 mg  0.4 mg Sublingual Q5 Min PRN Karla Weems MD        sodium chloride flush 0.9 % injection 10 mL  10 mL Intravenous 2 times per day Colonel Tani MD        sodium chloride flush 0.9 % injection 10 mL  10 mL Intravenous PRN Colonel Tani MD        promethazine (PHENERGAN) tablet 12.5 mg  12.5 mg Oral Q6H PRN Colonel Tani MD        Or    [facility]) injection 4 mg  4 mg Intravenous Q6H PRN Colonel Tani MD        acetaminophen (TYLENOL) tablet 650 mg  650 mg Oral Q6H PRN Colonel Tani MD        Or    acetaminophen (TYLENOL) suppository 650 mg  650 mg Rectal Q6H PRN Colonel Tani MD  polyethylene glycol (GLYCOLAX) packet 17 g  17 g Oral Daily PRN Lynsey Gan MD        [START ON 2/19/2021] aspirin chewable tablet 81 mg  81 mg Oral Daily Lynsey Gan MD        atorvastatin (LIPITOR) tablet 40 mg  40 mg Oral Nightly Lynsey Gan MD        famotidine (PEPCID) tablet 20 mg  20 mg Oral BID Lynsey Gan MD        nicotine (NICODERM CQ) 14 MG/24HR 1 patch  1 patch Transdermal Daily Lynsey Gan MD        enoxaparin (LOVENOX) injection 40 mg  40 mg Subcutaneous Daily Lynsey Gan MD         Current Outpatient Medications   Medication Sig Dispense Refill    CPAP Machine MISC 10 cm by CPAP route nightly      acarbose (PRECOSE) 25 MG tablet Take 50 mg by mouth 3 times daily (with meals)      losartan (COZAAR) 50 MG tablet Take 50 mg by mouth daily      acetaminophen (APAP EXTRA STRENGTH) 500 MG tablet Take 1 tablet by mouth every 6 hours as needed for Pain 30 tablet 0    TOUJEO SOLOSTAR 300 UNIT/ML injection pen 144 Units nightly   6    metFORMIN (GLUCOPHAGE-XR) 500 MG extended release tablet 1,000 mg 2 times daily   6    sertraline (ZOLOFT) 50 MG tablet 50 mg daily   6    traZODone (DESYREL) 50 MG tablet Take 100 mg by mouth nightly       nitroGLYCERIN (NITROSTAT) 0.4 MG SL tablet up to max of 3 total doses. If no relief after 1 dose, call 911. (Patient taking differently: as needed up to max of 3 total doses.  If no relief after 1 dose, call 911.) 25 tablet 3    atorvastatin (LIPITOR) 40 MG tablet Take 40 mg by mouth nightly       SITagliptin (JANUVIA) 100 MG tablet Take 100 mg by mouth nightly       insulin lispro (HUMALOG) 100 UNIT/ML injection cartridge Inject 25 Units into the skin 3 times daily (before meals) (Patient taking differently: Inject 60 Units into the skin 3 times daily (before meals) 60 units with breakfast  50 units with lunch and supper) 5 Cartridge 3    terazosin (HYTRIN) 2 MG capsule Take 2 mg by mouth nightly  carvedilol (COREG) 12.5 MG tablet Take 2 tablets by mouth 2 times daily (with meals). (Patient taking differently: Take 12.5 mg by mouth 2 times daily (with meals) Take one tablet by mouth two times a day with food.) 60 tablet 0    zonisamide (ZONEGRAN) 100 MG capsule Take 300 mg by mouth nightly Take 3 capsules by mouth every night at bedtime      omega-3 acid ethyl esters (LOVAZA) 1 G capsule Take 2 g by mouth 2 times daily.  albuterol (PROVENTIL HFA) 108 (90 BASE) MCG/ACT inhaler Inhale 2 puffs into the lungs every 6 hours as needed for Wheezing or Shortness of Breath. 1 Inhaler 0    aspirin 81 MG EC tablet Take 81 mg by mouth daily. Allergies   Allergen Reactions    Azithromycin     Ciprofloxacin Other (See Comments)     lethargic       Nursing Notes Reviewed    Physical Exam:  ED Triage Vitals   Enc Vitals Group      BP       Pulse       Resp       Temp       Temp src       SpO2       Weight       Height       Head Circumference       Peak Flow       Pain Score       Pain Loc       Pain Edu? Excl. in [address]? My pulse ox interpretation is  normal    General appearance:  No acute distress. Resting comfortably in bed. Appears nontoxic. Skin:  Warm. Dry. There is no diaphoresis. Eye:  Extraocular movements intact. Ears, nose, mouth and throat:  Oral mucosa moist   Neck:  Trachea midline. Extremity:  Normal ROM. No bilateral lower extremity edema. No calf tenderness to palpation. Heart:  Regular rate and rhythm, normal S1 & S2, no extra heart sounds. Perfusion:  Intact. Strong and symmetric bilateral radial and DP pulses. Respiratory:  Lungs clear to auscultation bilaterally. Respirations nonlabored. Speaking clearly in full sentences. Respiratory distress. Abdominal:  Normal bowel sounds. Soft. Nontender. Non distended. Elevated BMI. Back:  No CVA tenderness to palpation     Neurological:  Alert and oriented times 3. No focal neuro deficits. Psychiatric:  Appropriate    I have reviewed and interpreted all of the currently available lab results from this visit (if applicable):  Results for orders placed or performed during the hospital encounter of 02/18/21   CBC auto diff   Result Value Ref Range    WBC 9.7 4.0 - 10.5 K/CU MM    RBC 4.12 (L) 4.6 - 6.2 M/CU MM    Hemoglobin 12.3 (L) 13.5 - 18.0 GM/DL    Hematocrit 37.1 (L) 42 - 52 %    MCV 90.0 78 - 100 FL    MCH 29.9 27 - 31 PG    MCHC 33.2 32.0 - 36.0 %    RDW 13.0 11.7 - 14.9 %    Platelets 943 572 - 931 K/CU MM    MPV 10.9 7.5 - 11.1 FL    Differential Type AUTOMATED DIFFERENTIAL     Segs Relative 58.7 36 - 66 %    Lymphocytes % 28.9 24 - 44 %    Monocytes % 9.2 (H) 0 - 4 %    Eosinophils % 2.2 0 - 3 %    Basophils % 0.7 0 - 1 %    Segs Absolute 5.7 K/CU MM    Lymphocytes Absolute 2.8 K/CU MM    Monocytes Absolute 0.9 K/CU MM    Eosinophils Absolute 0.2 K/CU MM    Basophils Absolute 0.1 K/CU MM    Immature Neutrophil % 0.3 0 - 0.43 %    Total Immature Neutrophil 0.03 K/CU MM   Comprehensive Metabolic Panel w/ Reflex to MG   Result Value Ref Range    Sodium 141 135 - 145 MMOL/L    Potassium 3.9 3.5 - 5.1 MMOL/L    Chloride 108 99 - 110 mMol/L    CO2 21 21 - 32 MMOL/L    BUN 15 6 - 23 MG/DL    CREATININE 1.3 0.9 - 1.3 MG/DL    Glucose 224 (H) 70 - 99 MG/DL    Calcium 8.9 8.3 - 10.6 MG/DL    Albumin 3.8 3.4 - 5.0 GM/DL    Total Protein 6.2 (L) 6.4 - 8.2 GM/DL    Total Bilirubin 0.3 0.0 - 1.0 MG/DL    ALT 13 10 - 40 U/L    AST 9 (L) 15 - 37 IU/L    Alkaline Phosphatase 99 40 - 129 IU/L    GFR Non- 58 (L) >60 mL/min/1.73m2    GFR African American >60 >60 mL/min/1.73m2    Anion Gap 12 4 - 16   Troponin   Result Value Ref Range    Troponin T <0.010 <0.01 NG/ML   EKG 12 Lead   Result Value Ref Range    Ventricular Rate 74 BPM    Atrial Rate 74 BPM    P-R Interval 160 ms    QRS Duration 92 ms    Q-T Interval 380 ms    QTc Calculation (Bazett) 421 ms    P Axis 38 degrees R Axis -3 degrees    T Axis 50 degrees    Diagnosis       Normal sinus rhythm  Normal ECG  When compared with ECG of 19-JUL-2020 11:28,  No significant change was found        Radiographs (if obtained):  [] The following radiograph was interpreted by myself in the absence of a radiologist:   [x] Radiologist's Report Reviewed:  XR CHEST PORTABLE   Final Result   1. No acute cardiopulmonary process identified. EKG (if obtained): (All EKG's are interpreted by myself in the absence of a cardiologist)  12 lead EKG per my interpretation:  Normal Sinus Rhythm at 74  Axis is   Normal  QTc is  within an acceptable range  There is no specific T wave changes appreciated. There is no specific ST wave changes appreciated. No STEMI    Prior EKG to compare with was available and no clinically significant change in normal morphology. Chart review shows recent radiographs:  No results found. MDM:  Pt presents as above. Emergent conditions considered. Presentation prompted initial labs, EKG and imaging as above. EKG with normal sinus rhythm as above. CBC and CMP without clinically significant derangement other than mild hyperglycemia without acidosis. No elevated anion gap. IV fluid boluses given for the hyperglycemia. Troponin is negative. Chest x-ray negative for acute cardiopulmonary process. Patient is already received full dose aspirin. Nitroglycerin trial in the emergency department. Patient with a heart score of a 4. Decision made to admit the patient for serial troponin and further cardiac observation. Case is discussed with hospitalist, Dr. Beba Garces. He is accepted for admission. Questions sought and answered with the patient. They voice understanding and agree with plan. Clinical Impression:  1. Chest pain, unspecified type      Disposition referral (if applicable):  No follow-up provider specified.   Disposition medications (if applicable):  New Prescriptions No medications on file       Comment: Please note this report has been produced using speech recognition software and may contain errors related to that system including errors in grammar, punctuation, and spelling, as well as words and phrases that may be inappropriate. If there are any questions or concerns please feel free to contact the dictating provider for clarification.        Camryn Lemos MD  02/18/21 [id number]

## 2021-02-19 PROBLEM — R07.9 CHEST PAIN: Status: RESOLVED | Noted: 2021-01-01 | Resolved: 2021-01-01

## 2021-02-19 NOTE — DISCHARGE SUMMARY
Discharge Summary    Name:  Maninder Anton /Age/Sex: 1970  (44 y.o. male)   MRN & CSN:  [id number] & [id number] Admission Date/Time: 2021  1:58 PM   Attending:  Galina Parham MD Discharging Physician: Galina Parham MD     HPI:   Chief Complaint: Chest pain x24 hours  Maninder Anton is a 44 y.o.  male  who presents with chest pain under 24 hours. Patient states he was shoveling snow yesterday and had no symptoms during activity. However, patient started having chest pressure as he describes substernal persistent through the night. Patient did not take anything to alleviate symptoms. Denies shortness of breath or worsening symptoms on exertion. Denies nausea or vomiting or excessive sweating. Worse with deep breathing. As per my HPI on admission    Hospital Course:     1. Chest pain: Resolved. Troponins x3 less than 0.010. ECGs normal sinus rhythm no acute ST-T wave abnormalities. Patient with multiple comorbidities and risk factors. Has not seen his cardiologist in 2 years. Plan patient continue on current medication. Cardiology appointment to be made prior to discharge today. Currently on GDMT    2. Hypertension: Yesterday in the /79. This a.m. elevated prior to medications 170/77. After a.m. meds 131/63. Patient does have Hytrin 2 mg at bedtime. Follow-up with PCP for reevaluation and adjustment for improved a.m. readings. 3.  Insulin-dependent diabetes: POC glucose 97 this a.m. controlled on current regimen. Continue same. Follow-up with primary    4. COPD/tobacco abuse: Discussed cessation. NicoDerm patch while admitted. Reporting 1/4 to 1/3 pack/day    5.   GOPI: CPAP compliant 6.  History of CHF:Most recent echo on 5/30/2018 normal EF visually estimated 55%. Grade 2 diastolic dysfunction with pseudonormal filling pattern. Mild to moderate LVH. Mildly dilated left atrium. No significant valvular abnormalities. No evidence of any pericardial effusion. History of ASD repair in 2000 fine no sign of reoccurrence. Patient is euvolemic on exam this a.m. Follow-up with cardiology    7. Morbid obesity: Low-carb keto diet discussed calorie restriction    8. CAD: Patient on GDMT      The patient expressed appropriate understanding of and agreement with the discharge recommendations, medications, and plan. Consults this admission:  Murtaza Hardin HOSPITALIST    Discharge Instruction:   Follow up appointments: Cardiology within 7 days  Primary care physician:  within 2 weeks    Diet:  cardiac diet   Activity: activity as tolerated  Disposition: Discharged to:   [x]Home, []C, []SNF, []Acute Rehab, []Hospice   Condition on discharge: Stable    Discharge Medications:      Ulus Balloon   Home Medication Instructions [id number]    Printed on:02/19/21 1376   Medication Information                      acarbose (PRECOSE) 25 MG tablet  Take 50 mg by mouth 3 times daily (with meals)             albuterol (PROVENTIL HFA) 108 (90 BASE) MCG/ACT inhaler  Inhale 2 puffs into the lungs every 6 hours as needed for Wheezing or Shortness of Breath. aspirin 81 MG EC tablet  Take 81 mg by mouth daily. atorvastatin (LIPITOR) 40 MG tablet  Take 40 mg by mouth nightly              carvedilol (COREG) 12.5 MG tablet  Take 2 tablets by mouth 2 times daily (with meals).              CPAP Machine MISC  10 cm by CPAP route nightly             insulin lispro (HUMALOG) 100 UNIT/ML injection cartridge  Inject 25 Units into the skin 3 times daily (before meals)             losartan (COZAAR) 50 MG tablet  Take 100 mg by mouth daily Takes at night metFORMIN (GLUCOPHAGE-XR) 500 MG extended release tablet  1,000 mg 2 times daily              nitroGLYCERIN (NITROSTAT) 0.4 MG SL tablet  up to max of 3 total doses. If no relief after 1 dose, call 911. omega-3 acid ethyl esters (LOVAZA) 1 G capsule  Take 1 g by mouth daily              sertraline (ZOLOFT) 50 MG tablet  100 mg daily              SITagliptin (JANUVIA) 100 MG tablet  Take 100 mg by mouth nightly              terazosin (HYTRIN) 2 MG capsule  Take 2 mg by mouth nightly             TOUJEO SOLOSTAR 300 UNIT/ML injection pen  144 Units nightly 72 units each side of abdomen             traZODone (DESYREL) 50 MG tablet  Take 100 mg by mouth nightly              zonisamide (ZONEGRAN) 100 MG capsule  Take 200 mg by mouth 2 times daily                  Objective Findings at Discharge:   BP (!) 170/77   Pulse 77   Temp 96.7 °F (35.9 °C) (Infrared)   Resp 15   Ht 5' 9\" (1.753 m)   Wt 297 lb 6.4 oz (134.9 kg)   SpO2 97%   BMI 43.92 kg/m²            PHYSICAL EXAM generally healthy is active no orders  GEN    Awake morbidly obese white male, sitting upright in bed in no apparent distress. Appears given age. EYES   Pupils are equally round. No scleral erythema, discharge, or conjunctivitis. HENT  Mucous membranes are moist.   NECK  No apparent thyromegaly or masses. RESP  Clear to auscultation but faint, no wheezes, rales or rhonchi. Symmetric shallow chest movement while on room air. CARDIO/VASC           S1/S2 auscultated. Regular rate without appreciable murmurs, rubs, or gallops. Peripheral pulses equal bilaterally and palpable. No peripheral edema. GI        Abdomen protuberant is soft without significant tenderness, masses, or guarding. Bowel sounds present. Rectal exam deferred.        Herr catheter is not present. HEME/LYMPH            No petechiae or ecchymoses. MSK    No gross joint deformities. Spontaneous movement of all extremities. Mild tenderness over the chest region improved  SKIN    Normal coloration, warm, dry. NEURO           Cranial nerves appear grossly intact, normal speech, no lateralizing weakness. PSYCH            Awake, alert, oriented x 4. Affect appropriate. BMP/CBC  Recent Labs     02/18/21  1420 02/19/21  0545     --    K 3.9  --      --    CO2 21  --    BUN 15  --    CREATININE 1.3  --    WBC 9.7 9.7   HCT 37.1* 38.3*    169       IMAGING: Chest radiograph on intake:  FINDINGS:   Cardiac silhouette is stable.  No focal consolidation, pleural effusion, or   pneumothorax identified.  Postoperative changes of median sternotomy   redemonstrated.  Osseous structures appear intact.           Impression   1.  No acute cardiopulmonary process identified.         ECGs x2 normal sinus rhythm with no acute ST-T wave abnormalities      Discharge Time of 32 minutes    Electronically signed by Kristy Huerta MD on 2/19/2021 at 9:51 AM

## 2021-02-19 NOTE — PLAN OF CARE
Problem: Falls - Risk of:  Goal: Will remain free from falls  Description: Will remain free from falls  Outcome: Ongoing  Goal: Absence of physical injury  Description: Absence of physical injury  Outcome: Ongoing     Problem: Discharge Planning:  Goal: Discharged to appropriate level of care  Description: Discharged to appropriate level of care  Outcome: Ongoing     Problem: Serum Glucose Level - Abnormal:  Goal: Ability to maintain appropriate glucose levels will improve  Description: Ability to maintain appropriate glucose levels will improve  Outcome: Ongoing     Problem: Sensory Perception - Impaired:  Goal: Ability to maintain a stable neurologic state will improve  Description: Ability to maintain a stable neurologic state will improve  Outcome: Ongoing     Problem: Pain:  Description: Pain management should include both nonpharmacologic and pharmacologic interventions.   Goal: Pain level will decrease  Description: Pain level will decrease  Outcome: Ongoing  Goal: Control of acute pain  Description: Control of acute pain  Outcome: Ongoing  Goal: Control of chronic pain  Description: Control of chronic pain  Outcome: Ongoing     Problem: Cardiac:  Goal: Ability to maintain vital signs within normal range will improve  Description: Ability to maintain vital signs within normal range will improve  Outcome: Ongoing  Goal: Cardiovascular alteration will improve  Description: Cardiovascular alteration will improve  Outcome: Ongoing     Problem: Health Behavior:  Goal: Will modify at least one risk factor affecting health status  Description: Will modify at least one risk factor affecting health status  Outcome: Ongoing  Goal: Identification of resources available to assist in meeting health care needs will improve  Description: Identification of resources available to assist in meeting health care needs will improve  Outcome: Ongoing     Problem: Physical Regulation: Goal: Complications related to the disease process, condition or treatment will be avoided or minimized  Description: Complications related to the disease process, condition or treatment will be avoided or minimized  Outcome: Ongoing

## 2021-02-19 NOTE — PROGRESS NOTES
Appointment made with cardiologist Finas Duverney March 12 at 3:50 in [facility] office, information from Internet regarding keto diet given to patient so that he can research more information, also provided information from [facility] education resources, discharge instructions given to patient, all questions answered, verbalized understanding, skin assessment completed with [facility], no skin issues noted, did not see patients private area at his request, ambulated with patient to entrance and all belongings including cane and cell phone to be driven home by [facility].

## 2021-02-19 NOTE — PROGRESS NOTES
Patient arrived to room 15 from ED on cart accompanied by [facility] RN, able to ambulate to restroom and then bed, ambulates with a cane due to right sided weakness from a stroke in 2004, vitals and assessment complete, no signs of distress, skin assessment completed with [facility] RN, no skin issues noted, buttocks not assessed since patient refused but states he has no sores/issues with his \"booty\", oriented to room and call light, educated on hospital policies and bed alarm.

## 2021-05-24 NOTE — ED NOTES
Dr Anguiano Clock in to discuss test results and what Dr Cyndy Gracia said when he called and plan of care.      Angelic Casas RN  05/23/21 [id number]

## 2021-05-24 NOTE — ED NOTES
Dr Ava Jackson talks with Dr De Guzman.      Sandra Zamora, RN  05/23/21 [facility], RN  05/23/21 [id number]
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
## 2021-05-24 NOTE — ED NOTES
Massimo NICOLE  [address] [place] [address],[facility] , urology , via [address], [facility]  05/23/21 7362
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
## 2021-07-30 NOTE — ED TRIAGE NOTES
Patient arrived to room 5 by [facility] with complaints of epigastric abdominal pain since yesterday. BS in route 484. Patient transferred self to ED bed from EMS stretcher.

## 2021-09-06 NOTE — ED PROVIDER NOTES
The history is provided by the patient. Chest Pain  Pain location:  Unable to specify  Pain radiates to:  Does not radiate  Pain severity:  Mild  Progression:  Resolved  Chronicity:  Recurrent  Context comment:  Patient had chest discomfort at home when he took his BP and it was elevated. He had not taken his evening medication. Since he had a few episodes of very fleeting chest discomfort he thought it best to be checked out  Relieved by:  Nothing  Worsened by:  Nothing  Ineffective treatments:  None tried  Associated symptoms: anxiety, palpitations and shortness of breath    Associated symptoms: no abdominal pain, no AICD problem, no altered mental status, no anorexia, no back pain, no claudication, no cough, no diaphoresis, no fever, no headache, no heartburn, no nausea, no near-syncope, no numbness, no orthopnea and no syncope        Review of Systems   Constitutional: Negative. Negative for diaphoresis and fever. HENT: Negative. Eyes: Negative. Respiratory: Positive for shortness of breath. Negative for cough. Cardiovascular: Positive for chest pain and palpitations. Negative for orthopnea, claudication, syncope and near-syncope. Gastrointestinal: Negative. Negative for abdominal pain, anorexia, heartburn and nausea. Genitourinary: Negative. Musculoskeletal: Negative. Negative for back pain. Skin: Negative. Neurological: Negative. Negative for numbness and headaches. All other systems reviewed and are negative.       Family History   Problem Relation Age of Onset    Diabetes Mother     Other Mother     Diabetes Father     Heart Disease Father     Diabetes Paternal Uncle     Diabetes Maternal Grandmother     Diabetes Maternal Grandfather     Diabetes Paternal Grandmother     Diabetes Paternal Grandfather      Social History     Socioeconomic History    Marital status: Single     Spouse name: Not on file    Number of children: 3    Years of education: Not on file   Trinidad Johnson Highest education level: Not on file   Occupational History    Not on file   Tobacco Use    Smoking status: Current Every Day Smoker     Packs/day: 0.25     Years: 0.00     Pack years: 0.00     Types: Cigarettes    Smokeless tobacco: Never Used    Tobacco comment: quit in 1994, started again 08/2014; quit in 7/2019   Vaping Use    Vaping Use: Never used   Substance and Sexual Activity    Alcohol use: Not Currently     Comment: once in while    Drug use: No    Sexual activity: Not Currently   Other Topics Concern    Not on file   Social History Narrative    unemployed     Social Determinants of Health     Financial Resource Strain:     Difficulty of Paying Living Expenses:    Food Insecurity:     Worried About [address] in the Last Year:     [address] in the Last Year:    Transportation Needs:     Lack of Transportation (Medical):      Lack of Transportation (Non-Medical):    Physical Activity:     Days of Exercise per Week:     Minutes of Exercise per Session:    Stress:     Feeling of Stress :    Social Connections:     Frequency of Communication with Friends and Family:     Frequency of Social Gatherings with Friends and Family:     Attends Jewish Services:     Active Member of Clubs or Organizations:     Attends Club or Organization Meetings:     Marital Status:    Intimate Partner Violence:     Fear of Current or Ex-Partner:     Emotionally Abused:     Physically Abused:     Sexually Abused:      Past Surgical History:   Procedure Laterality Date    ABDOMEN SURGERY      ASD REPAIR  2005    ASD REPAIR      [place]    CARDIAC SURGERY  AUG 2013    REVEAL XT MONITOR #[id number]    CARPAL TUNNEL RELEASE      jeri    CHOLECYSTECTOMY      COLONOSCOPY      CYSTOSCOPY INSERTION / Clark Montoya / Yisel Guzman Right 10/3/2020    CYSTOSCOPY RETROGRADE PYELOGRAM STENT INSERTION performed by Arpan Bajwa MD at [address]  EYE SURGERY      bilateral    OTHER SURGICAL HISTORY  02/16/2017    I & D mid upper back    PERICARDIUM SURGERY  2005    post ASD repair with window     Past Medical History:   Diagnosis Date    Arthritis     Asthma     [facility]     [facility]     COPD [facility]chronic obstructive pulmonary disease) [facility]     Depression     Diabetes mellitus [facility]     Type 2    Diastolic heart failure [facility]     Hyperlipidemia     Hypertension     Kidney stone     Migraine     Other disorders of kidney and ureter     Pneumonia     Psychiatric problem     Sleep apnea     Unspecified cerebral artery occlusion with cerebral infarction     \"10% loss of use of right arm and leg\"     Allergies   Allergen Reactions    Azithromycin     Ciprofloxacin Other (See Comments)     lethargic     Prior to Admission medications    Medication Sig Start Date End Date Taking?  Authorizing Provider   oxyCODONE-acetaminophen (PERCOCET) 5-325 MG per tablet  7/20/21  Yes Historical Provider, MD   clopidogrel (PLAVIX) 75 MG tablet Take 75 mg by mouth daily   Yes Historical Provider, MD   acarbose (PRECOSE) 50 MG tablet Take 50 mg by mouth 3 times daily (with meals)    Yes Historical Provider, MD   losartan (COZAAR) 100 MG tablet Take 100 mg by mouth daily Takes at night   Yes Historical Provider, MD   TOUJEO SOLOSTAR 300 UNIT/ML injection pen 144 Units nightly 72 units each side of abdomen 1/28/19  Yes Historical Provider, MD   metFORMIN (GLUCOPHAGE-XR) 500 MG extended release tablet 1,000 mg 2 times daily  1/28/19  Yes Historical Provider, MD   sertraline (ZOLOFT) 100 MG tablet 100 mg daily  1/28/19  Yes Historical Provider, MD   traZODone (DESYREL) 50 MG tablet Take 100 mg by mouth nightly    Yes Historical Provider, MD   atorvastatin (LIPITOR) 40 MG tablet Take 40 mg by mouth nightly    Yes Historical Provider, MD   SITagliptin (JANUVIA) 100 MG tablet Take 100 mg by mouth nightly    Yes Historical Provider, MD   insulin lispro (HUMALOG) 100 UNIT/ML injection cartridge Inject 25 Units into the skin 3 times daily (before meals)  Patient taking differently: Inject 60 Units into the skin 3 times daily (before meals) 60 units with breakfast  50 units with lunch and supper 8/4/16  Yes Teresa Posey MD   terazosin (HYTRIN) 2 MG capsule Take 2 mg by mouth nightly   Yes Historical Provider, MD   carvedilol (COREG) 12.5 MG tablet Take 2 tablets by mouth 2 times daily (with meals). Patient taking differently: Take 12.5 mg by mouth 2 times daily (with meals) Take one tablet by mouth two times a day with food. 2/19/15  Yes Diego Tripp MD   zonisamide (ZONEGRAN) 100 MG capsule Take 200 mg by mouth 2 times daily    Yes Historical Provider, MD   omega-3 acid ethyl esters (LOVAZA) 1 G capsule Take 1 g by mouth daily    Yes Historical Provider, MD   albuterol (PROVENTIL HFA) 108 (90 BASE) MCG/ACT inhaler Inhale 2 puffs into the lungs every 6 hours as needed for Wheezing or Shortness of Breath. 1/10/13  Yes Geronimo Ruiz PA-C   aspirin 81 MG EC tablet Take 81 mg by mouth daily. Yes Historical Provider, MD   pioglitazone (ACTOS) 45 MG tablet Take 45 mg by mouth daily    Historical Provider, MD   CPAP Machine MISC 10 cm by CPAP route nightly    Historical Provider, MD   nitroGLYCERIN (NITROSTAT) 0.4 MG SL tablet up to max of 3 total doses. If no relief after 1 dose, call 911. Patient taking differently: as needed up to max of 3 total doses. If no relief after 1 dose, call 911. 6/1/18   Shaan Rodney MD       BP (!) 147/92   Pulse 92   Temp 98.2 °F (36.8 °C) (Oral)   Resp 16   Ht 5' 10\" (1.778 m)   Wt 287 lb (130.2 kg)   SpO2 97%   BMI 41.18 kg/m²     Physical Exam  Vitals and nursing note reviewed. Constitutional:       Appearance: He is well-developed. HENT:      Head: Normocephalic and atraumatic.       Right Ear: External ear normal.      Left Ear: External ear normal.      Nose: Nose normal. Eyes:      Conjunctiva/sclera: Conjunctivae normal.      Pupils: Pupils are equal, round, and reactive to light. Cardiovascular:      Rate and Rhythm: Normal rate and regular rhythm. Heart sounds: Normal heart sounds. Pulmonary:      Effort: Pulmonary effort is normal.      Breath sounds: Normal breath sounds. Abdominal:      General: Bowel sounds are normal.      Palpations: Abdomen is soft. Musculoskeletal:         General: Normal range of motion. Cervical back: Normal range of motion and neck supple. Skin:     General: Skin is warm and dry. Neurological:      Mental Status: He is alert and oriented to person, place, and time. GCS: GCS eye subscore is 4. GCS verbal subscore is 5. GCS motor subscore is 6. Psychiatric:         Behavior: Behavior normal.         Thought Content: Thought content normal.         Judgment: Judgment normal.         MDM:    Labs Reviewed   CBC WITH AUTO DIFFERENTIAL - Abnormal; Notable for the following components:       Result Value    RBC 4.46 (*)     Hematocrit 40.8 (*)     MPV 11.5 (*)     Monocytes % 10.4 (*)     All other components within normal limits   BASIC METABOLIC PANEL - Abnormal; Notable for the following components:    CO2 34 (*)     Anion Gap 1 (*)     Glucose 304 (*)     All other components within normal limits   TROPONIN       XR CHEST PORTABLE   Final Result   No acute abnormality visualized. EKG shows NSR with acute ST or T changes    Blood pressure is not critical high. Patient was told to take his evening medication  I have discussed with the patient my clinical impression and the result of the patient's current clinical evaluation for their presentation. In addition we discussed the risk and benefits of further testing and hospitalization. I discussed candidly with the patient  and the patient  was allowed to provide input as to their thoughts concerning the current presentation.   Although the risk of progression or development of new more serious signs and symptoms cannot be excluded the patient believes that further testing or hospitalization may not be as beneficial as self observation. I will work with the patient to optimize this choice. My typical dicussion, presentation,and considerations for this patients' chief complaint, diagnosis, and differential diagnosis have been considered and discussed. I have stressed need for follow up and reexamination for this encounter. I have discussed my clinical impression and the results of the current evaluation. Patient was not prescribed medication. Final Impression    1.  Elevated blood pressure reading              Corey Rodriguez DO  09/05/21 [id number]

## 2021-10-12 PROBLEM — C15.5 MALIGNANT NEOPLASM OF LOWER THIRD OF ESOPHAGUS (HCC): Status: ACTIVE | Noted: 2021-01-01

## 2021-10-12 NOTE — PROGRESS NOTES
Patient Name:  Harsh Tamez  Patient :  1970  Patient MRN:  [id number]     Primary Oncologist: Melanie Pulido MD  Referring Provider: Katty Siddiqi DO     Date of Service: 10/12/2021      Reason for Consult:  Esophageal cancer. Chief Complaint:    Chief Complaint   Patient presents with   [facility] Patient       Encounter Diagnosis   Name Primary?  Malignant neoplasm of lower third of esophagus ([address]         HPI:   10/12/21: He arrived alone to the clinic today. Reported that he has been having heartburn for about 3 months, was started on Protonix. Was also having difficulty swallowing. Denied any odynophagia. Substernal chest pain. Denied any increase shortness of breath, fever, cough. Denied any abdominal pain, nausea, vomiting, diarrhea, constipation. No bleeding. Reported weight loss of about 13 pounds. He subsequently had CT scan of the chest abdomen pelvisOn 2021 which revealed right lower lobe pulmonary nodules which have been unchanged since 7 May 2021 also left lower pulmonary nodule which was also unchanged except for small pleural-based nodules in the posterior left lower lung measuring about 8 mm in transverse dimension were new. Slowly enlarging 2.1 cm paraesophageal lymph node the level of the small sliding gastric hiatal hernia. Circumferential wall thickening of the distal esophagus. EGD on 10/6/2021 revealed an infiltrative ulcerated necrotic 7 [place] nonbleeding 10 cm mass of malignant appearance was found in the lower third of the esophagus. This mass caused partial obstruction. Gastric biopsy was benign. H. pylori not identified. 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 HER-2 by IHC was negative. 2021 CBC within normal limits. BMP with creatinine 1.1 and calcium 9.3 glucose was high at 304    Past Medical History:     Hypertension, diabetes mellitus, hyperlipidemia CAD, GOPI, asthma, (LIPITOR) 40 MG tablet Take 40 mg by mouth nightly       SITagliptin (JANUVIA) 100 MG tablet Take 100 mg by mouth nightly       terazosin (HYTRIN) 2 MG capsule Take 4 mg by mouth nightly       carvedilol (COREG) 12.5 MG tablet Take 2 tablets by mouth 2 times daily (with meals). (Patient taking differently: Take 12.5 mg by mouth 2 times daily (with meals) Take one tablet by mouth two times a day with food.) 60 tablet 0    zonisamide (ZONEGRAN) 100 MG capsule Take 400 mg by mouth nightly       omega-3 acid ethyl esters (LOVAZA) 1 G capsule Take 1 g by mouth daily       albuterol (PROVENTIL HFA) 108 (90 BASE) MCG/ACT inhaler Inhale 2 puffs into the lungs every 6 hours as needed for Wheezing or Shortness of Breath. 1 Inhaler 0    aspirin 81 MG EC tablet Take 81 mg by mouth daily. No current facility-administered medications on file prior to visit. Review of Systems:    Constitutional:  No fever, No chills, No night sweats. Energy level fair  Eyes:  No diplopia, No transient or permanent loss of vision, No scotomata. ENT / Mouth:  No epistaxis, No dysphagia, No hoarseness, No oral ulcers, No gingival bleeding. No sore throat, No postnasal drip, No nasal drip, No mouth pain, No sinus pain, No tinnitus, Normal hearing. Cardiovascular:  No chest pain, No palpitations, No syncope, No upper extremity edema, No lower extremity edema, No calf discomfort. Respiratory:  No cough. No hemoptysis, No pleurisy, No wheezing, No dyspnea. Gastrointestinal:  as per HPI  Urinary:  No dysuria, No hematuria, No urinary incontinence. Musculoskeletal:  No muscle pain, No swollen joints, No joint redness, No bone pain, No spine tenderness. Skin:  No rash, No nodules, No pruritus, No lesions. Neurologic:  No confusion, No seizures, No syncope, No tremor, No speech change, No headache, No hiccups, No abnormal gait, No sensory changes, No weakness.   Psychiatric:  No depression, No anxiety, Concentration normal.  Endocrine:  No polyuria, No polydipsia, No hot flashes, No thyroid symptoms. Hematologic:  No epistaxis, No gingival bleeding, No petechiae, No ecchymosis. Lymphatic:  No lymphadenopathy, No lymphedema. Allergy / Immunologic:  No eczema, No frequent mucous infections, No frequent respiratory infections, No recurrent urticarial, No frequent skin infections.      Vital Signs: /60 (Site: Right Upper Arm, Position: Sitting, Cuff Size: Large Adult)   Pulse 88   Temp 98.9 °F (37.2 °C) (Infrared)   Ht 5' 10.5\" (1.791 m)   Wt 278 lb 3.2 oz (126.2 kg)   SpO2 98%   BMI 39.35 kg/m²      CONSTITUTIONAL: awake, alert, cooperative, no apparent distress , obese  EYES: ROSALIO, No pallor or any icterus  ENT: ATNC  NECK: No JVD  HEMATOLOGIC/LYMPHATIC: no cervical, supraclavicular or axillary lymphadenopathy   LUNGS: CTAB  CARDIOVASCULAR: s1s2 rrr no murmurs  ABDOMEN: soft ntnd bs pos  MUSCULOSKELETAL: full range of motion noted, tone is normal  NEUROLOGIC: GI  SKIN: No rash  EXTREMITIES: no LE edema bilaterally     Labs:  Hematology:  Lab Results   Component Value Date    WBC 9.1 09/05/2021    RBC 4.46 (L) 09/05/2021    HGB 13.8 09/05/2021    HCT 40.8 (L) 09/05/2021    MCV 91.5 09/05/2021    MCH 30.9 09/05/2021    MCHC 33.8 09/05/2021    RDW 12.4 09/05/2021     09/05/2021    MPV 11.5 (H) 09/05/2021    SEGSPCT 61.8 09/05/2021    EOSRELPCT 1.9 09/05/2021    BASOPCT 0.7 09/05/2021    LYMPHOPCT 24.8 09/05/2021    MONOPCT 10.4 (H) 09/05/2021    SEGSABS 5.7 09/05/2021    EOSABS 0.2 09/05/2021    BASOSABS 0.1 09/05/2021    LYMPHSABS 2.3 09/05/2021    MONOSABS 1.0 09/05/2021    DIFFTYPE AUTOMATED DIFFERENTIAL 09/05/2021     Lab Results   Component Value Date    ESR 21 (H) 09/27/2016     Chemistry:  Lab Results   Component Value Date     09/05/2021    K 4.0 09/05/2021     09/05/2021    CO2 34 (H) 09/05/2021    BUN 18 09/05/2021    CREATININE 1.1 09/05/2021    GLUCOSE 304 (H) 09/05/2021    CALCIUM 9.3 09/05/2021    PROT 6.6 05/23/2021    LABALBU 4.0 05/23/2021    BILITOT 0.2 05/23/2021    ALKPHOS 102 05/23/2021    AST 13 (L) 05/23/2021    ALT 13 05/23/2021    LABGLOM >60 09/05/2021    GFRAA >60 09/05/2021    PHOS 2.1 (L) 07/30/2021    MG 1.6 (L) 07/30/2021    POCGLU 312 (H) 07/30/2021     Lab Results   Component Value Date    HOMOCYSTEINE 13.0 03/15/2014    HOMOCYSTEINE  03/15/2014     CORRECTED ON 03/17 AT 0606: PREVIOUSLY REPORTED AS: 8.0 Risk of    HOMOCYSTEINE  03/15/2014      vascular disease  increases above 9 umol/L and is significant    HOMOCYSTEINE  >15 umol/L. 03/15/2014     No components found for: LD  Lab Results   Component Value Date    TSHHS 2.110 08/17/2016    T4FREE 0.91 05/15/2013     Immunology:  Lab Results   Component Value Date    PROT 6.6 05/23/2021     No results found for: Stewart Haggis, KLFLCR  No results found for: B2M  Coagulation Panel:  Lab Results   Component Value Date    PROTIME 11.5 05/30/2018    INR 1.01 05/30/2018    APTT 26.0 05/30/2018    DDIMER 89 08/17/2016     Anemia Panel:  Lab Results   Component Value Date    PMTUOVAJ33 359 04/18/2011    FOLATE 9.9 04/18/2011     Tumor Markers:  Lab Results   Component Value Date    PSA 0.8 03/07/2016        Observations:  ECOG:  PHQ-9 Total Score: 0 (10/12/2021  1:42 PM)       Assessment & Plan:                                                          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 Pulmonary nodule seems to be stable but possible new pleural-based nodule. PET scan recommended. Will refer to CT surgery and also radiation oncology and plan for concomitant chemoradiation with weekly carbotaxol if PET scan without any evidence of metastatic disease. Discussed adverse effects of carboplatinum and Taxol. OCM requested. Discussed port placement.     Acid reflux, Protonix and sucralfate prescribed    Continue other medical care.    Thank you for letting us be part of the care and will follow along. Discussed above findings and plan with him and he voiced understanding. Answered all his questions. Discussed healthy lifestyle including healthy diet, regular exercise as tolerated. Also discussed importance of being up-to-date with age-appropriate screening tools. Recommend follow-up with primary care physician and other specialists. Please do not hesitate to contact us if you need further information. Return to clinic November 10, 2021 or earlier if new Sx    I have recommended that the patient follow CDC guidelines for prevention of COVID-19 infection. Received COVID vaccine and flu vaccine.     [address]

## 2021-10-12 NOTE — PROGRESS NOTES
MA Rooming Questions  Patient: Rebeca Xiong  MRN: [id number]    Date: 10/12/2021        NP    5. Did the patient have a depression screening completed today?  Yes    PHQ-9 Total Score: 0 (10/12/2021  1:42 PM)       PHQ-9 Given to (if applicable):               PHQ-9 Score (if applicable):                     [] Positive     [x]  Negative              Does question #9 need addressed (if applicable)                     [] Yes    []  No               Gerber Curran CMA

## 2021-10-13 PROBLEM — K21.00 GASTROESOPHAGEAL REFLUX DISEASE WITH ESOPHAGITIS WITHOUT HEMORRHAGE: Status: ACTIVE | Noted: 2021-01-01

## 2021-10-20 PROBLEM — F17.200 TOBACCO DEPENDENCE: Status: ACTIVE | Noted: 2021-01-01

## 2021-10-20 NOTE — CARE COORDINATION
Patient contacted regarding recent discharge and COVID-19 risk   Ambulatory Care Manager contacted the patient by telephone to perform post discharge assessment. Verified name and  with patient as identifiers. Patient has following risk factors of: heart failure and COPD. CTN/ACM reviewed discharge instructions, medical action plan and red flags related to discharge diagnosis. Reviewed and educated them on any new and changed medications related to discharge diagnosis. Advised obtaining a 90-day supply of all daily and as-needed medications. Education provided regarding infection prevention, and signs and symptoms of COVID-19 and when to seek medical attention with patient who verbalized understanding. Discussed exposure protocols and quarantine from [address] you at higher risk for severe illness  and given an opportunity for questions and concerns. The patient agrees to contact the COVID-19 hotline [phone] or PCP office for questions related to their healthcare. CTN/ACM provided contact information for future reference. From CDC: Are you at higher risk for severe illness?  Wash your hands often.  Avoid close contact (6 feet, which is about two arm lengths) with people who are sick.  Put distance between yourself and other people if COVID-19 is spreading in your community.  Clean and disinfect frequently touched surfaces.  Avoid all cruise travel and non-essential air travel.  Call your healthcare professional if you have concerns about COVID-19 and your underlying condition or if you are sick. For more information on steps you can take to protect yourself, see CDC's How to [address] with pt, pt confirms some improvement in symptoms, he is going today to  prescriptions ordered at discharge. Verbalizes a plan to f/u with PCP. Due to no evidence of respiratory concerns will plan for follow up in 14 days.   Mich Lawler RN  Ambulatory Care none

## 2021-10-21 NOTE — PROGRESS NOTES
Diego Ontiveros  10/21/2021    CONSULT     Vitals:    10/21/21 0958   BP: 125/70   Pulse: 85   Resp: 16   Temp: 98.1 °F (36.7 °C)   SpO2: 95%        Oxygen Therapy  SpO2: 95 %  Pulse Oximeter Device Mode: Continuous  Pulse Oximeter Device Location: Left, Finger  O2 Device: None (Room air)  Skin Assessment: Clean, dry, & intact    Wt Readings from Last 3 Encounters:   10/21/21 278 lb (126.1 kg)   10/20/21 281 lb (127.5 kg)   10/12/21 278 lb 3.2 oz (126.2 kg)           Denies Need for Intervention    Fall Risk:    Not currently a fall risk  Re-Evaluate Weekly    Nutritional Alteration:  None  No Difficulties Swallowing  Voices Sufficient Oral Intake    Mediport: no    Pacemaker/ICD: no    Implants: yes, Heart Stent    Previous XRT: no    Assessment:  NP here for consult of Esophageal Cancer to discuss Radiation options.        Past Medical History:   Diagnosis Date    Arthritis     Asthma     Atrial fibrillation (HCC)     CHF (congestive heart failure) (HCC)     [facility]     Depression     Diabetes mellitus ([facility]     Type 2    Diastolic heart failure (HCC)     GERD (gastroesophageal reflux disease)     Hyperlipidemia     Hypertension     Kidney stone     Migraine     Other disorders of kidney and ureter     Pneumonia     Psychiatric problem     Sleep apnea     Unspecified cerebral artery occlusion with cerebral infarction     \"10% loss of use of right arm and leg\"       Past Surgical History:   Procedure Laterality Date    ABDOMEN SURGERY      ASD REPAIR  2005    ASD REPAIR      [place]  January 2012    [place]    CARDIAC SURGERY  AUG 2013    REVEAL XT MONITOR #[id number]    CARPAL TUNNEL RELEASE      jeri    CHOLECYSTECTOMY      COLONOSCOPY      CYSTOSCOPY INSERTION / REMOVAL STENT / STONE Right 10/3/2020    CYSTOSCOPY RETROGRADE PYELOGRAM STENT INSERTION performed by Jim Willett MD at [address] [place] [address],[address] Rfl: 0    sucralfate (CARAFATE) 1 GM/10ML suspension, Take 10 mLs by mouth 4 times daily, Disp: 1200 mL, Rfl: 3    acarbose (PRECOSE) 50 MG tablet, Take 50 mg by mouth 3 times daily (with meals) , Disp: , Rfl:     SITagliptin (JANUVIA) 100 MG tablet, Take 100 mg by mouth nightly , Disp: , Rfl:     omega-3 acid ethyl esters (LOVAZA) 1 G capsule, Take 1 g by mouth daily , Disp: , Rfl:     aspirin 81 MG EC tablet, Take 81 mg by mouth daily. , Disp: , Rfl:         Electronically signed by Abhilash Conte CMA on 10/21/2021 at 10:01 AM

## 2021-10-21 NOTE — PLAN OF CARE
Radiation education and handouts given. Side effects and management reviewed with pt. All questions answered and pt voices understanding . Nursing Care Plan for Esophageal  Radiation  Pain related to cancer diagnosis and treatment. Interventions   Pain assessment. Monitor pharmacological pain medication. Teach relaxation and repositioning techniques. Expected Outcome   Maintain patient's acceptable pain level or less than 5 on pain scale. Knowledge deficit related to diagnosis and treatment plan. Interventions   Assess patient's ability to comprehend diagnosis and treatment plan. Provide educational materials and teaching regarding plan of care. Provide emotional support and continued education. Refer to psychosocial coordinator if further assistance needed. Expected Outcome   Patient voices understanding of diagnosis and treatment plan. Altered skin integrity related to treatment. Interventions   Evaluation of skin integrity at therapy site. Advise patient on appropriate skin care. Instruct patient on recommended lotions/ointments/creams/dressing changes to use on therapy site. Expected Outcome   Minimize adverse skin reaction/infection at treatment site. Altered nutritional status due to treatment process. Interventions   Initial nutritional assessment. Assess weight and intake during treatment. Management of treatment side effects. Refer to nutritional class/evaluation. Expected Outcome   Patient's weight loss <10% from initial assessment. Gastrointestinal disturbances due to treatment process. Interventions   Evaluate for treatment side effects. Evaluate treatment modalities for effectiveness. Initiate and educate on appropriate bowel program if needed. Expected Outcome   Patient with minimal bowel complications and controlled management of side effects.        Altered respiratory status related to diagnosis and treatment. Interventions   Assess respiratory status, note changes. Educate patient on symptoms to report to staff. Refer to smoking cessation program if needed. Expected Outcome   No respiratory complications, patient with SPO2 >90% or equal to baseline. To re-evaluate weekly and one month post radiation treatments.

## 2021-10-21 NOTE — CONSULTS
Radiation Oncology Consultation  Encounter Date: 10/21/2021 12:05 PM        Mr. Lorene Walters is a 46 y.o. male  : 1970  MRN: [id number]  Acct Number: [de-identified]  Requesting Provider: Dr. 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 male with the above referenced diagnosis. Presented with symptoms of heartburn not improved with PPI    CT Abd/Pelvis - 21  Narrative   EXAMINATION:   CT OF THE ABDOMEN AND PELVIS WITH CONTRAST 2021 5:04 pm       TECHNIQUE:   CT of the abdomen and pelvis was performed with the administration of   intravenous contrast. Multiplanar reformatted images are provided for review.    Dose modulation, iterative reconstruction, and/or weight based adjustment of   the mA/kV was utilized to reduce the radiation dose to as low as reasonably   achievable.       COMPARISON:   CT abdomen pelvis May 23, 2021; CT abdomen pelvis 2020       HISTORY:   ORDERING SYSTEM PROVIDED HISTORY: abdominal pain, poor appetite, tender over   epigastric area and RLQ   TECHNOLOGIST PROVIDED HISTORY:   Reason for exam:->abdominal pain, poor appetite, tender over epigastric area   and RLQ   Additional Contrast?->None   Decision Support Exception - unselect if not a suspected or confirmed   emergency medical condition->Emergency Medical Condition (MA)   Reason for Exam: abdominal pain, poor appetite, tender over epigastric area   and RLQ   Acuity: Acute   Type of Exam: Initial       FINDINGS:   Lower Chest:  Visualized portion of the lower chest demonstrates no acute   abnormality.       Organs: The liver demonstrates no acute findings.  Postoperative changes of   cholecystectomy redemonstrated. Rexine Fresh stable appearance of small fluid   collection at the gallbladder fossa when compared with previous exams which   again may reflect a small seroma or biloma.  The spleen, pancreas, and   bilateral adrenal glands demonstrate no acute findings.  The kidneys enhance   symmetrically.  Numerous nonobstructing bilateral renal stones redemonstrated   as noted on previous exams.  The largest stones measure up to approximately 7   mm on the left and approximately 8-9 mm on the right.  No obstructive   uropathy identified.  No hydronephrosis evident.  The ureters are normal in   caliber.       GI/Bowel: No bowel obstruction is identified.  The appendix is visualized and   is normal in caliber with no evidence for appendicitis.       Pelvis: The bladder and solid pelvic organs demonstrate no acute abnormality.       Peritoneum/Retroperitoneum: The abdominal aorta is normal in caliber. Enlarged 1.7 cm node at the distal esophagus (image 41 series 2).  Prominent   periportal nodes are also present which are nonspecific.  Correlate   clinically for any known cancer history.  No free fluid or free air   identified within the abdomen and pelvis.       Bones/Soft Tissues: No acute osseous or soft tissue findings.      Subsequent EGD and biopsy as above  PET/CT to complete staging scheduled 10/28/21    Denies chest pain, dyspnea, constipation/diarrhea, nausea/vomiting, melena or BRBPR      Past Medical History:   Diagnosis Date    Arthritis     Asthma     Atrial fibrillation ([address]     CHF (congestive heart failure) (HCC)     COPD (chronic obstructive pulmonary disease) ([address]     Depression     Diabetes mellitus ([address]     Type 2    Diastolic heart failure (HCC)     GERD (gastroesophageal reflux disease)     Hyperlipidemia     Hypertension     Kidney stone     Migraine     Other disorders of kidney and ureter     Pneumonia     Psychiatric problem     Sleep apnea     Unspecified cerebral artery occlusion with cerebral infarction     \"10% loss of use of right arm and leg\"        Past Surgical History:   Procedure Laterality Date    ABDOMEN SURGERY      ASD REPAIR  2005    ASD REPAIR      BACK SURGERY  01/2012    [facility] CARDIAC SURGERY  08/2013    REVEAL XT MONITOR #[id number]    CARPAL TUNNEL RELEASE      jeri    CHOLECYSTECTOMY      COLONOSCOPY      CYSTOSCOPY INSERTION / REMOVAL STENT / STONE Right 10/03/2020    CYSTOSCOPY RETROGRADE PYELOGRAM STENT INSERTION performed by Krystal Roblero MD at [address] [facility]      bilateral    OTHER SURGICAL HISTORY  02/16/2017    I & D mid upper back    PERICARDIUM SURGERY  2005    post ASD repair with window    UPPER GASTROINTESTINAL ENDOSCOPY  10/09/2021    with biopsy       Family History   Problem Relation Age of Onset    Diabetes Mother     Other Mother     Alzheimer's Disease Mother     Diabetes Father     Heart Disease Father     Diabetes Paternal Uncle     Diabetes Maternal Grandmother     Diabetes Maternal Grandfather     Diabetes Paternal Grandmother     Diabetes Paternal Grandfather     Cancer Sister        Social History     Socioeconomic History    Marital status: Single     Spouse name: Not on file    Number of children: 3    Years of education: Not on file    Highest education level: Not on file   Occupational History    Not on file   Tobacco Use    Smoking status: Current Every Day Smoker     Packs/day: 0.25     Years: 0.00     Pack years: 0.00     Types: Cigarettes    Smokeless tobacco: Never Used   Vaping Use    Vaping Use: Never used   Substance and Sexual Activity    Alcohol use: Not Currently     Comment: once in while    Drug use: No    Sexual activity: Not Currently   Other Topics Concern    Not on file   Social History Narrative    unemployed     Social Determinants of Health     Financial Resource Strain:     Difficulty of Paying Living Expenses:    Food Insecurity:     Worried About [address] in the Last Year:     [address] in the Last Year:    Transportation Needs:     Lack of Transportation (Medical):  Lack of Transportation (Non-Medical):    Physical Activity:     Days of Exercise per Week:     Minutes of Exercise per Session:    Stress:     Feeling of Stress :    Social Connections:     Frequency of Communication with Friends and Family:     Frequency of Social Gatherings with Friends and Family:     Attends [employer] Services:     Active Member of Clubs or Organizations:     Attends Club or Organization Meetings:     Marital Status:    Intimate Partner Violence:     Fear of Current or Ex-Partner:     Emotionally Abused:     Physically Abused:     Sexually Abused:         ALLERGIES:   Allergies   Allergen Reactions    Azithromycin     Ciprofloxacin Other (See Comments)     lethargic       Current Outpatient Medications:     rizatriptan (MAXALT) 10 MG tablet, Take 10 mg by mouth once as needed for Migraine May repeat in 2 hours if needed, Disp: , Rfl:     insulin regular (HUMULIN R;NOVOLIN R) 100 UNIT/ML injection, Inject into the skin See Admin Instructions Sliding Scale, Disp: , Rfl:     pantoprazole (PROTONIX) 40 MG tablet, Take 1 tablet by mouth daily, Disp: 30 tablet, Rfl: 5    sucralfate (CARAFATE) 1 GM/10ML suspension, Take 10 mLs by mouth 4 times daily, Disp: 1200 mL, Rfl: 3    pioglitazone (ACTOS) 45 MG tablet, Take 45 mg by mouth daily, Disp: , Rfl:     oxyCODONE-acetaminophen (PERCOCET) 5-325 MG per tablet, , Disp: , Rfl:     clopidogrel (PLAVIX) 75 MG tablet, Take 75 mg by mouth daily, Disp: , Rfl:     CPAP Machine MISC, 10 cm by CPAP route nightly, Disp: , Rfl:     acarbose (PRECOSE) 50 MG tablet, Take 50 mg by mouth 3 times daily (with meals) , Disp: , Rfl:     losartan (COZAAR) 100 MG tablet, Take 100 mg by mouth daily Takes at night, Disp: , Rfl:     metFORMIN (GLUCOPHAGE-XR) 500 MG extended release tablet, 1,000 mg 2 times daily , Disp: , Rfl: 6    sertraline (ZOLOFT) 100 MG tablet, 100 mg daily , Disp: , Rfl: 6    traZODone (DESYREL) 50 MG tablet, Take 100 mg by mouth nightly , Disp: , Rfl:     nitroGLYCERIN (NITROSTAT) 0.4 MG SL tablet, up to max of 3 total doses. If no relief after 1 dose, call 911. (Patient taking differently: as needed up to max of 3 total doses. If no relief after 1 dose, call 911.), Disp: 25 tablet, Rfl: 3    atorvastatin (LIPITOR) 40 MG tablet, Take 40 mg by mouth nightly , Disp: , Rfl:     SITagliptin (JANUVIA) 100 MG tablet, Take 100 mg by mouth nightly , Disp: , Rfl:     terazosin (HYTRIN) 2 MG capsule, Take 4 mg by mouth nightly , Disp: , Rfl:     carvedilol (COREG) 12.5 MG tablet, Take 2 tablets by mouth 2 times daily (with meals). (Patient taking differently: Take 12.5 mg by mouth 2 times daily (with meals) Take one tablet by mouth two times a day with food.), Disp: 60 tablet, Rfl: 0    zonisamide (ZONEGRAN) 100 MG capsule, Take 400 mg by mouth nightly , Disp: , Rfl:     omega-3 acid ethyl esters (LOVAZA) 1 G capsule, Take 1 g by mouth daily , Disp: , Rfl:     albuterol (PROVENTIL HFA) 108 (90 BASE) MCG/ACT inhaler, Inhale 2 puffs into the lungs every 6 hours as needed for Wheezing or Shortness of Breath., Disp: 1 Inhaler, Rfl: 0    aspirin 81 MG EC tablet, Take 81 mg by mouth daily. , Disp: , Rfl:        REVIEW OF SYSTEMS:   Pertinents as in HPI, the remainder of the 14-point ROS is otherwise negative and has been signed and included in the medical record.     PHYSICAL EXAMINATION:   VITAL SIGNS: /70   Pulse 85   Temp 98.1 °F (36.7 °C) (Infrared)   Resp 16   Ht 5' 10.5\" (1.791 m)   Wt 278 lb (126.1 kg)   SpO2 95%   BMI 39.33 kg/m²   KARNOFSKY PERFORMANCE STATUS: 90 induced liver disease, long-term risks of fibrosis, adhesions, bowel obstruction, fistula formation, spinal cord myelopathy, and risk of secondary malignancy. Final plan pending stage  Staging pending PET/CT    RTC after PET/CT    Thank-you for allowing me to participate in the care of this patient and please do not hesitate to contact me for any questions or concerns. MEDICAL DECISION MAKING    Number/Complexity of Problems Addressed  [] 1 self-limited of minor problem (45744/38538)  [] >=2 self-limited or minor problems, 1 stable chronic illness, 1 acute/uncomplicated illness/injury (76586/31429)  [x]Chronic illnesses with exacerbation/progression/side effects of treatment, >=2 stable chronic illnesses, 1 undiagnosed new problem with uncertain prognosis, 1 acute illness with systemic symptoms, 1 acute complicated injury (39249/25238)  []Chronic illnesses with severe exacerbation/progression/treatment side effects, acute or chronic illness or injury that poses a threat to life or bodily function (92941/95863)    Amount and/or Complexity of Data Reviewed  [] Minimal or none (69177/44183)  [] Limited - meets 1/2 categories (82855/18576)  1. At least 2 of: review of prior external notes from each unique source, review results of each unique test, ordering of each unique test  2. Assessment requiring an independent historian  [] Moderate - meets 1/3 categories (61576/27889)  1. Any 3 of: Review of prior external notes from each unique source, review results of each unique test, ordering of each unique test, assessment requiring an independent historian  2. Independent interpretation of tests  3. Discussion of management or test interpretation  [x] Extensive - meets 2/3 categories (47006/91133)  1. Any 3 of: Review of prior external notes from each unique source, review results of each unique test, ordering of each unique test, assessment requiring an independent historian  2.  Independent interpretation of tests  3.  Discussion of management or test interpretation    Risk of complications and/or morbidity/mortality of patient management  [] Minimal (49196/66736)  [] Low (71368/11810)  [] Moderate (90676/67267)  Examples: Rx drug management, decision regarding minor surgery with identified patient or procedure risk factors, decision regarding elective major surgery without identified patient or procedure risk factors, diagnosis or treatment significantly limited by social determinants of health  [] High (70918/78052)  Examples: drug therapy requiring intensive monitoring for toxicity, decision regarding elective major surgery with identified patient/procedure risk factors, decision regarding emergency major surgery, decision regarding hospitalization, decision for DNR or de-escalation of care because of poor prognosis    LEVEL OF MDM (based on 2/3 above categories)  [] Straightforward (22170/22151)  [] Low (19261/00301)  [] Moderate (06614/20426)  [x] High (68004/55874)      Electronically signed by Electronically signed by Tigist Freed MD on 10/21/2021 at 12:05 PM
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
## 2021-10-29 NOTE — PROGRESS NOTES
MA Rooming Questions  Patient: Lou Snow  MRN: [id number]    Date: 10/29/2021        1. Do you have any new issues?   no         2. Do you need any refills on medications?    no    3. Have you had any imaging done since your last visit?   no    4. Have you been hospitalized or seen in the emergency room since your last visit here?   no    5. Did the patient have a depression screening completed today?  No    No data recorded     PHQ-9 Given to (if applicable):               PHQ-9 Score (if applicable):                     [] Positive     []  Negative              Does question #9 need addressed (if applicable)                     [] Yes    []  No               Tea Drummond MA

## 2021-11-03 NOTE — TELEPHONE ENCOUNTER
Called pt to give him his education date for 11/15 @ 9:00 & tx start date for 11/16 @ 9:30; pt voiced understanding.

## 2021-11-05 NOTE — TELEPHONE ENCOUNTER
Kayley Molina at [facility] called and lvm that she needs to know the staging for this patient cancer.

## 2021-11-09 NOTE — TELEPHONE ENCOUNTER
Returned call to pt. He stated that he went to the hospital last night due to the pain. They gave him a shot of morphine and one percocet and released him. The ER doctor was going to write a prescription for pain medication because the patient stated that he didn't take any pain medications at home. However, when checking his OARRS, the doctor became aware of active scripts for percocet (see ER note). The ER doctor confronted him on this and he stated that the other prescription was lost when he was cleaning his room. The doctor explained that he could not write additional scripts for pain medication.

## 2021-11-09 NOTE — ED NOTES
Discharge paperwork reviewed with Pt, all questions answered.  Pt ambulated to lina Parra RN  11/09/21 4851

## 2021-11-09 NOTE — ED TRIAGE NOTES
Pt in via triage with c/o abdominal pain, has stated about 10am this morning. Pt was diagnosed with esophageal cancer last month and he knows he has a mass where the pain is but is getting worse all day. Pt is alert and oriented.

## 2021-11-09 NOTE — TELEPHONE ENCOUNTER
Patient called and LVM that he is having increase pain in the area where the cancer is located. Would like a call back.

## 2021-11-09 NOTE — CARE COORDINATION
Pt is to start both Chemo and Radiation Therapy next week. He requires transportation that is provided through the [facility] program [address] [place] [address] Road @ [phone]. [facility] has arranged Pt's transportation through December 15th which is the latest they would schedule. His remaining December rides can be scheduled after Thanksgiving. Pt was informed by [facility] and written information will be provided. Pt plans to self-transport for his treatment planning on Monday.

## 2021-11-09 NOTE — CARE COORDINATION
PT discharged to waiting room pt came up to desk requesting a ride home to [place]. Pt lives at home alone, no one to pick him up. CM requested to assist pt. CM contacted [employer], # [phone], looking for available  then will call back   [facility] for transport 30 Min, voucher completed for pt transport home.  VLADIMIR,RN/CM

## 2021-11-09 NOTE — ED PROVIDER NOTES
EMERGENCY DEPARTMENT ENCOUNTER    Patient: Odin Reyes  MRN: [id number]  : 1970  Date of Evaluation: 2021  ED Provider:  Nat Brittle, MD    CHIEF COMPLAINT  Chief Complaint   Patient presents with    Abdominal Pain       HPI  Odin Reyes is a 46 y.o. male who was diagnosed with esophageal cancer earlier this year who presents with complaints of moderate to severe, constant sharp and nonradiating epigastric abdominal pain with onset earlier this morning. Does not radiate elsewhere. No known modifying factors. Denies any other associated symptoms or complaints or concerns.     REVIEW OF SYSTEMS    I have reviewed the nursing notes    Constitutional: negative for fever, chills, weight change, change in appetite  Neurological: negative for HA, lightheadedness, numbness, weakness  Ophthalmic: negative for vision change  ENT: negative for congestion, runny nose, sore throat  Cardiovascular: negative for chest pain, palpitations  Respiratory: negative for SOB, cough  GI: negative for nausea, vomiting, diarrhea, constipation, hematemesis, hematochezia, melena   : negative for dysuria, hematuria, changes in urinary frequency  Musculoskeletal: negative for myalgias, decreased ROM, joint swelling  Dermatological: negative for rash, pruritis  Endocrine: negative for temperature intolerance, diaphoresis  Hematological: negative for anemia, bleeding      PAST MEDICAL HISTORY  Past Medical History:   Diagnosis Date    Arthritis     Asthma     Atrial fibrillation (HCC)     [facility]     CHF (congestive heart failure) (HCC)     COPD [facility]ch[facility] obstructive [facility] disease[facility]     Depression     Diabetes mellitus (HCC)     Type 2    Diastolic heart failure (HCC)     Esophageal cancer (HCC)     GERD (gastroesophageal reflux disease)     Hyperlipidemia     Hypertension     Kidney stone     Migraine     Other disorders of kidney and ureter     Pneumonia     Psychiatric problem     Sleep Currently   Other Topics Concern    None   Social History Narrative    unemployed     Social Determinants of Health     Financial Resource Strain:     Difficulty of Paying Living Expenses: Not on file   Food Insecurity:     Worried About [address] in the Last Year: Not on file    Maria Guadalupe of Food in the Last Year: Not on file   Transportation Needs:     Lack of Transportation (Medical): Not on file    Lack of Transportation (Non-Medical): Not on file   Physical Activity:     Days of Exercise per Week: Not on file    Minutes of Exercise per Session: Not on file   Stress:     Feeling of Stress : Not on file   Social Connections:     Frequency of Communication with Friends and Family: Not on file    Frequency of Social Gatherings with Friends and Family: Not on file    Attends Judaism Services: Not on file    Active Member of [address] [place] [address] or Organizations: Not on file    Attends Club or Organization Meetings: Not on file    Marital Status: Not on file   Intimate Partner Violence:     Fear of Current or Ex-Partner: Not on file    Emotionally Abused: Not on file    Physically Abused: Not on file    Sexually Abused: Not on file   Housing Stability:     Unable to Pay for Housing in the Last Year: Not on file    Number of [place] in the Last Year: Not on file    Unstable Housing in the Last Year: Not on file         **Past medical, family and social histories reviewed and verified by me**      PHYSICAL EXAM  VITAL SIGNS:   ED Triage Vitals [11/08/21 1838]   Enc Vitals Group      BP (!) 148/99      Pulse 81      Resp 20      Temp 98.4 °F (36.9 °C)      Temp src       SpO2 95 %      Weight       Height       Head Circumference       Peak Flow       Pain Score       Pain Loc       Pain Edu? Excl. in [address]? Vitals during ED course were reviewed and are as charted.     Constitutional: Minimal distress, Non-toxic appearance    Eyes:  Conjunctiva normal, No discharge    HENT: Normocephalic, Atraumatic, Bilateral external ears normal, posterior oropharynx is nonerythematous and without exudate, uvula is midline, oropharynx moist    Neck: Supple, no stridor, no grossly visible or palpable masses    Cardiovascular: Regular rate and rhythm, No murmurs, No rubs, No gallops    Pulmonary/Chest:  Normal breath sounds, No respiratory distress or accessory muscle use, No wheezing, crackles or rhonchi. Abdomen: Epigastric abdominal tenderness to palpation without peritoneal signs, otherwise abdomen is soft, nondistended and nonrigid, No tenderness or peritoneal signs elsewhere, No masses, normal bowel sounds    Back:  No midline point tenderness, No paraspinous muscle tenderness.   No CVA tenderness    Extremities:  No gross deformities, no edema, no tenderness    Neurologic:  Normal motor function, Normal sensory function, No focal deficits    Skin:  Warm, Dry, No erythema, No rash, No cyanosis, No mottling    Lymphatic:  No inguinal lymphadenopathy          EKG Interpretation    Interpreted by emergency department physician    Rhythm: normal sinus   Rate: normal  Axis: normal  Ectopy: none  Conduction: normal  ST Segments: normal  T Waves: normal  Q Waves: none    Clinical Impression: Normal EKG        RADIOLOGY/PROCEDURES/LABS/MEDICATIONS ADMINISTERED:    I have reviewed and interpreted all of the currently available lab results from this visit (if applicable):  Results for orders placed or performed during the hospital encounter of 11/08/21   CBC auto diff   Result Value Ref Range    WBC 9.6 4.0 - 10.5 K/CU MM    RBC 4.81 4.6 - 6.2 M/CU MM    Hemoglobin 15.0 13.5 - 18.0 GM/DL    Hematocrit 45.2 42 - 52 %    MCV 94.0 78 - 100 FL    MCH 31.2 (H) 27 - 31 PG    MCHC 33.2 32.0 - 36.0 %    RDW 12.7 11.7 - 14.9 %    Platelets 980 600 - 036 K/CU MM    MPV 11.2 (H) 7.5 - 11.1 FL    Differential Type AUTOMATED DIFFERENTIAL     Segs Relative 62.7 36 - 66 %    Lymphocytes % 24.9 24 - 44 %    Monocytes % 9.3 (H) 0 - 4 %    Eosinophils % 1.8 0 - 3 %    Basophils % 0.9 0 - 1 %    Segs Absolute 6.0 K/CU MM    Lymphocytes Absolute 2.4 K/CU MM    Monocytes Absolute 0.9 K/CU MM    Eosinophils Absolute 0.2 K/CU MM    Basophils Absolute 0.1 K/CU MM    Nucleated RBC % 0.0 %    Total Nucleated RBC 0.0 K/CU MM    Total Immature Neutrophil 0.04 K/CU MM    Immature Neutrophil % 0.4 0 - 0.43 %   CMP   Result Value Ref Range    Sodium 140 135 - 145 MMOL/L    Potassium 3.9 3.5 - 5.1 MMOL/L    Chloride 107 99 - 110 mMol/L    CO2 21 21 - 32 MMOL/L    BUN 12 6 - 23 MG/DL    CREATININE 0.9 0.9 - 1.3 MG/DL    Glucose 122 (H) 70 - 99 MG/DL    Calcium 9.2 8.3 - 10.6 MG/DL    Albumin 4.2 3.4 - 5.0 GM/DL    Total Protein 7.4 6.4 - 8.2 GM/DL    Total Bilirubin 0.5 0.0 - 1.0 MG/DL    ALT 17 10 - 40 U/L    AST 13 (L) 15 - 37 IU/L    Alkaline Phosphatase 95 40 - 129 IU/L    GFR Non-African American >60 >60 mL/min/1.73m2    GFR African American >60 >60 mL/min/1.73m2    Anion Gap 12 4 - 16   Lipase   Result Value Ref Range    Lipase 27 13 - 60 IU/L   Lactic Acid, Plasma   Result Value Ref Range    Lactate 1.1 0.4 - 2.0 mMOL/L   Urinalysis   Result Value Ref Range    Color, UA YELLOW YELLOW    Clarity, UA CLEAR CLEAR    Glucose, Urine NEGATIVE NEGATIVE MG/DL    Bilirubin Urine NEGATIVE NEGATIVE MG/DL    Ketones, Urine NEGATIVE NEGATIVE MG/DL    Specific Gravity, UA 1.013 1.001 - 1.035    Blood, Urine NEGATIVE NEGATIVE    pH, Urine 6.0 5.0 - 8.0    Protein, UA NEGATIVE NEGATIVE MG/DL    Urobilinogen, Urine 2.0 (H) 0.2 - 1.0 MG/DL    Nitrite Urine, Quantitative NEGATIVE NEGATIVE    Leukocyte Esterase, Urine MODERATE (A) NEGATIVE    RBC, UA 3 0 - 3 /HPF    WBC, UA 35 (H) 0 - 2 /HPF    Bacteria, UA NEGATIVE NEGATIVE /HPF    Squam Epithel, UA 1 /HPF    Mucus, UA RARE (A) NEGATIVE HPF    Trichomonas, UA NONE SEEN NONE SEEN /HPF    Hyaline Casts, UA 0 /LPF   Troponin   Result Value Ref Range    Troponin T <0.010 <0.01 NG/ML   EKG 12 Lead   Result Value Ref Range MEDICATIONS ADMINISTERED:  Medications   morphine (PF) injection 4 mg (4 mg IntraVENous Given 11/8/21 2249)   ondansetron (ZOFRAN) injection 4 mg (4 mg IntraVENous Given 11/8/21 2248)   iopamidol (ISOVUE-370) 76 % injection 75 mL (75 mLs IntraVENous Given 11/8/21 6110)   oxyCODONE-acetaminophen (PERCOCET) 7.5-325 MG per tablet 1 tablet (1 tablet Oral Given 11/9/21 0129)         COURSE & MEDICAL DECISION MAKING  Last vitals: BP (!) 151/84   Pulse 81   Temp 98.4 °F (36.9 °C)   Resp 20   SpO2 97%     Patient presented with gastric abdominal pain. Likely associated with his history of esophageal cancer. Differential diagnoses considered included, but were not limited to, acute cholecystitis/cholangitis, acute hepatitis,  Acute pancreatitis, acute coronary syndrome, pulmonary embolism, acute intra-abdominal catastrophe, acute vascular emergency, bowel obstruction, perforated bowel, incarcerated or strangulated hernia, colitis, diverticulitis, ischemic bowel, kidney stone and pyelonephritis. Patient was given the above medications with improvement in symptoms. On repeat examination the abdomen was non-surgical without peritoneal signs. The patient was able to tolerate oral intake. Patient was advised of the changing nature of intra-abdominal pathology and specific signs and symptoms to watch which may signal serious infectious, metabolic or surgical conditions for which immediate return to the ED would be necessary for further diagnosis and treatment. Was going to send the patient home with pain medications, as he had told me multiple times. he did not take anything for pain at home, however when I went to write a prescription for Percocet it was flagged his him currently having an active prescription. His OARRS report also reveals that he had a recent 30-day prescription filled.   I went back and confronted him on this, I asked him once more if he took anything for pain at home and he stated that he did not. I then asked him about this prescription for Percocet and then he did admit that he had had this but alleged that it got lost while he was cleaning his room. I have advised him that I will not be able to refill this. Additional workup and treatment in the ED as documented above. Patient reassured and will be discharged home. I have explained to the patient in appropriate terminology our work-up in the ED and their diagnosis. I have also given anticipatory guidance and expectant management of their condition as an outpatient as per my custom. The patient was given clear discharge and follow-up instructions including return to the ER immediately for worsening concerns. The patient has been advised to follow-up with their primary care physician and/or referred physician in the next two to three days or sooner if worsening and to return to the ER immediately as above with any concerns. I provided the patient counseling with regard to my customary list of strict return precautions as well as return precautions specific to the cause for today's emergency department visit. The patient will return under these provided conditions, but should also return for new concerns or further worsening. Pt and/or family understand and agree with plan. Clinical Impression:  1. Abdominal pain, epigastric        Disposition referral (if applicable):   Jai Melara [facility], [facility] [id number] [place] [address],[address] [id number] [phone]    Schedule an appointment as soon as possible for a visit       [facility] Emergency Department  [address] [phone]    If symptoms worsen      Disposition medications (if applicable):  Discharge Medication List as of 11/9/2021  1:30 AM          ED Provider Disposition Time  DISPOSITION          Electronically signed by: Ciara Cantor M.D., 11/9/2021  2:38 AM    This dictation was created with voice recognition software. While attempts have been made to review the dictation as it is transcribed, on occasion the spoken word can be misinterpreted by the technology leading to omissions or inappropriate words, phrases or sentences.         Gold Garcia MD  11/09/21 5782

## 2021-11-10 NOTE — CARE COORDINATION
Pt referred to [facility] counselor Obey Goodwin, [facility] for supportive counseling services.

## 2021-11-16 NOTE — PROGRESS NOTES
Weekly Radiation Treatment Progress Note    DATE OF SERVICE: 11/16/2021     DIAGNOSIS:  Cancer Staging  Malignant neoplasm of lower third of esophagus (HCC)  Staging form: Esophagus - Adenocarcinoma, AJCC 8th Edition  - Clinical: Stage Unknown (cTX, cN1, cM0) - Unsigned       TREATMENT COURSE:   Oncology History   Malignant neoplasm of lower third of esophagus (HCC)   10/6/2021 Initial Diagnosis    Malignant neoplasm of lower third of esophagus (HCC)    EGD      Pathology  Poorly differentiated signet ring adenocarcinoma       11/16/2021 -  Radiation    Esophagus/Celiac Axis  180 cGy x 25 = 4500 cGy, VAMT, 2 arcs, 6MV photons    Esophagus Boost  180 cGy x 3 = 540 cGy, VAMT, 2 arcs, 6MV photons  Total Dose 5040 cGy         Current Radiation Dose: 180 cGy    Subjective:    Doing well  1st treatment today  No RT complaints    EXAM  There were no vitals filed for this visit.   NAD    Setup images, chart, plan reviewed      A/P:   Tolerating tx well  Continue RT as planned      Electronically signed by Carolin Delaney MD on 11/16/2021 at 10:09 AM

## 2021-11-16 NOTE — PROGRESS NOTES
Patient arrived to treatment suite for blood draw, pre-medications and chemotherapy infusions. PIV started in left ac and blood drawn from site and sent to lab for processing. Patient has no questions or concerns for the doctor at this time. Treatment approved and given. Call bell provided to patient for first treatment. Patient tolerated well. Left treatment suite ambulatory. Discharge instructions provided. Patient's status assessed and documented appropriately. All labs and required results were also reviewed today. Treatment parameters have been reviewed. Today's treatment has been approved by the provider. Treatment orders and medication sequencing (when applicable) was verified by 2 registered nurses. The treatment plan was confirmed with the patient prior to administration, and the patient understands the need to report any treatment-related symptoms. Prior to administration, when applicable, the following 8 elements of medication administration were reviewed with 2nd Registered Nurse prior to dosing: drug name, drug dose, infusion volume when prepared in a syringe, rate of administration, expiration dates and/or times, appearance and integrity of drug(s), and rate of pump for infusion. The 5 rights of medication administration have been verified.

## 2021-11-23 NOTE — PROGRESS NOTES
Patient here for chemo. Gait slow and steady with assist of cane. RT done this am. States he has frequent indigestion. States he has not been taking his Magnesium tablet due to indigestion. Explained importance of taking po Magnesium. States he will try again to take regularly. CBC and CMP done. Chemo given as ordered. RTC 1 week.

## 2021-11-29 NOTE — PROGRESS NOTES
Patient Name:  Feli Rodrigez  Patient :  1970  Patient MRN:  [id number]     Primary Oncologist: Antoine Gaston MD  Referring Provider: Maico Siddiqi DO     Date of Service: 2021      Reason for Consult:  Esophageal cancer. Chief Complaint:    Chief Complaint   Patient presents with    Follow-up       Encounter Diagnosis   Name Primary?  Malignant neoplasm of lower third of esophagus ([facility] [address] Yes        HPI:   10/12/21: He arrived alone to the clinic today. Reported that he has been having heartburn for about 3 months, was started on Protonix. Was also having difficulty swallowing. Denied any odynophagia. Substernal chest pain. Denied any increase shortness of breath, fever, cough. Denied any abdominal pain, nausea, vomiting, diarrhea, constipation. No bleeding. Reported weight loss of about 13 pounds. He subsequently had CT scan of the chest abdomen pelvisOn 2021 which revealed right lower lobe pulmonary nodules which have been unchanged since 7 May 2021 also left lower pulmonary nodule which was also unchanged except for small pleural-based nodules in the posterior left lower lung measuring about 8 mm in transverse dimension were new. Slowly enlarging 2.1 cm paraesophageal lymph node the level of the small sliding gastric hiatal hernia. Circumferential wall thickening of the distal esophagus. EGD on 10/6/2021 revealed an infiltrative ulcerated necrotic 7 Italian nonbleeding 10 cm mass of malignant appearance was found in the lower third of the esophagus. This mass caused partial obstruction. Gastric biopsy was benign. H. pylori not identified. 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 HER-2 by IHC was negative. 2021 CBC within normal limits. BMP with creatinine 1.1 and calcium 9.3 glucose was high at 304    10/28/2021 PET scanWith hypermetabolic somewhat thickened distal esophagus.   Enlarged to Visit   Medication Sig Dispense Refill    ondansetron (ZOFRAN-ODT) 8 MG TBDP disintegrating tablet Place 1 tablet under the tongue every 8 hours as needed for Nausea or Vomiting (Chemo/radiation induced) 30 tablet 1    dexamethasone (DECADRON) 2 MG tablet Take 4 tablets with supper the night before first chemotherapy and then take 1 tablet with breakfast for two days after each treatment (every 7 days). 20 tablet 0    rizatriptan (MAXALT) 10 MG tablet Take 10 mg by mouth once as needed for Migraine May repeat in 2 hours if needed      insulin regular (HUMULIN R;NOVOLIN R) 100 UNIT/ML injection Inject into the skin See Admin Instructions Sliding Scale      sucralfate (CARAFATE) 1 GM/10ML suspension Take 10 mLs by mouth 4 times daily 1200 mL 3    pioglitazone (ACTOS) 45 MG tablet Take 45 mg by mouth daily      oxyCODONE-acetaminophen (PERCOCET) 5-325 MG per tablet       clopidogrel (PLAVIX) 75 MG tablet Take 75 mg by mouth daily      CPAP Machine MISC 10 cm by CPAP route nightly      acarbose (PRECOSE) 50 MG tablet Take 50 mg by mouth 3 times daily (with meals)       losartan (COZAAR) 100 MG tablet Take 100 mg by mouth daily Takes at night      metFORMIN (GLUCOPHAGE-XR) 500 MG extended release tablet 1,000 mg 2 times daily   6    sertraline (ZOLOFT) 100 MG tablet 100 mg daily   6    traZODone (DESYREL) 50 MG tablet Take 100 mg by mouth nightly       nitroGLYCERIN (NITROSTAT) 0.4 MG SL tablet up to max of 3 total doses. If no relief after 1 dose, call 911. (Patient taking differently: as needed up to max of 3 total doses. If no relief after 1 dose, call 911.) 25 tablet 3    atorvastatin (LIPITOR) 40 MG tablet Take 40 mg by mouth nightly       SITagliptin (JANUVIA) 100 MG tablet Take 100 mg by mouth nightly       terazosin (HYTRIN) 2 MG capsule Take 4 mg by mouth nightly       carvedilol (COREG) 12.5 MG tablet Take 2 tablets by mouth 2 times daily (with meals).  (Patient taking differently: Take 12.5 mg by mouth 2 times daily (with meals) Take one tablet by mouth two times a day with food.) 60 tablet 0    zonisamide (ZONEGRAN) 100 MG capsule Take 400 mg by mouth nightly       omega-3 acid ethyl esters (LOVAZA) 1 G capsule Take 1 g by mouth daily       albuterol (PROVENTIL HFA) 108 (90 BASE) MCG/ACT inhaler Inhale 2 puffs into the lungs every 6 hours as needed for Wheezing or Shortness of Breath. 1 Inhaler 0    aspirin 81 MG EC tablet Take 81 mg by mouth daily.  pantoprazole (PROTONIX) 40 MG tablet Take 1 tablet by mouth daily 30 tablet 5     No current facility-administered medications on file prior to visit. Interval history:11/30/21: He arrived alone to the clinic today. Reported that he has been tolerating chemo and radiation without any major side effects including neuropathy. Reported intermittent nausea but no emesis. Denied any diarrhea. Denied any overt bleeding. Continues to have some reflux symptoms. Reported dysphagia to solids but no odynophagia. Reported hiccups. Reported that he had changed his diet and eating more baby food. Cut down smoking and each pack last about 1-1/2-week. Reported that he has been discharged from pain clinic, he was told that he is noncompliant with appointments. Review of Systems:    As per interval history, rest of the review of systems negative     Vital Signs: /87 (Site: Left Upper Arm, Position: Sitting, Cuff Size: Medium Adult)   Pulse 77   Temp 97.4 °F (36.3 °C) (Infrared)   Ht 5' 10\" (1.778 m)   Wt 268 lb (121.6 kg)   SpO2 97%   BMI 38.45 kg/m²      CONSTITUTIONAL: awake, alert, cooperative, no apparent distress , obese, ambulates with the help of a cane. Poor dental hygiene.   EYES: ROSALIO, No pallor or any icterus  ENT: ATNC  NECK: No JVD  HEMATOLOGIC/LYMPHATIC: no cervical, supraclavicular or axillary lymphadenopathy   LUNGS: CTAB  CARDIOVASCULAR: s1s2 rrr no murmurs  ABDOMEN: soft ntnd bs pos  MUSCULOSKELETAL: full range of motion noted, tone is normal  NEUROLOGIC: GI  SKIN: No rash  EXTREMITIES: no LE edema bilaterally     Labs:  Hematology:  Lab Results   Component Value Date    WBC 8.0 11/22/2021    RBC 4.55 (L) 11/22/2021    HGB 14.3 11/22/2021    HCT 41.4 (L) 11/22/2021    MCV 91.0 11/22/2021    MCH 31.4 (H) 11/22/2021    MCHC 34.5 11/22/2021    RDW 12.9 11/22/2021     11/22/2021    MPV 11.2 (H) 11/22/2021    SEGSPCT 81.7 (H) 11/22/2021    EOSRELPCT 1.5 11/22/2021    BASOPCT 0.5 11/22/2021    LYMPHOPCT 11.6 (L) 11/22/2021    MONOPCT 4.7 (H) 11/22/2021    SEGSABS 6.6 11/22/2021    EOSABS 0.1 11/22/2021    BASOSABS 0.0 11/22/2021    LYMPHSABS 0.9 11/22/2021    MONOSABS 0.4 11/22/2021    DIFFTYPE AUTOMATED DIFFERENTIAL 11/22/2021     Lab Results   Component Value Date    ESR 21 (H) 09/27/2016     Chemistry:  Lab Results   Component Value Date     11/22/2021    K 4.5 11/22/2021     11/22/2021    CO2 24 11/22/2021    BUN 15 11/22/2021    CREATININE 0.9 11/22/2021    GLUCOSE 211 (H) 11/22/2021    CALCIUM 9.3 11/22/2021    PROT 6.7 11/22/2021    LABALBU 4.3 11/22/2021    BILITOT 0.5 11/22/2021    ALKPHOS 93 11/22/2021    AST 13 (L) 11/22/2021    ALT 21 11/22/2021    LABGLOM >60 11/22/2021    GFRAA >60 11/22/2021    PHOS 2.1 (L) 07/30/2021    MG 1.6 (L) 11/22/2021    POCGLU 312 (H) 07/30/2021     Lab Results   Component Value Date    HOMOCYSTEINE 13.0 03/15/2014    HOMOCYSTEINE  03/15/2014     CORRECTED ON 03/17 AT 0606: PREVIOUSLY REPORTED AS: 8.0 Risk of    HOMOCYSTEINE  03/15/2014      vascular disease  increases above 9 umol/L and is significant    HOMOCYSTEINE  >15 umol/L. 03/15/2014     No components found for: LD  Lab Results   Component Value Date    TSHHS 2.110 08/17/2016    T4FREE 0.91 05/15/2013     Immunology:  Lab Results   Component Value Date    PROT 6.7 11/22/2021     No results found for: ABIGAIL Gaffney  No results found for: B2M  Coagulation Panel:  Lab Results   Component Value Date PROTIME 11.5 05/30/2018    INR 1.01 05/30/2018    APTT 26.0 05/30/2018    DDIMER 89 08/17/2016     Anemia Panel:  Lab Results   Component Value Date    LIPACQHA01 359 04/18/2011    FOLATE 9.9 04/18/2011     Tumor Markers:  Lab Results   Component Value Date    PSA 0.8 03/07/2016        Observations:  ECOG:  No data recorded       Assessment & Plan:                                                          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 Pulmonary nodule seems to be stable but possible new pleural-based nodule. PET scan with a 2 mm left upper lobe pulmonary nodule too small to characterize, otherwise same findings as CT imaging. Has been referred to , CT surgery will refer to CT surgery. Recommend neoadjuvant chemoradiation with carbotaxol. Discussed adverse effects. OCM and port completed. Started cycle 1 day 1 carbotaxol and radiation on #16 2021. Tolerating fairly well. Follow-up with CT surgery after completion for possible esophagectomy, has upcoming appointment with CT surgery. Acid reflux, Protonix and sucralfate prescribed but not really assisting with symptoms. Mild normocytic anemia and mild thrombocytopenia, most probably secondary to chemotherapy. Dose adjustments as needed. Continue other medical care. Discussed above findings and plan with him and he voiced understanding. Answered all his questions. Discussed healthy lifestyle including healthy diet, regular exercise as tolerated. Also discussed importance of being up-to-date with age-appropriate screening tools. Recommend follow-up with primary care physician and other specialists. Please do not hesitate to contact us if you need further information.     Return to clinic December 2021 or earlier if new Sx    I have recommended that the patient follow CDC guidelines for prevention of COVID-19 infection. Received COVID vaccine and flu vaccine.     [address]

## 2021-11-29 NOTE — PROGRESS NOTES
MA Rooming Questions  Patient: Omar Newell  MRN: [id number]    Date: 11/29/2021        1. Do you have any new issues?   no         2. Do you need any refills on medications?    no    3. Have you had any imaging done since your last visit?   no    4. Have you been hospitalized or seen in the emergency room since your last visit here?   no    5. Did the patient have a depression screening completed today?  No    No data recorded     PHQ-9 Given to (if applicable):               PHQ-9 Score (if applicable):                     [] Positive     []  Negative              Does question #9 need addressed (if applicable)                     [] Yes    []  No               Hector Barnhart CMA

## 2021-11-30 NOTE — PROGRESS NOTES
Patient arrived to treatment suite for pre-medications and chemotherapy infusion. PIV started in left arm and good blood return noted. Patient has no questions or concerns for the doctor at this time. Treatment approved and given. Patient tolerated well. Left treatment suite ambulatory. Discharge instructions provided. Patient's status assessed and documented appropriately. All labs and required results were also reviewed today. Treatment parameters have been reviewed. Today's treatment has been approved by the provider. Treatment orders and medication sequencing (when applicable) was verified by 2 registered nurses. The treatment plan was confirmed with the patient prior to administration, and the patient understands the need to report any treatment-related symptoms. Prior to administration, when applicable, the following 8 elements of medication administration were reviewed with 2nd Registered Nurse prior to dosing: drug name, drug dose, infusion volume when prepared in a syringe, rate of administration, expiration dates and/or times, appearance and integrity of drug(s), and rate of pump for infusion. The 5 rights of medication administration have been verified.

## 2021-12-06 NOTE — PLAN OF CARE
Care plan reviewed. _reported feeling weak and light headed upon sitting up after treatment, improved at this time-  orthostatic vitals unremarkable.     _ Pt reports eating and drinking QS.     _Energy poor, tolerating activity with periods of rest.

## 2021-12-06 NOTE — PROGRESS NOTES
Weekly Radiation Treatment Progress Note    DATE OF SERVICE: 12/6/2021     DIAGNOSIS:  Cancer Staging  Malignant neoplasm of lower third of esophagus (HCC)  Staging form: Esophagus - Adenocarcinoma, AJCC 8th Edition  - Clinical: Stage Unknown (cTX, cN1, cM0) - Unsigned       TREATMENT COURSE:   Oncology History   Malignant neoplasm of lower third of esophagus (HCC)   10/6/2021 Initial Diagnosis    Malignant neoplasm of lower third of esophagus (HCC)    EGD      Pathology  Poorly differentiated signet ring adenocarcinoma       11/16/2021 -  Radiation    Esophagus/Celiac Axis  180 cGy x 25 = 4500 cGy, VAMT, 2 arcs, 6MV photons    Esophagus Boost  180 cGy x 3 = 540 cGy, VAMT, 2 arcs, 6MV photons  Total Dose 5040 cGy         Current Radiation Dose:  2340 cGy    Subjective:    Doing well  No RT complaints  Had episode of lightheadedness earlier today, states he is eating/drinking po well    EXAM    Orthostatics      HR BP  Supine  84 130/76  Standing 86 123/68    NAD  Moist mucous membranes    Setup images, chart, plan reviewed      A/P:   Tolerating tx well  Continue RT as planned      Electronically signed by Dipti Wayne MD on 12/6/2021 at 10:03 AM  /

## 2021-12-07 NOTE — PROGRESS NOTES
Ambulated to infusion area, here today for chemotherapy. Patient c/o poor appetite, decreased energy, and diarrhea last week that has since resolved. Review of vital signs revealed a decrease of 9# since 11/16. PIV started in [facility] by Rupa Pitts RN. Labs drawn. Platelets 92. Discussed findings with Dr. Jing Lentz, chemo to be reduced by 15%. Pharmacist updated. Chemo administered as ordered. Call light within reach. Tolerated infusion without incident. Discharge instructions provided. Patient RTC 12/8 for radiation treatment. Next infusion 12/14. Status appropriately assessed and documented. All required labs and results reviewed. Treatment approved by provider. Treatment orders and medications verified by 2 Registered Nurses where applicable. Treatment plan was confirmed with patient prior to administration, and educated the need to report any treatment-related symptoms    Prior to administration, when applicable, the following 8 elements of medication administration were reviewed with 2nd Registered Nurse prior to dosing: drug name, drug dose, infusion volume when prepared in a syringe, rate of administration, expiration dates and/or times, appearance and integrity of drug(s), and rate of pump for infusion. The 5 rights of medication administration have been verified.

## 2021-12-07 NOTE — PROGRESS NOTES
Decreased carboplatin and paclitaxel by 15% per  due to plat 92K. Also updated to most recent weight due to weight loss.

## 2021-12-10 NOTE — PROGRESS NOTES
Patient Name:  Vince Ayala  Patient :  1970  Patient MRN:  [id number]     Primary Oncologist: Elma Boyd MD  Referring Provider: Leonard Siddiqi DO     Date of Service: 12/10/2021      Reason for Consult:  Esophageal cancer. Chief Complaint:    Chief Complaint   Patient presents with    Other     OCM TOX Check       No diagnosis found. HPI:   10/12/21: He arrived alone to the clinic today. Reported that he has been having heartburn for about 3 months, was started on Protonix. Was also having difficulty swallowing. Denied any odynophagia. Substernal chest pain. Denied any increase shortness of breath, fever, cough. Denied any abdominal pain, nausea, vomiting, diarrhea, constipation. No bleeding. Reported weight loss of about 13 pounds. He subsequently had CT scan of the chest abdomen pelvisOn 2021 which revealed right lower lobe pulmonary nodules which have been unchanged since 7 May 2021 also left lower pulmonary nodule which was also unchanged except for small pleural-based nodules in the posterior left lower lung measuring about 8 mm in transverse dimension were new. Slowly enlarging 2.1 cm paraesophageal lymph node the level of the small sliding gastric hiatal hernia. Circumferential wall thickening of the distal esophagus. EGD on 10/6/2021 revealed an infiltrative ulcerated necrotic 7 Japanese nonbleeding 10 cm mass of malignant appearance was found in the lower third of the esophagus. This mass caused partial obstruction. Gastric biopsy was benign. H. pylori not identified. 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 HER-2 by IHC was negative. 2021 CBC within normal limits. BMP with creatinine 1.1 and calcium 9.3 glucose was high at 304    10/28/2021 PET scanWith hypermetabolic somewhat thickened distal esophagus. Enlarged retrocrural lymph node.   2 cm left upper lobe pulmonary nodule    11/8/2021 CT scan of the abdomen pelvis:  Significant amount of thickening of distal esophagus with adjacent 22 mm   lymph node, increased since prior exam. The combination of the findings are   worrisome and require further evaluation of distal esophagus via endoscopy to   exclude underlying neoplasm.       Diverticulosis with no signs of diverticulitis.       Bilateral multiple 2-6 mm nonobstructing stones of the kidneys.       Multiple surgical clips within the gallbladder fossa with normal appearance   of gallbladder.       Axial hiatal hernia.       8 mm calcified granuloma of the right lower lobe and 4 mm pulmonary nodule   within the left lower lobe.  No further follow-up is needed. 11/16/2021 started chemoradiation with carbotaxol. Past Medical History:     Hypertension, diabetes mellitus, hyperlipidemia CAD, GOPI, asthma, depression, migraine headaches                                                           Past Surgery History:    Cholecystectomy in 2010, atrial septal defect repair in 2005, resection of skin cancers, back surgery, carpal tunnel surgery x2, eye surgery                                                                        Social History:   Lives with his cousin . Tressa Blake is going to move from [place] to live with him soon. Currently employed since 2006 secondary to the stroke, CAD and heart surgery. Smokes about 4 to 6 cigarettes/day for the past several years. No history of alcohol abuse or any other illicit drug abuse. Family History:    Sister diagnosed with stomach cancer.                                                                                              Allergies   Allergen Reactions    Azithromycin     Ciprofloxacin Other (See Comments)     lethargic       Current Outpatient Medications on File Prior to Visit   Medication Sig Dispense Refill    ondansetron (ZOFRAN-ODT) 8 MG TBDP disintegrating tablet Place 1 tablet under the tongue every 8 hours as needed for Nausea or Vomiting (Chemo/radiation induced) 30 tablet 1    dexamethasone (DECADRON) 2 MG tablet Take 4 tablets with supper the night before first chemotherapy and then take 1 tablet with breakfast for two days after each treatment (every 7 days). 20 tablet 0    rizatriptan (MAXALT) 10 MG tablet Take 10 mg by mouth once as needed for Migraine May repeat in 2 hours if needed      insulin regular (HUMULIN R;NOVOLIN R) 100 UNIT/ML injection Inject into the skin See Admin Instructions Sliding Scale      sucralfate (CARAFATE) 1 GM/10ML suspension Take 10 mLs by mouth 4 times daily 1200 mL 3    pioglitazone (ACTOS) 45 MG tablet Take 45 mg by mouth daily      clopidogrel (PLAVIX) 75 MG tablet Take 75 mg by mouth daily      CPAP Machine MISC 10 cm by CPAP route nightly      acarbose (PRECOSE) 50 MG tablet Take 50 mg by mouth 3 times daily (with meals)       losartan (COZAAR) 100 MG tablet Take 100 mg by mouth daily Takes at night      metFORMIN (GLUCOPHAGE-XR) 500 MG extended release tablet 1,000 mg 2 times daily   6    sertraline (ZOLOFT) 100 MG tablet 100 mg daily   6    traZODone (DESYREL) 50 MG tablet Take 100 mg by mouth nightly       nitroGLYCERIN (NITROSTAT) 0.4 MG SL tablet up to max of 3 total doses. If no relief after 1 dose, call 911. (Patient taking differently: as needed up to max of 3 total doses. If no relief after 1 dose, call 911.) 25 tablet 3    atorvastatin (LIPITOR) 40 MG tablet Take 40 mg by mouth nightly       SITagliptin (JANUVIA) 100 MG tablet Take 100 mg by mouth nightly       terazosin (HYTRIN) 2 MG capsule Take 4 mg by mouth nightly       carvedilol (COREG) 12.5 MG tablet Take 2 tablets by mouth 2 times daily (with meals).  (Patient taking differently: Take 12.5 mg by mouth 2 times daily (with meals) Take one tablet by mouth two times a day with food.) 60 tablet 0  zonisamide (ZONEGRAN) 100 MG capsule Take 400 mg by mouth nightly       omega-3 acid ethyl esters (LOVAZA) 1 G capsule Take 1 g by mouth daily       albuterol (PROVENTIL HFA) 108 (90 BASE) MCG/ACT inhaler Inhale 2 puffs into the lungs every 6 hours as needed for Wheezing or Shortness of Breath. 1 Inhaler 0    aspirin 81 MG EC tablet Take 81 mg by mouth daily.  pantoprazole (PROTONIX) 40 MG tablet Take 1 tablet by mouth daily 30 tablet 5     No current facility-administered medications on file prior to visit. Interval history:12/10/21: Arrived alone in clinic today. Reports he has had ongoing abdominal pain but has been dc from pain clinic. Reports he has had a poor appetite and poor fluid intake. He has some diarrhea that is intermittent. He reports difficulty eating but has not been using nutritional supplementation. He denies headache, visual change, new bone pain, lower extremity edema or calf pain. Review of Systems:    As per interval history, rest of the review of systems negative     Vital Signs: /66 (Site: Right Upper Arm, Position: Sitting, Cuff Size: Large Adult)   Pulse 86   Temp 97.7 °F (36.5 °C) (Infrared)   Resp 16   Ht 5' 10\" (1.778 m)   Wt 260 lb (117.9 kg)   SpO2 97%   BMI 37.31 kg/m²        Physical Exam  Vitals reviewed. Constitutional:       Comments: Cane, obese   HENT:      Head: Normocephalic. Mouth/Throat:      Mouth: Mucous membranes are dry. Eyes:      Extraocular Movements: Extraocular movements intact. Conjunctiva/sclera: Conjunctivae normal.   Cardiovascular:      Rate and Rhythm: Regular rhythm. Heart sounds: Normal heart sounds. Pulmonary:      Effort: Pulmonary effort is normal.      Breath sounds: Normal breath sounds. Abdominal:      Palpations: Abdomen is soft. Musculoskeletal:         General: Normal range of motion. Cervical back: Normal range of motion. Skin:     General: Skin is warm and dry.    Neurological: General: No focal deficit present. Mental Status: He is alert. Psychiatric:         Mood and Affect: Mood normal.         Behavior: Behavior normal.         Thought Content:  Thought content normal.         Judgment: Judgment normal.          Labs:  Hematology:  Lab Results   Component Value Date    WBC 3.9 (L) 12/06/2021    RBC 4.01 (L) 12/06/2021    HGB 12.6 (L) 12/06/2021    HCT 35.7 (L) 12/06/2021    MCV 89.0 12/06/2021    MCH 31.4 (H) 12/06/2021    MCHC 35.3 12/06/2021    RDW 12.7 12/06/2021    PLT 92 (L) 12/06/2021    MPV 10.9 12/06/2021    SEGSPCT 76.6 (H) 12/06/2021    EOSRELPCT 0.5 12/06/2021    BASOPCT 0.5 12/06/2021    LYMPHOPCT 9.5 (L) 12/06/2021    MONOPCT 12.9 (H) 12/06/2021    SEGSABS 3.0 12/06/2021    EOSABS 0.0 12/06/2021    BASOSABS 0.0 12/06/2021    LYMPHSABS 0.4 12/06/2021    MONOSABS 0.5 12/06/2021    DIFFTYPE AUTOMATED DIFFERENTIAL 12/06/2021     Lab Results   Component Value Date    ESR 21 (H) 09/27/2016     Chemistry:  Lab Results   Component Value Date     12/06/2021    K 4.3 12/06/2021     12/06/2021    CO2 23 12/06/2021    BUN 13 12/06/2021    CREATININE 0.9 12/06/2021    GLUCOSE 193 (H) 12/06/2021    CALCIUM 9.2 12/06/2021    PROT 6.3 (L) 12/06/2021    LABALBU 4.2 12/06/2021    BILITOT 0.4 12/06/2021    ALKPHOS 84 12/06/2021    AST 11 (L) 12/06/2021    ALT 18 12/06/2021    LABGLOM >60 12/06/2021    GFRAA >60 12/06/2021    PHOS 2.1 (L) 07/30/2021    MG 1.8 12/06/2021    POCGLU 312 (H) 07/30/2021     Lab Results   Component Value Date    HOMOCYSTEINE 13.0 03/15/2014    HOMOCYSTEINE  03/15/2014     CORRECTED ON 03/17 AT 0606: PREVIOUSLY REPORTED AS: 8.0 Risk of    HOMOCYSTEINE  03/15/2014      vascular disease  increases above 9 umol/L and is significant    HOMOCYSTEINE  >15 umol/L. 03/15/2014     No components found for: LD  Lab Results   Component Value Date    TSHHS 2.110 08/17/2016    T4FREE 0.91 05/15/2013     Immunology:  Lab Results   Component Value Date screening tools. Recommend follow-up with primary care physician and other specialists. Please do not hesitate to contact us if you need further information. RTC after surgical consultation    I have recommended that the patient follow CDC guidelines for prevention of COVID-19 infection. Received COVID vaccine and flu vaccine.

## 2021-12-10 NOTE — PROGRESS NOTES
Pt ambulated into treatment area for IVF. PIV started in [facility]. Pt given Dex and Zofran. Infusion administered as ordered. Pt tolerated well. Left treatment area ambulatory and in stable condition.

## 2021-12-10 NOTE — PROGRESS NOTES
MA Rooming Questions  Patient: Jerzy Marte  MRN: [id number]    Date: 12/10/2021        1. Do you have any new issues?   no         2. Do you need any refills on medications?    no    3. Have you had any imaging done since your last visit?   no    4. Have you been hospitalized or seen in the emergency room since your last visit here?   no    5. Did the patient have a depression screening completed today?  No    No data recorded     PHQ-9 Given to (if applicable):               PHQ-9 Score (if applicable):                     [] Positive     []  Negative              Does question #9 need addressed (if applicable)                     [] Yes    []  No               Trae Otoole MA

## 2021-12-14 NOTE — PROGRESS NOTES
Ambulated to infusion area, here today for chemotherapy. No concerns at this time. PIV LAC placed, positive blood return noted. Labs reviewed. Platelets 65, Magnesium 1.6  Discussed findings with Dr. Ирина Huddleston. Instructed to give 2mg Magnesium IV today and hold treatment x1 week. Updated pharmacist on treatment plan. Discussed treatment changes for patient, patient agreeable. Call light within reach. Tolerated infusion without incident. Discharge instructions provided. Patient RTC 12/15 for radiation treatment. Next infusion scheduled for 12/21. Status appropriately assessed and documented. All required labs and results reviewed. Treatment approved by provider. Treatment orders and medications verified by 2 Registered Nurses where applicable. Treatment plan was confirmed with patient prior to administration, and educated the need to report any treatment-related symptoms      Prior to administration, when applicable, the following 8 elements of medication administration were reviewed with 2nd Registered Nurse prior to dosing: drug name, drug dose, infusion volume when prepared in a syringe, rate of administration, expiration dates and/or times, appearance and integrity of drug(s), and rate of pump for infusion. The 5 rights of medication administration have been verified.

## 2021-12-14 NOTE — PROGRESS NOTES
Weekly Radiation Treatment Progress Note    DATE OF SERVICE: 12/14/2021     DIAGNOSIS:  Cancer Staging  Malignant neoplasm of lower third of esophagus (HCC)  Staging form: Esophagus - Adenocarcinoma, AJCC 8th Edition  - Clinical: Stage Unknown (cTX, cN1, cM0) - Unsigned       TREATMENT COURSE:   Oncology History   Malignant neoplasm of lower third of esophagus (HCC)   10/6/2021 Initial Diagnosis    Malignant neoplasm of lower third of esophagus (HCC)    EGD      Pathology  Poorly differentiated signet ring adenocarcinoma       11/16/2021 -  Radiation    Esophagus/Celiac Axis  180 cGy x 25 = 4500 cGy, VAMT, 2 arcs, 6MV photons    Esophagus Boost  180 cGy x 3 = 540 cGy, VAMT, 2 arcs, 6MV photons  Total Dose 5040 cGy         Current Radiation Dose:  3240 cGy    Subjective:    Doing well  No RT complaints      EXAM  NAD  Moist mucous membranes    Setup images, chart, plan reviewed      A/P:   Tolerating tx well  Continue RT as planned      Electronically signed by Braxton Byrnes MD on 12/14/2021 at 9:50 AM

## 2021-12-14 NOTE — CARE COORDINATION
LSW spoke with Pt this morning regarding transportation arrangements for the remainder of his treatment. Pt stated he has taken care of things and does not require LSW's assistance. Pt denied any other needs at this time.

## 2021-12-14 NOTE — PLAN OF CARE
Care plan reviewed.      _pt with increased fatigue, encouraged activity as tolerated with periods of rest

## 2021-12-15 NOTE — PROGRESS NOTES
Patient was here earlier today for RT. Patient expressed concerns regarding platelet counts. Patient gets labs drawn weekly and platelets trending down. Platelets 65 on 46/81/8536. (Platelets: 92 09/67/1931, 111 11/29/2021, 142 11/22/2021). Patient wanting to know why he is low and what interventions to take. Will discuss with Dr. Smita Dickerson and address with patient tomorrow when he is here for RT. Called patient at [phone] to notify, voices understanding.

## 2021-12-16 NOTE — PROGRESS NOTES
Spoke with physician regarding patient concern for low platelet count. Explained that thrombocytopenia is d/t chemo and RT. Will continue to monitor platelets, if platelets <50 then we will reassess plan of care. Patient voices understanding. Denies further issues or concerns at this time.

## 2021-12-20 NOTE — PROGRESS NOTES
Weekly Radiation Treatment Progress Note    DATE OF SERVICE: 12/20/2021     DIAGNOSIS:  Cancer Staging  Malignant neoplasm of lower third of esophagus (HCC)  Staging form: Esophagus - Adenocarcinoma, AJCC 8th Edition  - Clinical: Stage Unknown (cTX, cN1, cM0) - Unsigned       TREATMENT COURSE:   Oncology History   Malignant neoplasm of lower third of esophagus (HCC)   10/6/2021 Initial Diagnosis    Malignant neoplasm of lower third of esophagus (HCC)    EGD      Pathology  Poorly differentiated signet ring adenocarcinoma       11/16/2021 -  Radiation    Esophagus/Celiac Axis  180 cGy x 25 = 4500 cGy, VAMT, 2 arcs, 6MV photons    Esophagus Boost  180 cGy x 3 = 540 cGy, VAMT, 2 arcs, 6MV photons  Total Dose 5040 cGy           Site: Esophagus   Current Total Radiation Dose: 4140 cGy    Pt doing well. Energy fairly good. No skin itching/soreness. Breathing stable. +dysphagia/odynophagia. Reports he is trying to eat regular foods. Not on boost or Ensure. States he is not eating much. EXAM  Wt Readings from Last 3 Encounters:   12/14/21 261 lb (118.4 kg)   12/14/21 261 lb (118.4 kg)   12/10/21 260 lb (117.9 kg)     NAD    Today's weight equals 261    Setup images, chart, plan reviewed    A/P:   Tolerating RT well  Continue RT as planned  Add 3 cans boost/Ensure daily.     Electronically signed by Chel Blake MD on 12/20/2021 at 10:02 AM

## 2021-12-20 NOTE — PLAN OF CARE
Care plan reviewed.       _pt with increased fatigue, encouraged activity as tolerated with periods of rest    _pain upper abdomen, esophagus, rates \"7/10\", reports taking pain meds prn

## 2021-12-27 NOTE — PROGRESS NOTES
Weekly Radiation Treatment Progress Note    DATE OF SERVICE: 12/27/2021     DIAGNOSIS:  Cancer Staging  Malignant neoplasm of lower third of esophagus (HCC)  Staging form: Esophagus - Adenocarcinoma, AJCC 8th Edition  - Clinical: Stage Unknown (cTX, cN1, cM0) - Unsigned       TREATMENT COURSE:   Oncology History   Malignant neoplasm of lower third of esophagus (HCC)   10/6/2021 Initial Diagnosis    Malignant neoplasm of lower third of esophagus (HCC)    EGD      Pathology  Poorly differentiated signet ring adenocarcinoma       11/16/2021 -  Radiation    Esophagus/Celiac Axis  180 cGy x 25 = 4500 cGy, VAMT, 2 arcs, 6MV photons    Esophagus Boost  180 cGy x 3 = 540 cGy, VAMT, 2 arcs, 6MV photons  Total Dose 5040 cGy           Site: Esophagus   Current Total Radiation Dose: 4860 cGy    Pt doing well. Energy fairly good. +dysphagia/odynophagia. Reports he is trying to eat regular foods. Not on boost or Ensure.        EXAM  Wt Readings from Last 3 Encounters:   12/21/21 258 lb 6.4 oz (117.2 kg)   12/20/21 261 lb (118.4 kg)   12/14/21 261 lb (118.4 kg)     NAD    Setup images, chart, plan reviewed      A/P:   Tolerating RT well  Continue RT as planned  Add 3 cans boost/Ensure daily    Surg f/u 1/5/22  Med Onc 1/15/22    RTC 1 month for f/u      Electronically signed by Danford Duane, MD on 12/27/2021 at 9:22 AM

## 2021-12-28 NOTE — PROGRESS NOTES
Radiation Oncology  Treatment Completion Summary  Encounter Date: 2021 10:13 AM    Mr. Jamel Momin is a 46 y.o. male  : 1970  MRN: [id number]  Acct Number: [de-identified]      FOLLOW UP PHYSICIANS:   Primary Care: Ruy Siddiqi DO       DIAGNOSIS:    Cancer Staging  Malignant neoplasm of lower third of esophagus ([address]  Staging form: Esophagus - Adenocarcinoma, AJCC 8th Edition  - Clinical: Stage Unknown (cTX, cN1, cM0) - Unsigned         TREATMENT COURSE:   Oncology History   Malignant neoplasm of lower third of esophagus (HCC)   10/6/2021 Initial Diagnosis    Malignant neoplasm of lower third of esophagus (HCC)    EGD      Pathology  Poorly differentiated signet ring adenocarcinoma       2021 - 2021 Radiation    Esophagus/Celiac Axis  180 cGy x 25 = 4500 cGy, VAMT, 2 arcs, 6MV photons    Esophagus Boost  180 cGy x 3 = 540 cGy, VAMT, 2 arcs, 6MV photons  Total Dose 5040 cGy           HISTORY:  Jamel Momin is a 46 y.o. male with the above referenced diagnosis. For complete details on history of present illness please see my initial consultation note. A course of neoadjuvant chemoradiation therapy was recently completed with details provided above:       TREATMENT TOLERANCE:   Tolerated treatment well  He developed anticipated toxicity of dysphagia/odynophagia which was stable through treatment  He had 20# weight loss from the time of initial consultation (<10% from baseline of 278#)  He did not take the recommended nutritional supplementation.       FOLLOW-UP PLANS:   RTC 1 month to ensure resolution of acute RT toxicity  Close f/u with [facility] and surgery for consideration of esophagectomy      Electronically signed by Electronically signed by Rossy Blancas MD on 2021 at 10:13 AM

## 2022-01-01 ENCOUNTER — APPOINTMENT (OUTPATIENT)
Dept: GENERAL RADIOLOGY | Age: 52
DRG: 326 | End: 2022-01-01
Attending: SURGERY
Payer: MEDICARE

## 2022-01-01 ENCOUNTER — HOSPITAL ENCOUNTER (OUTPATIENT)
Dept: RADIATION ONCOLOGY | Age: 52
Discharge: HOME | End: 2022-02-01
Attending: RADIOLOGY

## 2022-01-01 ENCOUNTER — OFFICE VISIT (OUTPATIENT)
Dept: ONCOLOGY | Age: 52
End: 2022-01-01
Payer: MEDICARE

## 2022-01-01 ENCOUNTER — HOSPITAL ENCOUNTER (INPATIENT)
Age: 52
LOS: 12 days | DRG: 326 | End: 2022-02-12
Attending: SURGERY | Admitting: SURGERY
Payer: MEDICARE

## 2022-01-01 ENCOUNTER — HOSPITAL ENCOUNTER (OUTPATIENT)
Dept: PET IMAGING | Age: 52
Discharge: HOME | End: 2022-01-12
Payer: MEDICARE

## 2022-01-01 ENCOUNTER — ANESTHESIA EVENT (OUTPATIENT)
Dept: OPERATING ROOM | Age: 52
DRG: 326 | End: 2022-01-01
Payer: MEDICARE

## 2022-01-01 ENCOUNTER — ANESTHESIA EVENT (OUTPATIENT)
Dept: CARDIOVASCULAR ICU | Age: 52
DRG: 326 | End: 2022-01-01
Payer: MEDICARE

## 2022-01-01 ENCOUNTER — APPOINTMENT (OUTPATIENT)
Dept: CT IMAGING | Age: 52
DRG: 326 | End: 2022-01-01
Attending: SURGERY
Payer: MEDICARE

## 2022-01-01 ENCOUNTER — HOSPITAL ENCOUNTER (OUTPATIENT)
Dept: LAB | Age: 52
Discharge: HOME | End: 2022-01-24
Payer: MEDICARE

## 2022-01-01 ENCOUNTER — HOSPITAL ENCOUNTER (OUTPATIENT)
Dept: INFUSION THERAPY | Age: 52
Discharge: HOME | End: 2022-01-14
Payer: MEDICARE

## 2022-01-01 ENCOUNTER — ANESTHESIA (OUTPATIENT)
Dept: CARDIOVASCULAR ICU | Age: 52
DRG: 326 | End: 2022-01-01
Payer: MEDICARE

## 2022-01-01 ENCOUNTER — ANESTHESIA (OUTPATIENT)
Dept: OPERATING ROOM | Age: 52
DRG: 326 | End: 2022-01-01
Payer: MEDICARE

## 2022-01-01 VITALS
DIASTOLIC BLOOD PRESSURE: 86 MMHG | OXYGEN SATURATION: 97 % | BODY MASS INDEX: 36.02 KG/M2 | WEIGHT: 251.6 LBS | HEART RATE: 85 BPM | TEMPERATURE: 97.4 F | SYSTOLIC BLOOD PRESSURE: 146 MMHG | HEIGHT: 70 IN | RESPIRATION RATE: 16 BRPM

## 2022-01-01 VITALS
TEMPERATURE: 101.3 F | HEART RATE: 94 BPM | SYSTOLIC BLOOD PRESSURE: 91 MMHG | DIASTOLIC BLOOD PRESSURE: 49 MMHG | HEIGHT: 70 IN | OXYGEN SATURATION: 87 % | BODY MASS INDEX: 38.98 KG/M2 | WEIGHT: 272.27 LBS | RESPIRATION RATE: 28 BRPM

## 2022-01-01 VITALS
DIASTOLIC BLOOD PRESSURE: 62 MMHG | SYSTOLIC BLOOD PRESSURE: 97 MMHG | OXYGEN SATURATION: 98 % | TEMPERATURE: 97.2 F | RESPIRATION RATE: 6 BRPM

## 2022-01-01 DIAGNOSIS — C15.5 MALIGNANT NEOPLASM OF LOWER THIRD OF ESOPHAGUS (HCC): Primary | ICD-10-CM

## 2022-01-01 DIAGNOSIS — C15.5 MALIGNANT NEOPLASM OF LOWER THIRD OF ESOPHAGUS (HCC): ICD-10-CM

## 2022-01-01 DIAGNOSIS — K21.00 GASTROESOPHAGEAL REFLUX DISEASE WITH ESOPHAGITIS WITHOUT HEMORRHAGE: ICD-10-CM

## 2022-01-01 DIAGNOSIS — Z01.818 ENCOUNTER FOR PREADMISSION TESTING: Primary | ICD-10-CM

## 2022-01-01 DIAGNOSIS — C15.5 MALIGNANT NEOPLASM OF ABDOMINAL ESOPHAGUS (HCC): ICD-10-CM

## 2022-01-01 LAB
ABO/RH: NORMAL
ABO/RH: NORMAL
ALBUMIN SERPL-MCNC: 2.2 GM/DL (ref 3.4–5)
ALBUMIN SERPL-MCNC: 2.6 GM/DL (ref 3.4–5)
ALBUMIN SERPL-MCNC: 4 GM/DL (ref 3.4–5)
ALP BLD-CCNC: 85 IU/L (ref 40–128)
ALP BLD-CCNC: 93 IU/L (ref 40–128)
ALT SERPL-CCNC: 11 U/L (ref 10–40)
ALT SERPL-CCNC: 9 U/L (ref 10–40)
ANION GAP SERPL CALCULATED.3IONS-SCNC: 10 MMOL/L (ref 4–16)
ANION GAP SERPL CALCULATED.3IONS-SCNC: 10 MMOL/L (ref 4–16)
ANION GAP SERPL CALCULATED.3IONS-SCNC: 11 MMOL/L (ref 4–16)
ANION GAP SERPL CALCULATED.3IONS-SCNC: 12 MMOL/L (ref 4–16)
ANION GAP SERPL CALCULATED.3IONS-SCNC: 13 MMOL/L (ref 4–16)
ANION GAP SERPL CALCULATED.3IONS-SCNC: 9 MMOL/L (ref 4–16)
ANTIBODY SCREEN: NEGATIVE
ANTIBODY SCREEN: NEGATIVE
APTT: 27.7 SECONDS (ref 25.1–37.1)
AST SERPL-CCNC: 10 IU/L (ref 15–37)
AST SERPL-CCNC: 15 IU/L (ref 15–37)
BACTERIA: ABNORMAL /HPF
BASE EXCESS MIXED: 3.4 (ref 0–1.2)
BASE EXCESS: 12 (ref 0–3.3)
BASE EXCESS: 2 (ref 0–3.3)
BASE EXCESS: 3 (ref 0–3.3)
BASE EXCESS: 4 (ref 0–3.3)
BASE EXCESS: 5 (ref 0–3.3)
BASE EXCESS: 7 (ref 0–3.3)
BASE EXCESS: 7 (ref 0–3.3)
BASE EXCESS: 8 (ref 0–3.3)
BASE EXCESS: ABNORMAL (ref 0–3.3)
BASOPHILS ABSOLUTE: 0 K/CU MM
BASOPHILS ABSOLUTE: 0 K/CU MM
BASOPHILS RELATIVE PERCENT: 0.4 % (ref 0–1)
BASOPHILS RELATIVE PERCENT: 1 % (ref 0–1)
BILIRUB SERPL-MCNC: 0.8 MG/DL (ref 0–1)
BILIRUB SERPL-MCNC: 1 MG/DL (ref 0–1)
BILIRUBIN URINE: NEGATIVE MG/DL
BLOOD, URINE: NEGATIVE
BUN BLDV-MCNC: 10 MG/DL (ref 6–23)
BUN BLDV-MCNC: 10 MG/DL (ref 6–23)
BUN BLDV-MCNC: 14 MG/DL (ref 6–23)
BUN BLDV-MCNC: 18 MG/DL (ref 6–23)
BUN BLDV-MCNC: 21 MG/DL (ref 6–23)
BUN BLDV-MCNC: 23 MG/DL (ref 6–23)
BUN BLDV-MCNC: 49 MG/DL (ref 6–23)
BUN BLDV-MCNC: 50 MG/DL (ref 6–23)
BUN BLDV-MCNC: 54 MG/DL (ref 6–23)
BUN BLDV-MCNC: 56 MG/DL (ref 6–23)
BUN BLDV-MCNC: 58 MG/DL (ref 6–23)
BUN BLDV-MCNC: 62 MG/DL (ref 6–23)
C-REACTIVE PROTEIN, HIGH SENSITIVITY: 365.2 MG/L
CALCIUM IONIZED: 4.76 MG/DL (ref 4.48–5.28)
CALCIUM IONIZED: 4.8 MG/DL (ref 4.48–5.28)
CALCIUM SERPL-MCNC: 7.9 MG/DL (ref 8.3–10.6)
CALCIUM SERPL-MCNC: 7.9 MG/DL (ref 8.3–10.6)
CALCIUM SERPL-MCNC: 8 MG/DL (ref 8.3–10.6)
CALCIUM SERPL-MCNC: 8.1 MG/DL (ref 8.3–10.6)
CALCIUM SERPL-MCNC: 8.2 MG/DL (ref 8.3–10.6)
CALCIUM SERPL-MCNC: 8.4 MG/DL (ref 8.3–10.6)
CALCIUM SERPL-MCNC: 8.4 MG/DL (ref 8.3–10.6)
CALCIUM SERPL-MCNC: 8.5 MG/DL (ref 8.3–10.6)
CALCIUM SERPL-MCNC: 8.5 MG/DL (ref 8.3–10.6)
CALCIUM SERPL-MCNC: 8.6 MG/DL (ref 8.3–10.6)
CALCIUM SERPL-MCNC: 9.1 MG/DL (ref 8.3–10.6)
CALCIUM SERPL-MCNC: 9.1 MG/DL (ref 8.3–10.6)
CARBON MONOXIDE, BLOOD: 1.5 % (ref 0–5)
CARBON MONOXIDE, BLOOD: 1.9 % (ref 0–5)
CARBON MONOXIDE, BLOOD: 2 % (ref 0–5)
CARBON MONOXIDE, BLOOD: 2 % (ref 0–5)
CARBON MONOXIDE, BLOOD: 2.1 % (ref 0–5)
CARBON MONOXIDE, BLOOD: 2.2 % (ref 0–5)
CARBON MONOXIDE, BLOOD: 2.2 % (ref 0–5)
CARBON MONOXIDE, BLOOD: 2.3 % (ref 0–5)
CARBON MONOXIDE, BLOOD: 2.4 % (ref 0–5)
CARBON MONOXIDE, BLOOD: 2.5 % (ref 0–5)
CARBON MONOXIDE, BLOOD: 2.5 % (ref 0–5)
CARBON MONOXIDE, BLOOD: 3 % (ref 0–5)
CARBON MONOXIDE, BLOOD: 3.1 % (ref 0–5)
CHLORIDE BLD-SCNC: 104 MMOL/L (ref 99–110)
CHLORIDE BLD-SCNC: 104 MMOL/L (ref 99–110)
CHLORIDE BLD-SCNC: 106 MMOL/L (ref 99–110)
CHLORIDE BLD-SCNC: 106 MMOL/L (ref 99–110)
CHLORIDE BLD-SCNC: 107 MMOL/L (ref 99–110)
CHLORIDE BLD-SCNC: 107 MMOL/L (ref 99–110)
CHLORIDE BLD-SCNC: 108 MMOL/L (ref 99–110)
CHLORIDE BLD-SCNC: 108 MMOL/L (ref 99–110)
CHLORIDE BLD-SCNC: 109 MMOL/L (ref 99–110)
CHLORIDE BLD-SCNC: 111 MMOL/L (ref 99–110)
CLARITY: CLEAR
CO2 CONTENT: 15.5 MMOL/L (ref 19–24)
CO2 CONTENT: 18 MMOL/L (ref 19–24)
CO2 CONTENT: 18.9 MMOL/L (ref 19–24)
CO2 CONTENT: 19.5 MMOL/L (ref 19–24)
CO2 CONTENT: 20 MMOL/L (ref 19–24)
CO2 CONTENT: 20.1 MMOL/L (ref 19–24)
CO2 CONTENT: 20.3 MMOL/L (ref 19–24)
CO2 CONTENT: 20.4 MMOL/L (ref 19–24)
CO2 CONTENT: 20.4 MMOL/L (ref 19–24)
CO2 CONTENT: 21.1 MMOL/L (ref 19–24)
CO2 CONTENT: 21.1 MMOL/L (ref 19–24)
CO2 CONTENT: 21.3 MMOL/L (ref 19–24)
CO2 CONTENT: 21.3 MMOL/L (ref 19–24)
CO2 CONTENT: 21.6 MMOL/L (ref 19–24)
CO2 CONTENT: 22.4 MMOL/L (ref 19–24)
CO2 CONTENT: 23.3 MMOL/L (ref 19–24)
CO2: 15 MMOL/L (ref 21–32)
CO2: 17 MMOL/L (ref 21–32)
CO2: 18 MMOL/L (ref 21–32)
CO2: 19 MMOL/L (ref 21–32)
CO2: 20 MMOL/L (ref 21–32)
CO2: 21 MMOL/L (ref 21–32)
CO2: 22 MMOL/L (ref 21–32)
CO2: 23 MMOL/L (ref 21–32)
CO2: 23 MMOL/L (ref 21–32)
CO2: 24 MMOL/L (ref 21–32)
CO2: 25 MMOL/L (ref 21–32)
COLOR: YELLOW
COMMENT: ABNORMAL
COMMENT: NORMAL
CREAT SERPL-MCNC: 0.7 MG/DL (ref 0.9–1.3)
CREAT SERPL-MCNC: 0.8 MG/DL (ref 0.9–1.3)
CREAT SERPL-MCNC: 0.9 MG/DL (ref 0.9–1.3)
CREAT SERPL-MCNC: 1.2 MG/DL (ref 0.9–1.3)
CREAT SERPL-MCNC: 1.2 MG/DL (ref 0.9–1.3)
CREAT SERPL-MCNC: 1.3 MG/DL (ref 0.9–1.3)
CREAT SERPL-MCNC: 1.4 MG/DL (ref 0.9–1.3)
CREAT SERPL-MCNC: 1.4 MG/DL (ref 0.9–1.3)
CULTURE: NORMAL
DIFFERENTIAL TYPE: ABNORMAL
DIFFERENTIAL TYPE: ABNORMAL
DOSE AMOUNT: NORMAL
DOSE TIME: NORMAL
EOSINOPHILS ABSOLUTE: 0 K/CU MM
EOSINOPHILS ABSOLUTE: 0.1 K/CU MM
EOSINOPHILS RELATIVE PERCENT: 0.5 % (ref 0–3)
EOSINOPHILS RELATIVE PERCENT: 1.6 % (ref 0–3)
ESTIMATED AVERAGE GLUCOSE: 123 MG/DL
GFR AFRICAN AMERICAN: >60 ML/MIN/1.73M2
GFR NON-AFRICAN AMERICAN: 53 ML/MIN/1.73M2
GFR NON-AFRICAN AMERICAN: 53 ML/MIN/1.73M2
GFR NON-AFRICAN AMERICAN: 58 ML/MIN/1.73M2
GFR NON-AFRICAN AMERICAN: >60 ML/MIN/1.73M2
GLUCOSE BLD-MCNC: 116 MG/DL (ref 70–99)
GLUCOSE BLD-MCNC: 120 MG/DL (ref 70–99)
GLUCOSE BLD-MCNC: 126 MG/DL (ref 70–99)
GLUCOSE BLD-MCNC: 132 MG/DL (ref 70–99)
GLUCOSE BLD-MCNC: 141 MG/DL (ref 70–99)
GLUCOSE BLD-MCNC: 143 MG/DL (ref 70–99)
GLUCOSE BLD-MCNC: 166 MG/DL (ref 70–99)
GLUCOSE BLD-MCNC: 172 MG/DL (ref 70–99)
GLUCOSE BLD-MCNC: 177 MG/DL (ref 70–99)
GLUCOSE BLD-MCNC: 180 MG/DL (ref 70–99)
GLUCOSE BLD-MCNC: 188 MG/DL (ref 70–99)
GLUCOSE BLD-MCNC: 200 MG/DL (ref 70–99)
GLUCOSE BLD-MCNC: 203 MG/DL (ref 70–99)
GLUCOSE BLD-MCNC: 206 MG/DL (ref 70–99)
GLUCOSE BLD-MCNC: 209 MG/DL (ref 70–99)
GLUCOSE BLD-MCNC: 211 MG/DL (ref 70–99)
GLUCOSE BLD-MCNC: 218 MG/DL (ref 70–99)
GLUCOSE BLD-MCNC: 233 MG/DL (ref 70–99)
GLUCOSE BLD-MCNC: 234 MG/DL (ref 70–99)
GLUCOSE BLD-MCNC: 246 MG/DL (ref 70–99)
GLUCOSE BLD-MCNC: 252 MG/DL (ref 70–99)
GLUCOSE BLD-MCNC: 255 MG/DL (ref 70–99)
GLUCOSE BLD-MCNC: 257 MG/DL (ref 70–99)
GLUCOSE BLD-MCNC: 264 MG/DL (ref 70–99)
GLUCOSE BLD-MCNC: 276 MG/DL (ref 70–99)
GLUCOSE BLD-MCNC: 276 MG/DL (ref 70–99)
GLUCOSE BLD-MCNC: 278 MG/DL (ref 70–99)
GLUCOSE BLD-MCNC: 280 MG/DL (ref 70–99)
GLUCOSE BLD-MCNC: 281 MG/DL (ref 70–99)
GLUCOSE BLD-MCNC: 287 MG/DL (ref 70–99)
GLUCOSE BLD-MCNC: 289 MG/DL (ref 70–99)
GLUCOSE BLD-MCNC: 290 MG/DL (ref 70–99)
GLUCOSE BLD-MCNC: 290 MG/DL (ref 70–99)
GLUCOSE BLD-MCNC: 295 MG/DL (ref 70–99)
GLUCOSE BLD-MCNC: 300 MG/DL (ref 70–99)
GLUCOSE BLD-MCNC: 302 MG/DL (ref 70–99)
GLUCOSE BLD-MCNC: 307 MG/DL (ref 70–99)
GLUCOSE BLD-MCNC: 307 MG/DL (ref 70–99)
GLUCOSE BLD-MCNC: 311 MG/DL (ref 70–99)
GLUCOSE BLD-MCNC: 312 MG/DL (ref 70–99)
GLUCOSE BLD-MCNC: 316 MG/DL (ref 70–99)
GLUCOSE BLD-MCNC: 316 MG/DL (ref 70–99)
GLUCOSE BLD-MCNC: 322 MG/DL (ref 70–99)
GLUCOSE BLD-MCNC: 324 MG/DL (ref 70–99)
GLUCOSE BLD-MCNC: 329 MG/DL (ref 70–99)
GLUCOSE BLD-MCNC: 346 MG/DL (ref 70–99)
GLUCOSE BLD-MCNC: 348 MG/DL (ref 70–99)
GLUCOSE BLD-MCNC: 355 MG/DL (ref 70–99)
GLUCOSE, URINE: NEGATIVE MG/DL
HBA1C MFR BLD: 5.9 % (ref 4.2–6.3)
HCO3 ARTERIAL: 14.5 MMOL/L (ref 18–23)
HCO3 ARTERIAL: 17 MMOL/L (ref 18–23)
HCO3 ARTERIAL: 18.1 MMOL/L (ref 18–23)
HCO3 ARTERIAL: 18.6 MMOL/L (ref 18–23)
HCO3 ARTERIAL: 19.1 MMOL/L (ref 18–23)
HCO3 ARTERIAL: 19.1 MMOL/L (ref 18–23)
HCO3 ARTERIAL: 19.2 MMOL/L (ref 18–23)
HCO3 ARTERIAL: 19.5 MMOL/L (ref 18–23)
HCO3 ARTERIAL: 19.5 MMOL/L (ref 18–23)
HCO3 ARTERIAL: 20.1 MMOL/L (ref 18–23)
HCO3 ARTERIAL: 20.2 MMOL/L (ref 18–23)
HCO3 ARTERIAL: 20.2 MMOL/L (ref 18–23)
HCO3 ARTERIAL: 20.3 MMOL/L (ref 18–23)
HCO3 ARTERIAL: 20.6 MMOL/L (ref 18–23)
HCO3 ARTERIAL: 21.4 MMOL/L (ref 18–23)
HCO3 ARTERIAL: 21.6 MMOL/L (ref 18–23)
HCO3 ARTERIAL: 22.4 MMOL/L (ref 18–23)
HCT VFR BLD CALC: 25.5 % (ref 42–52)
HCT VFR BLD CALC: 27.7 % (ref 42–52)
HCT VFR BLD CALC: 28.9 % (ref 42–52)
HCT VFR BLD CALC: 30.2 % (ref 42–52)
HCT VFR BLD CALC: 33 % (ref 42–52)
HCT VFR BLD CALC: 33 % (ref 42–52)
HCT VFR BLD CALC: 33.1 % (ref 42–52)
HCT VFR BLD CALC: 33.3 % (ref 42–52)
HCT VFR BLD CALC: 35.5 % (ref 42–52)
HCT VFR BLD CALC: 36.5 % (ref 42–52)
HCT VFR BLD CALC: 37.4 % (ref 42–52)
HCT VFR BLD CALC: 41.9 % (ref 42–52)
HEMOGLOBIN: 10.8 GM/DL (ref 13.5–18)
HEMOGLOBIN: 11 GM/DL (ref 13.5–18)
HEMOGLOBIN: 11.3 GM/DL (ref 13.5–18)
HEMOGLOBIN: 11.4 GM/DL (ref 13.5–18)
HEMOGLOBIN: 12.5 GM/DL (ref 13.5–18)
HEMOGLOBIN: 12.6 GM/DL (ref 13.5–18)
HEMOGLOBIN: 12.9 GM/DL (ref 13.5–18)
HEMOGLOBIN: 13.6 GM/DL (ref 13.5–18)
HEMOGLOBIN: 8.8 GM/DL (ref 13.5–18)
HEMOGLOBIN: 9.3 GM/DL (ref 13.5–18)
HEMOGLOBIN: 9.8 GM/DL (ref 13.5–18)
HEMOGLOBIN: 9.9 GM/DL (ref 13.5–18)
HYALINE CASTS: 5 /LPF
IMMATURE NEUTROPHIL %: 0.4 % (ref 0–0.43)
INR BLD: 0.93 INDEX
IONIZED CA: 1.19 MMOL/L (ref 1.12–1.32)
IONIZED CA: 1.2 MMOL/L (ref 1.12–1.32)
KETONES, URINE: NEGATIVE MG/DL
LEUKOCYTE ESTERASE, URINE: NEGATIVE
LYMPHOCYTES ABSOLUTE: 0.3 K/CU MM
LYMPHOCYTES ABSOLUTE: 0.8 K/CU MM
LYMPHOCYTES RELATIVE PERCENT: 18.4 % (ref 24–44)
LYMPHOCYTES RELATIVE PERCENT: 7.6 % (ref 24–44)
Lab: NORMAL
MAGNESIUM: 1.6 MG/DL (ref 1.8–2.4)
MAGNESIUM: 2 MG/DL (ref 1.8–2.4)
MAGNESIUM: 2.2 MG/DL (ref 1.8–2.4)
MAGNESIUM: 2.3 MG/DL (ref 1.8–2.4)
MAGNESIUM: 2.4 MG/DL (ref 1.8–2.4)
MAGNESIUM: 2.4 MG/DL (ref 1.8–2.4)
MCH RBC QN AUTO: 32.5 PG (ref 27–31)
MCH RBC QN AUTO: 32.9 PG (ref 27–31)
MCH RBC QN AUTO: 32.9 PG (ref 27–31)
MCH RBC QN AUTO: 33 PG (ref 27–31)
MCH RBC QN AUTO: 33.2 PG (ref 27–31)
MCH RBC QN AUTO: 33.3 PG (ref 27–31)
MCH RBC QN AUTO: 33.3 PG (ref 27–31)
MCH RBC QN AUTO: 33.5 PG (ref 27–31)
MCH RBC QN AUTO: 33.6 PG (ref 27–31)
MCH RBC QN AUTO: 33.9 PG (ref 27–31)
MCH RBC QN AUTO: 33.9 PG (ref 27–31)
MCHC RBC AUTO-ENTMCNC: 32.5 % (ref 32–36)
MCHC RBC AUTO-ENTMCNC: 32.5 % (ref 32–36)
MCHC RBC AUTO-ENTMCNC: 32.6 % (ref 32–36)
MCHC RBC AUTO-ENTMCNC: 33 % (ref 32–36)
MCHC RBC AUTO-ENTMCNC: 33.6 % (ref 32–36)
MCHC RBC AUTO-ENTMCNC: 34.3 % (ref 32–36)
MCHC RBC AUTO-ENTMCNC: 34.5 % (ref 32–36)
MCHC RBC AUTO-ENTMCNC: 35.2 % (ref 32–36)
MCV RBC AUTO: 100 FL (ref 78–100)
MCV RBC AUTO: 101.7 FL (ref 78–100)
MCV RBC AUTO: 104.5 FL (ref 78–100)
MCV RBC AUTO: 94.7 FL (ref 78–100)
MCV RBC AUTO: 95.3 FL (ref 78–100)
MCV RBC AUTO: 96.4 FL (ref 78–100)
MCV RBC AUTO: 97 FL (ref 78–100)
MCV RBC AUTO: 97.3 FL (ref 78–100)
MCV RBC AUTO: 98.2 FL (ref 78–100)
MCV RBC AUTO: 99.7 FL (ref 78–100)
MCV RBC AUTO: 99.7 FL (ref 78–100)
METHEMOGLOBIN ARTERIAL: 0.8 %
METHEMOGLOBIN ARTERIAL: 0.9 %
METHEMOGLOBIN ARTERIAL: 0.9 %
METHEMOGLOBIN ARTERIAL: 1 %
METHEMOGLOBIN ARTERIAL: 1 %
METHEMOGLOBIN ARTERIAL: 1.1 %
METHEMOGLOBIN ARTERIAL: 1.2 %
METHEMOGLOBIN ARTERIAL: 1.3 %
METHEMOGLOBIN ARTERIAL: 1.5 %
METHEMOGLOBIN ARTERIAL: 1.6 %
MONOCYTES ABSOLUTE: 0.6 K/CU MM
MONOCYTES ABSOLUTE: 0.8 K/CU MM
MONOCYTES RELATIVE PERCENT: 13.7 % (ref 0–4)
MONOCYTES RELATIVE PERCENT: 20.6 % (ref 0–4)
MUCUS: ABNORMAL HPF
NITRITE URINE, QUANTITATIVE: NEGATIVE
NUCLEATED RBC %: 0 %
O2 SATURATION: 71.8 % (ref 96–97)
O2 SATURATION: 76.3 % (ref 96–97)
O2 SATURATION: 76.4 % (ref 96–97)
O2 SATURATION: 78.8 % (ref 96–97)
O2 SATURATION: 81.2 % (ref 96–97)
O2 SATURATION: 82.8 % (ref 96–97)
O2 SATURATION: 83.6 % (ref 96–97)
O2 SATURATION: 89.2 % (ref 96–97)
O2 SATURATION: 89.4 % (ref 96–97)
O2 SATURATION: 90.5 % (ref 96–97)
O2 SATURATION: 90.8 % (ref 96–97)
O2 SATURATION: 91.9 % (ref 96–97)
O2 SATURATION: 92.3 % (ref 96–97)
O2 SATURATION: 93.1 % (ref 96–97)
O2 SATURATION: 95.4 % (ref 96–97)
O2 SATURATION: 96.2 % (ref 96–97)
O2 SATURATION: 99.1 % (ref 96–97)
PCO2 ARTERIAL: 26 MMHG (ref 32–45)
PCO2 ARTERIAL: 27 MMHG (ref 32–45)
PCO2 ARTERIAL: 28 MMHG (ref 32–45)
PCO2 ARTERIAL: 29 MMHG (ref 32–45)
PCO2 ARTERIAL: 30 MMHG (ref 32–45)
PCO2 ARTERIAL: 30 MMHG (ref 32–45)
PCO2 ARTERIAL: 31 MMHG (ref 32–45)
PCO2 ARTERIAL: 31 MMHG (ref 32–45)
PCO2 ARTERIAL: 32 MMHG (ref 32–45)
PCO2 ARTERIAL: 33 MMHG (ref 32–45)
PCO2 ARTERIAL: 35 MMHG (ref 32–45)
PCO2 ARTERIAL: 38 MMHG (ref 32–45)
PCO2 ARTERIAL: 42.5 MMHG (ref 32–45)
PCO2 ARTERIAL: 54 MMHG (ref 32–45)
PDW BLD-RTO: 14.1 % (ref 11.7–14.9)
PDW BLD-RTO: 14.6 % (ref 11.7–14.9)
PDW BLD-RTO: 14.7 % (ref 11.7–14.9)
PDW BLD-RTO: 15.6 % (ref 11.7–14.9)
PDW BLD-RTO: 15.8 % (ref 11.7–14.9)
PDW BLD-RTO: 15.9 % (ref 11.7–14.9)
PDW BLD-RTO: 16.2 % (ref 11.7–14.9)
PDW BLD-RTO: 18.2 % (ref 11.7–14.9)
PH BLOOD: 7.21 (ref 7.34–7.45)
PH BLOOD: 7.25 (ref 7.34–7.45)
PH BLOOD: 7.31 (ref 7.34–7.45)
PH BLOOD: 7.32 (ref 7.34–7.45)
PH BLOOD: 7.33 (ref 7.34–7.45)
PH BLOOD: 7.37 (ref 7.34–7.45)
PH BLOOD: 7.37 (ref 7.34–7.45)
PH BLOOD: 7.41 (ref 7.34–7.45)
PH BLOOD: 7.42 (ref 7.34–7.45)
PH BLOOD: 7.43 (ref 7.34–7.45)
PH BLOOD: 7.43 (ref 7.34–7.45)
PH BLOOD: 7.45 (ref 7.34–7.45)
PH BLOOD: 7.45 (ref 7.34–7.45)
PH BLOOD: 7.46 (ref 7.34–7.45)
PH, URINE: 5.5 (ref 5–8)
PHOSPHORUS: 3.9 MG/DL (ref 2.5–4.9)
PHOSPHORUS: 4.1 MG/DL (ref 2.5–4.9)
PHOSPHORUS: 4.3 MG/DL (ref 2.5–4.9)
PHOSPHORUS: 6.2 MG/DL (ref 2.5–4.9)
PLATELET # BLD: 100 K/CU MM (ref 140–440)
PLATELET # BLD: 113 K/CU MM (ref 140–440)
PLATELET # BLD: 114 K/CU MM (ref 140–440)
PLATELET # BLD: 123 K/CU MM (ref 140–440)
PLATELET # BLD: 128 K/CU MM (ref 140–440)
PLATELET # BLD: 128 K/CU MM (ref 140–440)
PLATELET # BLD: 132 K/CU MM (ref 140–440)
PLATELET # BLD: 133 K/CU MM (ref 140–440)
PLATELET # BLD: 147 K/CU MM (ref 140–440)
PLATELET # BLD: 149 K/CU MM (ref 140–440)
PLATELET # BLD: 260 K/CU MM (ref 140–440)
PMV BLD AUTO: 10.7 FL (ref 7.5–11.1)
PMV BLD AUTO: 10.8 FL (ref 7.5–11.1)
PMV BLD AUTO: 11.1 FL (ref 7.5–11.1)
PMV BLD AUTO: 11.2 FL (ref 7.5–11.1)
PMV BLD AUTO: 11.4 FL (ref 7.5–11.1)
PMV BLD AUTO: 11.7 FL (ref 7.5–11.1)
PMV BLD AUTO: 11.7 FL (ref 7.5–11.1)
PMV BLD AUTO: 12.2 FL (ref 7.5–11.1)
PMV BLD AUTO: 12.2 FL (ref 7.5–11.1)
PMV BLD AUTO: 12.3 FL (ref 7.5–11.1)
PMV BLD AUTO: 9.4 FL (ref 7.5–11.1)
PO2 ARTERIAL: 144.6 MMHG (ref 75–100)
PO2 ARTERIAL: 42 MMHG (ref 75–100)
PO2 ARTERIAL: 45 MMHG (ref 75–100)
PO2 ARTERIAL: 50 MMHG (ref 75–100)
PO2 ARTERIAL: 52 MMHG (ref 75–100)
PO2 ARTERIAL: 52 MMHG (ref 75–100)
PO2 ARTERIAL: 53 MMHG (ref 75–100)
PO2 ARTERIAL: 54 MMHG (ref 75–100)
PO2 ARTERIAL: 61 MMHG (ref 75–100)
PO2 ARTERIAL: 63 MMHG (ref 75–100)
PO2 ARTERIAL: 65 MMHG (ref 75–100)
PO2 ARTERIAL: 67 MMHG (ref 75–100)
PO2 ARTERIAL: 69 MMHG (ref 75–100)
PO2 ARTERIAL: 70 MMHG (ref 75–100)
PO2 ARTERIAL: 76 MMHG (ref 75–100)
PO2 ARTERIAL: 82 MMHG (ref 75–100)
PO2 ARTERIAL: 88 MMHG (ref 75–100)
POC CALCIUM: 1.22 MMOL/L (ref 1.12–1.32)
POC CHLORIDE: 112 MMOL/L (ref 98–109)
POC CREATININE: 1 MG/DL (ref 0.9–1.3)
POTASSIUM SERPL-SCNC: 3.2 MMOL/L (ref 3.5–5.1)
POTASSIUM SERPL-SCNC: 3.3 MMOL/L (ref 3.5–5.1)
POTASSIUM SERPL-SCNC: 3.4 MMOL/L (ref 3.5–5.1)
POTASSIUM SERPL-SCNC: 3.5 MMOL/L (ref 3.5–5.1)
POTASSIUM SERPL-SCNC: 3.5 MMOL/L (ref 3.5–5.1)
POTASSIUM SERPL-SCNC: 3.6 MMOL/L (ref 3.5–4.5)
POTASSIUM SERPL-SCNC: 3.6 MMOL/L (ref 3.5–5.1)
POTASSIUM SERPL-SCNC: 3.9 MMOL/L (ref 3.5–5.1)
POTASSIUM SERPL-SCNC: 4 MMOL/L (ref 3.5–5.1)
POTASSIUM SERPL-SCNC: 4.1 MMOL/L (ref 3.5–5.1)
POTASSIUM SERPL-SCNC: 5.2 MMOL/L (ref 3.5–5.1)
PREALBUMIN: 31 MG/DL (ref 20–40)
PREALBUMIN: 8 MG/DL (ref 20–40)
PRO-BNP: 595.1 PG/ML
PRO-BNP: 765.5 PG/ML
PROCALCITONIN: 13.09
PROCALCITONIN: 16.95
PROTEIN UA: ABNORMAL MG/DL
PROTHROMBIN TIME: 12 SECONDS (ref 11.7–14.5)
RBC # BLD: 2.63 M/CU MM (ref 4.6–6.2)
RBC # BLD: 2.77 M/CU MM (ref 4.6–6.2)
RBC # BLD: 2.97 M/CU MM (ref 4.6–6.2)
RBC # BLD: 2.97 M/CU MM (ref 4.6–6.2)
RBC # BLD: 3.32 M/CU MM (ref 4.6–6.2)
RBC # BLD: 3.34 M/CU MM (ref 4.6–6.2)
RBC # BLD: 3.36 M/CU MM (ref 4.6–6.2)
RBC # BLD: 3.75 M/CU MM (ref 4.6–6.2)
RBC # BLD: 3.83 M/CU MM (ref 4.6–6.2)
RBC # BLD: 3.88 M/CU MM (ref 4.6–6.2)
RBC # BLD: 4.01 M/CU MM (ref 4.6–6.2)
RBC URINE: ABNORMAL /HPF (ref 0–3)
SARS-COV-2: DETECTED
SEGMENTED NEUTROPHILS ABSOLUTE COUNT: 2.9 K/CU MM
SEGMENTED NEUTROPHILS ABSOLUTE COUNT: 2.9 K/CU MM
SEGMENTED NEUTROPHILS RELATIVE PERCENT: 65.5 % (ref 36–66)
SEGMENTED NEUTROPHILS RELATIVE PERCENT: 70.3 % (ref 36–66)
SODIUM BLD-SCNC: 134 MMOL/L (ref 135–145)
SODIUM BLD-SCNC: 135 MMOL/L (ref 135–145)
SODIUM BLD-SCNC: 135 MMOL/L (ref 135–145)
SODIUM BLD-SCNC: 136 MMOL/L (ref 135–145)
SODIUM BLD-SCNC: 138 MMOL/L (ref 135–145)
SODIUM BLD-SCNC: 139 MMOL/L (ref 135–145)
SODIUM BLD-SCNC: 141 MMOL/L (ref 135–145)
SODIUM BLD-SCNC: 143 MMOL/L (ref 135–145)
SODIUM BLD-SCNC: 143 MMOL/L (ref 135–145)
SODIUM BLD-SCNC: 144 MMOL/L (ref 135–145)
SODIUM BLD-SCNC: 146 MMOL/L (ref 138–146)
SOURCE, BLOOD GAS: ABNORMAL
SOURCE: ABNORMAL
SPECIFIC GRAVITY UA: 1.02 (ref 1–1.03)
SPECIMEN: NORMAL
SQUAMOUS EPITHELIAL: 1 /HPF
TOTAL IMMATURE NEUTOROPHIL: 0.02 K/CU MM
TOTAL NUCLEATED RBC: 0 K/CU MM
TOTAL PROTEIN: 4.8 GM/DL (ref 6.4–8.2)
TOTAL PROTEIN: 6.3 GM/DL (ref 6.4–8.2)
TRIGL SERPL-MCNC: 362 MG/DL
UROBILINOGEN, URINE: NORMAL MG/DL (ref 0.2–1)
VANCOMYCIN RANDOM: 14.3 UG/ML
VANCOMYCIN RANDOM: 21.9 UG/ML
VANCOMYCIN RANDOM: 37.2 UG/ML
WBC # BLD: 12.6 K/CU MM (ref 4–10.5)
WBC # BLD: 13.9 K/CU MM (ref 4–10.5)
WBC # BLD: 15.7 K/CU MM (ref 4–10.5)
WBC # BLD: 15.8 K/CU MM (ref 4–10.5)
WBC # BLD: 17.4 K/CU MM (ref 4–10.5)
WBC # BLD: 22.8 K/CU MM (ref 4–10.5)
WBC # BLD: 23.7 K/CU MM (ref 4–10.5)
WBC # BLD: 25.2 K/CU MM (ref 4–10.5)
WBC # BLD: 4.1 K/CU MM (ref 4–10.5)
WBC # BLD: 4.5 K/CU MM (ref 4–10.5)
WBC # BLD: 75.3 K/CU MM (ref 4–10.5)
WBC UA: 2 /HPF (ref 0–2)

## 2022-01-01 PROCEDURE — 94669 MECHANICAL CHEST WALL OSCILL: CPT

## 2022-01-01 PROCEDURE — 6360000002 HC RX W HCPCS: Performed by: SURGERY

## 2022-01-01 PROCEDURE — 80048 BASIC METABOLIC PNL TOTAL CA: CPT

## 2022-01-01 PROCEDURE — 87086 URINE CULTURE/COLONY COUNT: CPT

## 2022-01-01 PROCEDURE — 6360000002 HC RX W HCPCS: Performed by: INTERNAL MEDICINE

## 2022-01-01 PROCEDURE — 36569 INSJ PICC 5 YR+ W/O IMAGING: CPT

## 2022-01-01 PROCEDURE — 37799 UNLISTED PX VASCULAR SURGERY: CPT

## 2022-01-01 PROCEDURE — 2580000003 HC RX 258: Performed by: THORACIC SURGERY (CARDIOTHORACIC VASCULAR SURGERY)

## 2022-01-01 PROCEDURE — 71045 X-RAY EXAM CHEST 1 VIEW: CPT

## 2022-01-01 PROCEDURE — 87205 SMEAR GRAM STAIN: CPT

## 2022-01-01 PROCEDURE — 84100 ASSAY OF PHOSPHORUS: CPT

## 2022-01-01 PROCEDURE — 2580000003 HC RX 258: Performed by: HOSPITALIST

## 2022-01-01 PROCEDURE — 2580000003 HC RX 258: Performed by: SURGERY

## 2022-01-01 PROCEDURE — 3017F COLORECTAL CA SCREEN DOC REV: CPT | Performed by: INTERNAL MEDICINE

## 2022-01-01 PROCEDURE — 94640 AIRWAY INHALATION TREATMENT: CPT

## 2022-01-01 PROCEDURE — 82330 ASSAY OF CALCIUM: CPT

## 2022-01-01 PROCEDURE — 85027 COMPLETE CBC AUTOMATED: CPT

## 2022-01-01 PROCEDURE — 6370000000 HC RX 637 (ALT 250 FOR IP): Performed by: INTERNAL MEDICINE

## 2022-01-01 PROCEDURE — 2700000000 HC OXYGEN THERAPY PER DAY

## 2022-01-01 PROCEDURE — 84478 ASSAY OF TRIGLYCERIDES: CPT

## 2022-01-01 PROCEDURE — 6360000002 HC RX W HCPCS: Performed by: PHYSICIAN ASSISTANT

## 2022-01-01 PROCEDURE — 94761 N-INVAS EAR/PLS OXIMETRY MLT: CPT

## 2022-01-01 PROCEDURE — 82803 BLOOD GASES ANY COMBINATION: CPT

## 2022-01-01 PROCEDURE — 3600000003 HC SURGERY LEVEL 3 BASE: Performed by: SURGERY

## 2022-01-01 PROCEDURE — 6370000000 HC RX 637 (ALT 250 FOR IP): Performed by: PHYSICIAN ASSISTANT

## 2022-01-01 PROCEDURE — 82962 GLUCOSE BLOOD TEST: CPT

## 2022-01-01 PROCEDURE — 2580000003 HC RX 258: Performed by: PHYSICIAN ASSISTANT

## 2022-01-01 PROCEDURE — 99024 POSTOP FOLLOW-UP VISIT: CPT | Performed by: PHYSICIAN ASSISTANT

## 2022-01-01 PROCEDURE — 6370000000 HC RX 637 (ALT 250 FOR IP): Performed by: SURGERY

## 2022-01-01 PROCEDURE — 43117 PARTIAL REMOVAL OF ESOPHAGUS: CPT | Performed by: SURGERY

## 2022-01-01 PROCEDURE — 84145 PROCALCITONIN (PCT): CPT

## 2022-01-01 PROCEDURE — XW033H5 INTRODUCTION OF TOCILIZUMAB INTO PERIPHERAL VEIN, PERCUTANEOUS APPROACH, NEW TECHNOLOGY GROUP 5: ICD-10-PCS | Performed by: INTERNAL MEDICINE

## 2022-01-01 PROCEDURE — 6360000002 HC RX W HCPCS: Performed by: HOSPITALIST

## 2022-01-01 PROCEDURE — 3700000000 HC ANESTHESIA ATTENDED CARE: Performed by: SURGERY

## 2022-01-01 PROCEDURE — 80051 ELECTROLYTE PANEL: CPT

## 2022-01-01 PROCEDURE — 31500 INSERT EMERGENCY AIRWAY: CPT

## 2022-01-01 PROCEDURE — 2000000000 HC ICU R&B

## 2022-01-01 PROCEDURE — 89220 SPUTUM SPECIMEN COLLECTION: CPT

## 2022-01-01 PROCEDURE — 94003 VENT MGMT INPAT SUBQ DAY: CPT

## 2022-01-01 PROCEDURE — 80053 COMPREHEN METABOLIC PANEL: CPT

## 2022-01-01 PROCEDURE — 87070 CULTURE OTHR SPECIMN AEROBIC: CPT

## 2022-01-01 PROCEDURE — 6370000000 HC RX 637 (ALT 250 FOR IP)

## 2022-01-01 PROCEDURE — C9113 INJ PANTOPRAZOLE SODIUM, VIA: HCPCS | Performed by: PHYSICIAN ASSISTANT

## 2022-01-01 PROCEDURE — 99292 CRITICAL CARE ADDL 30 MIN: CPT | Performed by: THORACIC SURGERY (CARDIOTHORACIC VASCULAR SURGERY)

## 2022-01-01 PROCEDURE — 84134 ASSAY OF PREALBUMIN: CPT

## 2022-01-01 PROCEDURE — 94668 MNPJ CHEST WALL SBSQ: CPT

## 2022-01-01 PROCEDURE — 2500000003 HC RX 250 WO HCPCS: Performed by: PHYSICIAN ASSISTANT

## 2022-01-01 PROCEDURE — 85610 PROTHROMBIN TIME: CPT

## 2022-01-01 PROCEDURE — 74018 RADEX ABDOMEN 1 VIEW: CPT

## 2022-01-01 PROCEDURE — 2500000003 HC RX 250 WO HCPCS

## 2022-01-01 PROCEDURE — 4004F PT TOBACCO SCREEN RCVD TLK: CPT | Performed by: INTERNAL MEDICINE

## 2022-01-01 PROCEDURE — 2500000003 HC RX 250 WO HCPCS: Performed by: INTERNAL MEDICINE

## 2022-01-01 PROCEDURE — 2580000003 HC RX 258: Performed by: ANESTHESIOLOGY

## 2022-01-01 PROCEDURE — 31500 INSERT EMERGENCY AIRWAY: CPT | Performed by: ANESTHESIOLOGY

## 2022-01-01 PROCEDURE — 2500000003 HC RX 250 WO HCPCS: Performed by: THORACIC SURGERY (CARDIOTHORACIC VASCULAR SURGERY)

## 2022-01-01 PROCEDURE — 2500000003 HC RX 250 WO HCPCS: Performed by: SURGERY

## 2022-01-01 PROCEDURE — 36620 INSERTION CATHETER ARTERY: CPT | Performed by: PHYSICIAN ASSISTANT

## 2022-01-01 PROCEDURE — 85014 HEMATOCRIT: CPT

## 2022-01-01 PROCEDURE — 6360000002 HC RX W HCPCS

## 2022-01-01 PROCEDURE — 86901 BLOOD TYPING SEROLOGIC RH(D): CPT

## 2022-01-01 PROCEDURE — 80202 ASSAY OF VANCOMYCIN: CPT

## 2022-01-01 PROCEDURE — 2580000003 HC RX 258: Performed by: INTERNAL MEDICINE

## 2022-01-01 PROCEDURE — 83735 ASSAY OF MAGNESIUM: CPT

## 2022-01-01 PROCEDURE — 3700000001 HC ADD 15 MINUTES (ANESTHESIA): Performed by: SURGERY

## 2022-01-01 PROCEDURE — 31622 DX BRONCHOSCOPE/WASH: CPT

## 2022-01-01 PROCEDURE — 87077 CULTURE AEROBIC IDENTIFY: CPT

## 2022-01-01 PROCEDURE — 0W9930Z DRAINAGE OF RIGHT PLEURAL CAVITY WITH DRAINAGE DEVICE, PERCUTANEOUS APPROACH: ICD-10-PCS | Performed by: THORACIC SURGERY (CARDIOTHORACIC VASCULAR SURGERY)

## 2022-01-01 PROCEDURE — 99024 POSTOP FOLLOW-UP VISIT: CPT | Performed by: THORACIC SURGERY (CARDIOTHORACIC VASCULAR SURGERY)

## 2022-01-01 PROCEDURE — 85007 BL SMEAR W/DIFF WBC COUNT: CPT

## 2022-01-01 PROCEDURE — 2709999900 HC NON-CHARGEABLE SUPPLY: Performed by: SURGERY

## 2022-01-01 PROCEDURE — 82565 ASSAY OF CREATININE: CPT

## 2022-01-01 PROCEDURE — 97163 PT EVAL HIGH COMPLEX 45 MIN: CPT

## 2022-01-01 PROCEDURE — C1751 CATH, INF, PER/CENT/MIDLINE: HCPCS

## 2022-01-01 PROCEDURE — 36600 WITHDRAWAL OF ARTERIAL BLOOD: CPT

## 2022-01-01 PROCEDURE — 36410 VNPNXR 3YR/> PHY/QHP DX/THER: CPT

## 2022-01-01 PROCEDURE — U0005 INFEC AGEN DETEC AMPLI PROBE: HCPCS

## 2022-01-01 PROCEDURE — 31624 DX BRONCHOSCOPE/LAVAGE: CPT

## 2022-01-01 PROCEDURE — P9045 ALBUMIN (HUMAN), 5%, 250 ML: HCPCS

## 2022-01-01 PROCEDURE — 6360000004 HC RX CONTRAST MEDICATION: Performed by: SURGERY

## 2022-01-01 PROCEDURE — P9045 ALBUMIN (HUMAN), 5%, 250 ML: HCPCS | Performed by: SURGERY

## 2022-01-01 PROCEDURE — 36415 COLL VENOUS BLD VENIPUNCTURE: CPT

## 2022-01-01 PROCEDURE — 85025 COMPLETE CBC W/AUTO DIFF WBC: CPT

## 2022-01-01 PROCEDURE — U0003 INFECTIOUS AGENT DETECTION BY NUCLEIC ACID (DNA OR RNA); SEVERE ACUTE RESPIRATORY SYNDROME CORONAVIRUS 2 (SARS-COV-2) (CORONAVIRUS DISEASE [COVID-19]), AMPLIFIED PROBE TECHNIQUE, MAKING USE OF HIGH THROUGHPUT TECHNOLOGIES AS DESCRIBED BY CMS-2020-01-R: HCPCS

## 2022-01-01 PROCEDURE — 2100000000 HC CCU R&B

## 2022-01-01 PROCEDURE — 76937 US GUIDE VASCULAR ACCESS: CPT

## 2022-01-01 PROCEDURE — 71260 CT THORAX DX C+: CPT

## 2022-01-01 PROCEDURE — 86900 BLOOD TYPING SEROLOGIC ABO: CPT

## 2022-01-01 PROCEDURE — 2720000010 HC SURG SUPPLY STERILE: Performed by: SURGERY

## 2022-01-01 PROCEDURE — G8427 DOCREV CUR MEDS BY ELIG CLIN: HCPCS | Performed by: INTERNAL MEDICINE

## 2022-01-01 PROCEDURE — 83880 ASSAY OF NATRIURETIC PEPTIDE: CPT

## 2022-01-01 PROCEDURE — 2580000003 HC RX 258

## 2022-01-01 PROCEDURE — 86140 C-REACTIVE PROTEIN: CPT

## 2022-01-01 PROCEDURE — 85730 THROMBOPLASTIN TIME PARTIAL: CPT

## 2022-01-01 PROCEDURE — 86850 RBC ANTIBODY SCREEN: CPT

## 2022-01-01 PROCEDURE — A9552 F18 FDG: HCPCS | Performed by: SURGERY

## 2022-01-01 PROCEDURE — C1769 GUIDE WIRE: HCPCS

## 2022-01-01 PROCEDURE — 87186 SC STD MICRODIL/AGAR DIL: CPT

## 2022-01-01 PROCEDURE — 6360000002 HC RX W HCPCS: Performed by: ANESTHESIOLOGY

## 2022-01-01 PROCEDURE — A4217 STERILE WATER/SALINE, 500 ML: HCPCS | Performed by: SURGERY

## 2022-01-01 PROCEDURE — 87040 BLOOD CULTURE FOR BACTERIA: CPT

## 2022-01-01 PROCEDURE — 94667 MNPJ CHEST WALL 1ST: CPT

## 2022-01-01 PROCEDURE — 3600000013 HC SURGERY LEVEL 3 ADDTL 15MIN: Performed by: SURGERY

## 2022-01-01 PROCEDURE — G8484 FLU IMMUNIZE NO ADMIN: HCPCS | Performed by: INTERNAL MEDICINE

## 2022-01-01 PROCEDURE — 3430000000 HC RX DIAGNOSTIC RADIOPHARMACEUTICAL: Performed by: SURGERY

## 2022-01-01 PROCEDURE — 36620 INSERTION CATHETER ARTERY: CPT

## 2022-01-01 PROCEDURE — 0BH17EZ INSERTION OF ENDOTRACHEAL AIRWAY INTO TRACHEA, VIA NATURAL OR ARTIFICIAL OPENING: ICD-10-PCS | Performed by: INTERNAL MEDICINE

## 2022-01-01 PROCEDURE — 99291 CRITICAL CARE FIRST HOUR: CPT | Performed by: THORACIC SURGERY (CARDIOTHORACIC VASCULAR SURGERY)

## 2022-01-01 PROCEDURE — 5A1955Z RESPIRATORY VENTILATION, GREATER THAN 96 CONSECUTIVE HOURS: ICD-10-PCS | Performed by: INTERNAL MEDICINE

## 2022-01-01 PROCEDURE — 0W9B30Z DRAINAGE OF LEFT PLEURAL CAVITY WITH DRAINAGE DEVICE, PERCUTANEOUS APPROACH: ICD-10-PCS | Performed by: THORACIC SURGERY (CARDIOTHORACIC VASCULAR SURGERY)

## 2022-01-01 PROCEDURE — 0DB30ZZ EXCISION OF LOWER ESOPHAGUS, OPEN APPROACH: ICD-10-PCS | Performed by: SURGERY

## 2022-01-01 PROCEDURE — G8417 CALC BMI ABV UP PARAM F/U: HCPCS | Performed by: INTERNAL MEDICINE

## 2022-01-01 PROCEDURE — 31645 BRNCHSC W/THER ASPIR 1ST: CPT

## 2022-01-01 PROCEDURE — 6370000000 HC RX 637 (ALT 250 FOR IP): Performed by: THORACIC SURGERY (CARDIOTHORACIC VASCULAR SURGERY)

## 2022-01-01 PROCEDURE — 97530 THERAPEUTIC ACTIVITIES: CPT

## 2022-01-01 PROCEDURE — 85018 HEMOGLOBIN: CPT

## 2022-01-01 PROCEDURE — 78815 PET IMAGE W/CT SKULL-THIGH: CPT

## 2022-01-01 PROCEDURE — 87081 CULTURE SCREEN ONLY: CPT

## 2022-01-01 PROCEDURE — 2720000010 HC SURG SUPPLY STERILE

## 2022-01-01 PROCEDURE — 94002 VENT MGMT INPAT INIT DAY: CPT

## 2022-01-01 PROCEDURE — 99214 OFFICE O/P EST MOD 30 MIN: CPT | Performed by: INTERNAL MEDICINE

## 2022-01-01 PROCEDURE — 05HY33Z INSERTION OF INFUSION DEVICE INTO UPPER VEIN, PERCUTANEOUS APPROACH: ICD-10-PCS | Performed by: INTERNAL MEDICINE

## 2022-01-01 PROCEDURE — 81001 URINALYSIS AUTO W/SCOPE: CPT

## 2022-01-01 PROCEDURE — 83036 HEMOGLOBIN GLYCOSYLATED A1C: CPT

## 2022-01-01 PROCEDURE — 6360000002 HC RX W HCPCS: Performed by: THORACIC SURGERY (CARDIOTHORACIC VASCULAR SURGERY)

## 2022-01-01 PROCEDURE — 80069 RENAL FUNCTION PANEL: CPT

## 2022-01-01 PROCEDURE — 05HB33Z INSERTION OF INFUSION DEVICE INTO RIGHT BASILIC VEIN, PERCUTANEOUS APPROACH: ICD-10-PCS | Performed by: INTERNAL MEDICINE

## 2022-01-01 PROCEDURE — 36592 COLLECT BLOOD FROM PICC: CPT

## 2022-01-01 PROCEDURE — 32551 INSERTION OF CHEST TUBE: CPT | Performed by: THORACIC SURGERY (CARDIOTHORACIC VASCULAR SURGERY)

## 2022-01-01 RX ORDER — NOREPINEPHRINE BIT/0.9 % NACL 16MG/250ML
INFUSION BOTTLE (ML) INTRAVENOUS
Status: DISPENSED
Start: 2022-01-01 | End: 2022-01-01

## 2022-01-01 RX ORDER — ACETYLCYSTEINE 200 MG/ML
600 SOLUTION ORAL; RESPIRATORY (INHALATION) 2 TIMES DAILY
Status: DISCONTINUED | OUTPATIENT
Start: 2022-01-01 | End: 2022-01-01

## 2022-01-01 RX ORDER — ALBUMIN, HUMAN INJ 5% 5 %
SOLUTION INTRAVENOUS PRN
Status: DISCONTINUED | OUTPATIENT
Start: 2022-01-01 | End: 2022-01-01 | Stop reason: SDUPTHER

## 2022-01-01 RX ORDER — FUROSEMIDE 10 MG/ML
INJECTION INTRAMUSCULAR; INTRAVENOUS PRN
Status: DISCONTINUED | OUTPATIENT
Start: 2022-01-01 | End: 2022-01-01 | Stop reason: SDUPTHER

## 2022-01-01 RX ORDER — ALBUTEROL SULFATE 90 UG/1
4 AEROSOL, METERED RESPIRATORY (INHALATION)
Status: DISCONTINUED | OUTPATIENT
Start: 2022-01-01 | End: 2022-01-01

## 2022-01-01 RX ORDER — SODIUM CHLORIDE, SODIUM LACTATE, POTASSIUM CHLORIDE, CALCIUM CHLORIDE 600; 310; 30; 20 MG/100ML; MG/100ML; MG/100ML; MG/100ML
INJECTION, SOLUTION INTRAVENOUS CONTINUOUS
Status: DISCONTINUED | OUTPATIENT
Start: 2022-01-01 | End: 2022-01-01

## 2022-01-01 RX ORDER — CEFAZOLIN SODIUM 2 G/100ML
2000 INJECTION, SOLUTION INTRAVENOUS EVERY 8 HOURS
Status: COMPLETED | OUTPATIENT
Start: 2022-01-01 | End: 2022-01-01

## 2022-01-01 RX ORDER — SODIUM CHLORIDE 9 MG/ML
INJECTION, SOLUTION INTRAVENOUS CONTINUOUS PRN
Status: DISCONTINUED | OUTPATIENT
Start: 2022-01-01 | End: 2022-01-01 | Stop reason: SDUPTHER

## 2022-01-01 RX ORDER — SERTRALINE HYDROCHLORIDE 100 MG/1
100 TABLET, FILM COATED ORAL DAILY
Status: DISCONTINUED | OUTPATIENT
Start: 2022-01-01 | End: 2022-01-01 | Stop reason: HOSPADM

## 2022-01-01 RX ORDER — FUROSEMIDE 10 MG/ML
20 INJECTION INTRAMUSCULAR; INTRAVENOUS ONCE
Status: COMPLETED | OUTPATIENT
Start: 2022-01-01 | End: 2022-01-01

## 2022-01-01 RX ORDER — SODIUM CHLORIDE 450 MG/100ML
INJECTION, SOLUTION INTRAVENOUS CONTINUOUS
Status: DISCONTINUED | OUTPATIENT
Start: 2022-01-01 | End: 2022-01-01

## 2022-01-01 RX ORDER — HYDROMORPHONE HCL 110MG/55ML
0.5 PATIENT CONTROLLED ANALGESIA SYRINGE INTRAVENOUS EVERY 4 HOURS PRN
Status: DISCONTINUED | OUTPATIENT
Start: 2022-01-01 | End: 2022-01-01 | Stop reason: HOSPADM

## 2022-01-01 RX ORDER — ALBUTEROL SULFATE 90 UG/1
2 AEROSOL, METERED RESPIRATORY (INHALATION) EVERY 4 HOURS PRN
Status: DISCONTINUED | OUTPATIENT
Start: 2022-01-01 | End: 2022-01-01 | Stop reason: ALTCHOICE

## 2022-01-01 RX ORDER — LIDOCAINE 4 G/G
1 PATCH TOPICAL DAILY
Status: DISCONTINUED | OUTPATIENT
Start: 2022-01-01 | End: 2022-01-01 | Stop reason: HOSPADM

## 2022-01-01 RX ORDER — HYDROCODONE BITARTRATE AND ACETAMINOPHEN 5; 325 MG/1; MG/1
1 TABLET ORAL EVERY 4 HOURS PRN
Status: DISCONTINUED | OUTPATIENT
Start: 2022-01-01 | End: 2022-01-01 | Stop reason: HOSPADM

## 2022-01-01 RX ORDER — HYDROCODONE BITARTRATE AND ACETAMINOPHEN 5; 325 MG/1; MG/1
2 TABLET ORAL PRN
Status: DISCONTINUED | OUTPATIENT
Start: 2022-01-01 | End: 2022-01-01 | Stop reason: HOSPADM

## 2022-01-01 RX ORDER — HYDRALAZINE HYDROCHLORIDE 20 MG/ML
10 INJECTION INTRAMUSCULAR; INTRAVENOUS
Status: DISCONTINUED | OUTPATIENT
Start: 2022-01-01 | End: 2022-01-01 | Stop reason: HOSPADM

## 2022-01-01 RX ORDER — ALBUMIN, HUMAN INJ 5% 5 %
SOLUTION INTRAVENOUS
Status: COMPLETED
Start: 2022-01-01 | End: 2022-01-01

## 2022-01-01 RX ORDER — ONDANSETRON 2 MG/ML
INJECTION INTRAMUSCULAR; INTRAVENOUS PRN
Status: DISCONTINUED | OUTPATIENT
Start: 2022-01-01 | End: 2022-01-01 | Stop reason: SDUPTHER

## 2022-01-01 RX ORDER — ALBUMIN, HUMAN INJ 5% 5 %
SOLUTION INTRAVENOUS
Status: DISPENSED
Start: 2022-01-01 | End: 2022-01-01

## 2022-01-01 RX ORDER — SODIUM CHLORIDE 0.9 % (FLUSH) 0.9 %
10 SYRINGE (ML) INJECTION PRN
Status: COMPLETED | OUTPATIENT
Start: 2022-01-01 | End: 2022-01-01

## 2022-01-01 RX ORDER — HYDROMORPHONE HCL 110MG/55ML
0.5 PATIENT CONTROLLED ANALGESIA SYRINGE INTRAVENOUS ONCE
Status: COMPLETED | OUTPATIENT
Start: 2022-01-01 | End: 2022-01-01

## 2022-01-01 RX ORDER — ALBUMIN, HUMAN INJ 5% 5 %
12.5 SOLUTION INTRAVENOUS ONCE
Status: COMPLETED | OUTPATIENT
Start: 2022-01-01 | End: 2022-01-01

## 2022-01-01 RX ORDER — METOPROLOL TARTRATE 5 MG/5ML
5 INJECTION INTRAVENOUS EVERY 6 HOURS
Status: DISCONTINUED | OUTPATIENT
Start: 2022-01-01 | End: 2022-01-01

## 2022-01-01 RX ORDER — 0.9 % SODIUM CHLORIDE 0.9 %
500 INTRAVENOUS SOLUTION INTRAVENOUS ONCE
Status: COMPLETED | OUTPATIENT
Start: 2022-01-01 | End: 2022-01-01

## 2022-01-01 RX ORDER — POTASSIUM CHLORIDE 7.45 MG/ML
10 INJECTION INTRAVENOUS PRN
Status: DISCONTINUED | OUTPATIENT
Start: 2022-01-01 | End: 2022-01-01

## 2022-01-01 RX ORDER — LIDOCAINE HYDROCHLORIDE 20 MG/ML
INJECTION, SOLUTION INTRAVENOUS PRN
Status: DISCONTINUED | OUTPATIENT
Start: 2022-01-01 | End: 2022-01-01 | Stop reason: SDUPTHER

## 2022-01-01 RX ORDER — VECURONIUM BROMIDE 1 MG/ML
INJECTION, POWDER, LYOPHILIZED, FOR SOLUTION INTRAVENOUS PRN
Status: DISCONTINUED | OUTPATIENT
Start: 2022-01-01 | End: 2022-01-01 | Stop reason: SDUPTHER

## 2022-01-01 RX ORDER — SODIUM CHLORIDE 0.9 % (FLUSH) 0.9 %
5-40 SYRINGE (ML) INJECTION PRN
Status: DISCONTINUED | OUTPATIENT
Start: 2022-01-01 | End: 2022-01-01 | Stop reason: HOSPADM

## 2022-01-01 RX ORDER — PROPOFOL 10 MG/ML
10 INJECTION, EMULSION INTRAVENOUS CONTINUOUS
Status: DISCONTINUED | OUTPATIENT
Start: 2022-01-01 | End: 2022-01-01

## 2022-01-01 RX ORDER — INSULIN GLARGINE 100 [IU]/ML
12 INJECTION, SOLUTION SUBCUTANEOUS NIGHTLY
Status: DISCONTINUED | OUTPATIENT
Start: 2022-01-01 | End: 2022-01-01

## 2022-01-01 RX ORDER — FENTANYL CITRATE 50 UG/ML
INJECTION, SOLUTION INTRAMUSCULAR; INTRAVENOUS PRN
Status: DISCONTINUED | OUTPATIENT
Start: 2022-01-01 | End: 2022-01-01 | Stop reason: SDUPTHER

## 2022-01-01 RX ORDER — FUROSEMIDE 10 MG/ML
40 INJECTION INTRAMUSCULAR; INTRAVENOUS ONCE
Status: COMPLETED | OUTPATIENT
Start: 2022-01-01 | End: 2022-01-01

## 2022-01-01 RX ORDER — DEXTROSE MONOHYDRATE 25 G/50ML
12.5 INJECTION, SOLUTION INTRAVENOUS PRN
Status: DISCONTINUED | OUTPATIENT
Start: 2022-01-01 | End: 2022-01-01 | Stop reason: HOSPADM

## 2022-01-01 RX ORDER — INSULIN GLARGINE 100 [IU]/ML
40 INJECTION, SOLUTION SUBCUTANEOUS NIGHTLY
Status: DISCONTINUED | OUTPATIENT
Start: 2022-01-01 | End: 2022-01-01

## 2022-01-01 RX ORDER — FENTANYL CITRATE-0.9 % NACL/PF 10 MCG/ML
PLASTIC BAG, INJECTION (ML) INTRAVENOUS
Status: DISCONTINUED
Start: 2022-01-01 | End: 2022-01-01 | Stop reason: HOSPADM

## 2022-01-01 RX ORDER — ROCURONIUM BROMIDE 10 MG/ML
INJECTION, SOLUTION INTRAVENOUS PRN
Status: DISCONTINUED | OUTPATIENT
Start: 2022-01-01 | End: 2022-01-01 | Stop reason: SDUPTHER

## 2022-01-01 RX ORDER — FLUDEOXYGLUCOSE F 18 200 MCI/ML
15.43 INJECTION, SOLUTION INTRAVENOUS
Status: COMPLETED | OUTPATIENT
Start: 2022-01-01 | End: 2022-01-01

## 2022-01-01 RX ORDER — SODIUM CHLORIDE 450 MG/100ML
INJECTION, SOLUTION INTRAVENOUS CONTINUOUS
Status: DISCONTINUED | OUTPATIENT
Start: 2022-01-01 | End: 2022-01-01 | Stop reason: HOSPADM

## 2022-01-01 RX ORDER — LABETALOL HYDROCHLORIDE 5 MG/ML
10 INJECTION, SOLUTION INTRAVENOUS
Status: DISCONTINUED | OUTPATIENT
Start: 2022-01-01 | End: 2022-01-01 | Stop reason: HOSPADM

## 2022-01-01 RX ORDER — METOCLOPRAMIDE HYDROCHLORIDE 5 MG/ML
10 INJECTION INTRAMUSCULAR; INTRAVENOUS
Status: DISCONTINUED | OUTPATIENT
Start: 2022-01-01 | End: 2022-01-01 | Stop reason: HOSPADM

## 2022-01-01 RX ORDER — GUAIFENESIN 100 MG/5ML
400 SOLUTION ORAL 4 TIMES DAILY
Status: DISCONTINUED | OUTPATIENT
Start: 2022-01-01 | End: 2022-01-01 | Stop reason: HOSPADM

## 2022-01-01 RX ORDER — POTASSIUM CHLORIDE 29.8 MG/ML
20 INJECTION INTRAVENOUS ONCE
Status: DISCONTINUED | OUTPATIENT
Start: 2022-01-01 | End: 2022-01-01

## 2022-01-01 RX ORDER — FENTANYL CITRATE-0.9 % NACL/PF 10 MCG/ML
25 PLASTIC BAG, INJECTION (ML) INTRAVENOUS CONTINUOUS
Status: DISCONTINUED | OUTPATIENT
Start: 2022-01-01 | End: 2022-01-01 | Stop reason: HOSPADM

## 2022-01-01 RX ORDER — CHLORHEXIDINE GLUCONATE 0.12 MG/ML
15 RINSE ORAL 2 TIMES DAILY
Status: DISCONTINUED | OUTPATIENT
Start: 2022-01-01 | End: 2022-01-01 | Stop reason: HOSPADM

## 2022-01-01 RX ORDER — LIDOCAINE HYDROCHLORIDE 10 MG/ML
5 INJECTION, SOLUTION EPIDURAL; INFILTRATION; INTRACAUDAL; PERINEURAL ONCE
Status: DISCONTINUED | OUTPATIENT
Start: 2022-01-01 | End: 2022-01-01

## 2022-01-01 RX ORDER — HYDROCODONE BITARTRATE AND ACETAMINOPHEN 5; 325 MG/1; MG/1
1 TABLET ORAL PRN
Status: DISCONTINUED | OUTPATIENT
Start: 2022-01-01 | End: 2022-01-01 | Stop reason: HOSPADM

## 2022-01-01 RX ORDER — NICOTINE POLACRILEX 4 MG
15 LOZENGE BUCCAL PRN
Status: DISCONTINUED | OUTPATIENT
Start: 2022-01-01 | End: 2022-01-01 | Stop reason: HOSPADM

## 2022-01-01 RX ORDER — PROPOFOL 10 MG/ML
INJECTION, EMULSION INTRAVENOUS
Status: COMPLETED
Start: 2022-01-01 | End: 2022-01-01

## 2022-01-01 RX ORDER — INSULIN GLARGINE 100 [IU]/ML
25 INJECTION, SOLUTION SUBCUTANEOUS NIGHTLY
Status: DISCONTINUED | OUTPATIENT
Start: 2022-01-01 | End: 2022-01-01

## 2022-01-01 RX ORDER — DEXAMETHASONE SODIUM PHOSPHATE 4 MG/ML
INJECTION, SOLUTION INTRA-ARTICULAR; INTRALESIONAL; INTRAMUSCULAR; INTRAVENOUS; SOFT TISSUE PRN
Status: DISCONTINUED | OUTPATIENT
Start: 2022-01-01 | End: 2022-01-01 | Stop reason: SDUPTHER

## 2022-01-01 RX ORDER — ALBUTEROL SULFATE 90 UG/1
2 AEROSOL, METERED RESPIRATORY (INHALATION) 4 TIMES DAILY
Status: DISCONTINUED | OUTPATIENT
Start: 2022-01-01 | End: 2022-01-01

## 2022-01-01 RX ORDER — BISACODYL 10 MG
10 SUPPOSITORY, RECTAL RECTAL ONCE
Status: COMPLETED | OUTPATIENT
Start: 2022-01-01 | End: 2022-01-01

## 2022-01-01 RX ORDER — PANTOPRAZOLE SODIUM 40 MG/10ML
40 INJECTION, POWDER, LYOPHILIZED, FOR SOLUTION INTRAVENOUS DAILY
Status: DISCONTINUED | OUTPATIENT
Start: 2022-01-01 | End: 2022-01-01 | Stop reason: HOSPADM

## 2022-01-01 RX ORDER — BISACODYL 10 MG
10 SUPPOSITORY, RECTAL RECTAL DAILY PRN
Status: DISCONTINUED | OUTPATIENT
Start: 2022-01-01 | End: 2022-01-01 | Stop reason: HOSPADM

## 2022-01-01 RX ORDER — NOREPINEPHRINE BIT/0.9 % NACL 16MG/250ML
2-30 INFUSION BOTTLE (ML) INTRAVENOUS CONTINUOUS
Status: DISCONTINUED | OUTPATIENT
Start: 2022-01-01 | End: 2022-01-01 | Stop reason: HOSPADM

## 2022-01-01 RX ORDER — SODIUM CHLORIDE 0.9 % (FLUSH) 0.9 %
5-40 SYRINGE (ML) INJECTION EVERY 12 HOURS SCHEDULED
Status: DISCONTINUED | OUTPATIENT
Start: 2022-01-01 | End: 2022-01-01 | Stop reason: HOSPADM

## 2022-01-01 RX ORDER — POTASSIUM CHLORIDE 29.8 MG/ML
20 INJECTION INTRAVENOUS PRN
Status: DISCONTINUED | OUTPATIENT
Start: 2022-01-01 | End: 2022-01-01 | Stop reason: HOSPADM

## 2022-01-01 RX ORDER — ALBUTEROL SULFATE 90 UG/1
2 AEROSOL, METERED RESPIRATORY (INHALATION) EVERY 6 HOURS PRN
Status: DISCONTINUED | OUTPATIENT
Start: 2022-01-01 | End: 2022-01-01

## 2022-01-01 RX ORDER — CARVEDILOL 12.5 MG/1
12.5 TABLET ORAL 2 TIMES DAILY WITH MEALS
Status: DISCONTINUED | OUTPATIENT
Start: 2022-01-01 | End: 2022-01-01

## 2022-01-01 RX ORDER — INSULIN GLARGINE 100 [IU]/ML
50 INJECTION, SOLUTION SUBCUTANEOUS NIGHTLY
Status: DISCONTINUED | OUTPATIENT
Start: 2022-01-01 | End: 2022-01-01

## 2022-01-01 RX ORDER — DEXAMETHASONE SODIUM PHOSPHATE 10 MG/ML
10 INJECTION, SOLUTION INTRAMUSCULAR; INTRAVENOUS DAILY
Status: DISCONTINUED | OUTPATIENT
Start: 2022-01-01 | End: 2022-01-01 | Stop reason: HOSPADM

## 2022-01-01 RX ORDER — HYDROCODONE BITARTRATE AND ACETAMINOPHEN 5; 325 MG/1; MG/1
2 TABLET ORAL EVERY 4 HOURS PRN
Status: DISCONTINUED | OUTPATIENT
Start: 2022-01-01 | End: 2022-01-01

## 2022-01-01 RX ORDER — IPRATROPIUM BROMIDE AND ALBUTEROL SULFATE 2.5; .5 MG/3ML; MG/3ML
1 SOLUTION RESPIRATORY (INHALATION)
Status: DISCONTINUED | OUTPATIENT
Start: 2022-01-01 | End: 2022-01-01

## 2022-01-01 RX ORDER — DIPHENHYDRAMINE HYDROCHLORIDE 50 MG/ML
12.5 INJECTION INTRAMUSCULAR; INTRAVENOUS
Status: DISCONTINUED | OUTPATIENT
Start: 2022-01-01 | End: 2022-01-01 | Stop reason: HOSPADM

## 2022-01-01 RX ORDER — HYDROMORPHONE HCL 110MG/55ML
PATIENT CONTROLLED ANALGESIA SYRINGE INTRAVENOUS PRN
Status: DISCONTINUED | OUTPATIENT
Start: 2022-01-01 | End: 2022-01-01 | Stop reason: SDUPTHER

## 2022-01-01 RX ORDER — PROPOFOL 10 MG/ML
10 INJECTION, EMULSION INTRAVENOUS CONTINUOUS
Status: DISCONTINUED | OUTPATIENT
Start: 2022-01-01 | End: 2022-01-01 | Stop reason: HOSPADM

## 2022-01-01 RX ORDER — PHENYLEPHRINE HYDROCHLORIDE 10 MG/ML
INJECTION INTRAVENOUS PRN
Status: DISCONTINUED | OUTPATIENT
Start: 2022-01-01 | End: 2022-01-01 | Stop reason: SDUPTHER

## 2022-01-01 RX ORDER — LABETALOL HYDROCHLORIDE 5 MG/ML
5 INJECTION, SOLUTION INTRAVENOUS EVERY 10 MIN PRN
Status: DISCONTINUED | OUTPATIENT
Start: 2022-01-01 | End: 2022-01-01 | Stop reason: HOSPADM

## 2022-01-01 RX ORDER — HYDROMORPHONE HCL 110MG/55ML
0.5 PATIENT CONTROLLED ANALGESIA SYRINGE INTRAVENOUS EVERY 5 MIN PRN
Status: DISCONTINUED | OUTPATIENT
Start: 2022-01-01 | End: 2022-01-01 | Stop reason: HOSPADM

## 2022-01-01 RX ORDER — MAGNESIUM SULFATE IN WATER 40 MG/ML
2000 INJECTION, SOLUTION INTRAVENOUS ONCE
Status: COMPLETED | OUTPATIENT
Start: 2022-01-01 | End: 2022-01-01

## 2022-01-01 RX ORDER — PROMETHAZINE HYDROCHLORIDE 25 MG/ML
6.25 INJECTION, SOLUTION INTRAMUSCULAR; INTRAVENOUS
Status: DISCONTINUED | OUTPATIENT
Start: 2022-01-01 | End: 2022-01-01 | Stop reason: HOSPADM

## 2022-01-01 RX ORDER — MEPERIDINE HYDROCHLORIDE 25 MG/ML
12.5 INJECTION INTRAMUSCULAR; INTRAVENOUS; SUBCUTANEOUS EVERY 5 MIN PRN
Status: DISCONTINUED | OUTPATIENT
Start: 2022-01-01 | End: 2022-01-01 | Stop reason: HOSPADM

## 2022-01-01 RX ORDER — CHLORHEXIDINE GLUCONATE 0.12 MG/ML
15 RINSE ORAL 2 TIMES DAILY
Status: DISCONTINUED | OUTPATIENT
Start: 2022-01-01 | End: 2022-01-01

## 2022-01-01 RX ORDER — FENTANYL CITRATE-0.9 % NACL/PF 10 MCG/ML
25 PLASTIC BAG, INJECTION (ML) INTRAVENOUS CONTINUOUS
Status: DISCONTINUED | OUTPATIENT
Start: 2022-01-01 | End: 2022-01-01

## 2022-01-01 RX ORDER — CEFAZOLIN SODIUM 2 G/100ML
2000 INJECTION, SOLUTION INTRAVENOUS ONCE
Status: COMPLETED | OUTPATIENT
Start: 2022-01-01 | End: 2022-01-01

## 2022-01-01 RX ORDER — POTASSIUM CHLORIDE 29.8 MG/ML
20 INJECTION INTRAVENOUS ONCE
Status: COMPLETED | OUTPATIENT
Start: 2022-01-01 | End: 2022-01-01

## 2022-01-01 RX ORDER — HYDRALAZINE HYDROCHLORIDE 20 MG/ML
5 INJECTION INTRAMUSCULAR; INTRAVENOUS EVERY 10 MIN PRN
Status: DISCONTINUED | OUTPATIENT
Start: 2022-01-01 | End: 2022-01-01 | Stop reason: HOSPADM

## 2022-01-01 RX ORDER — FENTANYL CITRATE 50 UG/ML
50 INJECTION, SOLUTION INTRAMUSCULAR; INTRAVENOUS EVERY 5 MIN PRN
Status: DISCONTINUED | OUTPATIENT
Start: 2022-01-01 | End: 2022-01-01 | Stop reason: HOSPADM

## 2022-01-01 RX ORDER — HYDROMORPHONE HCL 110MG/55ML
0.5 PATIENT CONTROLLED ANALGESIA SYRINGE INTRAVENOUS
Status: DISCONTINUED | OUTPATIENT
Start: 2022-01-01 | End: 2022-01-01

## 2022-01-01 RX ORDER — BUPIVACAINE HYDROCHLORIDE AND EPINEPHRINE 5; 5 MG/ML; UG/ML
INJECTION, SOLUTION EPIDURAL; INTRACAUDAL; PERINEURAL
Status: COMPLETED | OUTPATIENT
Start: 2022-01-01 | End: 2022-01-01

## 2022-01-01 RX ORDER — ONDANSETRON 2 MG/ML
4 INJECTION INTRAMUSCULAR; INTRAVENOUS EVERY 6 HOURS PRN
Status: DISCONTINUED | OUTPATIENT
Start: 2022-01-01 | End: 2022-01-01 | Stop reason: HOSPADM

## 2022-01-01 RX ORDER — ALBUTEROL SULFATE 90 UG/1
4 AEROSOL, METERED RESPIRATORY (INHALATION) EVERY 4 HOURS
Status: DISCONTINUED | OUTPATIENT
Start: 2022-01-01 | End: 2022-01-01

## 2022-01-01 RX ORDER — ALBUTEROL SULFATE 90 UG/1
2 AEROSOL, METERED RESPIRATORY (INHALATION) 4 TIMES DAILY
Status: DISCONTINUED | OUTPATIENT
Start: 2022-01-01 | End: 2022-01-01 | Stop reason: SDUPTHER

## 2022-01-01 RX ORDER — ACETAMINOPHEN 325 MG/1
650 TABLET ORAL EVERY 4 HOURS PRN
Status: DISCONTINUED | OUTPATIENT
Start: 2022-01-01 | End: 2022-01-01 | Stop reason: HOSPADM

## 2022-01-01 RX ORDER — SODIUM CHLORIDE 9 MG/ML
25 INJECTION, SOLUTION INTRAVENOUS PRN
Status: DISCONTINUED | OUTPATIENT
Start: 2022-01-01 | End: 2022-01-01 | Stop reason: HOSPADM

## 2022-01-01 RX ORDER — KETOROLAC TROMETHAMINE 30 MG/ML
15 INJECTION, SOLUTION INTRAMUSCULAR; INTRAVENOUS EVERY 6 HOURS PRN
Status: DISPENSED | OUTPATIENT
Start: 2022-01-01 | End: 2022-01-01

## 2022-01-01 RX ORDER — METHYLPREDNISOLONE SODIUM SUCCINATE 125 MG/2ML
125 INJECTION, POWDER, LYOPHILIZED, FOR SOLUTION INTRAMUSCULAR; INTRAVENOUS ONCE
Status: COMPLETED | OUTPATIENT
Start: 2022-01-01 | End: 2022-01-01

## 2022-01-01 RX ORDER — PROPOFOL 10 MG/ML
INJECTION, EMULSION INTRAVENOUS PRN
Status: DISCONTINUED | OUTPATIENT
Start: 2022-01-01 | End: 2022-01-01 | Stop reason: SDUPTHER

## 2022-01-01 RX ORDER — DEXTROSE MONOHYDRATE 50 MG/ML
100 INJECTION, SOLUTION INTRAVENOUS PRN
Status: DISCONTINUED | OUTPATIENT
Start: 2022-01-01 | End: 2022-01-01 | Stop reason: HOSPADM

## 2022-01-01 RX ORDER — MAGNESIUM SULFATE 1 G/100ML
1000 INJECTION INTRAVENOUS PRN
Status: DISCONTINUED | OUTPATIENT
Start: 2022-01-01 | End: 2022-01-01 | Stop reason: HOSPADM

## 2022-01-01 RX ADMIN — HUMAN INSULIN 50 UNITS: 100 INJECTION, SUSPENSION SUBCUTANEOUS at 08:47

## 2022-01-01 RX ADMIN — ALBUTEROL SULFATE 4 PUFF: 90 AEROSOL, METERED RESPIRATORY (INHALATION) at 03:09

## 2022-01-01 RX ADMIN — GUAIFENESIN 400 MG: 100 SOLUTION ORAL at 12:45

## 2022-01-01 RX ADMIN — ALBUTEROL SULFATE 4 PUFF: 90 AEROSOL, METERED RESPIRATORY (INHALATION) at 07:08

## 2022-01-01 RX ADMIN — KETOROLAC TROMETHAMINE 15 MG: 30 INJECTION, SOLUTION INTRAMUSCULAR; INTRAVENOUS at 15:55

## 2022-01-01 RX ADMIN — PANTOPRAZOLE SODIUM 40 MG: 40 INJECTION, POWDER, FOR SOLUTION INTRAVENOUS at 09:25

## 2022-01-01 RX ADMIN — Medication 4 PUFF: at 15:56

## 2022-01-01 RX ADMIN — ACETYLCYSTEINE 600 MG: 200 SOLUTION ORAL; RESPIRATORY (INHALATION) at 07:12

## 2022-01-01 RX ADMIN — INSULIN HUMAN 12 UNITS: 100 INJECTION, SOLUTION PARENTERAL at 14:06

## 2022-01-01 RX ADMIN — Medication 2 PUFF: at 15:48

## 2022-01-01 RX ADMIN — METOPROLOL TARTRATE 5 MG: 5 INJECTION INTRAVENOUS at 07:02

## 2022-01-01 RX ADMIN — PHENYLEPHRINE HYDROCHLORIDE 100 MCG: 10 INJECTION INTRAVENOUS at 15:43

## 2022-01-01 RX ADMIN — Medication 2 MCG/MIN: at 06:12

## 2022-01-01 RX ADMIN — PIPERACILLIN SODIUM AND TAZOBACTAM SODIUM 3375 MG: 3; 375 INJECTION, POWDER, FOR SOLUTION INTRAVENOUS at 16:40

## 2022-01-01 RX ADMIN — BISACODYL 10 MG: 10 SUPPOSITORY RECTAL at 16:45

## 2022-01-01 RX ADMIN — SUGAMMADEX 300 MG: 100 INJECTION, SOLUTION INTRAVENOUS at 16:10

## 2022-01-01 RX ADMIN — Medication 4 PUFF: at 23:10

## 2022-01-01 RX ADMIN — CHLORHEXIDINE GLUCONATE 0.12% ORAL RINSE 15 ML: 1.2 LIQUID ORAL at 10:46

## 2022-01-01 RX ADMIN — BISACODYL 10 MG: 10 SUPPOSITORY RECTAL at 13:41

## 2022-01-01 RX ADMIN — HYDROMORPHONE HYDROCHLORIDE 0.4 MG: 2 INJECTION INTRAMUSCULAR; INTRAVENOUS; SUBCUTANEOUS at 09:05

## 2022-01-01 RX ADMIN — HYDROMORPHONE HYDROCHLORIDE 0.5 MG: 2 INJECTION INTRAMUSCULAR; INTRAVENOUS; SUBCUTANEOUS at 13:30

## 2022-01-01 RX ADMIN — HYDROMORPHONE HYDROCHLORIDE 0.5 MG: 2 INJECTION INTRAMUSCULAR; INTRAVENOUS; SUBCUTANEOUS at 13:35

## 2022-01-01 RX ADMIN — PROPOFOL 35 MCG/KG/MIN: 10 INJECTION, EMULSION INTRAVENOUS at 09:12

## 2022-01-01 RX ADMIN — ACETAMINOPHEN 650 MG: 325 TABLET ORAL at 06:10

## 2022-01-01 RX ADMIN — ASCORBIC ACID, VITAMIN A PALMITATE, CHOLECALCIFEROL, THIAMINE HYDROCHLORIDE, RIBOFLAVIN-5 PHOSPHATE SODIUM, PYRIDOXINE HYDROCHLORIDE, NIACINAMIDE, DEXPANTHENOL, ALPHA-TOCOPHEROL ACETATE, VITAMIN K1, FOLIC ACID, BIOTIN, CYANOCOBALAMIN: 200; 3300; 200; 6; 3.6; 6; 40; 15; 10; 150; 600; 60; 5 INJECTION, SOLUTION INTRAVENOUS at 18:04

## 2022-01-01 RX ADMIN — INSULIN HUMAN 20 UNITS: 100 INJECTION, SUSPENSION SUBCUTANEOUS at 09:52

## 2022-01-01 RX ADMIN — SODIUM CHLORIDE: 4.5 INJECTION, SOLUTION INTRAVENOUS at 15:31

## 2022-01-01 RX ADMIN — HYDROMORPHONE HYDROCHLORIDE 0.5 MG: 2 INJECTION INTRAMUSCULAR; INTRAVENOUS; SUBCUTANEOUS at 20:24

## 2022-01-01 RX ADMIN — Medication 4 PUFF: at 00:46

## 2022-01-01 RX ADMIN — ACETYLCYSTEINE 600 MG: 200 SOLUTION ORAL; RESPIRATORY (INHALATION) at 07:06

## 2022-01-01 RX ADMIN — HYDROMORPHONE HYDROCHLORIDE 0.5 MG: 2 INJECTION INTRAMUSCULAR; INTRAVENOUS; SUBCUTANEOUS at 23:48

## 2022-01-01 RX ADMIN — HYDROMORPHONE HYDROCHLORIDE 0.2 MG: 2 INJECTION INTRAMUSCULAR; INTRAVENOUS; SUBCUTANEOUS at 08:44

## 2022-01-01 RX ADMIN — Medication 4 PUFF: at 07:03

## 2022-01-01 RX ADMIN — DEXMEDETOMIDINE HYDROCHLORIDE 0.3 MCG/KG/HR: 100 INJECTION, SOLUTION INTRAVENOUS at 12:38

## 2022-01-01 RX ADMIN — PROPOFOL 5 MCG/KG/MIN: 10 INJECTION, EMULSION INTRAVENOUS at 05:49

## 2022-01-01 RX ADMIN — KETOROLAC TROMETHAMINE 15 MG: 30 INJECTION, SOLUTION INTRAMUSCULAR; INTRAVENOUS at 15:49

## 2022-01-01 RX ADMIN — INSULIN LISPRO 8 UNITS: 100 INJECTION, SOLUTION INTRAVENOUS; SUBCUTANEOUS at 04:32

## 2022-01-01 RX ADMIN — PROPOFOL 35 MCG/KG/MIN: 10 INJECTION, EMULSION INTRAVENOUS at 05:30

## 2022-01-01 RX ADMIN — POTASSIUM CHLORIDE 10 MEQ: 7.46 INJECTION, SOLUTION INTRAVENOUS at 18:45

## 2022-01-01 RX ADMIN — SODIUM CHLORIDE: 4.5 INJECTION, SOLUTION INTRAVENOUS at 17:27

## 2022-01-01 RX ADMIN — HYDROMORPHONE HYDROCHLORIDE 0.5 MG: 2 INJECTION INTRAMUSCULAR; INTRAVENOUS; SUBCUTANEOUS at 04:14

## 2022-01-01 RX ADMIN — SODIUM CHLORIDE, PRESERVATIVE FREE 10 ML: 5 INJECTION INTRAVENOUS at 08:25

## 2022-01-01 RX ADMIN — Medication 4 PUFF: at 14:30

## 2022-01-01 RX ADMIN — PIPERACILLIN SODIUM AND TAZOBACTAM SODIUM 3375 MG: 3; 375 INJECTION, POWDER, FOR SOLUTION INTRAVENOUS at 00:32

## 2022-01-01 RX ADMIN — PIPERACILLIN SODIUM AND TAZOBACTAM SODIUM 3375 MG: 3; 375 INJECTION, POWDER, FOR SOLUTION INTRAVENOUS at 16:56

## 2022-01-01 RX ADMIN — VECURONIUM BROMIDE 4 MG: 10 INJECTION, POWDER, LYOPHILIZED, FOR SOLUTION INTRAVENOUS at 08:28

## 2022-01-01 RX ADMIN — PROPOFOL 35 MCG/KG/MIN: 10 INJECTION, EMULSION INTRAVENOUS at 17:28

## 2022-01-01 RX ADMIN — KETOROLAC TROMETHAMINE 15 MG: 30 INJECTION, SOLUTION INTRAMUSCULAR; INTRAVENOUS at 07:33

## 2022-01-01 RX ADMIN — SODIUM CHLORIDE: 4.5 INJECTION, SOLUTION INTRAVENOUS at 03:13

## 2022-01-01 RX ADMIN — ENOXAPARIN SODIUM 40 MG: 100 INJECTION SUBCUTANEOUS at 21:19

## 2022-01-01 RX ADMIN — GUAIFENESIN 400 MG: 100 SOLUTION ORAL at 17:05

## 2022-01-01 RX ADMIN — HYDROMORPHONE HYDROCHLORIDE 0.5 MG: 2 INJECTION INTRAMUSCULAR; INTRAVENOUS; SUBCUTANEOUS at 19:43

## 2022-01-01 RX ADMIN — INSULIN LISPRO 8 UNITS: 100 INJECTION, SOLUTION INTRAVENOUS; SUBCUTANEOUS at 20:27

## 2022-01-01 RX ADMIN — ALBUTEROL SULFATE 4 PUFF: 90 AEROSOL, METERED RESPIRATORY (INHALATION) at 23:11

## 2022-01-01 RX ADMIN — ALBUTEROL SULFATE 2 PUFF: 90 AEROSOL, METERED RESPIRATORY (INHALATION) at 21:23

## 2022-01-01 RX ADMIN — PROPOFOL 30 MCG/KG/MIN: 10 INJECTION, EMULSION INTRAVENOUS at 20:11

## 2022-01-01 RX ADMIN — PROPOFOL 30 MCG/KG/MIN: 10 INJECTION, EMULSION INTRAVENOUS at 00:16

## 2022-01-01 RX ADMIN — ACETYLCYSTEINE 600 MG: 200 SOLUTION ORAL; RESPIRATORY (INHALATION) at 19:36

## 2022-01-01 RX ADMIN — SODIUM CHLORIDE: 4.5 INJECTION, SOLUTION INTRAVENOUS at 16:48

## 2022-01-01 RX ADMIN — HYDROCODONE BITARTRATE AND ACETAMINOPHEN 1 TABLET: 5; 325 TABLET ORAL at 05:01

## 2022-01-01 RX ADMIN — PANTOPRAZOLE SODIUM 40 MG: 40 INJECTION, POWDER, FOR SOLUTION INTRAVENOUS at 08:20

## 2022-01-01 RX ADMIN — Medication 2 PUFF: at 21:23

## 2022-01-01 RX ADMIN — Medication 50 MCG/HR: at 00:18

## 2022-01-01 RX ADMIN — ENOXAPARIN SODIUM 40 MG: 100 INJECTION SUBCUTANEOUS at 09:25

## 2022-01-01 RX ADMIN — ASCORBIC ACID, VITAMIN A PALMITATE, CHOLECALCIFEROL, THIAMINE HYDROCHLORIDE, RIBOFLAVIN-5 PHOSPHATE SODIUM, PYRIDOXINE HYDROCHLORIDE, NIACINAMIDE, DEXPANTHENOL, ALPHA-TOCOPHEROL ACETATE, VITAMIN K1, FOLIC ACID, BIOTIN, CYANOCOBALAMIN: 200; 3300; 200; 6; 3.6; 6; 40; 15; 10; 150; 600; 60; 5 INJECTION, SOLUTION INTRAVENOUS at 17:59

## 2022-01-01 RX ADMIN — ENOXAPARIN SODIUM 40 MG: 100 INJECTION SUBCUTANEOUS at 08:20

## 2022-01-01 RX ADMIN — HYDRALAZINE HYDROCHLORIDE 10 MG: 20 INJECTION INTRAMUSCULAR; INTRAVENOUS at 20:24

## 2022-01-01 RX ADMIN — ALBUTEROL SULFATE 4 PUFF: 90 AEROSOL, METERED RESPIRATORY (INHALATION) at 11:11

## 2022-01-01 RX ADMIN — ALBUTEROL SULFATE 4 PUFF: 90 AEROSOL, METERED RESPIRATORY (INHALATION) at 23:21

## 2022-01-01 RX ADMIN — Medication 4 PUFF: at 15:20

## 2022-01-01 RX ADMIN — PHENYLEPHRINE HYDROCHLORIDE 100 MCG: 10 INJECTION INTRAVENOUS at 11:15

## 2022-01-01 RX ADMIN — Medication 2 MCG/MIN: at 08:29

## 2022-01-01 RX ADMIN — ALBUMIN (HUMAN) 12.5 G: 12.5 INJECTION, SOLUTION INTRAVENOUS at 12:09

## 2022-01-01 RX ADMIN — HYDROCODONE BITARTRATE AND ACETAMINOPHEN 1 TABLET: 5; 325 TABLET ORAL at 21:36

## 2022-01-01 RX ADMIN — ALBUTEROL SULFATE 4 PUFF: 90 AEROSOL, METERED RESPIRATORY (INHALATION) at 04:01

## 2022-01-01 RX ADMIN — CEFAZOLIN SODIUM 2000 MG: 2 INJECTION, SOLUTION INTRAVENOUS at 15:52

## 2022-01-01 RX ADMIN — ALBUTEROL SULFATE 4 PUFF: 90 AEROSOL, METERED RESPIRATORY (INHALATION) at 07:02

## 2022-01-01 RX ADMIN — SERTRALINE HYDROCHLORIDE 100 MG: 100 TABLET ORAL at 19:59

## 2022-01-01 RX ADMIN — DEXMEDETOMIDINE HYDROCHLORIDE 0.2 MCG/KG/HR: 100 INJECTION, SOLUTION INTRAVENOUS at 01:43

## 2022-01-01 RX ADMIN — DEXMEDETOMIDINE 4 MCG: 100 INJECTION, SOLUTION, CONCENTRATE INTRAVENOUS at 09:58

## 2022-01-01 RX ADMIN — ASCORBIC ACID, VITAMIN A PALMITATE, CHOLECALCIFEROL, THIAMINE HYDROCHLORIDE, RIBOFLAVIN-5 PHOSPHATE SODIUM, PYRIDOXINE HYDROCHLORIDE, NIACINAMIDE, DEXPANTHENOL, ALPHA-TOCOPHEROL ACETATE, VITAMIN K1, FOLIC ACID, BIOTIN, CYANOCOBALAMIN: 200; 3300; 200; 6; 3.6; 6; 40; 15; 10; 150; 600; 60; 5 INJECTION, SOLUTION INTRAVENOUS at 17:41

## 2022-01-01 RX ADMIN — SODIUM CHLORIDE: 4.5 INJECTION, SOLUTION INTRAVENOUS at 02:55

## 2022-01-01 RX ADMIN — SODIUM CHLORIDE, PRESERVATIVE FREE 10 ML: 5 INJECTION INTRAVENOUS at 09:31

## 2022-01-01 RX ADMIN — HYDROMORPHONE HYDROCHLORIDE 0.5 MG: 2 INJECTION INTRAMUSCULAR; INTRAVENOUS; SUBCUTANEOUS at 17:39

## 2022-01-01 RX ADMIN — GUAIFENESIN 400 MG: 100 SOLUTION ORAL at 09:25

## 2022-01-01 RX ADMIN — Medication 4 PUFF: at 19:34

## 2022-01-01 RX ADMIN — FAMOTIDINE 20 MG: 10 INJECTION, SOLUTION INTRAVENOUS at 10:50

## 2022-01-01 RX ADMIN — HYDROMORPHONE HYDROCHLORIDE 0.5 MG: 2 INJECTION INTRAMUSCULAR; INTRAVENOUS; SUBCUTANEOUS at 15:30

## 2022-01-01 RX ADMIN — POTASSIUM CHLORIDE 20 MEQ: 29.8 INJECTION, SOLUTION INTRAVENOUS at 19:36

## 2022-01-01 RX ADMIN — PANTOPRAZOLE SODIUM 40 MG: 40 INJECTION, POWDER, FOR SOLUTION INTRAVENOUS at 08:41

## 2022-01-01 RX ADMIN — SODIUM CHLORIDE: 4.5 INJECTION, SOLUTION INTRAVENOUS at 03:50

## 2022-01-01 RX ADMIN — HYDROMORPHONE HYDROCHLORIDE 0.2 MG: 2 INJECTION INTRAMUSCULAR; INTRAVENOUS; SUBCUTANEOUS at 11:47

## 2022-01-01 RX ADMIN — PROPOFOL 1000 MG: 10 INJECTION, EMULSION INTRAVENOUS at 15:04

## 2022-01-01 RX ADMIN — PIPERACILLIN SODIUM AND TAZOBACTAM SODIUM 3375 MG: 3; 375 INJECTION, POWDER, FOR SOLUTION INTRAVENOUS at 08:55

## 2022-01-01 RX ADMIN — Medication 4 PUFF: at 23:21

## 2022-01-01 RX ADMIN — FENTANYL CITRATE 50 MCG: 50 INJECTION, SOLUTION INTRAMUSCULAR; INTRAVENOUS at 07:53

## 2022-01-01 RX ADMIN — MAGNESIUM SULFATE HEPTAHYDRATE 2000 MG: 2 INJECTION, SOLUTION INTRAVENOUS at 17:20

## 2022-01-01 RX ADMIN — PIPERACILLIN SODIUM AND TAZOBACTAM SODIUM 3375 MG: 3; 375 INJECTION, POWDER, FOR SOLUTION INTRAVENOUS at 23:32

## 2022-01-01 RX ADMIN — SODIUM CHLORIDE: 4.5 INJECTION, SOLUTION INTRAVENOUS at 18:59

## 2022-01-01 RX ADMIN — PROPOFOL 40 MCG/KG/MIN: 10 INJECTION, EMULSION INTRAVENOUS at 02:23

## 2022-01-01 RX ADMIN — HYDROMORPHONE HYDROCHLORIDE 0.5 MG: 2 INJECTION INTRAMUSCULAR; INTRAVENOUS; SUBCUTANEOUS at 04:20

## 2022-01-01 RX ADMIN — DEXMEDETOMIDINE 4 MCG: 100 INJECTION, SOLUTION, CONCENTRATE INTRAVENOUS at 09:52

## 2022-01-01 RX ADMIN — SODIUM CHLORIDE, PRESERVATIVE FREE 10 ML: 5 INJECTION INTRAVENOUS at 20:14

## 2022-01-01 RX ADMIN — CHLORHEXIDINE GLUCONATE 0.12% ORAL RINSE 15 ML: 1.2 LIQUID ORAL at 19:24

## 2022-01-01 RX ADMIN — VECURONIUM BROMIDE 2 MG: 10 INJECTION, POWDER, LYOPHILIZED, FOR SOLUTION INTRAVENOUS at 11:19

## 2022-01-01 RX ADMIN — HYDROMORPHONE HYDROCHLORIDE 0.2 MG: 2 INJECTION INTRAMUSCULAR; INTRAVENOUS; SUBCUTANEOUS at 11:36

## 2022-01-01 RX ADMIN — SODIUM CHLORIDE: 4.5 INJECTION, SOLUTION INTRAVENOUS at 10:29

## 2022-01-01 RX ADMIN — PIPERACILLIN SODIUM AND TAZOBACTAM SODIUM 3375 MG: 3; 375 INJECTION, POWDER, FOR SOLUTION INTRAVENOUS at 23:56

## 2022-01-01 RX ADMIN — HYDROMORPHONE HYDROCHLORIDE 0.5 MG: 2 INJECTION INTRAMUSCULAR; INTRAVENOUS; SUBCUTANEOUS at 11:49

## 2022-01-01 RX ADMIN — Medication 50 MCG/HR: at 06:05

## 2022-01-01 RX ADMIN — ALBUTEROL SULFATE 4 PUFF: 90 AEROSOL, METERED RESPIRATORY (INHALATION) at 02:58

## 2022-01-01 RX ADMIN — HYDRALAZINE HYDROCHLORIDE 10 MG: 20 INJECTION INTRAMUSCULAR; INTRAVENOUS at 05:21

## 2022-01-01 RX ADMIN — Medication 2 MCG/MIN: at 09:30

## 2022-01-01 RX ADMIN — Medication 4 PUFF: at 19:07

## 2022-01-01 RX ADMIN — PANTOPRAZOLE SODIUM 40 MG: 40 INJECTION, POWDER, FOR SOLUTION INTRAVENOUS at 08:24

## 2022-01-01 RX ADMIN — INSULIN LISPRO 6 UNITS: 100 INJECTION, SOLUTION INTRAVENOUS; SUBCUTANEOUS at 10:25

## 2022-01-01 RX ADMIN — LABETALOL HYDROCHLORIDE 10 MG: 5 INJECTION, SOLUTION INTRAVENOUS at 08:10

## 2022-01-01 RX ADMIN — PIPERACILLIN SODIUM AND TAZOBACTAM SODIUM 3375 MG: 3; 375 INJECTION, POWDER, FOR SOLUTION INTRAVENOUS at 23:46

## 2022-01-01 RX ADMIN — SODIUM CHLORIDE, PRESERVATIVE FREE 10 ML: 5 INJECTION INTRAVENOUS at 19:59

## 2022-01-01 RX ADMIN — SERTRALINE HYDROCHLORIDE 100 MG: 100 TABLET ORAL at 09:41

## 2022-01-01 RX ADMIN — DEXAMETHASONE SODIUM PHOSPHATE 20 MG: 4 INJECTION, SOLUTION INTRA-ARTICULAR; INTRALESIONAL; INTRAMUSCULAR; INTRAVENOUS; SOFT TISSUE at 11:49

## 2022-01-01 RX ADMIN — ALBUTEROL SULFATE 4 PUFF: 90 AEROSOL, METERED RESPIRATORY (INHALATION) at 11:24

## 2022-01-01 RX ADMIN — SODIUM CHLORIDE: 4.5 INJECTION, SOLUTION INTRAVENOUS at 20:00

## 2022-01-01 RX ADMIN — Medication 2 PUFF: at 16:02

## 2022-01-01 RX ADMIN — HYDROMORPHONE HYDROCHLORIDE 0.5 MG: 2 INJECTION INTRAMUSCULAR; INTRAVENOUS; SUBCUTANEOUS at 14:34

## 2022-01-01 RX ADMIN — ALBUTEROL SULFATE 4 PUFF: 90 AEROSOL, METERED RESPIRATORY (INHALATION) at 14:54

## 2022-01-01 RX ADMIN — Medication 4 PUFF: at 08:20

## 2022-01-01 RX ADMIN — INSULIN GLARGINE 50 UNITS: 100 INJECTION, SOLUTION SUBCUTANEOUS at 20:46

## 2022-01-01 RX ADMIN — ROCURONIUM BROMIDE 50 MG: 10 INJECTION INTRAVENOUS at 07:35

## 2022-01-01 RX ADMIN — CEFAZOLIN SODIUM 2000 MG: 2 INJECTION, SOLUTION INTRAVENOUS at 09:21

## 2022-01-01 RX ADMIN — ENOXAPARIN SODIUM 40 MG: 100 INJECTION SUBCUTANEOUS at 19:24

## 2022-01-01 RX ADMIN — ENOXAPARIN SODIUM 40 MG: 100 INJECTION SUBCUTANEOUS at 22:06

## 2022-01-01 RX ADMIN — INSULIN HUMAN 6 UNITS: 100 INJECTION, SOLUTION PARENTERAL at 23:54

## 2022-01-01 RX ADMIN — PROPOFOL 30 MCG/KG/MIN: 10 INJECTION, EMULSION INTRAVENOUS at 18:54

## 2022-01-01 RX ADMIN — DEXMEDETOMIDINE 4 MCG: 100 INJECTION, SOLUTION, CONCENTRATE INTRAVENOUS at 13:08

## 2022-01-01 RX ADMIN — PIPERACILLIN SODIUM AND TAZOBACTAM SODIUM 3375 MG: 3; 375 INJECTION, POWDER, FOR SOLUTION INTRAVENOUS at 09:24

## 2022-01-01 RX ADMIN — SODIUM CHLORIDE, POTASSIUM CHLORIDE, SODIUM LACTATE AND CALCIUM CHLORIDE: 600; 310; 30; 20 INJECTION, SOLUTION INTRAVENOUS at 06:46

## 2022-01-01 RX ADMIN — HYDROMORPHONE HYDROCHLORIDE 0.5 MG: 2 INJECTION INTRAMUSCULAR; INTRAVENOUS; SUBCUTANEOUS at 22:25

## 2022-01-01 RX ADMIN — FUROSEMIDE 20 MG: 10 INJECTION, SOLUTION INTRAVENOUS at 16:10

## 2022-01-01 RX ADMIN — ALBUTEROL SULFATE 4 PUFF: 90 AEROSOL, METERED RESPIRATORY (INHALATION) at 19:35

## 2022-01-01 RX ADMIN — HYDROMORPHONE HYDROCHLORIDE 0.5 MG: 2 INJECTION INTRAMUSCULAR; INTRAVENOUS; SUBCUTANEOUS at 04:34

## 2022-01-01 RX ADMIN — INSULIN LISPRO 8 UNITS: 100 INJECTION, SOLUTION INTRAVENOUS; SUBCUTANEOUS at 16:43

## 2022-01-01 RX ADMIN — PIPERACILLIN SODIUM AND TAZOBACTAM SODIUM 3375 MG: 3; 375 INJECTION, POWDER, FOR SOLUTION INTRAVENOUS at 00:14

## 2022-01-01 RX ADMIN — VECURONIUM BROMIDE 2 MG: 10 INJECTION, POWDER, LYOPHILIZED, FOR SOLUTION INTRAVENOUS at 10:16

## 2022-01-01 RX ADMIN — PIPERACILLIN SODIUM AND TAZOBACTAM SODIUM 3375 MG: 3; 375 INJECTION, POWDER, FOR SOLUTION INTRAVENOUS at 16:41

## 2022-01-01 RX ADMIN — GUAIFENESIN 400 MG: 100 SOLUTION ORAL at 16:55

## 2022-01-01 RX ADMIN — ALBUTEROL SULFATE 4 PUFF: 90 AEROSOL, METERED RESPIRATORY (INHALATION) at 19:39

## 2022-01-01 RX ADMIN — INSULIN HUMAN 30 UNITS: 100 INJECTION, SUSPENSION SUBCUTANEOUS at 09:03

## 2022-01-01 RX ADMIN — SODIUM CHLORIDE, POTASSIUM CHLORIDE, SODIUM LACTATE AND CALCIUM CHLORIDE: 600; 310; 30; 20 INJECTION, SOLUTION INTRAVENOUS at 07:27

## 2022-01-01 RX ADMIN — ENOXAPARIN SODIUM 40 MG: 100 INJECTION SUBCUTANEOUS at 21:35

## 2022-01-01 RX ADMIN — LABETALOL HYDROCHLORIDE 10 MG: 5 INJECTION, SOLUTION INTRAVENOUS at 11:22

## 2022-01-01 RX ADMIN — HYDROMORPHONE HYDROCHLORIDE 0.5 MG: 2 INJECTION INTRAMUSCULAR; INTRAVENOUS; SUBCUTANEOUS at 23:35

## 2022-01-01 RX ADMIN — PIPERACILLIN SODIUM AND TAZOBACTAM SODIUM 3375 MG: 3; 375 INJECTION, POWDER, FOR SOLUTION INTRAVENOUS at 09:14

## 2022-01-01 RX ADMIN — POTASSIUM CHLORIDE 20 MEQ: 29.8 INJECTION, SOLUTION INTRAVENOUS at 17:15

## 2022-01-01 RX ADMIN — Medication 2 PUFF: at 19:14

## 2022-01-01 RX ADMIN — KETOROLAC TROMETHAMINE 15 MG: 30 INJECTION, SOLUTION INTRAMUSCULAR; INTRAVENOUS at 23:04

## 2022-01-01 RX ADMIN — METHYLPREDNISOLONE SODIUM SUCCINATE 125 MG: 125 INJECTION, POWDER, FOR SOLUTION INTRAMUSCULAR; INTRAVENOUS at 00:27

## 2022-01-01 RX ADMIN — CHLORHEXIDINE GLUCONATE 0.12% ORAL RINSE 15 ML: 1.2 LIQUID ORAL at 08:25

## 2022-01-01 RX ADMIN — DEXMEDETOMIDINE 4 MCG: 100 INJECTION, SOLUTION, CONCENTRATE INTRAVENOUS at 11:07

## 2022-01-01 RX ADMIN — KETOROLAC TROMETHAMINE 15 MG: 30 INJECTION, SOLUTION INTRAMUSCULAR; INTRAVENOUS at 13:57

## 2022-01-01 RX ADMIN — FLUDEOXYGLUCOSE F 18 15.43 MILLICURIE: 200 INJECTION, SOLUTION INTRAVENOUS at 14:07

## 2022-01-01 RX ADMIN — BISACODYL 10 MG: 10 SUPPOSITORY RECTAL at 13:07

## 2022-01-01 RX ADMIN — POTASSIUM CHLORIDE 20 MEQ: 29.8 INJECTION, SOLUTION INTRAVENOUS at 06:38

## 2022-01-01 RX ADMIN — SODIUM CHLORIDE: 4.5 INJECTION, SOLUTION INTRAVENOUS at 07:35

## 2022-01-01 RX ADMIN — ALBUTEROL SULFATE 4 PUFF: 90 AEROSOL, METERED RESPIRATORY (INHALATION) at 11:04

## 2022-01-01 RX ADMIN — HYDROMORPHONE HYDROCHLORIDE 0.5 MG: 2 INJECTION INTRAMUSCULAR; INTRAVENOUS; SUBCUTANEOUS at 22:04

## 2022-01-01 RX ADMIN — Medication 4 PUFF: at 23:31

## 2022-01-01 RX ADMIN — CEFAZOLIN SODIUM 2000 MG: 2 INJECTION, SOLUTION INTRAVENOUS at 07:52

## 2022-01-01 RX ADMIN — SODIUM CHLORIDE: 4.5 INJECTION, SOLUTION INTRAVENOUS at 19:42

## 2022-01-01 RX ADMIN — SODIUM CHLORIDE, PRESERVATIVE FREE 10 ML: 5 INJECTION INTRAVENOUS at 09:25

## 2022-01-01 RX ADMIN — Medication 4 PUFF: at 11:06

## 2022-01-01 RX ADMIN — BISACODYL 10 MG: 10 SUPPOSITORY RECTAL at 18:14

## 2022-01-01 RX ADMIN — Medication 75 MCG/HR: at 09:46

## 2022-01-01 RX ADMIN — Medication 75 MCG/HR: at 18:08

## 2022-01-01 RX ADMIN — PHENYLEPHRINE HYDROCHLORIDE 100 MCG: 10 INJECTION INTRAVENOUS at 08:24

## 2022-01-01 RX ADMIN — POTASSIUM CHLORIDE 20 MEQ: 29.8 INJECTION, SOLUTION INTRAVENOUS at 07:45

## 2022-01-01 RX ADMIN — GUAIFENESIN 400 MG: 100 SOLUTION ORAL at 13:03

## 2022-01-01 RX ADMIN — Medication 25 MCG/HR: at 05:51

## 2022-01-01 RX ADMIN — TOCILIZUMAB 810 MG: 180 INJECTION, SOLUTION SUBCUTANEOUS at 13:00

## 2022-01-01 RX ADMIN — CARVEDILOL 12.5 MG: 12.5 TABLET, FILM COATED ORAL at 16:45

## 2022-01-01 RX ADMIN — CHLORHEXIDINE GLUCONATE 0.12% ORAL RINSE 15 ML: 1.2 LIQUID ORAL at 19:29

## 2022-01-01 RX ADMIN — HYDROMORPHONE HYDROCHLORIDE 0.5 MG: 2 INJECTION INTRAMUSCULAR; INTRAVENOUS; SUBCUTANEOUS at 05:52

## 2022-01-01 RX ADMIN — PHENOL 1 SPRAY: 1.4 LIQUID ORAL at 03:40

## 2022-01-01 RX ADMIN — ENOXAPARIN SODIUM 40 MG: 100 INJECTION SUBCUTANEOUS at 08:25

## 2022-01-01 RX ADMIN — ALBUTEROL SULFATE 4 PUFF: 90 AEROSOL, METERED RESPIRATORY (INHALATION) at 11:39

## 2022-01-01 RX ADMIN — PHENYLEPHRINE HYDROCHLORIDE 50 MCG: 10 INJECTION INTRAVENOUS at 12:49

## 2022-01-01 RX ADMIN — Medication 2 PUFF: at 07:18

## 2022-01-01 RX ADMIN — Medication 2 PUFF: at 21:21

## 2022-01-01 RX ADMIN — FENTANYL CITRATE 50 MCG: 50 INJECTION, SOLUTION INTRAMUSCULAR; INTRAVENOUS at 07:32

## 2022-01-01 RX ADMIN — SODIUM CHLORIDE, PRESERVATIVE FREE 10 ML: 5 INJECTION INTRAVENOUS at 20:41

## 2022-01-01 RX ADMIN — FUROSEMIDE 40 MG: 10 INJECTION, SOLUTION INTRAVENOUS at 04:16

## 2022-01-01 RX ADMIN — PROPOFOL 40 MCG/KG/MIN: 10 INJECTION, EMULSION INTRAVENOUS at 15:54

## 2022-01-01 RX ADMIN — POTASSIUM CHLORIDE 10 MEQ: 7.46 INJECTION, SOLUTION INTRAVENOUS at 16:05

## 2022-01-01 RX ADMIN — ALBUTEROL SULFATE 2 PUFF: 90 AEROSOL, METERED RESPIRATORY (INHALATION) at 21:21

## 2022-01-01 RX ADMIN — HYDROMORPHONE HYDROCHLORIDE 0.5 MG: 2 INJECTION INTRAMUSCULAR; INTRAVENOUS; SUBCUTANEOUS at 08:54

## 2022-01-01 RX ADMIN — ALBUTEROL SULFATE 4 PUFF: 90 AEROSOL, METERED RESPIRATORY (INHALATION) at 19:27

## 2022-01-01 RX ADMIN — PIPERACILLIN SODIUM AND TAZOBACTAM SODIUM 3375 MG: 3; 375 INJECTION, POWDER, FOR SOLUTION INTRAVENOUS at 16:03

## 2022-01-01 RX ADMIN — MAGNESIUM SULFATE IN DEXTROSE 1000 MG: 10 INJECTION, SOLUTION INTRAVENOUS at 15:39

## 2022-01-01 RX ADMIN — PHENYLEPHRINE HYDROCHLORIDE 100 MCG: 10 INJECTION INTRAVENOUS at 15:21

## 2022-01-01 RX ADMIN — HYDROMORPHONE HYDROCHLORIDE 0.5 MG: 2 INJECTION INTRAMUSCULAR; INTRAVENOUS; SUBCUTANEOUS at 10:51

## 2022-01-01 RX ADMIN — ALBUTEROL SULFATE 2 PUFF: 90 AEROSOL, METERED RESPIRATORY (INHALATION) at 16:00

## 2022-01-01 RX ADMIN — KETOROLAC TROMETHAMINE 15 MG: 30 INJECTION, SOLUTION INTRAMUSCULAR; INTRAVENOUS at 09:16

## 2022-01-01 RX ADMIN — PIPERACILLIN SODIUM AND TAZOBACTAM SODIUM 3375 MG: 3; 375 INJECTION, POWDER, FOR SOLUTION INTRAVENOUS at 09:49

## 2022-01-01 RX ADMIN — ENOXAPARIN SODIUM 40 MG: 100 INJECTION SUBCUTANEOUS at 08:41

## 2022-01-01 RX ADMIN — PIPERACILLIN SODIUM AND TAZOBACTAM SODIUM 3375 MG: 3; 375 INJECTION, POWDER, FOR SOLUTION INTRAVENOUS at 08:44

## 2022-01-01 RX ADMIN — FUROSEMIDE 20 MG: 10 INJECTION, SOLUTION INTRAVENOUS at 18:56

## 2022-01-01 RX ADMIN — ASCORBIC ACID, VITAMIN A PALMITATE, CHOLECALCIFEROL, THIAMINE HYDROCHLORIDE, RIBOFLAVIN-5 PHOSPHATE SODIUM, PYRIDOXINE HYDROCHLORIDE, NIACINAMIDE, DEXPANTHENOL, ALPHA-TOCOPHEROL ACETATE, VITAMIN K1, FOLIC ACID, BIOTIN, CYANOCOBALAMIN: 200; 3300; 200; 6; 3.6; 6; 40; 15; 10; 150; 600; 60; 5 INJECTION, SOLUTION INTRAVENOUS at 18:25

## 2022-01-01 RX ADMIN — SODIUM CHLORIDE: 4.5 INJECTION, SOLUTION INTRAVENOUS at 13:13

## 2022-01-01 RX ADMIN — HYDROMORPHONE HYDROCHLORIDE 0.5 MG: 2 INJECTION INTRAMUSCULAR; INTRAVENOUS; SUBCUTANEOUS at 07:29

## 2022-01-01 RX ADMIN — ALBUMIN (HUMAN) 12.5 G: 12.5 INJECTION, SOLUTION INTRAVENOUS at 18:56

## 2022-01-01 RX ADMIN — HYDROMORPHONE HYDROCHLORIDE 0.5 MG: 2 INJECTION INTRAMUSCULAR; INTRAVENOUS; SUBCUTANEOUS at 20:01

## 2022-01-01 RX ADMIN — HYDROMORPHONE HYDROCHLORIDE 0.5 MG: 2 INJECTION INTRAMUSCULAR; INTRAVENOUS; SUBCUTANEOUS at 17:56

## 2022-01-01 RX ADMIN — HYDROMORPHONE HYDROCHLORIDE 0.5 MG: 2 INJECTION INTRAMUSCULAR; INTRAVENOUS; SUBCUTANEOUS at 17:06

## 2022-01-01 RX ADMIN — PROPOFOL 35 MCG/KG/MIN: 10 INJECTION, EMULSION INTRAVENOUS at 05:38

## 2022-01-01 RX ADMIN — DEXMEDETOMIDINE 4 MCG: 100 INJECTION, SOLUTION, CONCENTRATE INTRAVENOUS at 12:20

## 2022-01-01 RX ADMIN — VECURONIUM BROMIDE 2 MG: 10 INJECTION, POWDER, LYOPHILIZED, FOR SOLUTION INTRAVENOUS at 11:32

## 2022-01-01 RX ADMIN — PHENYLEPHRINE HYDROCHLORIDE 100 MCG: 10 INJECTION INTRAVENOUS at 11:23

## 2022-01-01 RX ADMIN — IOPAMIDOL 90 ML: 755 INJECTION, SOLUTION INTRAVENOUS at 23:32

## 2022-01-01 RX ADMIN — GUAIFENESIN 400 MG: 100 SOLUTION ORAL at 12:55

## 2022-01-01 RX ADMIN — POTASSIUM CHLORIDE 20 MEQ: 29.8 INJECTION, SOLUTION INTRAVENOUS at 12:41

## 2022-01-01 RX ADMIN — VECURONIUM BROMIDE 2 MG: 10 INJECTION, POWDER, LYOPHILIZED, FOR SOLUTION INTRAVENOUS at 13:45

## 2022-01-01 RX ADMIN — SODIUM CHLORIDE: 4.5 INJECTION, SOLUTION INTRAVENOUS at 17:14

## 2022-01-01 RX ADMIN — ALBUTEROL SULFATE 4 PUFF: 90 AEROSOL, METERED RESPIRATORY (INHALATION) at 19:15

## 2022-01-01 RX ADMIN — FUROSEMIDE 20 MG: 10 INJECTION, SOLUTION INTRAVENOUS at 16:45

## 2022-01-01 RX ADMIN — ENOXAPARIN SODIUM 40 MG: 100 INJECTION SUBCUTANEOUS at 10:50

## 2022-01-01 RX ADMIN — GUAIFENESIN 400 MG: 100 SOLUTION ORAL at 19:30

## 2022-01-01 RX ADMIN — ALBUTEROL SULFATE 4 PUFF: 90 AEROSOL, METERED RESPIRATORY (INHALATION) at 14:30

## 2022-01-01 RX ADMIN — POTASSIUM CHLORIDE 20 MEQ: 29.8 INJECTION, SOLUTION INTRAVENOUS at 16:10

## 2022-01-01 RX ADMIN — INSULIN HUMAN 9 UNITS: 100 INJECTION, SOLUTION PARENTERAL at 23:39

## 2022-01-01 RX ADMIN — HYDROMORPHONE HYDROCHLORIDE 0.5 MG: 2 INJECTION INTRAMUSCULAR; INTRAVENOUS; SUBCUTANEOUS at 12:24

## 2022-01-01 RX ADMIN — PROPOFOL 40 MCG/KG/MIN: 10 INJECTION, EMULSION INTRAVENOUS at 22:24

## 2022-01-01 RX ADMIN — SODIUM CHLORIDE, PRESERVATIVE FREE 10 ML: 5 INJECTION INTRAVENOUS at 00:27

## 2022-01-01 RX ADMIN — PIPERACILLIN SODIUM AND TAZOBACTAM SODIUM 3375 MG: 3; 375 INJECTION, POWDER, FOR SOLUTION INTRAVENOUS at 00:59

## 2022-01-01 RX ADMIN — GUAIFENESIN 400 MG: 100 SOLUTION ORAL at 08:42

## 2022-01-01 RX ADMIN — INSULIN HUMAN 50 UNITS: 100 INJECTION, SUSPENSION SUBCUTANEOUS at 09:11

## 2022-01-01 RX ADMIN — ACETYLCYSTEINE 600 MG: 200 INHALANT RESPIRATORY (INHALATION) at 00:32

## 2022-01-01 RX ADMIN — HYDROMORPHONE HYDROCHLORIDE 0.5 MG: 2 INJECTION INTRAMUSCULAR; INTRAVENOUS; SUBCUTANEOUS at 02:12

## 2022-01-01 RX ADMIN — CHLORHEXIDINE GLUCONATE 0.12% ORAL RINSE 15 ML: 1.2 LIQUID ORAL at 21:20

## 2022-01-01 RX ADMIN — KETOROLAC TROMETHAMINE 15 MG: 30 INJECTION, SOLUTION INTRAMUSCULAR; INTRAVENOUS at 00:38

## 2022-01-01 RX ADMIN — PHENYLEPHRINE HYDROCHLORIDE 100 MCG: 10 INJECTION INTRAVENOUS at 08:28

## 2022-01-01 RX ADMIN — HYDROMORPHONE HYDROCHLORIDE 0.5 MG: 2 INJECTION INTRAMUSCULAR; INTRAVENOUS; SUBCUTANEOUS at 06:09

## 2022-01-01 RX ADMIN — ALBUTEROL SULFATE 2 PUFF: 90 AEROSOL, METERED RESPIRATORY (INHALATION) at 07:18

## 2022-01-01 RX ADMIN — PROPOFOL 40 MCG/KG/MIN: 10 INJECTION, EMULSION INTRAVENOUS at 19:45

## 2022-01-01 RX ADMIN — CEFAZOLIN SODIUM 2000 MG: 2 INJECTION, SOLUTION INTRAVENOUS at 01:14

## 2022-01-01 RX ADMIN — PHENYLEPHRINE HYDROCHLORIDE 30 MCG/MIN: 10 INJECTION INTRAVENOUS at 11:23

## 2022-01-01 RX ADMIN — INSULIN HUMAN 3 UNITS: 100 INJECTION, SOLUTION PARENTERAL at 05:41

## 2022-01-01 RX ADMIN — HYDROMORPHONE HYDROCHLORIDE 0.5 MG: 2 INJECTION INTRAMUSCULAR; INTRAVENOUS; SUBCUTANEOUS at 12:11

## 2022-01-01 RX ADMIN — HYDROMORPHONE HYDROCHLORIDE 0.5 MG: 2 INJECTION INTRAMUSCULAR; INTRAVENOUS; SUBCUTANEOUS at 10:16

## 2022-01-01 RX ADMIN — Medication 4 PUFF: at 03:56

## 2022-01-01 RX ADMIN — ALBUTEROL SULFATE 4 PUFF: 90 AEROSOL, METERED RESPIRATORY (INHALATION) at 15:21

## 2022-01-01 RX ADMIN — HYDROCODONE BITARTRATE AND ACETAMINOPHEN 1 TABLET: 5; 325 TABLET ORAL at 01:21

## 2022-01-01 RX ADMIN — DEXAMETHASONE SODIUM PHOSPHATE 20 MG: 4 INJECTION, SOLUTION INTRA-ARTICULAR; INTRALESIONAL; INTRAMUSCULAR; INTRAVENOUS; SOFT TISSUE at 08:26

## 2022-01-01 RX ADMIN — HYDROMORPHONE HYDROCHLORIDE 0.5 MG: 2 INJECTION INTRAMUSCULAR; INTRAVENOUS; SUBCUTANEOUS at 10:00

## 2022-01-01 RX ADMIN — SOYBEAN OIL 250 ML: 20 INJECTION, SOLUTION INTRAVENOUS at 17:40

## 2022-01-01 RX ADMIN — DEXMEDETOMIDINE HYDROCHLORIDE 0.2 MCG/KG/HR: 100 INJECTION, SOLUTION INTRAVENOUS at 14:08

## 2022-01-01 RX ADMIN — Medication 4 PUFF: at 07:06

## 2022-01-01 RX ADMIN — ALBUTEROL SULFATE 4 PUFF: 90 AEROSOL, METERED RESPIRATORY (INHALATION) at 15:06

## 2022-01-01 RX ADMIN — DEXMEDETOMIDINE 4 MCG: 100 INJECTION, SOLUTION, CONCENTRATE INTRAVENOUS at 12:24

## 2022-01-01 RX ADMIN — PHENYLEPHRINE HYDROCHLORIDE 50 MCG: 10 INJECTION INTRAVENOUS at 11:13

## 2022-01-01 RX ADMIN — LABETALOL HYDROCHLORIDE 10 MG: 5 INJECTION, SOLUTION INTRAVENOUS at 00:27

## 2022-01-01 RX ADMIN — ALBUTEROL SULFATE 4 PUFF: 90 AEROSOL, METERED RESPIRATORY (INHALATION) at 08:19

## 2022-01-01 RX ADMIN — PROPOFOL 30 MCG/KG/MIN: 10 INJECTION, EMULSION INTRAVENOUS at 23:51

## 2022-01-01 RX ADMIN — LABETALOL HYDROCHLORIDE 10 MG: 5 INJECTION, SOLUTION INTRAVENOUS at 21:43

## 2022-01-01 RX ADMIN — Medication 4 PUFF: at 11:24

## 2022-01-01 RX ADMIN — ALBUTEROL SULFATE 2 PUFF: 90 AEROSOL, METERED RESPIRATORY (INHALATION) at 15:45

## 2022-01-01 RX ADMIN — VANCOMYCIN HYDROCHLORIDE 1250 MG: 5 INJECTION, POWDER, LYOPHILIZED, FOR SOLUTION INTRAVENOUS at 02:34

## 2022-01-01 RX ADMIN — GUAIFENESIN 400 MG: 100 SOLUTION ORAL at 21:20

## 2022-01-01 RX ADMIN — METOPROLOL TARTRATE 5 MG: 5 INJECTION INTRAVENOUS at 18:09

## 2022-01-01 RX ADMIN — Medication 4 PUFF: at 15:08

## 2022-01-01 RX ADMIN — PROPOFOL 40 MCG/KG/MIN: 10 INJECTION, EMULSION INTRAVENOUS at 10:12

## 2022-01-01 RX ADMIN — INSULIN HUMAN 9 UNITS: 100 INJECTION, SOLUTION PARENTERAL at 05:56

## 2022-01-01 RX ADMIN — Medication 2 MCG/MIN: at 00:00

## 2022-01-01 RX ADMIN — SODIUM CHLORIDE, PRESERVATIVE FREE 10 ML: 5 INJECTION INTRAVENOUS at 19:27

## 2022-01-01 RX ADMIN — SODIUM CHLORIDE, PRESERVATIVE FREE 10 ML: 5 INJECTION INTRAVENOUS at 21:20

## 2022-01-01 RX ADMIN — ALBUTEROL SULFATE 4 PUFF: 90 AEROSOL, METERED RESPIRATORY (INHALATION) at 07:06

## 2022-01-01 RX ADMIN — PHENYLEPHRINE HYDROCHLORIDE 50 MCG: 10 INJECTION INTRAVENOUS at 13:15

## 2022-01-01 RX ADMIN — METOPROLOL TARTRATE 5 MG: 5 INJECTION INTRAVENOUS at 02:31

## 2022-01-01 RX ADMIN — HYDROMORPHONE HYDROCHLORIDE 0.5 MG: 2 INJECTION INTRAMUSCULAR; INTRAVENOUS; SUBCUTANEOUS at 17:04

## 2022-01-01 RX ADMIN — SODIUM CHLORIDE: 4.5 INJECTION, SOLUTION INTRAVENOUS at 23:35

## 2022-01-01 RX ADMIN — ENOXAPARIN SODIUM 40 MG: 100 INJECTION SUBCUTANEOUS at 19:29

## 2022-01-01 RX ADMIN — POTASSIUM CHLORIDE 10 MEQ: 7.46 INJECTION, SOLUTION INTRAVENOUS at 14:00

## 2022-01-01 RX ADMIN — PHENYLEPHRINE HYDROCHLORIDE 50 MCG: 10 INJECTION INTRAVENOUS at 12:55

## 2022-01-01 RX ADMIN — SERTRALINE HYDROCHLORIDE 100 MG: 100 TABLET ORAL at 19:31

## 2022-01-01 RX ADMIN — HYDROMORPHONE HYDROCHLORIDE 0.5 MG: 2 INJECTION INTRAMUSCULAR; INTRAVENOUS; SUBCUTANEOUS at 20:06

## 2022-01-01 RX ADMIN — HYDROMORPHONE HYDROCHLORIDE 0.5 MG: 2 INJECTION INTRAMUSCULAR; INTRAVENOUS; SUBCUTANEOUS at 18:14

## 2022-01-01 RX ADMIN — ALBUMIN (HUMAN) 250 ML: 12.5 INJECTION, SOLUTION INTRAVENOUS at 11:30

## 2022-01-01 RX ADMIN — GUAIFENESIN 400 MG: 100 SOLUTION ORAL at 19:25

## 2022-01-01 RX ADMIN — PANTOPRAZOLE SODIUM 40 MG: 40 INJECTION, POWDER, FOR SOLUTION INTRAVENOUS at 19:58

## 2022-01-01 RX ADMIN — PROPOFOL 40 MCG/KG/MIN: 10 INJECTION, EMULSION INTRAVENOUS at 13:07

## 2022-01-01 RX ADMIN — GUAIFENESIN 400 MG: 100 SOLUTION ORAL at 18:14

## 2022-01-01 RX ADMIN — ACETYLCYSTEINE 600 MG: 200 SOLUTION ORAL; RESPIRATORY (INHALATION) at 19:39

## 2022-01-01 RX ADMIN — INSULIN GLARGINE 40 UNITS: 100 INJECTION, SOLUTION SUBCUTANEOUS at 20:29

## 2022-01-01 RX ADMIN — LIDOCAINE HYDROCHLORIDE 100 MG: 20 INJECTION, SOLUTION INTRAVENOUS at 07:33

## 2022-01-01 RX ADMIN — PHENYLEPHRINE HYDROCHLORIDE 50 MCG: 10 INJECTION INTRAVENOUS at 12:32

## 2022-01-01 RX ADMIN — Medication 4 PUFF: at 03:09

## 2022-01-01 RX ADMIN — CHLORHEXIDINE GLUCONATE 0.12% ORAL RINSE 15 ML: 1.2 LIQUID ORAL at 09:28

## 2022-01-01 RX ADMIN — SERTRALINE HYDROCHLORIDE 100 MG: 100 TABLET ORAL at 21:20

## 2022-01-01 RX ADMIN — ENOXAPARIN SODIUM 40 MG: 100 INJECTION SUBCUTANEOUS at 19:58

## 2022-01-01 RX ADMIN — HYDROMORPHONE HYDROCHLORIDE 0.4 MG: 2 INJECTION INTRAMUSCULAR; INTRAVENOUS; SUBCUTANEOUS at 07:50

## 2022-01-01 RX ADMIN — CARVEDILOL 12.5 MG: 12.5 TABLET, FILM COATED ORAL at 16:30

## 2022-01-01 RX ADMIN — VANCOMYCIN HYDROCHLORIDE 1250 MG: 5 INJECTION, POWDER, LYOPHILIZED, FOR SOLUTION INTRAVENOUS at 14:23

## 2022-01-01 RX ADMIN — GUAIFENESIN 400 MG: 100 SOLUTION ORAL at 16:34

## 2022-01-01 RX ADMIN — HYDROMORPHONE HYDROCHLORIDE 0.5 MG: 2 INJECTION INTRAMUSCULAR; INTRAVENOUS; SUBCUTANEOUS at 09:32

## 2022-01-01 RX ADMIN — Medication 4 PUFF: at 19:27

## 2022-01-01 RX ADMIN — POTASSIUM CHLORIDE 10 MEQ: 7.46 INJECTION, SOLUTION INTRAVENOUS at 23:07

## 2022-01-01 RX ADMIN — ACETYLCYSTEINE 600 MG: 200 SOLUTION ORAL; RESPIRATORY (INHALATION) at 07:23

## 2022-01-01 RX ADMIN — ONDANSETRON 4 MG: 2 INJECTION INTRAMUSCULAR; INTRAVENOUS at 15:16

## 2022-01-01 RX ADMIN — Medication 4 PUFF: at 19:15

## 2022-01-01 RX ADMIN — PROPOFOL 40 MCG/KG/MIN: 10 INJECTION, EMULSION INTRAVENOUS at 09:45

## 2022-01-01 RX ADMIN — SODIUM CHLORIDE: 4.5 INJECTION, SOLUTION INTRAVENOUS at 15:11

## 2022-01-01 RX ADMIN — SODIUM CHLORIDE: 4.5 INJECTION, SOLUTION INTRAVENOUS at 10:42

## 2022-01-01 RX ADMIN — HYDROMORPHONE HYDROCHLORIDE 0.5 MG: 2 INJECTION INTRAMUSCULAR; INTRAVENOUS; SUBCUTANEOUS at 00:44

## 2022-01-01 RX ADMIN — GUAIFENESIN 400 MG: 100 SOLUTION ORAL at 16:39

## 2022-01-01 RX ADMIN — ALBUTEROL SULFATE 4 PUFF: 90 AEROSOL, METERED RESPIRATORY (INHALATION) at 23:32

## 2022-01-01 RX ADMIN — POTASSIUM CHLORIDE 20 MEQ: 29.8 INJECTION, SOLUTION INTRAVENOUS at 13:37

## 2022-01-01 RX ADMIN — HYDROMORPHONE HYDROCHLORIDE 0.5 MG: 2 INJECTION INTRAMUSCULAR; INTRAVENOUS; SUBCUTANEOUS at 22:52

## 2022-01-01 RX ADMIN — PROPOFOL 30 MCG/KG/MIN: 10 INJECTION, EMULSION INTRAVENOUS at 03:54

## 2022-01-01 RX ADMIN — Medication 4 PUFF: at 19:39

## 2022-01-01 RX ADMIN — ALBUTEROL SULFATE 4 PUFF: 90 AEROSOL, METERED RESPIRATORY (INHALATION) at 15:55

## 2022-01-01 RX ADMIN — SODIUM CHLORIDE: 4.5 INJECTION, SOLUTION INTRAVENOUS at 09:52

## 2022-01-01 RX ADMIN — ACETYLCYSTEINE 600 MG: 200 SOLUTION ORAL; RESPIRATORY (INHALATION) at 19:14

## 2022-01-01 RX ADMIN — VECURONIUM BROMIDE 1 MG: 10 INJECTION, POWDER, LYOPHILIZED, FOR SOLUTION INTRAVENOUS at 12:47

## 2022-01-01 RX ADMIN — Medication 4 PUFF: at 02:58

## 2022-01-01 RX ADMIN — PROPOFOL 40 MCG/KG/MIN: 10 INJECTION, EMULSION INTRAVENOUS at 05:59

## 2022-01-01 RX ADMIN — Medication 4 PUFF: at 11:11

## 2022-01-01 RX ADMIN — ACETYLCYSTEINE 600 MG: 200 SOLUTION ORAL; RESPIRATORY (INHALATION) at 19:08

## 2022-01-01 RX ADMIN — DEXMEDETOMIDINE 4 MCG: 100 INJECTION, SOLUTION, CONCENTRATE INTRAVENOUS at 14:13

## 2022-01-01 RX ADMIN — HYDROMORPHONE HYDROCHLORIDE 0.5 MG: 2 INJECTION INTRAMUSCULAR; INTRAVENOUS; SUBCUTANEOUS at 01:11

## 2022-01-01 RX ADMIN — Medication 50 MCG/HR: at 06:56

## 2022-01-01 RX ADMIN — INSULIN HUMAN 10 UNITS: 100 INJECTION, SUSPENSION SUBCUTANEOUS at 16:45

## 2022-01-01 RX ADMIN — ACETYLCYSTEINE 600 MG: 200 SOLUTION ORAL; RESPIRATORY (INHALATION) at 07:04

## 2022-01-01 RX ADMIN — PROPOFOL 20 MCG/KG/MIN: 10 INJECTION, EMULSION INTRAVENOUS at 14:09

## 2022-01-01 RX ADMIN — SODIUM CHLORIDE, PRESERVATIVE FREE 10 ML: 5 INJECTION INTRAVENOUS at 14:07

## 2022-01-01 RX ADMIN — CARVEDILOL 12.5 MG: 12.5 TABLET, FILM COATED ORAL at 16:33

## 2022-01-01 RX ADMIN — SODIUM CHLORIDE: 4.5 INJECTION, SOLUTION INTRAVENOUS at 13:21

## 2022-01-01 RX ADMIN — ASCORBIC ACID, VITAMIN A PALMITATE, CHOLECALCIFEROL, THIAMINE HYDROCHLORIDE, RIBOFLAVIN-5 PHOSPHATE SODIUM, PYRIDOXINE HYDROCHLORIDE, NIACINAMIDE, DEXPANTHENOL, ALPHA-TOCOPHEROL ACETATE, VITAMIN K1, FOLIC ACID, BIOTIN, CYANOCOBALAMIN: 200; 3300; 200; 6; 3.6; 6; 40; 15; 10; 150; 600; 60; 5 INJECTION, SOLUTION INTRAVENOUS at 17:24

## 2022-01-01 RX ADMIN — SODIUM CHLORIDE 500 ML: 9 INJECTION, SOLUTION INTRAVENOUS at 14:05

## 2022-01-01 RX ADMIN — GUAIFENESIN 400 MG: 100 SOLUTION ORAL at 21:36

## 2022-01-01 RX ADMIN — CHLORHEXIDINE GLUCONATE 0.12% ORAL RINSE 15 ML: 1.2 LIQUID ORAL at 19:58

## 2022-01-01 RX ADMIN — HYDROMORPHONE HYDROCHLORIDE 0.5 MG: 2 INJECTION INTRAMUSCULAR; INTRAVENOUS; SUBCUTANEOUS at 04:29

## 2022-01-01 RX ADMIN — DEXMEDETOMIDINE HYDROCHLORIDE 0.7 MCG/KG/HR: 100 INJECTION, SOLUTION INTRAVENOUS at 07:23

## 2022-01-01 RX ADMIN — DEXMEDETOMIDINE HYDROCHLORIDE 0.2 MCG/KG/HR: 100 INJECTION, SOLUTION INTRAVENOUS at 05:21

## 2022-01-01 RX ADMIN — ALBUTEROL SULFATE 4 PUFF: 90 AEROSOL, METERED RESPIRATORY (INHALATION) at 03:55

## 2022-01-01 RX ADMIN — ENOXAPARIN SODIUM 40 MG: 100 INJECTION SUBCUTANEOUS at 09:30

## 2022-01-01 RX ADMIN — CHLORHEXIDINE GLUCONATE 0.12% ORAL RINSE 15 ML: 1.2 LIQUID ORAL at 08:20

## 2022-01-01 RX ADMIN — POTASSIUM CHLORIDE 20 MEQ: 29.8 INJECTION, SOLUTION INTRAVENOUS at 18:50

## 2022-01-01 RX ADMIN — PROPOFOL 10 MCG/KG/MIN: 10 INJECTION, EMULSION INTRAVENOUS at 08:36

## 2022-01-01 RX ADMIN — HYDROMORPHONE HYDROCHLORIDE 0.5 MG: 2 INJECTION INTRAMUSCULAR; INTRAVENOUS; SUBCUTANEOUS at 01:19

## 2022-01-01 RX ADMIN — CARVEDILOL 12.5 MG: 12.5 TABLET, FILM COATED ORAL at 09:31

## 2022-01-01 RX ADMIN — Medication 4 PUFF: at 14:53

## 2022-01-01 RX ADMIN — HYDROMORPHONE HYDROCHLORIDE 0.5 MG: 2 INJECTION INTRAMUSCULAR; INTRAVENOUS; SUBCUTANEOUS at 01:06

## 2022-01-01 RX ADMIN — ACETYLCYSTEINE 600 MG: 200 SOLUTION ORAL; RESPIRATORY (INHALATION) at 19:13

## 2022-01-01 RX ADMIN — INSULIN HUMAN 9 UNITS: 100 INJECTION, SOLUTION PARENTERAL at 12:52

## 2022-01-01 RX ADMIN — SERTRALINE HYDROCHLORIDE 100 MG: 100 TABLET ORAL at 09:31

## 2022-01-01 RX ADMIN — HYDROMORPHONE HYDROCHLORIDE 0.5 MG: 2 INJECTION INTRAMUSCULAR; INTRAVENOUS; SUBCUTANEOUS at 02:28

## 2022-01-01 RX ADMIN — ALBUMIN (HUMAN): 12.5 INJECTION, SOLUTION INTRAVENOUS at 18:50

## 2022-01-01 RX ADMIN — DEXAMETHASONE SODIUM PHOSPHATE 20 MG: 4 INJECTION, SOLUTION INTRA-ARTICULAR; INTRALESIONAL; INTRAMUSCULAR; INTRAVENOUS; SOFT TISSUE at 10:32

## 2022-01-01 RX ADMIN — ALBUTEROL SULFATE 2 PUFF: 90 AEROSOL, METERED RESPIRATORY (INHALATION) at 19:14

## 2022-01-01 RX ADMIN — VECURONIUM BROMIDE 2 MG: 10 INJECTION, POWDER, LYOPHILIZED, FOR SOLUTION INTRAVENOUS at 14:40

## 2022-01-01 RX ADMIN — INSULIN HUMAN 3 UNITS: 100 INJECTION, SOLUTION PARENTERAL at 01:33

## 2022-01-01 RX ADMIN — METOPROLOL TARTRATE 5 MG: 5 INJECTION INTRAVENOUS at 11:54

## 2022-01-01 RX ADMIN — SODIUM CHLORIDE, PRESERVATIVE FREE 10 ML: 5 INJECTION INTRAVENOUS at 09:06

## 2022-01-01 RX ADMIN — ALBUTEROL SULFATE 4 PUFF: 90 AEROSOL, METERED RESPIRATORY (INHALATION) at 00:46

## 2022-01-01 RX ADMIN — POTASSIUM CHLORIDE 10 MEQ: 7.46 INJECTION, SOLUTION INTRAVENOUS at 20:07

## 2022-01-01 RX ADMIN — HYDROMORPHONE HYDROCHLORIDE 0.5 MG: 2 INJECTION INTRAMUSCULAR; INTRAVENOUS; SUBCUTANEOUS at 20:12

## 2022-01-01 RX ADMIN — SODIUM CHLORIDE: 4.5 INJECTION, SOLUTION INTRAVENOUS at 05:23

## 2022-01-01 RX ADMIN — SODIUM CHLORIDE, PRESERVATIVE FREE 10 ML: 5 INJECTION INTRAVENOUS at 20:26

## 2022-01-01 RX ADMIN — SODIUM CHLORIDE, PRESERVATIVE FREE 10 ML: 5 INJECTION INTRAVENOUS at 08:22

## 2022-01-01 RX ADMIN — HYDROMORPHONE HYDROCHLORIDE 0.5 MG: 2 INJECTION INTRAMUSCULAR; INTRAVENOUS; SUBCUTANEOUS at 23:08

## 2022-01-01 RX ADMIN — ALBUTEROL SULFATE 4 PUFF: 90 AEROSOL, METERED RESPIRATORY (INHALATION) at 23:30

## 2022-01-01 RX ADMIN — PHENYLEPHRINE HYDROCHLORIDE 100 MCG: 10 INJECTION INTRAVENOUS at 15:32

## 2022-01-01 RX ADMIN — SODIUM CHLORIDE, PRESERVATIVE FREE 10 ML: 5 INJECTION INTRAVENOUS at 19:30

## 2022-01-01 RX ADMIN — INSULIN LISPRO 8 UNITS: 100 INJECTION, SOLUTION INTRAVENOUS; SUBCUTANEOUS at 00:59

## 2022-01-01 RX ADMIN — CHLORHEXIDINE GLUCONATE 0.12% ORAL RINSE 15 ML: 1.2 LIQUID ORAL at 08:41

## 2022-01-01 RX ADMIN — INSULIN GLARGINE 25 UNITS: 100 INJECTION, SOLUTION SUBCUTANEOUS at 20:27

## 2022-01-01 RX ADMIN — SODIUM CHLORIDE 1000 ML: 4.5 INJECTION, SOLUTION INTRAVENOUS at 08:45

## 2022-01-01 RX ADMIN — GUAIFENESIN 400 MG: 100 SOLUTION ORAL at 20:23

## 2022-01-01 RX ADMIN — PROPOFOL 35 MCG/KG/MIN: 10 INJECTION, EMULSION INTRAVENOUS at 02:15

## 2022-01-01 RX ADMIN — ALBUMIN (HUMAN) 250 ML: 12.5 INJECTION, SOLUTION INTRAVENOUS at 08:26

## 2022-01-01 RX ADMIN — ALBUTEROL SULFATE 4 PUFF: 90 AEROSOL, METERED RESPIRATORY (INHALATION) at 19:08

## 2022-01-01 RX ADMIN — ENOXAPARIN SODIUM 40 MG: 100 INJECTION SUBCUTANEOUS at 20:23

## 2022-01-01 RX ADMIN — HYDROMORPHONE HYDROCHLORIDE 0.5 MG: 2 INJECTION INTRAMUSCULAR; INTRAVENOUS; SUBCUTANEOUS at 20:47

## 2022-01-01 RX ADMIN — GUAIFENESIN 400 MG: 100 SOLUTION ORAL at 08:21

## 2022-01-01 RX ADMIN — SODIUM CHLORIDE 500 ML: 9 INJECTION, SOLUTION INTRAVENOUS at 15:10

## 2022-01-01 RX ADMIN — VECURONIUM BROMIDE 2 MG: 10 INJECTION, POWDER, LYOPHILIZED, FOR SOLUTION INTRAVENOUS at 09:32

## 2022-01-01 RX ADMIN — PIPERACILLIN SODIUM AND TAZOBACTAM SODIUM 3375 MG: 3; 375 INJECTION, POWDER, FOR SOLUTION INTRAVENOUS at 08:26

## 2022-01-01 RX ADMIN — SERTRALINE HYDROCHLORIDE 100 MG: 100 TABLET ORAL at 21:36

## 2022-01-01 RX ADMIN — VANCOMYCIN HYDROCHLORIDE 2000 MG: 1 INJECTION, POWDER, LYOPHILIZED, FOR SOLUTION INTRAVENOUS at 03:36

## 2022-01-01 RX ADMIN — SODIUM CHLORIDE 500 ML: 4.5 INJECTION, SOLUTION INTRAVENOUS at 10:02

## 2022-01-01 RX ADMIN — PROPOFOL 35 MCG/KG/MIN: 10 INJECTION, EMULSION INTRAVENOUS at 21:45

## 2022-01-01 RX ADMIN — PIPERACILLIN SODIUM AND TAZOBACTAM SODIUM 3375 MG: 3; 375 INJECTION, POWDER, FOR SOLUTION INTRAVENOUS at 17:29

## 2022-01-01 RX ADMIN — HYDROMORPHONE HYDROCHLORIDE 0.5 MG: 2 INJECTION INTRAMUSCULAR; INTRAVENOUS; SUBCUTANEOUS at 13:14

## 2022-01-01 RX ADMIN — DEXAMETHASONE SODIUM PHOSPHATE 4 MG: 4 INJECTION, SOLUTION INTRAMUSCULAR; INTRAVENOUS at 07:50

## 2022-01-01 RX ADMIN — SERTRALINE HYDROCHLORIDE 100 MG: 100 TABLET ORAL at 19:26

## 2022-01-01 RX ADMIN — MAGNESIUM SULFATE IN DEXTROSE 1000 MG: 10 INJECTION, SOLUTION INTRAVENOUS at 16:41

## 2022-01-01 RX ADMIN — SODIUM CHLORIDE: 9 INJECTION, SOLUTION INTRAVENOUS at 08:00

## 2022-01-01 RX ADMIN — SERTRALINE HYDROCHLORIDE 100 MG: 100 TABLET ORAL at 16:30

## 2022-01-01 RX ADMIN — HYDROMORPHONE HYDROCHLORIDE 0.5 MG: 2 INJECTION INTRAMUSCULAR; INTRAVENOUS; SUBCUTANEOUS at 03:43

## 2022-01-01 RX ADMIN — PIPERACILLIN SODIUM AND TAZOBACTAM SODIUM 3375 MG: 3; 375 INJECTION, POWDER, FOR SOLUTION INTRAVENOUS at 23:41

## 2022-01-01 RX ADMIN — Medication 4 PUFF: at 04:01

## 2022-01-01 RX ADMIN — Medication 4 PUFF: at 07:10

## 2022-01-01 RX ADMIN — DEXAMETHASONE SODIUM PHOSPHATE 10 MG: 10 INJECTION INTRAMUSCULAR; INTRAVENOUS at 08:24

## 2022-01-01 RX ADMIN — SODIUM CHLORIDE: 4.5 INJECTION, SOLUTION INTRAVENOUS at 17:59

## 2022-01-01 RX ADMIN — DEXAMETHASONE SODIUM PHOSPHATE 20 MG: 4 INJECTION, SOLUTION INTRA-ARTICULAR; INTRALESIONAL; INTRAMUSCULAR; INTRAVENOUS; SOFT TISSUE at 11:05

## 2022-01-01 RX ADMIN — PIPERACILLIN SODIUM AND TAZOBACTAM SODIUM 3375 MG: 3; 375 INJECTION, POWDER, FOR SOLUTION INTRAVENOUS at 17:18

## 2022-01-01 RX ADMIN — Medication 4 PUFF: at 23:32

## 2022-01-01 RX ADMIN — PHENYLEPHRINE HYDROCHLORIDE 50 MCG: 10 INJECTION INTRAVENOUS at 13:13

## 2022-01-01 RX ADMIN — Medication 4 PUFF: at 11:40

## 2022-01-01 RX ADMIN — POTASSIUM CHLORIDE 10 MEQ: 7.46 INJECTION, SOLUTION INTRAVENOUS at 21:44

## 2022-01-01 RX ADMIN — HYDROMORPHONE HYDROCHLORIDE 0.5 MG: 2 INJECTION, SOLUTION INTRAMUSCULAR; INTRAVENOUS; SUBCUTANEOUS at 03:12

## 2022-01-01 RX ADMIN — INSULIN HUMAN 9 UNITS: 100 INJECTION, SOLUTION PARENTERAL at 18:26

## 2022-01-01 RX ADMIN — DEXMEDETOMIDINE 4 MCG: 100 INJECTION, SOLUTION, CONCENTRATE INTRAVENOUS at 11:22

## 2022-01-01 RX ADMIN — HYDROMORPHONE HYDROCHLORIDE 0.5 MG: 2 INJECTION INTRAMUSCULAR; INTRAVENOUS; SUBCUTANEOUS at 08:09

## 2022-01-01 RX ADMIN — CARVEDILOL 12.5 MG: 12.5 TABLET, FILM COATED ORAL at 09:41

## 2022-01-01 RX ADMIN — DEXMEDETOMIDINE 4 MCG: 100 INJECTION, SOLUTION, CONCENTRATE INTRAVENOUS at 11:47

## 2022-01-01 RX ADMIN — PROPOFOL 35 MCG/KG/MIN: 10 INJECTION, EMULSION INTRAVENOUS at 13:19

## 2022-01-01 RX ADMIN — PHENYLEPHRINE HYDROCHLORIDE 100 MCG: 10 INJECTION INTRAVENOUS at 10:25

## 2022-01-01 RX ADMIN — VANCOMYCIN HYDROCHLORIDE 1250 MG: 5 INJECTION, POWDER, LYOPHILIZED, FOR SOLUTION INTRAVENOUS at 14:42

## 2022-01-01 RX ADMIN — DEXAMETHASONE SODIUM PHOSPHATE 20 MG: 4 INJECTION, SOLUTION INTRA-ARTICULAR; INTRALESIONAL; INTRAMUSCULAR; INTRAVENOUS; SOFT TISSUE at 09:24

## 2022-01-01 RX ADMIN — PROPOFOL 170 MG: 10 INJECTION, EMULSION INTRAVENOUS at 07:33

## 2022-01-01 RX ADMIN — HYDROMORPHONE HYDROCHLORIDE 0.5 MG: 2 INJECTION INTRAMUSCULAR; INTRAVENOUS; SUBCUTANEOUS at 07:26

## 2022-01-01 RX ADMIN — ACETYLCYSTEINE 600 MG: 200 SOLUTION ORAL; RESPIRATORY (INHALATION) at 08:21

## 2022-01-01 RX ADMIN — INSULIN HUMAN 12 UNITS: 100 INJECTION, SOLUTION PARENTERAL at 17:22

## 2022-01-01 RX ADMIN — CEFAZOLIN SODIUM 2000 MG: 2 INJECTION, SOLUTION INTRAVENOUS at 11:52

## 2022-01-01 ASSESSMENT — PAIN DESCRIPTION - PAIN TYPE
TYPE: SURGICAL PAIN

## 2022-01-01 ASSESSMENT — PULMONARY FUNCTION TESTS
PIF_VALUE: 16
PIF_VALUE: 23
PIF_VALUE: 18
PIF_VALUE: 21
PIF_VALUE: 17
PIF_VALUE: 27
PIF_VALUE: 24
PIF_VALUE: 17
PIF_VALUE: 17
PIF_VALUE: 16
PIF_VALUE: 23
PIF_VALUE: 23
PIF_VALUE: 5
PIF_VALUE: 22
PIF_VALUE: 23
PIF_VALUE: 22
PIF_VALUE: 23
PIF_VALUE: 21
PIF_VALUE: 23
PIF_VALUE: 24
PIF_VALUE: 23
PIF_VALUE: 21
PIF_VALUE: 24
PIF_VALUE: 23
PIF_VALUE: 23
PIF_VALUE: 16
PIF_VALUE: 28
PIF_VALUE: 16
PIF_VALUE: 16
PIF_VALUE: 20
PIF_VALUE: 23
PIF_VALUE: 16
PIF_VALUE: 24
PIF_VALUE: 15
PIF_VALUE: 18
PIF_VALUE: 23
PIF_VALUE: 16
PIF_VALUE: 23
PIF_VALUE: 19
PIF_VALUE: 16
PIF_VALUE: 27
PIF_VALUE: 21
PIF_VALUE: 24
PIF_VALUE: 17
PIF_VALUE: 15
PIF_VALUE: 16
PIF_VALUE: 16
PIF_VALUE: 22
PIF_VALUE: 24
PIF_VALUE: 20
PIF_VALUE: 23
PIF_VALUE: 18
PIF_VALUE: 19
PIF_VALUE: 24
PIF_VALUE: 22
PIF_VALUE: 17
PIF_VALUE: 15
PIF_VALUE: 23
PIF_VALUE: 19
PIF_VALUE: 23
PIF_VALUE: 23
PIF_VALUE: 28
PIF_VALUE: 16
PIF_VALUE: 21
PIF_VALUE: 17
PIF_VALUE: 22
PIF_VALUE: 28
PIF_VALUE: 16
PIF_VALUE: 29
PIF_VALUE: 23
PIF_VALUE: 18
PIF_VALUE: 18
PIF_VALUE: 22
PIF_VALUE: 29
PIF_VALUE: 16
PIF_VALUE: 18
PIF_VALUE: 23
PIF_VALUE: 23
PIF_VALUE: 22
PIF_VALUE: 18
PIF_VALUE: 24
PIF_VALUE: 16
PIF_VALUE: 22
PIF_VALUE: 24
PIF_VALUE: 28
PIF_VALUE: 23
PIF_VALUE: 23
PIF_VALUE: 21
PIF_VALUE: 22
PIF_VALUE: 20
PIF_VALUE: 28
PIF_VALUE: 17
PIF_VALUE: 23
PIF_VALUE: 25
PIF_VALUE: 24
PIF_VALUE: 16
PIF_VALUE: 21
PIF_VALUE: 16
PIF_VALUE: 4
PIF_VALUE: 18
PIF_VALUE: 23
PIF_VALUE: 24
PIF_VALUE: 20
PIF_VALUE: 21
PIF_VALUE: 23
PIF_VALUE: 17
PIF_VALUE: 23
PIF_VALUE: 22
PIF_VALUE: 6
PIF_VALUE: 17
PIF_VALUE: 18
PIF_VALUE: 17
PIF_VALUE: 22
PIF_VALUE: 25
PIF_VALUE: 18
PIF_VALUE: 17
PIF_VALUE: 30
PIF_VALUE: 28
PIF_VALUE: 23
PIF_VALUE: 17
PIF_VALUE: 15
PIF_VALUE: 0
PIF_VALUE: 18
PIF_VALUE: 24
PIF_VALUE: 17
PIF_VALUE: 17
PIF_VALUE: 29
PIF_VALUE: 21
PIF_VALUE: 17
PIF_VALUE: 23
PIF_VALUE: 16
PIF_VALUE: 28
PIF_VALUE: 17
PIF_VALUE: 28
PIF_VALUE: 22
PIF_VALUE: 28
PIF_VALUE: 18
PIF_VALUE: 17
PIF_VALUE: 29
PIF_VALUE: 2
PIF_VALUE: 22
PIF_VALUE: 24
PIF_VALUE: 17
PIF_VALUE: 23
PIF_VALUE: 15
PIF_VALUE: 22
PIF_VALUE: 23
PIF_VALUE: 22
PIF_VALUE: 17
PIF_VALUE: 24
PIF_VALUE: 22
PIF_VALUE: 16
PIF_VALUE: 18
PIF_VALUE: 16
PIF_VALUE: 22
PIF_VALUE: 22
PIF_VALUE: 23
PIF_VALUE: 23
PIF_VALUE: 24
PIF_VALUE: 19
PIF_VALUE: 16
PIF_VALUE: 23
PIF_VALUE: 25
PIF_VALUE: 16
PIF_VALUE: 16
PIF_VALUE: 26
PIF_VALUE: 23
PIF_VALUE: 23
PIF_VALUE: 19
PIF_VALUE: 24
PIF_VALUE: 24
PIF_VALUE: 17
PIF_VALUE: 29
PIF_VALUE: 23
PIF_VALUE: 22
PIF_VALUE: 23
PIF_VALUE: 23
PIF_VALUE: 2
PIF_VALUE: 18
PIF_VALUE: 23
PIF_VALUE: 22
PIF_VALUE: 24
PIF_VALUE: 17
PIF_VALUE: 17
PIF_VALUE: 23
PIF_VALUE: 28
PIF_VALUE: 22
PIF_VALUE: 23
PIF_VALUE: 17
PIF_VALUE: 22
PIF_VALUE: 22
PIF_VALUE: 23
PIF_VALUE: 23
PIF_VALUE: 17
PIF_VALUE: 24
PIF_VALUE: 23
PIF_VALUE: 23
PIF_VALUE: 16
PIF_VALUE: 27
PIF_VALUE: 23
PIF_VALUE: 16
PIF_VALUE: 22
PIF_VALUE: 20
PIF_VALUE: 18
PIF_VALUE: 24
PIF_VALUE: 16
PIF_VALUE: 23
PIF_VALUE: 17
PIF_VALUE: 16
PIF_VALUE: 17
PIF_VALUE: 17
PIF_VALUE: 23
PIF_VALUE: 16
PIF_VALUE: 22
PIF_VALUE: 16
PIF_VALUE: 24
PIF_VALUE: 21
PIF_VALUE: 22
PIF_VALUE: 28
PIF_VALUE: 20
PIF_VALUE: 23
PIF_VALUE: 16
PIF_VALUE: 6
PIF_VALUE: 22
PIF_VALUE: 25
PIF_VALUE: 22
PIF_VALUE: 24
PIF_VALUE: 22
PIF_VALUE: 16
PIF_VALUE: 17
PIF_VALUE: 21
PIF_VALUE: 22
PIF_VALUE: 29
PIF_VALUE: 23
PIF_VALUE: 23
PIF_VALUE: 21
PIF_VALUE: 17
PIF_VALUE: 17
PIF_VALUE: 23
PIF_VALUE: 29
PIF_VALUE: 2
PIF_VALUE: 23
PIF_VALUE: 5
PIF_VALUE: 0
PIF_VALUE: 23
PIF_VALUE: 24
PIF_VALUE: 23
PIF_VALUE: 16
PIF_VALUE: 17
PIF_VALUE: 24
PIF_VALUE: 22
PIF_VALUE: 24
PIF_VALUE: 23
PIF_VALUE: 21
PIF_VALUE: 17
PIF_VALUE: 25
PIF_VALUE: 25
PIF_VALUE: 15
PIF_VALUE: 17
PIF_VALUE: 26
PIF_VALUE: 15
PIF_VALUE: 17
PIF_VALUE: 17
PIF_VALUE: 23
PIF_VALUE: 17
PIF_VALUE: 21
PIF_VALUE: 28
PIF_VALUE: 21
PIF_VALUE: 25
PIF_VALUE: 23
PIF_VALUE: 18
PIF_VALUE: 17
PIF_VALUE: 23
PIF_VALUE: 16
PIF_VALUE: 17
PIF_VALUE: 18
PIF_VALUE: 17
PIF_VALUE: 21
PIF_VALUE: 24
PIF_VALUE: 16
PIF_VALUE: 17
PIF_VALUE: 23
PIF_VALUE: 23
PIF_VALUE: 17
PIF_VALUE: 22
PIF_VALUE: 1
PIF_VALUE: 23
PIF_VALUE: 30
PIF_VALUE: 25
PIF_VALUE: 1
PIF_VALUE: 23
PIF_VALUE: 22
PIF_VALUE: 18
PIF_VALUE: 23
PIF_VALUE: 18
PIF_VALUE: 23
PIF_VALUE: 22
PIF_VALUE: 25
PIF_VALUE: 23
PIF_VALUE: 23
PIF_VALUE: 28
PIF_VALUE: 17
PIF_VALUE: 22
PIF_VALUE: 23
PIF_VALUE: 16
PIF_VALUE: 23
PIF_VALUE: 17
PIF_VALUE: 23
PIF_VALUE: 21
PIF_VALUE: 15
PIF_VALUE: 16
PIF_VALUE: 14
PIF_VALUE: 23
PIF_VALUE: 7
PIF_VALUE: 23
PIF_VALUE: 19
PIF_VALUE: 23
PIF_VALUE: 28
PIF_VALUE: 23
PIF_VALUE: 21
PIF_VALUE: 22
PIF_VALUE: 16
PIF_VALUE: 23
PIF_VALUE: 19
PIF_VALUE: 16
PIF_VALUE: 24
PIF_VALUE: 17
PIF_VALUE: 23
PIF_VALUE: 16
PIF_VALUE: 28
PIF_VALUE: 22
PIF_VALUE: 21
PIF_VALUE: 22
PIF_VALUE: 17
PIF_VALUE: 2
PIF_VALUE: 23
PIF_VALUE: 18
PIF_VALUE: 28
PIF_VALUE: 16
PIF_VALUE: 23
PIF_VALUE: 29
PIF_VALUE: 22
PIF_VALUE: 28
PIF_VALUE: 29
PIF_VALUE: 23
PIF_VALUE: 22
PIF_VALUE: 23
PIF_VALUE: 21
PIF_VALUE: 1
PIF_VALUE: 23
PIF_VALUE: 23
PIF_VALUE: 17
PIF_VALUE: 23
PIF_VALUE: 25
PIF_VALUE: 16
PIF_VALUE: 24
PIF_VALUE: 23
PIF_VALUE: 22
PIF_VALUE: 23
PIF_VALUE: 17
PIF_VALUE: 22
PIF_VALUE: 5
PIF_VALUE: 25
PIF_VALUE: 28
PIF_VALUE: 20
PIF_VALUE: 22
PIF_VALUE: 23
PIF_VALUE: 24
PIF_VALUE: 23
PIF_VALUE: 23
PIF_VALUE: 30
PIF_VALUE: 23
PIF_VALUE: 28
PIF_VALUE: 24
PIF_VALUE: 23
PIF_VALUE: 18
PIF_VALUE: 22
PIF_VALUE: 23
PIF_VALUE: 24
PIF_VALUE: 28
PIF_VALUE: 28
PIF_VALUE: 23
PIF_VALUE: 19
PIF_VALUE: 24
PIF_VALUE: 31
PIF_VALUE: 23
PIF_VALUE: 15
PIF_VALUE: 20
PIF_VALUE: 6
PIF_VALUE: 22
PIF_VALUE: 31
PIF_VALUE: 23
PIF_VALUE: 2
PIF_VALUE: 23
PIF_VALUE: 22
PIF_VALUE: 23
PIF_VALUE: 22
PIF_VALUE: 16
PIF_VALUE: 16
PIF_VALUE: 24
PIF_VALUE: 18
PIF_VALUE: 21
PIF_VALUE: 28
PIF_VALUE: 21
PIF_VALUE: 18
PIF_VALUE: 17
PIF_VALUE: 39
PIF_VALUE: 0
PIF_VALUE: 23
PIF_VALUE: 16
PIF_VALUE: 20
PIF_VALUE: 15
PIF_VALUE: 23
PIF_VALUE: 16
PIF_VALUE: 28
PIF_VALUE: 16
PIF_VALUE: 31
PIF_VALUE: 28
PIF_VALUE: 17
PIF_VALUE: 17
PIF_VALUE: 23
PIF_VALUE: 23
PIF_VALUE: 18
PIF_VALUE: 22
PIF_VALUE: 17
PIF_VALUE: 16
PIF_VALUE: 23
PIF_VALUE: 17
PIF_VALUE: 15
PIF_VALUE: 17
PIF_VALUE: 24
PIF_VALUE: 22
PIF_VALUE: 23
PIF_VALUE: 17
PIF_VALUE: 17
PIF_VALUE: 23
PIF_VALUE: 25
PIF_VALUE: 22
PIF_VALUE: 17
PIF_VALUE: 23
PIF_VALUE: 17
PIF_VALUE: 17
PIF_VALUE: 24
PIF_VALUE: 16
PIF_VALUE: 23
PIF_VALUE: 27
PIF_VALUE: 23
PIF_VALUE: 23
PIF_VALUE: 16
PIF_VALUE: 23
PIF_VALUE: 23
PIF_VALUE: 24
PIF_VALUE: 9
PIF_VALUE: 21
PIF_VALUE: 23
PIF_VALUE: 16
PIF_VALUE: 21
PIF_VALUE: 16
PIF_VALUE: 23
PIF_VALUE: 17
PIF_VALUE: 19
PIF_VALUE: 23
PIF_VALUE: 16
PIF_VALUE: 22
PIF_VALUE: 28
PIF_VALUE: 24
PIF_VALUE: 22
PIF_VALUE: 15
PIF_VALUE: 23
PIF_VALUE: 17
PIF_VALUE: 23
PIF_VALUE: 29
PIF_VALUE: 17
PIF_VALUE: 0
PIF_VALUE: 24
PIF_VALUE: 28
PIF_VALUE: 24
PIF_VALUE: 36
PIF_VALUE: 20
PIF_VALUE: 23
PIF_VALUE: 19
PIF_VALUE: 17
PIF_VALUE: 16
PIF_VALUE: 24
PIF_VALUE: 23
PIF_VALUE: 24
PIF_VALUE: 23
PIF_VALUE: 23
PIF_VALUE: 20
PIF_VALUE: 22
PIF_VALUE: 28
PIF_VALUE: 16
PIF_VALUE: 17
PIF_VALUE: 14
PIF_VALUE: 16
PIF_VALUE: 29
PIF_VALUE: 18
PIF_VALUE: 23
PIF_VALUE: 22
PIF_VALUE: 17
PIF_VALUE: 23
PIF_VALUE: 17
PIF_VALUE: 23
PIF_VALUE: 28
PIF_VALUE: 23
PIF_VALUE: 21
PIF_VALUE: 16
PIF_VALUE: 16
PIF_VALUE: 18
PIF_VALUE: 17
PIF_VALUE: 28
PIF_VALUE: 23
PIF_VALUE: 22
PIF_VALUE: 16
PIF_VALUE: 22
PIF_VALUE: 23
PIF_VALUE: 16
PIF_VALUE: 21
PIF_VALUE: 17
PIF_VALUE: 16
PIF_VALUE: 22
PIF_VALUE: 21
PIF_VALUE: 23
PIF_VALUE: 21
PIF_VALUE: 23
PIF_VALUE: 17
PIF_VALUE: 17
PIF_VALUE: 23
PIF_VALUE: 22
PIF_VALUE: 24
PIF_VALUE: 29
PIF_VALUE: 23
PIF_VALUE: 17
PIF_VALUE: 22
PIF_VALUE: 18
PIF_VALUE: 25
PIF_VALUE: 19
PIF_VALUE: 23
PIF_VALUE: 24
PIF_VALUE: 23
PIF_VALUE: 28
PIF_VALUE: 23
PIF_VALUE: 23
PIF_VALUE: 29
PIF_VALUE: 24
PIF_VALUE: 29
PIF_VALUE: 25
PIF_VALUE: 17
PIF_VALUE: 22
PIF_VALUE: 23
PIF_VALUE: 16
PIF_VALUE: 22
PIF_VALUE: 17
PIF_VALUE: 16
PIF_VALUE: 21
PIF_VALUE: 17
PIF_VALUE: 19
PIF_VALUE: 23
PIF_VALUE: 23
PIF_VALUE: 3
PIF_VALUE: 16
PIF_VALUE: 18
PIF_VALUE: 17
PIF_VALUE: 16
PIF_VALUE: 23
PIF_VALUE: 22
PIF_VALUE: 29
PIF_VALUE: 25
PIF_VALUE: 23
PIF_VALUE: 23
PIF_VALUE: 24
PIF_VALUE: 24
PIF_VALUE: 16
PIF_VALUE: 16
PIF_VALUE: 25
PIF_VALUE: 20
PIF_VALUE: 28
PIF_VALUE: 21
PIF_VALUE: 28
PIF_VALUE: 23
PIF_VALUE: 24
PIF_VALUE: 23
PIF_VALUE: 16
PIF_VALUE: 23
PIF_VALUE: 25
PIF_VALUE: 16
PIF_VALUE: 17
PIF_VALUE: 24
PIF_VALUE: 21
PIF_VALUE: 22
PIF_VALUE: 22
PIF_VALUE: 2
PIF_VALUE: 28
PIF_VALUE: 23
PIF_VALUE: 17
PIF_VALUE: 17
PIF_VALUE: 24
PIF_VALUE: 17
PIF_VALUE: 15
PIF_VALUE: 17
PIF_VALUE: 28
PIF_VALUE: 23
PIF_VALUE: 24
PIF_VALUE: 16
PIF_VALUE: 17
PIF_VALUE: 23
PIF_VALUE: 23
PIF_VALUE: 1
PIF_VALUE: 6
PIF_VALUE: 17
PIF_VALUE: 28
PIF_VALUE: 16
PIF_VALUE: 22
PIF_VALUE: 28
PIF_VALUE: 29
PIF_VALUE: 23

## 2022-01-01 ASSESSMENT — PAIN DESCRIPTION - PROGRESSION
CLINICAL_PROGRESSION: GRADUALLY WORSENING
CLINICAL_PROGRESSION: NOT CHANGED
CLINICAL_PROGRESSION: GRADUALLY WORSENING
CLINICAL_PROGRESSION: NOT CHANGED
CLINICAL_PROGRESSION: GRADUALLY IMPROVING
CLINICAL_PROGRESSION: NOT CHANGED
CLINICAL_PROGRESSION: GRADUALLY WORSENING

## 2022-01-01 ASSESSMENT — PAIN SCALES - GENERAL
PAINLEVEL_OUTOF10: 0
PAINLEVEL_OUTOF10: 3
PAINLEVEL_OUTOF10: 9
PAINLEVEL_OUTOF10: 7
PAINLEVEL_OUTOF10: 8
PAINLEVEL_OUTOF10: 7
PAINLEVEL_OUTOF10: 9
PAINLEVEL_OUTOF10: 0
PAINLEVEL_OUTOF10: 3
PAINLEVEL_OUTOF10: 0
PAINLEVEL_OUTOF10: 10
PAINLEVEL_OUTOF10: 0
PAINLEVEL_OUTOF10: 7
PAINLEVEL_OUTOF10: 10
PAINLEVEL_OUTOF10: 0
PAINLEVEL_OUTOF10: 0
PAINLEVEL_OUTOF10: 10
PAINLEVEL_OUTOF10: 10
PAINLEVEL_OUTOF10: 9
PAINLEVEL_OUTOF10: 9
PAINLEVEL_OUTOF10: 10
PAINLEVEL_OUTOF10: 10
PAINLEVEL_OUTOF10: 9
PAINLEVEL_OUTOF10: 0
PAINLEVEL_OUTOF10: 7
PAINLEVEL_OUTOF10: 7
PAINLEVEL_OUTOF10: 2
PAINLEVEL_OUTOF10: 8
PAINLEVEL_OUTOF10: 7
PAINLEVEL_OUTOF10: 9
PAINLEVEL_OUTOF10: 10
PAINLEVEL_OUTOF10: 0
PAINLEVEL_OUTOF10: 10
PAINLEVEL_OUTOF10: 0
PAINLEVEL_OUTOF10: 10
PAINLEVEL_OUTOF10: 7
PAINLEVEL_OUTOF10: 9
PAINLEVEL_OUTOF10: 7
PAINLEVEL_OUTOF10: 10
PAINLEVEL_OUTOF10: 8
PAINLEVEL_OUTOF10: 3
PAINLEVEL_OUTOF10: 0
PAINLEVEL_OUTOF10: 0
PAINLEVEL_OUTOF10: 6
PAINLEVEL_OUTOF10: 8
PAINLEVEL_OUTOF10: 7
PAINLEVEL_OUTOF10: 0
PAINLEVEL_OUTOF10: 10
PAINLEVEL_OUTOF10: 3
PAINLEVEL_OUTOF10: 3
PAINLEVEL_OUTOF10: 9
PAINLEVEL_OUTOF10: 2
PAINLEVEL_OUTOF10: 5
PAINLEVEL_OUTOF10: 10
PAINLEVEL_OUTOF10: 7
PAINLEVEL_OUTOF10: 8
PAINLEVEL_OUTOF10: 10
PAINLEVEL_OUTOF10: 7
PAINLEVEL_OUTOF10: 8
PAINLEVEL_OUTOF10: 9
PAINLEVEL_OUTOF10: 8
PAINLEVEL_OUTOF10: 10
PAINLEVEL_OUTOF10: 10
PAINLEVEL_OUTOF10: 0
PAINLEVEL_OUTOF10: 6
PAINLEVEL_OUTOF10: 0
PAINLEVEL_OUTOF10: 6
PAINLEVEL_OUTOF10: 7
PAINLEVEL_OUTOF10: 8
PAINLEVEL_OUTOF10: 6
PAINLEVEL_OUTOF10: 8
PAINLEVEL_OUTOF10: 4
PAINLEVEL_OUTOF10: 10
PAINLEVEL_OUTOF10: 6
PAINLEVEL_OUTOF10: 3
PAINLEVEL_OUTOF10: 10
PAINLEVEL_OUTOF10: 3
PAINLEVEL_OUTOF10: 0
PAINLEVEL_OUTOF10: 8
PAINLEVEL_OUTOF10: 5
PAINLEVEL_OUTOF10: 8
PAINLEVEL_OUTOF10: 10
PAINLEVEL_OUTOF10: 0
PAINLEVEL_OUTOF10: 8
PAINLEVEL_OUTOF10: 0
PAINLEVEL_OUTOF10: 9
PAINLEVEL_OUTOF10: 8
PAINLEVEL_OUTOF10: 10
PAINLEVEL_OUTOF10: 7
PAINLEVEL_OUTOF10: 0
PAINLEVEL_OUTOF10: 0
PAINLEVEL_OUTOF10: 3
PAINLEVEL_OUTOF10: 0
PAINLEVEL_OUTOF10: 10
PAINLEVEL_OUTOF10: 10
PAINLEVEL_OUTOF10: 7
PAINLEVEL_OUTOF10: 8
PAINLEVEL_OUTOF10: 10
PAINLEVEL_OUTOF10: 0
PAINLEVEL_OUTOF10: 0
PAINLEVEL_OUTOF10: 2
PAINLEVEL_OUTOF10: 10
PAINLEVEL_OUTOF10: 0

## 2022-01-01 ASSESSMENT — PAIN DESCRIPTION - FREQUENCY
FREQUENCY: INTERMITTENT
FREQUENCY: CONTINUOUS

## 2022-01-01 ASSESSMENT — PAIN - FUNCTIONAL ASSESSMENT
PAIN_FUNCTIONAL_ASSESSMENT: PREVENTS OR INTERFERES SOME ACTIVE ACTIVITIES AND ADLS
PAIN_FUNCTIONAL_ASSESSMENT: 0-10
PAIN_FUNCTIONAL_ASSESSMENT: PREVENTS OR INTERFERES SOME ACTIVE ACTIVITIES AND ADLS
PAIN_FUNCTIONAL_ASSESSMENT: ACTIVITIES ARE NOT PREVENTED
PAIN_FUNCTIONAL_ASSESSMENT: PREVENTS OR INTERFERES SOME ACTIVE ACTIVITIES AND ADLS
PAIN_FUNCTIONAL_ASSESSMENT: PREVENTS OR INTERFERES SOME ACTIVE ACTIVITIES AND ADLS

## 2022-01-01 ASSESSMENT — PAIN DESCRIPTION - LOCATION
LOCATION: ABDOMEN
LOCATION: ABDOMEN;CHEST
LOCATION: CHEST;ABDOMEN
LOCATION: ABDOMEN
LOCATION: ABDOMEN;CHEST
LOCATION: ABDOMEN

## 2022-01-01 ASSESSMENT — PAIN DESCRIPTION - ONSET
ONSET: ON-GOING
ONSET: GRADUAL
ONSET: GRADUAL
ONSET: ON-GOING

## 2022-01-01 ASSESSMENT — PAIN DESCRIPTION - ORIENTATION
ORIENTATION: MID
ORIENTATION: MID;RIGHT
ORIENTATION: MID
ORIENTATION: MID;RIGHT
ORIENTATION: MID
ORIENTATION: MID;RIGHT
ORIENTATION: RIGHT;MID
ORIENTATION: MID;RIGHT
ORIENTATION: MID
ORIENTATION: MID;RIGHT

## 2022-01-01 ASSESSMENT — PATIENT HEALTH QUESTIONNAIRE - PHQ9
SUM OF ALL RESPONSES TO PHQ QUESTIONS 1-9: 0
1. LITTLE INTEREST OR PLEASURE IN DOING THINGS: 0
2. FEELING DOWN, DEPRESSED OR HOPELESS: 0
SUM OF ALL RESPONSES TO PHQ QUESTIONS 1-9: 0
SUM OF ALL RESPONSES TO PHQ QUESTIONS 1-9: 0
SUM OF ALL RESPONSES TO PHQ9 QUESTIONS 1 & 2: 0
SUM OF ALL RESPONSES TO PHQ QUESTIONS 1-9: 0

## 2022-01-01 ASSESSMENT — PAIN DESCRIPTION - DESCRIPTORS
DESCRIPTORS: ACHING
DESCRIPTORS: ACHING;CONSTANT
DESCRIPTORS: ACHING;SORE
DESCRIPTORS: ACHING
DESCRIPTORS: ACHING;CONSTANT
DESCRIPTORS: ACHING
DESCRIPTORS: ACHING;SORE
DESCRIPTORS: DISCOMFORT;ACHING;CONSTANT
DESCRIPTORS: ACHING

## 2022-01-01 ASSESSMENT — PAIN SCALES - WONG BAKER
WONGBAKER_NUMERICALRESPONSE: 6
WONGBAKER_NUMERICALRESPONSE: 8
WONGBAKER_NUMERICALRESPONSE: 10
WONGBAKER_NUMERICALRESPONSE: 0
WONGBAKER_NUMERICALRESPONSE: 8
WONGBAKER_NUMERICALRESPONSE: 10

## 2022-01-01 ASSESSMENT — LIFESTYLE VARIABLES: SMOKING_STATUS: 1

## 2022-01-14 NOTE — PROGRESS NOTES
Patient Name:  Sean Box  Patient :  1970  Patient MRN:  [id number]     Primary Oncologist: John Le MD  Referring Provider: Damaso Siddiqi DO     Date of Service: 2022      Reason for Consult:  Esophageal cancer. Chief Complaint:    Chief Complaint   Patient presents with    Follow-up       Encounter Diagnoses   Name Primary?  Malignant neoplasm of lower third of esophagus (HCC) Yes    Gastroesophageal reflux disease with esophagitis without hemorrhage         HPI:   10/12/21: He arrived alone to the clinic today. Reported that he has been having heartburn for about 3 months, was started on Protonix. Was also having difficulty swallowing. Denied any odynophagia. Substernal chest pain. Denied any increase shortness of breath, fever, cough. Denied any abdominal pain, nausea, vomiting, diarrhea, constipation. No bleeding. Reported weight loss of about 13 pounds. He subsequently had CT scan of the chest abdomen pelvisOn 2021 which revealed right lower lobe pulmonary nodules which have been unchanged since 7 May 2021 also left lower pulmonary nodule which was also unchanged except for small pleural-based nodules in the posterior left lower lung measuring about 8 mm in transverse dimension were new. Slowly enlarging 2.1 cm paraesophageal lymph node the level of the small sliding gastric hiatal hernia. Circumferential wall thickening of the distal esophagus. EGD on 10/6/2021 revealed an infiltrative ulcerated necrotic 7 [place] nonbleeding 10 cm mass of malignant appearance was found in the lower third of the esophagus. This mass caused partial obstruction. Gastric biopsy was benign. H. pylori not identified. 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 HER-2 by IHC was negative. 2021 CBC within normal limits. BMP with creatinine 1.1 and calcium 9.3 glucose was high at 304    10/28/2021 PET scanWith hypermetabolic somewhat thickened distal esophagus. Enlarged retrocrural lymph node. 2 cm left upper lobe pulmonary nodule    11/8/2021 CT scan of the abdomen pelvis:  Significant amount of thickening of distal esophagus with adjacent 22 mm   lymph node, increased since prior exam. The combination of the findings are   worrisome and require further evaluation of distal esophagus via endoscopy to   exclude underlying neoplasm.       Diverticulosis with no signs of diverticulitis.       Bilateral multiple 2-6 mm nonobstructing stones of the kidneys.       Multiple surgical clips within the gallbladder fossa with normal appearance   of gallbladder.       Axial hiatal hernia.       8 mm calcified granuloma of the right lower lobe and 4 mm pulmonary nodule   within the left lower lobe.  No further follow-up is needed. 11/16/2021 started chemoradiation with carbotaxol. Completed dec 28 2021     Jan 2022 12: PET:  1.  There is decreased FDG activity in the distal esophagus as well as the   retrocrural lymph node.  This may represent treatment response.  The mild   residual activity could be due to post treatment change including radiation   esophagitis.  Attention on follow-up imaging.       2.  No new metabolically active metastatic disease.             Past Medical History:     Hypertension, diabetes mellitus, hyperlipidemia CAD, GOPI, asthma, depression, migraine headaches                                                           Past Surgery History:    Cholecystectomy in 2010, atrial septal defect repair in 2005, resection of skin cancers, back surgery, carpal tunnel surgery x2, eye surgery                                                                        Social History:   Lives with his cousin . Stewart Davidson is going to move from [place] to live with him soon. Currently employed since 2006 secondary to the stroke, CAD and heart surgery.   Smokes about 4 to 6 cigarettes/day for the past several years.  No history of alcohol abuse or any other illicit drug abuse. Family History:    Sister diagnosed with stomach cancer. Allergies   Allergen Reactions    Azithromycin     Ciprofloxacin Other (See Comments)     lethargic       Current Outpatient Medications on File Prior to Visit   Medication Sig Dispense Refill    Nutritional Supplement LIQD Take 1 Can by mouth 2 times daily 60 each 1    ondansetron (ZOFRAN-ODT) 8 MG TBDP disintegrating tablet Place 1 tablet under the tongue every 8 hours as needed for Nausea or Vomiting (Chemo/radiation induced) 30 tablet 1    dexamethasone (DECADRON) 2 MG tablet Take 4 tablets with supper the night before first chemotherapy and then take 1 tablet with breakfast for two days after each treatment (every 7 days). 20 tablet 0    rizatriptan (MAXALT) 10 MG tablet Take 10 mg by mouth once as needed for Migraine May repeat in 2 hours if needed      insulin regular (HUMULIN R;NOVOLIN R) 100 UNIT/ML injection Inject into the skin See Admin Instructions Sliding Scale      sucralfate (CARAFATE) 1 GM/10ML suspension Take 10 mLs by mouth 4 times daily 1200 mL 3    pioglitazone (ACTOS) 45 MG tablet Take 45 mg by mouth daily      clopidogrel (PLAVIX) 75 MG tablet Take 75 mg by mouth daily      CPAP Machine MISC 10 cm by CPAP route nightly      acarbose (PRECOSE) 50 MG tablet Take 50 mg by mouth 3 times daily (with meals)       losartan (COZAAR) 100 MG tablet Take 100 mg by mouth daily Takes at night      metFORMIN (GLUCOPHAGE-XR) 500 MG extended release tablet 1,000 mg 2 times daily   6    sertraline (ZOLOFT) 100 MG tablet 100 mg daily   6    traZODone (DESYREL) 50 MG tablet Take 100 mg by mouth nightly       nitroGLYCERIN (NITROSTAT) 0.4 MG SL tablet up to max of 3 total doses.  If no relief after 1 dose, call 911. (Patient taking differently: as needed up to max of 3 total doses. If no relief after 1 dose, call 911.) 25 tablet 3    atorvastatin (LIPITOR) 40 MG tablet Take 40 mg by mouth nightly       SITagliptin (JANUVIA) 100 MG tablet Take 100 mg by mouth nightly       terazosin (HYTRIN) 2 MG capsule Take 4 mg by mouth nightly       carvedilol (COREG) 12.5 MG tablet Take 2 tablets by mouth 2 times daily (with meals). (Patient taking differently: Take 12.5 mg by mouth 2 times daily (with meals) Take one tablet by mouth two times a day with food.) 60 tablet 0    zonisamide (ZONEGRAN) 100 MG capsule Take 400 mg by mouth nightly       omega-3 acid ethyl esters (LOVAZA) 1 G capsule Take 1 g by mouth daily       albuterol (PROVENTIL HFA) 108 (90 BASE) MCG/ACT inhaler Inhale 2 puffs into the lungs every 6 hours as needed for Wheezing or Shortness of Breath. 1 Inhaler 0    aspirin 81 MG EC tablet Take 81 mg by mouth daily.  pantoprazole (PROTONIX) 40 MG tablet Take 1 tablet by mouth daily 30 tablet 5     No current facility-administered medications on file prior to visit. Interval history:1/14/22: He arrived alone to the clinic today. Tired a lot. No fever. Cough mostly dry. No dysphagia or any odynophagia. No chest pain, increased sob. No reflux sx. No abdominal pain. Dry heaving is the worst sx he complains of. No diarrhea or any constipation. No overt bleeding. No Gu sx. Appetite is poor and lost 27 lbs overall since the first visit here. Review of Systems:    As per interval history, rest of the review of systems negative     Vital Signs: BP (!) 146/86 (Site: Right Upper Arm, Position: Sitting, Cuff Size: Large Adult)   Pulse 85   Temp 97.4 °F (36.3 °C) (Infrared)   Resp 16   Ht 5' 10\" (1.778 m)   Wt 251 lb 9.6 oz (114.1 kg)   SpO2 97%   BMI 36.10 kg/m²      CONSTITUTIONAL: awake, alert, cooperative, no apparent distress , obese, ambulates with the help of a cane.   Poor dental hygiene.   EYES: ROSALIO, No pallor or any icterus  ENT: ATNC  NECK: No JVD  HEMATOLOGIC/LYMPHATIC: no cervical, supraclavicular or axillary lymphadenopathy   LUNGS: CTAB  CARDIOVASCULAR: s1s2 rrr no murmurs  ABDOMEN: soft ntnd bs pos  MUSCULOSKELETAL: full range of motion noted, tone is normal  NEUROLOGIC: GI  SKIN: No rash  EXTREMITIES: no LE edema bilaterally     Labs:  Hematology:  Lab Results   Component Value Date    WBC 3.4 (L) 12/20/2021    RBC 3.78 (L) 12/20/2021    HGB 12.1 (L) 12/20/2021    HCT 34.2 (L) 12/20/2021    MCV 90.5 12/20/2021    MCH 32.0 (H) 12/20/2021    MCHC 35.4 12/20/2021    RDW 14.8 12/20/2021    PLT 69 (L) 12/20/2021    MPV 9.8 12/20/2021    SEGSPCT 77.8 (H) 12/20/2021    EOSRELPCT 1.5 12/20/2021    BASOPCT 0.3 12/20/2021    LYMPHOPCT 5.0 (L) 12/20/2021    MONOPCT 15.4 (H) 12/20/2021    SEGSABS 2.6 12/20/2021    EOSABS 0.1 12/20/2021    BASOSABS 0.0 12/20/2021    LYMPHSABS 0.2 12/20/2021    MONOSABS 0.5 12/20/2021    DIFFTYPE AUTOMATED DIFFERENTIAL 12/20/2021     Lab Results   Component Value Date    ESR 21 (H) 09/27/2016     Chemistry:  Lab Results   Component Value Date     12/20/2021    K 3.6 12/20/2021     12/20/2021    CO2 25 12/20/2021    BUN 8 12/20/2021    CREATININE 0.8 (L) 12/20/2021    GLUCOSE 142 (H) 12/20/2021    CALCIUM 8.8 12/20/2021    PROT 6.2 (L) 12/20/2021    LABALBU 4.1 12/20/2021    BILITOT 0.7 12/20/2021    ALKPHOS 84 12/20/2021    AST 8 (L) 12/20/2021    ALT 12 12/20/2021    LABGLOM >60 12/20/2021    GFRAA >60 12/20/2021    PHOS 2.1 (L) 07/30/2021    MG 1.6 (L) 12/20/2021    POCGLU 312 (H) 07/30/2021     Lab Results   Component Value Date    HOMOCYSTEINE 13.0 03/15/2014    HOMOCYSTEINE  03/15/2014     CORRECTED ON 03/17 AT 0606: PREVIOUSLY REPORTED AS: 8.0 Risk of    HOMOCYSTEINE  03/15/2014      vascular disease  increases above 9 umol/L and is significant    HOMOCYSTEINE  >15 umol/L. 03/15/2014     No components found for: LD  Lab Results Component Value Date    TSHHS 2.110 08/17/2016    T4FREE 0.91 05/15/2013     Immunology:  Lab Results   Component Value Date    PROT 6.2 (L) 12/20/2021     No results found for: ALEXANDER LondonoLCR  No results found for: B2M  Coagulation Panel:  Lab Results   Component Value Date    PROTIME 11.5 05/30/2018    INR 1.01 05/30/2018    APTT 26.0 05/30/2018    DDIMER 89 08/17/2016     Anemia Panel:  Lab Results   Component Value Date    ELXUGATS37 359 04/18/2011    FOLATE 9.9 04/18/2011     Tumor Markers:  Lab Results   Component Value Date    PSA 0.8 03/07/2016        Observations:  ECOG:  PHQ-9 Total Score: 0 (1/14/2022  2:02 PM)       Assessment & Plan:                                                          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 Pulmonary nodule seems to be stable but possible new pleural-based nodule. PET scan with a 2 mm left upper lobe pulmonary nodule too small to characterize, otherwise same findings as CT imaging. Has been referred to , CT surgery will refer to CT surgery. Recommend neoadjuvant chemoradiation with carbotaxol. Discussed adverse effects. OCM and port completed. Started cycle 1 day 1 carbotaxol and radiation on November 16 2021. Completed dec 28 2021  PET jan 2022 with good response. Plan for surgical resection, discussed with Dr Traci Essex. Acid reflux    Pancytopenia: Most probably sec to chemotherapy /radiation. CBC pending jan 2022     Continue other medical care. Discussed above findings and plan with him and he voiced understanding. Answered all his questions. Discussed healthy lifestyle including healthy diet, regular exercise as tolerated. Also discussed importance of being up-to-date with age-appropriate screening tools. Recommend follow-up with primary care physician and other specialists.     Please do not hesitate to contact us if you need further information. Return to clinic feb 2022 or earlier if new Sx    I have recommended that the patient follow CDC guidelines for prevention of COVID-19 infection. Received COVID vaccine/booster  and flu vaccine.     [address]

## 2022-01-14 NOTE — PROGRESS NOTES
MA Rooming Questions  Patient: Olvin Cervantes  MRN: [id number]    Date: 1/14/2022        1. Do you have any new issues?   no         2. Do you need any refills on medications?    no    3. Have you had any imaging done since your last visit? Yes- PET    4. Have you been hospitalized or seen in the emergency room since your last visit here?   no    5. Did the patient have a depression screening completed today?  Yes    PHQ-9 Total Score: 0 (1/14/2022  2:02 PM)       PHQ-9 Given to (if applicable):               PHQ-9 Score (if applicable):                     [] Positive     []  Negative              Does question #9 need addressed (if applicable)                     [] Yes    []  No               Antony Orr CMA

## 2022-01-20 NOTE — PROGRESS NOTES
they will be removed; please bring a case for them. 10. If you  have a Living Will and Durable Power of  for Healthcare, please bring in a copy. 11. Please bring picture ID,  insurance card, paperwork from the doctors office    (H & P, Consent, & card for implantable devices). 12. Take a shower the night before or morning of your procedure, do not apply any lotion, oil or powder. 13. Wear a mask covering your nose & mouth when entering the hospital. Have your covid-19 test performed within 2-7 days of your                  surgery. Quarantine yourself after the test until after your surgery. COVID test, CBC, PT/PTT, BMP, and T&S on 1/24/22 at [address] [place] [address]. Patient previously had EKG's done 11/18/21 and 9/5/21= NSR, and previous CXR 9/5/21=normal, and PET scan done 1/12/22.

## 2022-01-26 NOTE — PROGRESS NOTES
I spoke to patient and he is aware he is covid positive. He said he called the office to reschedule surgery and he is just waiting for a return call. [id number]  I called Shell's office and was transferred to Marc Flynn RN voicemail. I informed her that patient was covid test 1/24/22 and is positive. 1/27/22 [id number] I spoke to Marc Flynn RN at [facility] office and she said that the surgery is not going to be canceled. Dr. Sylwia Chao approved the surgery. 1/27/22 [id number] I spoke to patient and he confirmed that surgery will continue on 1/31/22 and I did go over which medications he can take morning of surgery.

## 2022-01-28 NOTE — ANESTHESIA PRE PROCEDURE
Department of Anesthesiology  Preprocedure Note       Name:  Esau Jani   Age:  46 y.o.  :  1970                                          MRN:  [id number]         Date:  2022      Surgeon: Mimi Schwartz):  Mari Gan MD    Procedure: Procedure(s):  ESOPHAGECTOMY    Medications prior to admission:   Prior to Admission medications    Medication Sig Start Date End Date Taking?  Authorizing Provider   Nutritional Supplement LIQD Take 1 Can by mouth 2 times daily 12/10/21  Yes TAMEKA Sigala - CNP   ondansetron (ZOFRAN-ODT) 8 MG TBDP disintegrating tablet Place 1 tablet under the tongue every 8 hours as needed for Nausea or Vomiting (Chemo/radiation induced) 11/15/21  Yes Federico Flores MD   rizatriptan (MAXALT) 10 MG tablet Take 10 mg by mouth once as needed for Migraine May repeat in 2 hours if needed   Yes Historical Provider, MD   insulin regular (HUMULIN R;NOVOLIN R) 100 UNIT/ML injection Inject into the skin See Admin Instructions Sliding Scale 100 units in the  morning and evening and 150 units at  lunch   Yes Historical Provider, MD   pantoprazole (PROTONIX) 40 MG tablet Take 1 tablet by mouth daily 10/13/21 1/20/22 Yes Cinthia Santiago MD   sucralfate (CARAFATE) 1 GM/10ML suspension Take 10 mLs by mouth 4 times daily 10/12/21  Yes Cinthia Santiago MD   pioglitazone (ACTOS) 45 MG tablet Take 45 mg by mouth daily   Yes Historical Provider, MD   acarbose (PRECOSE) 50 MG tablet Take 50 mg by mouth 3 times daily (with meals)    Yes Historical Provider, MD   losartan (COZAAR) 100 MG tablet Take 100 mg by mouth daily Takes at night   Yes Historical Provider, MD   metFORMIN (GLUCOPHAGE-XR) 500 MG extended release tablet 1,000 mg 2 times daily  19  Yes Historical Provider, MD   traZODone (DESYREL) 50 MG tablet Take 100 mg by mouth nightly    Yes Historical Provider, MD   atorvastatin (LIPITOR) 40 MG tablet Take 40 mg by mouth nightly    Yes Historical Provider, MD   SITagliptin (JANUVIA) 100 MG tablet Take 100 mg by mouth nightly    Yes Historical Provider, MD   terazosin (HYTRIN) 2 MG capsule Take 4 mg by mouth nightly    Yes Historical Provider, MD   carvedilol (COREG) 12.5 MG tablet Take 2 tablets by mouth 2 times daily (with meals). Patient taking differently: Take 12.5 mg by mouth 2 times daily (with meals) Take one tablet by mouth two times a day with food. 2/19/15  Yes Sharmon Callow, MD   zonisamide (ZONEGRAN) 100 MG capsule Take 400 mg by mouth nightly    Yes Historical Provider, MD   omega-3 acid ethyl esters (LOVAZA) 1 G capsule Take 1 g by mouth daily    Yes Historical Provider, MD   albuterol (PROVENTIL HFA) 108 (90 BASE) MCG/ACT inhaler Inhale 2 puffs into the lungs every 6 hours as needed for Wheezing or Shortness of Breath. 1/10/13  Yes Nano Nickerson PA-C   aspirin 81 MG EC tablet Take 81 mg by mouth daily. Yes Historical Provider, MD   clopidogrel (PLAVIX) 75 MG tablet Take 75 mg by mouth daily    Historical Provider, MD   CPAP Machine MISC 10 cm by CPAP route nightly    Historical Provider, MD   sertraline (ZOLOFT) 100 MG tablet 100 mg daily  1/28/19   Historical Provider, MD   nitroGLYCERIN (NITROSTAT) 0.4 MG SL tablet up to max of 3 total doses. If no relief after 1 dose, call 911. Patient taking differently: as needed up to max of 3 total doses. If no relief after 1 dose, call 911. 6/1/18   Maximiliano Whitney MD       Current medications:    No current facility-administered medications for this encounter.      Current Outpatient Medications   Medication Sig Dispense Refill    Nutritional Supplement LIQD Take 1 Can by mouth 2 times daily 60 each 1    ondansetron (ZOFRAN-ODT) 8 MG TBDP disintegrating tablet Place 1 tablet under the tongue every 8 hours as needed for Nausea or Vomiting (Chemo/radiation induced) 30 tablet 1    rizatriptan (MAXALT) 10 MG tablet Take 10 mg by mouth once as needed for Migraine May repeat in 2 hours if needed      insulin regular (HUMULIN R;NOVOLIN R) 100 UNIT/ML injection Inject into the skin See Admin Instructions Sliding Scale 100 units in the  morning and evening and 150 units at  lunch      pantoprazole (PROTONIX) 40 MG tablet Take 1 tablet by mouth daily 30 tablet 5    sucralfate (CARAFATE) 1 GM/10ML suspension Take 10 mLs by mouth 4 times daily 1200 mL 3    pioglitazone (ACTOS) 45 MG tablet Take 45 mg by mouth daily      acarbose (PRECOSE) 50 MG tablet Take 50 mg by mouth 3 times daily (with meals)       losartan (COZAAR) 100 MG tablet Take 100 mg by mouth daily Takes at night      metFORMIN (GLUCOPHAGE-XR) 500 MG extended release tablet 1,000 mg 2 times daily   6    traZODone (DESYREL) 50 MG tablet Take 100 mg by mouth nightly       atorvastatin (LIPITOR) 40 MG tablet Take 40 mg by mouth nightly       SITagliptin (JANUVIA) 100 MG tablet Take 100 mg by mouth nightly       terazosin (HYTRIN) 2 MG capsule Take 4 mg by mouth nightly       carvedilol (COREG) 12.5 MG tablet Take 2 tablets by mouth 2 times daily (with meals). (Patient taking differently: Take 12.5 mg by mouth 2 times daily (with meals) Take one tablet by mouth two times a day with food.) 60 tablet 0    zonisamide (ZONEGRAN) 100 MG capsule Take 400 mg by mouth nightly       omega-3 acid ethyl esters (LOVAZA) 1 G capsule Take 1 g by mouth daily       albuterol (PROVENTIL HFA) 108 (90 BASE) MCG/ACT inhaler Inhale 2 puffs into the lungs every 6 hours as needed for Wheezing or Shortness of Breath. 1 Inhaler 0    aspirin 81 MG EC tablet Take 81 mg by mouth daily.  clopidogrel (PLAVIX) 75 MG tablet Take 75 mg by mouth daily      CPAP Machine MISC 10 cm by CPAP route nightly      sertraline (ZOLOFT) 100 MG tablet 100 mg daily   6    nitroGLYCERIN (NITROSTAT) 0.4 MG SL tablet up to max of 3 total doses. If no relief after 1 dose, call 911. (Patient taking differently: as needed up to max of 3 total doses.  If no relief after 1 dose, call 911.) 25 tablet 3       Allergies:  No Active Allergies    Problem List:    Patient Active Problem List   Diagnosis Code    Migraine G43.909    Diabetes mellitus ([facility] F98.2    Diastolic heart failure ([facility]) I50.30    TIA (transient ischemic attack) G45.9    Persistent headaches R51.9    Cerebral infarction ([facility] I63.9    Abdominal pain, acute, right upper quadrant R10.11    Chronic back pain M54.9, G89.29    Chronic pain G89.29    Migrainous headache without aura G43.009    COPD with acute exacerbation ([facility]) J44.1    DM (diabetes mellitus), type 2, uncontrolled ([facility] E11.65    Hyperglycemia R73.9    Acute chest pain R07.9    Anxiety F41.9    Asthma J45.909    Cardiac septal defect Q21.9    Hemiparesis following cerebrovascular accident (CVA) ([facility]) I69.359    Displacement of lumbar intervertebral disc without myelopathy M51.26    Calculus of kidney N20.0    Abnormal liver enzymes R74.8    Herniated lumbar intervertebral disc M51.26    Memory impairment R41.3    Infection of wound due to methicillin resistant Staphylococcus aureus (MRSA) A49.02    Shortness of breath R06.02    Sleep apnea G47.30    Cerebral artery occlusion with cerebral infarction ([facility]) I63.50    Increased frequency of urination R35.0    Urinary urgency R39.15    Hypertensive emergency I16.1    Seizures ([facility]) R56.9    Syncope R55    Wound infection following procedure T81.49XA    Wound infection T14. 8XXA, L08.9    Infected sebaceous cyst L72.3, L08.9    COPD (chronic obstructive pulmonary disease) ([facility]) J44.9    Anxiety F41.9    Migraine G43.909    DKA, type 2 ([facility]) E11.10    Hypokalemia E87.6    Thrombocytopenia ([facility]) D69.6    Type 2 diabetes mellitus ([facility]) E11.9    Essential hypertension I10    Hyperlipidemia E78.5    Depression F32. A    Diabetes ([facility] E11.9    Ureterolithiasis N20.1    Ureteral stone N20.1    Malignant neoplasm of lower third of esophagus ([facility]) C15.5    Gastroesophageal reflux disease with esophagitis without hemorrhage K21.00  Tobacco dependence F17.200       Past Medical History:        Diagnosis Date    Arthritis     Asthma     Atrial fibrillation (HCC)     Cancer (HCC)     CHF (congestive heart failure) (HCC)     COPD (chronic obstructive pulmonary disease) (HCC)     Depression     Diabetes mellitus (HCC)     Type 2    Diastolic heart failure (HCC)     Esophageal cancer (HCC)     GERD (gastroesophageal reflux disease)     Hyperlipidemia     Hypertension     Kidney stone     Migraine     Other disorders of kidney and ureter     Pneumonia     Psychiatric problem     Sleep apnea     Unspecified cerebral artery occlusion with cerebral infarction     \"10% loss of use of right arm and leg\"       Past Surgical History:        Procedure Laterality Date    ABDOMEN SURGERY      ASD REPAIR  2005    ASD REPAIR      BACK SURGERY  01/2012    [facility] CARDIAC SURGERY  08/2013    REVEAL XT MONITOR #[id number]    CARPAL TUNNEL RELEASE      jeri    CHOLECYSTECTOMY      COLONOSCOPY      CYSTOSCOPY INSERTION / REMOVAL STENT / STONE Right 10/03/2020    CYSTOSCOPY RETROGRADE PYELOGRAM STENT INSERTION performed by Noemi Steele MD at [address] [place] [address],[address]      bilateral    OTHER SURGICAL HISTORY  02/16/2017    I & D mid upper back    PERICARDIUM SURGERY  2005    post ASD repair with window    UPPER GASTROINTESTINAL ENDOSCOPY  10/09/2021    with biopsy       Social History:    Social History     Tobacco Use    Smoking status: Current Every Day Smoker     Packs/day: 0.25     Years: 0.00     Pack years: 0.00     Types: Cigarettes    Smokeless tobacco: Never Used   Substance Use Topics    Alcohol use: Not Currently     Comment: once in while                                Ready to quit: Yes  Counseling given: Not Answered      Vital Signs (Current):   Vitals:    01/20/22 1233   Weight: 251 lb (113.9 kg)   Height: 5' 10\" (1.778 m)                                              BP Readings from Last 3 Encounters:   01/19/22 (!) 146/78   01/14/22 (!) 146/86   01/05/22 126/80       NPO Status:                                                                                 BMI:   Wt Readings from Last 3 Encounters:   01/19/22 251 lb (113.9 kg)   01/14/22 251 lb 9.6 oz (114.1 kg)   01/05/22 254 lb (115.2 kg)     Body mass index is 36.01 kg/m². CBC:   Lab Results   Component Value Date    WBC 4.5 01/24/2022    RBC 3.83 01/24/2022    HGB 12.6 01/24/2022    HCT 36.5 01/24/2022    MCV 95.3 01/24/2022    RDW 16.2 01/24/2022     01/24/2022       CMP:   Lab Results   Component Value Date     01/24/2022    K 3.4 01/24/2022     01/24/2022    CO2 23 01/24/2022    BUN 10 01/24/2022    CREATININE 0.8 01/24/2022    GFRAA >60 01/24/2022    LABGLOM >60 01/24/2022    GLUCOSE 209 01/24/2022    PROT 6.3 01/14/2022    PROT 7.1 03/08/2013    CALCIUM 9.1 01/24/2022    BILITOT 0.8 01/14/2022    ALKPHOS 93 01/14/2022    AST 15 01/14/2022    ALT 11 01/14/2022       POC Tests: No results for input(s): POCGLU, POCNA, POCK, POCCL, POCBUN, POCHEMO, POCHCT in the last 72 hours.     Coags:   Lab Results   Component Value Date    PROTIME 12.0 01/24/2022    PROTIME 10.6 06/02/2012    INR 0.93 01/24/2022    APTT 27.7 01/24/2022       HCG (If Applicable): No results found for: PREGTESTUR, PREGSERUM, HCG, HCGQUANT     ABGs:   Lab Results   Component Value Date    PO2ART 63 04/17/2014    AUI5AOL 35.0 04/17/2014    ELY4EKA 20.2 04/17/2014    BEART 4 04/17/2014        Type & Screen (If Applicable):  No results found for: LABABO, LABRH    Drug/Infectious Status (If Applicable):  No results found for: HIV, HEPCAB    COVID-19 Screening (If Applicable):   Lab Results   Component Value Date    COVID19 DETECTED 01/24/2022           Anesthesia Evaluation  Patient summary reviewed no history of anesthetic complications:   Airway: Mallampati: II  TM distance: >3 FB   Neck ROM: full  Mouth opening: > = 3 FB Dental:    (+) upper dentures and partials      Pulmonary:   (+) COPD:  sleep apnea:  asthma: current smoker                          ROS comment: COVID + on 1/24/2022   Cardiovascular:  Exercise tolerance: poor (<4 METS),   (+) hypertension:, CABG/stent: no interval change, dysrhythmias: atrial fibrillation, CHF:,       ECG reviewed               Beta Blocker:  Dose within 24 Hrs      ROS comment: 2018 [facility]  Procedure Summary    Radial access, LVEDP was 10 mmHG    1. PCI to RAmus for 80 % lesion using a 2.5 X 23 LYNNETTE inflated to    16 howie    2. LAD is widely patent    3. left dominant system, large CIRC / OM widely patent    4. RCA is non dominant vessel    2018 echo   Summary   Left ventricular systolic function is normal.   Ejection fraction is visually estimated at 55%. Grade II diastolic dysfunction (pseudo-normal filling pattern). Mild to moderate left ventricular hypertrophy. Mildly dilated left atrium. No significant valvular abnormalities. No evidence of any pericardial effusion. History of ASD repair in 2005-no sign of re-occurrence. Neuro/Psych:   (+) CVA:, TIA, headaches:,             GI/Hepatic/Renal:   (+) GERD:, renal disease: kidney stones,          ROS comment: abd CT  Significant amount of thickening of distal esophagus with adjacent 22 mm  lymph node, increased since prior exam. The combination of the findings are  worrisome and require further evaluation of distal esophagus via endoscopy to  exclude underlying neoplasm.     Diverticulosis with no signs of diverticulitis.     Bilateral multiple 2-6 mm nonobstructing stones of the kidneys.     Multiple surgical clips within the gallbladder fossa with normal appearance  of gallbladder.     Axial hiatal hernia.     8 mm calcified granuloma of the right lower lobe and 4 mm pulmonary nodule  within the left lower lobe.  No further follow-up is needed.     .   Endo/Other:    (+) DiabetesType II DM, using insulin, malignancy/cancer

## 2022-01-28 NOTE — PROGRESS NOTES
I  confirmed with patient   that surgery is on 1/31/22 at [facility] at 0730 with a 0600 arrival time.

## 2022-01-31 PROBLEM — C15.9 ESOPHAGEAL CANCER (HCC): Status: ACTIVE | Noted: 2022-01-01

## 2022-01-31 NOTE — ANESTHESIA PROCEDURE NOTES
Arterial Line:    An arterial line was placed using surface landmarks, in the OR for the following indication(s): continuous blood pressure monitoring and blood sampling needed. A 20 gauge (size), 1 and 3/4 inch (length), Arrow (type) catheter was placed, Seldinger technique used, into the right radial artery, secured by tape and Tegaderm. Anesthesia type: General    Events:  patient tolerated procedure well with no complications and EBL < 5mL.   1/31/2022 7:34 AM1/31/2022 7:36 AM  Anesthesiologist: Levy Mancia DO  Performed: Anesthesiologist   Preanesthetic Checklist  Completed: patient identified, IV checked, site marked, risks and benefits discussed, surgical consent, monitors and equipment checked, pre-op evaluation, timeout performed, anesthesia consent given, oxygen available and patient being monitored

## 2022-01-31 NOTE — PROGRESS NOTES
Pt arrive to room 2002 from [address] [place] [address]. Pt arrive with 2 chest tubes to right side. Radial ART line. Bilateral forearm PIV's. NG to left nare at 59. Pt on 8L oxygen. Skin assessment completed. Pt skin is clean, dry, and intact. Pt belongings include CPAP, cane, jacket, slippers, pants, shirt, socks, wallet, keys, tablet. Pt notified that belongings were placed in closet in room 2002.

## 2022-02-01 NOTE — PROGRESS NOTES
PATIENT NAME: Veronika Quintanilla    TODAY'S DATE: 02/01/22    SUBJECTIVE:    Pt is POD # 1 s/p esophagectomy. Pt c/o incisional pain along the right chest. He tried to get out of bed this morning with great difficulty due to pain. He denies nausea or vomiting. NGT in place. Minimal SOB. OBJECTIVE:   VITALS:    Vitals:    02/01/22 1202   BP:    Pulse: 104   Resp: 19   Temp: 98 °F (36.7 °C)   SpO2: 93%     INTAKE/OUTPUT:    Date 02/01/22 0000 - 02/01/22 2359   Shift 5031-3992 3655-7409 5975-4201 24 Hour Total   INTAKE   I.V.(mL/kg/hr) 0(0)   0   IV Piggyback 0   0   Shift Total(mL/kg) 0(0)   0(0)   OUTPUT   Urine(mL/kg/hr) 640(0.7)   640   Emesis/NG output 10   10   Chest Tube 380   380   Shift Total(mL/kg) 1030(8.9)   1030(8.9)   Weight (kg) 116 116 116 116      Patient Vitals for the past 96 hrs (Last 3 readings):   Weight   02/01/22 0630 255 lb 11.7 oz (116 kg)   01/31/22 0619 253 lb (114.8 kg)       EXAM:  Blood pressure 136/79, pulse 104, temperature 98 °F (36.7 °C), resp. rate 19, height 5' 10\" (1.778 m), weight 255 lb 11.7 oz (116 kg), SpO2 93 %. General appearance: No apparent distress, appears stated age and cooperative. Skin: unremarkable  HEENT Normocephalic, atraumatic without obvious deformity. Neck: Supple, Trachea midline   Lungs: poor respiratory effort. Clear to auscultation, bilaterally  Heart: tachycardic inc c/d/i  Abdomen: Soft, appropriately tender or non-distended   Extremities: min edema warm well perfused  Neurologic: Alert, grossly intact  Mental status: normal affect      Data:  CBC:   Recent Labs     01/31/22  1356 02/01/22  0715   WBC  --  15.7*   HGB 11.3* 12.9*   HCT 33.0* 37.4*   PLT  --  149     BMP:    Recent Labs     01/31/22  1356 02/01/22  0715    143   K 3.6 3.5   CL  --  109   CO2 24 21   BUN  --  18   CREATININE 1.0 0.8*   GLUCOSE  --  203*     Hepatic: No results for input(s): AST, ALT, ALB, BILITOT, ALKPHOS in the last 72 hours.   Mag:    No results for input(s): PSYCHOTHERAPY 1:1 NOTE:    Met with pt for 1:1 today.  Patient presented as: anxious  Patient's primary areas of concern include: Pt acknowledged he is negative all the time and over thinks; pt expressed doubt he will be able to change. Pt expressed doubt that medication will help him  Behavioral observations:frequently repeated himself  Therapeutic interventions: understanding triggers/warning signs, connection to family/community supports, encourage medication and treatment compliance, educate on positive coping skills/relapse prevention techniques, safety planning and provided support  Patient's response: Patient participated in development of safety plan  Patient needs to work on the following to be ready for discharge: Absence of suicidal ideation  Patient would be at high risk for decompensation if discharged at this time.    Therapist assisted pt in creating a Safety Plan. Pt able to identify adaptive coping skills and distracting activities to help keep self safe post discharge. Plan also includes supportive resources. Safety plan on AVS-see AVS for details.      FAMILY:  Contacted pts brother, Wilner Moon ([phone]) today. Discussed pts hospitalization and obtained collateral information. Wilner states pt lives with his brother, Tuan and they own a duplex. Wilner said Tuan may have a few limitations but not as severe as pts. States pts first episode, that was similiar to current episode, occurred in March of this year. Wilner said pt got to the point where he would stomp around and hit his head with his fists, which he was doing prior to this admission. Wilner said father's death last Fall has been very difficult for pt, as well as coping with the covid pandemic. Wilner said pt got to the point where he was fearful of taking the bus to work, so walked to and from work downtown from his home in [place]. Wilner talks frequently with pt, and as pt becomes more symptomatic, Wilner will stay with pt or have pt stay with him.  MG in the last 72 hours. Phos:   No results for input(s): PHOS in the last 72 hours. INR: No results for input(s): INR in the last 72 hours. Radiology Review:  CXR  Impression:     Status post esophagectomy with gastric pull-through.  Pneumomediastinum,   pneumoperitoneum and trace right pneumothorax consistent with recent surgery. Right chest tube present. Atelectasis noted in both lungs.             ASSESSMENT AND PLAN:    Patient Active Problem List   Diagnosis    Migraine    Diabetes mellitus ([address]    Diastolic heart failure (HCC)    TIA (transient ischemic attack)    Persistent headaches    Cerebral infarction ([address]    Abdominal pain, acute, right upper quadrant    Chronic back pain    Chronic pain    Migrainous headache without aura    COPD with acute exacerbation ([address]    DM (diabetes mellitus), type 2, uncontrolled ([address]    Hyperglycemia    Acute chest pain    Anxiety    Asthma    Cardiac septal defect    Hemiparesis following cerebrovascular accident (CVA) ([address]    Displacement of lumbar intervertebral disc without myelopathy    Calculus of kidney    Abnormal liver enzymes    Herniated lumbar intervertebral disc    Memory impairment    Infection of wound due to methicillin resistant Staphylococcus aureus (MRSA)    Shortness of breath    Sleep apnea    Cerebral artery occlusion with cerebral infarction ([address]    Increased frequency of urination    Urinary urgency    Hypertensive emergency    Seizures ([address]    Syncope    Wound infection following procedure    Wound infection    Infected sebaceous cyst    COPD (chronic obstructive pulmonary disease) ([address]    Anxiety    Migraine    DKA, type 2 (HCC)    Hypokalemia    Thrombocytopenia (HCC)    Type 2 diabetes mellitus ([address]    Essential hypertension    Hyperlipidemia    Depression    Diabetes ([address]    Ureterolithiasis    Ureteral stone    Malignant neoplasm of lower third of esophagus ([address]    Wilner said pt had not taken meds until last year, and does not like to take medicine. Wilner said pts upcoming sleep study may be effort by pt to have a finding of something that is causing his sleep disturbance so he won't have to take medicine to help him sleep. Brother said pts sleep is better when pt takes his medicine. Wilner pt has weekly home visits by a therapist or -writer clarified that,per record, pt has a  with the [facility] program at [facility]. Brother appears concerned and supportive. Tricia KHANW, SAC       Gastroesophageal reflux disease with esophagitis without hemorrhage    Tobacco dependence    Esophageal cancer ([facility]    Encounter for preadmission testing       S/P esophagectomy    Pt appears dry. Will give fluid bolus and increase MIVF. DC arterial line. Encourage IS. Increase activity. Continue NGT and NPO. Add lidocaine patch for pain control.      Bruno Baptiste PA-C

## 2022-02-01 NOTE — CARE COORDINATION
Chart reviewed. Patient lives alone in [facility]. He uses [facility] for transportation needs. Has a PCP and insurance that assists with medications at this time. CM will follow for discharge needs.  Benigno Ramos RN

## 2022-02-01 NOTE — PLAN OF CARE

## 2022-02-01 NOTE — ADDENDUM NOTE
Addendum  created 02/01/22 0856 by TAMEKA Pires - JULIO CESAR    Intraprocedure Event edited, Intraprocedure Staff edited

## 2022-02-01 NOTE — BRIEF OP NOTE
Brief Postoperative Note      Patient: Leanne Tolliver  YOB: 1970  MRN: [id number]    Date of Procedure: 1/31/2022    Pre-Op Diagnosis: Malignant neoplasm of abdominal esophagus ([address] [C15.5]    Post-Op Diagnosis: Same       Procedure(s):  ESOPHAGECTOMY    Surgeon(s):  MD Ihsan Moe MD Marea Blocker, MD    Assistant:  Physician Assistant: Ania Rios PA-C    Anesthesia: General    Estimated Blood Loss (mL): 768     Complications: None    Specimens:   ID Type Source Tests Collected by Time Destination   A : esophagus - check suture esophageal margins Specimen Esophagus SURGICAL PATHOLOGY Johanna Prather MD 1/31/2022 1252    B : esophagus - suture marks proximal line Specimen Esophagus SURGICAL PATHOLOGY Johanna Prather MD 1/31/2022 1335    C : esophageal anastomosis donut  Specimen Esophagus SURGICAL PATHOLOGY Johanna Prather MD 1/31/2022 1438    D : ESOPHAGUS  Tissue Esophagus SURGICAL PATHOLOGY oJhanna Prather MD 1/31/2022 1445    E : PARAESOPHAGEAL LYMPH NODE #1  Tissue Lymph Node SURGICAL PATHOLOGY Johanna Prather MD 1/31/2022 1505    F : PARAESOPHAGEAL LYMPH NODE #2 Tissue Lymph Node SURGICAL PATHOLOGY Johanna Prather MD 1/31/2022 1514        Implants:  * No implants in log *      Drains:   Chest Tube 1 Right 24 [place]ese (Active)   Drainage Description Bright red 01/31/22 1642   Dressing Status Clean;Dry; Intact 01/31/22 1642   Dressing Type Dry dressing 01/31/22 1642   Site Assessment Clean;Dry; Intact 01/31/22 1642   Surrounding Skin Dry; Intact 01/31/22 1642   Output (ml) 320 ml 01/31/22 1904       Chest Tube 2 Right 24 [place] (Active)   Drainage Description Bright red 01/31/22 1642   Dressing Status Clean;Dry; Intact 01/31/22 1642   Dressing Type Dry dressing 01/31/22 1642   Site Assessment Clean;Dry; Intact 01/31/22 1642   Surrounding Skin Dry; Intact 01/31/22 1642       NG/OG/NJ/NE Tube Nasogastric 16 fr Left nostril (Active)   Output (mL) 0 ml 01/31/22 1902       Gastrostomy/Enterostomy/Jejunostomy Gastrostomy LUQ 20 fr (Active)   Output (mL) 0 ml 01/31/22 1902       Urethral Catheter Non-latex 16 fr (Active)   Catheter Indications Need for fluid volume management of the critically ill patient in a critical care setting 01/31/22 1642   Securement Device Date Changed 01/31/22 01/31/22 1642   Site Assessment Pink; No urethral drainage 01/31/22 1642   Urine Color Yellow 01/31/22 1642   Urine Appearance Clear 01/31/22 1642   Output (mL) 750 mL 01/31/22 1904       Findings:     Electronically signed by Perfecto Hoover PA-C on 1/31/2022 at 8:49 PM

## 2022-02-01 NOTE — OP NOTE
Operative Note      Patient: Andrés Jones  YOB: 1970  MRN: [id number]    Date of Procedure: 1/31/2022    Pre-Op Diagnosis: Malignant neoplasm of abdominal esophagus ([address] [C15.5]    Post-Op Diagnosis: Same       Procedure(s):  ESOPHAGECTOMY    Surgeon(s):  MD Fernando Berger MD Marlena Mis, MD    Assistant:   Physician Assistant: Gutierrez Recinos PA-C    Anesthesia: General    Estimated Blood Loss (mL): Minimal    Complications: None    Specimens:   ID Type Source Tests Collected by Time Destination   A : esophagus - check suture esophageal margins Specimen Esophagus SURGICAL PATHOLOGY Kellie Burnette MD 1/31/2022 1252    B : esophagus - suture marks proximal line Specimen Esophagus SURGICAL PATHOLOGY Kellie Burnette MD 1/31/2022 1335    C : esophageal anastomosis donut  Specimen Esophagus SURGICAL PATHOLOGY Kellie Burnette MD 1/31/2022 1438    D : ESOPHAGUS  Tissue Esophagus SURGICAL PATHOLOGY Kellie Burnette MD 1/31/2022 1445    E : PARAESOPHAGEAL LYMPH NODE #1  Tissue Lymph Node SURGICAL PATHOLOGY Kellie Burnette MD 1/31/2022 1505    F : PARAESOPHAGEAL LYMPH NODE #2 Tissue Lymph Node SURGICAL PATHOLOGY Kellie Burnette MD 1/31/2022 1514        Implants:  * No implants in log *      Drains:   Chest Tube 1 Right 24 [place] (Active)   Drainage Description Bright red 01/31/22 1642   Dressing Status Clean;Dry; Intact 01/31/22 1642   Dressing Type Dry dressing 01/31/22 1642   Site Assessment Clean;Dry; Intact 01/31/22 1642   Surrounding Skin Dry; Intact 01/31/22 1642   Output (ml) 320 ml 01/31/22 1904       Chest Tube 2 Right 24 [place] (Active)   Drainage Description Bright red 01/31/22 1642   Dressing Status Clean;Dry; Intact 01/31/22 1642   Dressing Type Dry dressing 01/31/22 1642   Site Assessment Clean;Dry; Intact 01/31/22 1642   Surrounding Skin Dry; Intact 01/31/22 1642       NG/OG/NJ/NE Tube Nasogastric 16 fr Left nostril (Active)   Output (mL) 0 ml 01/31/22 1902 Gastrostomy/Enterostomy/Jejunostomy Gastrostomy LUQ 20 fr (Active)   Output (mL) 0 ml 01/31/22 1902       Urethral Catheter Non-latex 16 fr (Active)   Catheter Indications Need for fluid volume management of the critically ill patient in a critical care setting 01/31/22 1642   Securement Device Date Changed 01/31/22 01/31/22 1642   Site Assessment Pink; No urethral drainage 01/31/22 1642   Urine Color Yellow 01/31/22 1642   Urine Appearance Clear 01/31/22 1642   Output (mL) 750 mL 01/31/22 1904       Findings: same    Detailed Description of Procedure: The patient had been repositioned on the left lateral decubitus having had the abdominal portion of the procedure done which entailed mobilization of the stomach and lower esophagus into the left chest.  Please refer to Dr Zacarias Sawyer operative note for details . The left chest was already open exposing the esophagus with full mobilization of the lower to the mid esophagus. There was a palpable mass in the lower esophagus and at about 3 cm proximal to this division was made in the esophagus. We waited for pathology response with the proximal cut edge frozen section. This was positive for malignancy. Dr. Shad Decker came in and help with the mobilization of the proximal esophagus with the division of the azygos vein. More higher up the frozen section was negative for malignancy. Stay sutures were placed full-thickness and pressuring suture was placed at the distal cartilage using 2-0 Prolene. 25 mm of was then placed into the opening pursestring was tied. 25 mm was chosen due to the size of the esophagus. Then an opening was made on the mobilized stomach in preparation for anastomosis. Through the gastrotomy the EEA stapler was inserted and the pin was deployed after the orientation of the stomach was checked. Pain in the ankle. and stapled. There were 2 full rings which were sent to pathology.   The anastomosis was checked for airtightness by insufflating through the NG tube which had only been advanced past the anastomosis. There is no air leak. Dr. Alfredo Bender proceeded to mobilize point the pleura to reinforce the anastomosis. The remainder of the procedure please refer to Dr. Amador Quiet operative note.   Electronically signed by Elliot Telles MD on 1/31/2022 at 8:04 PM

## 2022-02-02 NOTE — PROGRESS NOTES
Hanna Cerda at bedside. Stated that she wished for the A-line to be taken out. Also stated that she wishes for the pt to get up and walk. Pt also requested a throat spray as he states that the NG is rubbing. Hanna to place orders.

## 2022-02-02 NOTE — PROGRESS NOTES
PATIENT NAME: Charles Grant    TODAY'S DATE: 02/02/22    SUBJECTIVE:    Pt is POD # 2 s/p esophagectomy. Pt states incisional pain is better. He is up in the chair. He is nervous about walking, stating it took everything he had to get up to the chair. Denies nausea or vomiting. No flatus. OBJECTIVE:   VITALS:    Vitals:    02/02/22 1405   BP: (!) 168/101   Pulse: 109   Resp: 17   Temp:    SpO2:      INTAKE/OUTPUT:    Date 02/02/22 0000 - 02/02/22 2359   Shift 4059-4141 5083-7734 0696-2141 24 Hour Total   INTAKE   I.V.(mL/kg/hr)  2195.1  2195.1   IV Piggyback  98.6  98.6   Shift Total(mL/kg)  2293. 6(19.8)  2293. 6(19.8)   OUTPUT   Urine(mL/kg/hr) 400(0.4)   400   Chest Tube 150 160  310   Shift Total(mL/kg) 550(4.7) 160(1.4)  710(6.1)   Weight (kg) 116 116 116 116      Patient Vitals for the past 96 hrs (Last 3 readings):   Weight   02/01/22 0630 255 lb 11.7 oz (116 kg)   01/31/22 0619 253 lb (114.8 kg)       EXAM:  Blood pressure (!) 168/101, pulse 109, temperature 97.8 °F (36.6 °C), temperature source Oral, resp. rate 17, height 5' 10\" (1.778 m), weight 255 lb 11.7 oz (116 kg), SpO2 93 %. General appearance: No apparent distress, appears stated age and cooperative. Skin: unremarkable  HEENT Normocephalic, atraumatic without obvious deformity. Neck: Supple, Trachea midline   Lungs: poor respiratory effort.  Clear to auscultation, bilaterally  Heart: tachycardic inc c/d/i  Abdomen: Soft, appropriately tender or non-distended   Extremities: min edema warm well perfused  Neurologic: Alert, grossly intact  Mental status: normal affect      Data:  CBC:   Recent Labs     01/31/22  1356 02/01/22  0715 02/02/22  0930   WBC  --  15.7* 17.4*   HGB 11.3* 12.9* 11.4*   HCT 33.0* 37.4* 33.0*   PLT  --  149 133*     BMP:    Recent Labs     01/31/22  1356 02/01/22  0715 02/02/22  0930    143 138   K 3.6 3.5 3.4*   CL  --  109 106   CO2 24 21 20*   BUN  --  18 21   CREATININE 1.0 0.8* 0.8*   GLUCOSE  --  203* 177* Hepatic: No results for input(s): AST, ALT, ALB, BILITOT, ALKPHOS in the last 72 hours. Mag:      Recent Labs     02/02/22  0930   MG 1.6*      Phos:   No results for input(s): PHOS in the last 72 hours. INR: No results for input(s): INR in the last 72 hours. Radiology Review:  CXR  Impression:     Stable chest x-ray with right perihilar opacities.           ASSESSMENT AND PLAN:    Patient Active Problem List   Diagnosis    Migraine    Diabetes mellitus ([address]    Diastolic heart failure (HCC)    TIA (transient ischemic attack)    Persistent headaches    Cerebral infarction ([address]    Abdominal pain, acute, right upper quadrant    Chronic back pain    Chronic pain    Migrainous headache without aura    COPD with acute exacerbation ([address]    DM (diabetes mellitus), type 2, uncontrolled ([address]    Hyperglycemia    Acute chest pain    Anxiety    Asthma    Cardiac septal defect    Hemiparesis following cerebrovascular accident (CVA) ([address]    Displacement of lumbar intervertebral disc without myelopathy    Calculus of kidney    Abnormal liver enzymes    Herniated lumbar intervertebral disc    Memory impairment    Infection of wound due to methicillin resistant Staphylococcus aureus (MRSA)    Shortness of breath    Sleep apnea    Cerebral artery occlusion with cerebral infarction ([address]    Increased frequency of urination    Urinary urgency    Hypertensive emergency    Seizures ([address]    Syncope    Wound infection following procedure    Wound infection    Infected sebaceous cyst    COPD (chronic obstructive pulmonary disease) ([address]    Anxiety    Migraine    DKA, type 2 (HCC)    Hypokalemia    Thrombocytopenia (HCC)    Type 2 diabetes mellitus ([address]    Essential hypertension    Hyperlipidemia    Depression    Diabetes ([address]    Ureterolithiasis    Ureteral stone    Malignant neoplasm of lower third of esophagus (HCC)    Gastroesophageal reflux disease with esophagitis without hemorrhage    Tobacco dependence    Esophageal [facility] ([facility]    Encounter for preadmission testing       S/P esophagectomy    Continue NGT and NPO. Will start trickle TF soon. Still no bowel function at this point. Replace electrolytes. Encourage IS. Increase activity. PT/OT.      Bessy Guerrero PA-C

## 2022-02-02 NOTE — RT PROTOCOL NOTE
RT Inhaler-Nebulizer Bronchodilator Protocol Note    There is a bronchodilator order in the chart from a provider indicating to follow the RT Bronchodilator Protocol and there is an Initiate RT Inhaler-Nebulizer Bronchodilator Protocol order as well (see protocol at bottom of note). CXR Findings:  XR CHEST PORTABLE    Result Date: 2/2/2022  Stable chest x-ray with right perihilar opacities. XR CHEST PORTABLE    Result Date: 2/1/2022  Status post esophagectomy with gastric pull-through. Pneumomediastinum, pneumoperitoneum and trace right pneumothorax consistent with recent surgery. Right chest tube present. Atelectasis noted in both lungs. XR CHEST PORTABLE    Result Date: 2/1/2022  Cardiomegaly with scattered pulmonary infiltrates. The findings from the last RT Protocol Assessment were as follows:   History Pulmonary Disease: Chronic pulmonary disease  Respiratory Pattern: Regular pattern and RR 12-20 bpm  Breath Sounds: Clear breath sounds  Cough: Strong, spontaneous, non-productive  Indication for Bronchodilator Therapy:    Bronchodilator Assessment Score: 2    Aerosolized bronchodilator medication orders have been revised according to the RT Inhaler-Nebulizer Bronchodilator Protocol below. Respiratory Therapist to perform RT Therapy Protocol Assessment initially then follow the protocol. Repeat RT Therapy Protocol Assessment PRN for score 0-3 or on second treatment, BID, and PRN for scores above 3. No Indications  adjust the frequency to every 6 hours PRN wheezing or bronchospasm, if no treatments needed after 48 hours then discontinue using Per Protocol order mode. If indication present, adjust the RT bronchodilator orders based on the Bronchodilator Assessment Score as indicated below.   Use Inhaler orders unless patient has one or more of the following: on home nebulizer, not able to hold breath for 10 seconds, is not alert and oriented, cannot activate and use MDI correctly, or respiratory rate 25 breaths per minute or more, then use the equivalent nebulizer order(s) with same Frequency and PRN reasons based on the score. If a patient is on this medication at home then do not decrease Frequency below that used at home. 0-3  enter or revise RT bronchodilator order(s) to equivalent RT Bronchodilator order with Frequency of every 4 hours PRN for wheezing or increased work of breathing using Per Protocol order mode. 4-6  enter or revise RT Bronchodilator order(s) to two equivalent RT bronchodilator orders with one order with BID Frequency and one order with Frequency of every 4 hours PRN wheezing or increased work of breathing using Per Protocol order mode. 7-10  enter or revise RT Bronchodilator order(s) to two equivalent RT bronchodilator orders with one order with TID Frequency and one order with Frequency of every 4 hours PRN wheezing or increased work of breathing using Per Protocol order mode. 11-13  enter or revise RT Bronchodilator order(s) to one equivalent RT bronchodilator order with QID Frequency and an Albuterol order with Frequency of every 4 hours PRN wheezing or increased work of breathing using Per Protocol order mode. Greater than 13  enter or revise RT Bronchodilator order(s) to one equivalent RT bronchodilator order with every 4 hours Frequency and an Albuterol order with Frequency of every 2 hours PRN wheezing or increased work of breathing using Per Protocol order mode. RT to enter RT Home Evaluation for COPD & MDI Assessment order using Per Protocol order mode.     Electronically signed by Floresita Georges RCP on 2/2/2022 at 10:46 AM

## 2022-02-02 NOTE — PROGRESS NOTES
Pt already receiving prn replacement k+, okay to hold ordered K+ and administer 10 more meq of the PRN order per Johnathan GROSS.

## 2022-02-03 NOTE — PROGRESS NOTES
PATIENT NAME: Andrés Jones    TODAY'S DATE: 02/03/22    Reason for visit: Status post esophagectomy    SUBJECTIVE:    Pt is doing well overall. He has no complaints. He is not passing gas. He did ambulate yesterday. OBJECTIVE:   VITALS:    Vitals:    02/03/22 1103   BP:    Pulse:    Resp:    Temp:    SpO2: 91%     INTAKE/OUTPUT:    Date 02/03/22 0000 - 02/03/22 2359   Shift 4799-1462 8153-6377 0015-7070 24 Hour Total   INTAKE   Shift Total(mL/kg)       OUTPUT   Urine(mL/kg/hr) 350(0.4) 350  700   Emesis/NG output 200 250  450   Chest Tube 160 100  260   Shift Total(mL/kg) 710(6.1) 700(6)  1410(12.2)   Weight (kg) 116 116 116 116        EXAM:  Blood pressure (!) 169/102, pulse 104, temperature 98.2 °F (36.8 °C), temperature source Oral, resp. rate 18, height 5' 10\" (1.778 m), weight 255 lb 11.7 oz (116 kg), SpO2 91 %. General appearance: No apparent distress, appears stated age and cooperative. Skin: unremarkable  HEENT Normocephalic, atraumatic without obvious deformity. Neck: Supple, Trachea midline   Lungs: Good respiratory effort. Clear to auscultation, bilaterally  Heart: Regular rate/ rhythm chest incision dressed, abdominal incision dressed   Abdomen: Soft, non-tender or non-distended, obese  Extremities: Minimal lower extremity edema warm well perfused  Neurologic: Alert, grossly intact  Mental status: normal affect  NG tube is slight bilious    Chest tube with good fluctuation serosanguineous output    Data:  CBC:   Recent Labs     01/31/22  1356 02/01/22  0715 02/02/22  0930   WBC  --  15.7* 17.4*   HGB 11.3* 12.9* 11.4*   HCT 33.0* 37.4* 33.0*   PLT  --  149 133*     BMP:    Recent Labs     01/31/22  1356 02/01/22  0715 02/02/22  0930    143 138   K 3.6 3.5 3.4*   CL  --  109 106   CO2 24 21 20*   BUN  --  18 21   CREATININE 1.0 0.8* 0.8*   GLUCOSE  --  203* 177*     Hepatic: No results for input(s): AST, ALT, ALB, BILITOT, ALKPHOS in the last 72 hours.   Mag:      Recent Labs 02/02/22  0930   MG 1.6*      Phos:   No results for input(s): PHOS in the last 72 hours. INR: No results for input(s): INR in the last 72 hours. Radiology Review:  CXR with no concerning findings      ASSESSMENT AND PLAN:  S/P [place] esophagectomy    3 Days Post-Op  Patient Active Problem List   Diagnosis    Migraine    Diabetes mellitus ([address]    Diastolic heart failure (HCC)    TIA (transient ischemic attack)    Persistent headaches    Cerebral infarction ([address]    Abdominal pain, acute, right upper quadrant    Chronic back pain    Chronic pain    Migrainous headache without aura    COPD with acute exacerbation ([address]    DM (diabetes mellitus), type 2, uncontrolled ([address]    Hyperglycemia    Acute chest pain    Anxiety    Asthma    Cardiac septal defect    Hemiparesis following cerebrovascular accident (CVA) ([address]    Displacement of lumbar intervertebral disc without myelopathy    Calculus of kidney    Abnormal liver enzymes    Herniated lumbar intervertebral disc    Memory impairment    Infection of wound due to methicillin resistant Staphylococcus aureus (MRSA)    Shortness of breath    Sleep apnea    Cerebral artery occlusion with cerebral infarction ([address]    Increased frequency of urination    Urinary urgency    Hypertensive emergency    Seizures ([address]    Syncope    Wound infection following procedure    Wound infection    Infected sebaceous cyst    COPD (chronic obstructive pulmonary disease) ([address]    Anxiety    Migraine    DKA, type 2 (HCC)    Hypokalemia    Thrombocytopenia (HCC)    Type 2 diabetes mellitus ([address]    Essential hypertension    Hyperlipidemia    Depression    Diabetes ([address]    Ureterolithiasis    Ureteral stone    Malignant neoplasm of lower third of esophagus (HCC)    Gastroesophageal reflux disease with esophagitis without hemorrhage    Tobacco dependence    Esophageal cancer ([address]    Encounter for preadmission testing       1.  Cardio: I will start Lopressor secondary to hypertension and tachycardia   Current drips: Decrease maintenance IV at 100 an hour  2. Pulm: Wean oxygen as tolerated, continue chest tube for now  3. GI: Keep NG tube. Trophic tube feeds via J-tube. I will give a suppository today. 4. ID: Perioperative antibiotics  5. Heme: Recheck labs tomorrow  6. Neuro: Pain controlled  7. Renal: BUN/creatinine okay  8.  Activity: PT/OT -ambulate    COVID-19 positive however not symptomatic    Electronically signed by Brijesh Matthews MD on 2/3/2022 at 11:41 AM

## 2022-02-03 NOTE — PROGRESS NOTES
Comprehensive Nutrition Assessment    Type and Reason for Visit:  Initial (EN)    Nutrition Recommendations/Plan:   · Modify EN order to immune enhancing formula (Pivot 1.5 Michael)  · Continue/start  EN trophically and progress slowly to a goal rate of 70 ml/hr to provide 2520 kcal and 158 gm protein. · This will provide 100% est protein needs to support lean body mass and healing, but Pt will likely continue to lose wt at this kcal level  · Replace depleted electrolytes and monitor Phos    Nutrition Assessment:  Pt with esophageal cancer is POD 3 s/p esophagectomy and placement of PEG (or PEG/J?), Covid-19 positive preop. H/O DM, CHF, COPD, GERD. S/P neoadjuvant chemoradiation end of December. PTA denied dysphagia, but has gotten pickier about what to eat, he reports wt loss of about 20 lbs. Trophic EN with standard with fiber formula ordered, not yet running, will continue to follow as high nutrition risk. Malnutrition Assessment:  Malnutrition Status:  Insufficient data    Context:  Chronic Illness       Estimated Daily Nutrient Needs:  Energy (kcal):  2312-1089 (22-25 kcal/kg); Weight Used for Energy Requirements:  Current     Protein (g):  139-174 (1.2-1.5 g/kg);  Weight Used for Protein Requirements:  Current        Fluid (ml/day):  2500; Method Used for Fluid Requirements:  1 ml/kcal      Nutrition Related Findings:  K 3.4, Mg 1.6, Glu 177, A1C 10.2 (old)      Wounds:  Surgical Incision       Current Nutrition Therapies:    Diet NPO  ADULT TUBE FEEDING; Jejunostomy; Standard with Fiber; Continuous; 10; No; 30; Q 3 hours  Current Tube Feeding (TF) Orders:  · Feeding Route: PEG  · Formula: Standard with Fiber (10 ml/hr)  · Schedule: Continuous  · Water Flushes: 30 ml Q3H  · Current TF & Flush Orders Provides: 240 kcal and 16 gm protein    Anthropometric Measures:  · Height: 5' 10\" (177.8 cm)  · Current Body Weight: 255 lb 11.7 oz (116 kg)   · Admission Body Weight: 255 lb 11.7 oz (116 kg)    · Usual Body Weight: 270 lb 3.2 oz (122.6 kg) (11/16/21)     · Ideal Body Weight: 166 lbs; % Ideal Body Weight 154.1 %   · BMI: 36.7  · BMI Categories: Obese Class 2 (BMI 35.0 -39.9)       Nutrition Diagnosis:   · Inadequate protein-energy intake related to altered GI structure,catabolic illness as evidenced by nutrition support - enteral nutrition,weight loss,poor intake prior to admission    Nutrition Interventions:   Food and/or Nutrient Delivery:  Continue NPO,Modify Tube Feeding  Nutrition Education/Counseling:  No recommendation at this time   Coordination of Nutrition Care:  Continue to monitor while inpatient    Goals:  Pt will tolerate EN to meet 100% of his estimated needs       Nutrition Monitoring and Evaluation:   Behavioral-Environmental Outcomes:  None Identified   Food/Nutrient Intake Outcomes:  Enteral Nutrition Intake/Tolerance,IVF Intake  Physical Signs/Symptoms Outcomes:  Biochemical Data,GI Status,Fluid Status or Edema,Nutrition Focused Physical Findings,Skin,Weight     Discharge Planning:     Too soon to determine     Electronically signed by Staci Martin RD, LD on 2/3/22 at 11:03 AM EST    Contact: [phone]

## 2022-02-03 NOTE — PLAN OF CARE
Nutrition Problem #1: Inadequate protein-energy intake  Intervention: Food and/or Nutrient Delivery: Continue NPO,Modify Tube Feeding  Nutritional Goals: Pt will tolerate EN to meet 100% of his estimated needs

## 2022-02-04 NOTE — CONSULTS
D/w RN and RN mgr before attempt at PT eval.    Introduced myself with AIDET. Patient comfortable, confirms he can move fairly well, recently with RN help. Not interested in PT eval today d/t fatigue right now and how tired he gets with mobility. Offered PT eval again, discussing benefits of mobility and support for recovery as he makes progress towards discharge. Patient declined a second time, agreeable to eval at future time/date.   Maurice [place], [place] [address]  3:02 PM 2/4/2022

## 2022-02-04 NOTE — PROGRESS NOTES
PATIENT NAME: Garland Maurice    TODAY'S DATE: 02/04/22    SUBJECTIVE:    Pt is POD # 3 s/p esophagectomy. Pt c/o incisional pain. He took a short walk earlier. He is passing flatus but no BM. Denies nausea or vomiting. OBJECTIVE:   VITALS:    Vitals:    02/04/22 1305   BP: (!) 157/95   Pulse: 112   Resp: 24   Temp: 98.4 °F (36.9 °C)   SpO2: 96%     INTAKE/OUTPUT:    Date 02/04/22 0000 - 02/04/22 2359   Shift 0421-2623 8896-8457 5455-4267 24 Hour Total   INTAKE   Shift Total(mL/kg)       OUTPUT   Urine(mL/kg/hr) 400(0.4) 250  650   Emesis/NG output 235 80  315   Chest Tube 195 100  295   Shift Total(mL/kg) 830(7.2) 430(3.7)  1260(10.9)   Weight (kg) 116 116 116 116      Patient Vitals for the past 96 hrs (Last 3 readings):   Weight   02/01/22 0630 255 lb 11.7 oz (116 kg)       EXAM:  Blood pressure (!) 157/95, pulse 112, temperature 98.4 °F (36.9 °C), temperature source Oral, resp. rate 24, height 5' 10\" (1.778 m), weight 255 lb 11.7 oz (116 kg), SpO2 96 %. General appearance: No apparent distress, appears stated age and cooperative. Skin: unremarkable  HEENT Normocephalic, atraumatic without obvious deformity. Neck: Supple, Trachea midline   Lungs: Good respiratory effort. Clear to auscultation, bilaterally  Heart: Regular rate/ rhythm inc c/d/i  Abdomen: Soft, appropriately tender or non-distended   Extremities: min edema warm well perfused  Neurologic: Alert, grossly intact  Mental status: normal affect      Data:  CBC:   Recent Labs     02/02/22 0930   WBC 17.4*   HGB 11.4*   HCT 33.0*   *     BMP:    Recent Labs     02/02/22 0930      K 3.4*      CO2 20*   BUN 21   CREATININE 0.8*   GLUCOSE 177*     Hepatic: No results for input(s): AST, ALT, ALB, BILITOT, ALKPHOS in the last 72 hours. Mag:      Recent Labs     02/02/22  0930   MG 1.6*      Phos:   No results for input(s): PHOS in the last 72 hours. INR: No results for input(s): INR in the last 72 hours.     Radiology Review: CXR lungs clear, no effusion, gastric pullthrough nondistended       ASSESSMENT AND PLAN:    Patient Active Problem List   Diagnosis    Migraine    Diabetes mellitus ([address]    Diastolic heart failure (HCC)    TIA (transient ischemic attack)    Persistent headaches    Cerebral infarction ([address]    Abdominal pain, acute, right upper quadrant    Chronic back pain    Chronic pain    Migrainous headache without aura    COPD with acute exacerbation ([address]    DM (diabetes mellitus), type 2, uncontrolled ([address]    Hyperglycemia    Acute chest pain    Anxiety    Asthma    Cardiac septal defect    Hemiparesis following cerebrovascular accident (CVA) ([address]    Displacement of lumbar intervertebral disc without myelopathy    Calculus of kidney    Abnormal liver enzymes    Herniated lumbar intervertebral disc    Memory impairment    Infection of wound due to methicillin resistant Staphylococcus aureus (MRSA)    Shortness of breath    Sleep apnea    Cerebral artery occlusion with cerebral infarction ([address]    Increased frequency of urination    Urinary urgency    Hypertensive emergency    Seizures ([address]    Syncope    Wound infection following procedure    Wound infection    Infected sebaceous cyst    COPD (chronic obstructive pulmonary disease) ([address]    Anxiety    Migraine    DKA, type 2 (HCC)    Hypokalemia    Thrombocytopenia (HCC)    Type 2 diabetes mellitus ([address]    Essential hypertension    Hyperlipidemia    Depression    Diabetes ([address]    Ureterolithiasis    Ureteral stone    Malignant neoplasm of lower third of esophagus (HCC)    Gastroesophageal reflux disease with esophagitis without hemorrhage    Tobacco dependence    Esophageal cancer ([address]    Encounter for preadmission testing       S/P esophagectomy    Cardio: stable, HTN and tachycardia, will restart coreg. Pulm: stable on 2L NC, encourage IS, wean O2 as tolerated  GI: tolerating trickle TF, continue at 10 for now. Will increase as bowel function returns. Continue NGT to LIWS. Continue strict NPO.    Renal: stable, adequate UOP  Wound: stable     Sarai Lerma PA-C

## 2022-02-04 NOTE — PROGRESS NOTES
This Nurse found NG tube at 54 cm this morning. After surgery, it was placed at 61. Dr. Anisha Javier notified. Chest X-Ray ordered for NG verification. This Nurse got patient up to chair. Patient tolerated well. Patient tolerated for a total of about an hour. Patient walked about 10 feet. Electronically signed: [place].  Jl, [address] [place] [address], [facility], 2/4/22

## 2022-02-05 NOTE — PROGRESS NOTES
PATIENT NAME: Esau Jain    TODAY'S DATE: 02/05/22    SUBJECTIVE:    Pt is POD # 5 s/p esophagectomy. Pt c/o incisional pain. He took a short walk earlier. He is passing flatus but no BM. Denies nausea or vomiting. OBJECTIVE:   VITALS:    Vitals:    02/05/22 1300   BP: 115/69   Pulse: 102   Resp: 27   Temp:    SpO2: 93%     INTAKE/OUTPUT:    Date 02/05/22 0000 - 02/05/22 2359   Shift 2508-9732 5656-3571 1257-6713 24 Hour Total   INTAKE   Shift Total(mL/kg)       OUTPUT   Urine(mL/kg/hr)  300  300   Emesis/NG output  270  270   Chest Tube 350 120  470   Shift Total(mL/kg) 350(3) 690(5.9)  1040(9)   Weight (kg) 116 116 116 116      No data found. EXAM:  Blood pressure 115/69, pulse 102, temperature 98.6 °F (37 °C), temperature source Oral, resp. rate 27, height 5' 10\" (1.778 m), weight 255 lb 11.7 oz (116 kg), SpO2 93 %. General appearance: No apparent distress, appears stated age and cooperative. Skin: unremarkable  HEENT Normocephalic, atraumatic without obvious deformity. Neck: Supple, Trachea midline   Lungs: Good respiratory effort. Clear to auscultation, bilaterally  Heart: Regular rate/ rhythm inc c/d/i  Abdomen: Soft, appropriately tender  non-distended   Extremities: min edema warm well perfused  Neurologic: Alert, grossly intact  Mental status: normal affect      Data:  CBC:   Recent Labs     02/05/22  1230   WBC 23.7*   HGB 9.9*   HCT 28.9*   *     BMP:    No results for input(s): NA, K, CL, CO2, BUN, CREATININE, GLUCOSE in the last 72 hours. Hepatic: No results for input(s): AST, ALT, ALB, BILITOT, ALKPHOS in the last 72 hours. Mag:      No results for input(s): MG in the last 72 hours. Phos:   No results for input(s): PHOS in the last 72 hours. INR: No results for input(s): INR in the last 72 hours.     Radiology Review:  CXR lungs clear, no effusion, gastric pullthrough nondistended       ASSESSMENT AND PLAN:    Patient Active Problem List   Diagnosis    Migraine    Diabetes mellitus (HCC)    Diastolic heart failure (HCC)    TIA (transient ischemic attack)    Persistent headaches    Cerebral infarction (HCC)    Abdominal pain, acute, right upper quadrant    Chronic back pain    Chronic pain    Migrainous headache without aura    COPD with acute exacerbation ([address]    DM (diabetes mellitus), type 2, uncontrolled ([address]    Hyperglycemia    Acute chest pain    Anxiety    Asthma    Cardiac septal defect    Hemiparesis following cerebrovascular accident (CVA) ([address]    Displacement of lumbar intervertebral disc without myelopathy    Calculus of kidney    Abnormal liver enzymes    Herniated lumbar intervertebral disc    Memory impairment    Infection of wound due to methicillin resistant Staphylococcus aureus (MRSA)    Shortness of breath    Sleep apnea    Cerebral artery occlusion with cerebral infarction ([address]    Increased frequency of urination    Urinary urgency    Hypertensive emergency    Seizures ([address]    Syncope    Wound infection following procedure    Wound infection    Infected sebaceous cyst    COPD (chronic obstructive pulmonary disease) ([address]    Anxiety    Migraine    DKA, type 2 (HCC)    Hypokalemia    Thrombocytopenia (HCC)    Type 2 diabetes mellitus ([address]    Essential hypertension    Hyperlipidemia    Depression    Diabetes ([address]    Ureterolithiasis    Ureteral stone    Malignant neoplasm of lower third of esophagus (HCC)    Gastroesophageal reflux disease with esophagitis without hemorrhage    Tobacco dependence    Esophageal cancer ([address]    Encounter for preadmission testing       S/P esophagectomy    Cardio: stable, tachy, on coreg. Pulm: stable on 2L NC, encourage IS, wean O2 as tolerated  GI: tolerating trickle TF, continue at 10 for now. Will increase as bowel function returns. Continue NGT to LIWS. Continue strict NPO. Dulcolax suppository x 1.   Renal: stable, adequate UOP, but appears a little dry, 500 NS bolus over 2 hour x 1.  BMP pending  Wound: stable

## 2022-02-05 NOTE — PROGRESS NOTES
Pt SpO2 dropped to 83%, pt lung rhonchi and diminished. RT at bedside placed pt up to 13L high flow cannula. Dr. Mosher called and updated. Orders for stat CXR. Orders received for 20mg IV lasix once. Pt able to be weaned down to 10L once pt was able to cough and deep breath.

## 2022-02-05 NOTE — CONSULTS
Consult completed. Procedure/rationale explained to pt including benefits vs potential risks/complications; questions answered & consent obtained. #5.5Fr Double Lumen ArrowG+hamida Blue Advance PICC initiated to RUE Basilic Vein using sterile, UltraSound-guided technique without difficulty/complications. Positioning verified via VPSg4 & 3CG with appropriate P-wave changes, US cathedral waves, and blue bullseye noted. Sterile dressing with SkinPrep, StatLock Securing Device, BioPatch, SwabCaps, and Limb Precautions band applied. Pt tolerated well & denies other c/o or needs. Brenna Drake, Primary RN notified.

## 2022-02-05 NOTE — CONSULTS
limitations): · Vision and Hearing:  WFL vision and WFL hearing  · Cognition and Participation:  alert, oriented, pleasant and participatory, engaged in service and follows commands appropriately. · Cardiac and Pulmonary Tolerance:  cardiac activity intolerance, tachycardia, AFIB noted on telemetry, short of breath upon exertion, elevated respiratory rate, oxygen desaturation with activity and recovers quickly with rest period  · Mobility skills and general balance:    Bed Mobility: minimal assist.    Sup-sit:  minimal assist.    Sit-static balance: supervision. Sit-stand:  minimal assist.    Stand-static balance:  minimal assist.    Stand-pivot:  minimal assist.    Bedside stepping/ambulation (0-10 feet):  minimal assist.    Stand-dynamic balance:  minimal assist.  Ambulation:  , unable. Mobility/Transfer Skills:  Pattern/Sequence:  Impaired, does not roll well to side which prevents safe xfer from supine, would prefer unsafe longsit from supine and is unable to perform safely. Efficiency:  impaired. · Neuro screen:  [facility] recruitment and coordination and control for mobility skills  · Musculoskeletal screen:  [facility] BLE AROM and power, L AROM BUE.   [facility] 6 Clicks Inpatient Mobility:   (Amb in room is defined as 10-30 feet.)  [facility] Mobility Inpatient   How much difficulty turning over in bed?: A Little  How much difficulty sitting down on / standing up from a chair with arms?: A Little  How much difficulty moving from lying on back to sitting on side of bed?: A Little  How much help from another person moving to and from a bed to a chair?: A Little  How much help from another person needed to walk in hospital room?: A Little  How much help from another person for climbing 3-5 steps with a railing?: Total  AM-PAC Inpatient Mobility Raw Score : 16  AM-PAC Inpatient T-Scale Score : 40.78  Mobility Inpatient CMS 0-100% Score: 54.16  Mobility Inpatient CMS G-Code Modifier : CK  [facility] 6 Clicks Goal:  20  [address] Nursing Mobility Assessment Tool Score:  1  patient requires assist from staff to sit from supine -- patient is able to transfer with assist  BMAT Goal:  3    Treatment today:    Therapeutic Activity Training:   Therapeutic activity training was instructed today. Cues were given for safety, sequence, UE/LE placement, awareness, and balance. Activities performed today included bed mobility training, sup-sit, sit-stand, SPT. Assessment: Body structures, Functions, Activity limitations: Decreased functional mobility ,Decreased strength,Decreased endurance,Decreased posture,Decreased ADL status,Decreased safe awareness,Decreased high-level IADLs,Increased pain  Patient admitted for esophagectomy with unstable/unpredictable medical features and evolving rehabilitative features. Many lines/tubes remain present, energy is variable, tele readings are non-ideal.  Mobility skills are reasonable, needs assist  Skilled physical therapy services are appropriate in the acute care setting. There are no significant educational impairments or barriers to learning which prevent participation with service. Prognosis: good. Clinical decision making:  high complexity:  hx 3+ personal factors/comorbidities, exam tests and measures for 3+ elements of body/structures/functions/activity limitation/participation restriction, unstable/unpredictable presentation  Discharge recommendations:  minimal/mild post-acute physical therapy service needs. From physical therapist perspective, to safely transition patient home from hospital, recommend enhanced support for IADLs, recommend physical assistance for ADLs, recommend physical assistance for mobility, recommend physical assist for home entry/exit, recommend continual available support and recommend home health physical therapy service (S3, S4)  Equipment recommendations:  front-wheeled rolling walker, bedside commode and lift chair  Goals:  Achieve goals within two weeks.   bed mobility and sup-sit independently  sit-stand and SPT independently  ambulate 150 feet with modified independence and least restrictive device  independent with HEP  independent recall of signs and symptoms of cardiac intolerance  Plan:  Plan  Times per week: 3+  Acute care physical therapy treatment to include patient/caregiver education, therapeutic activity training, gait training, neuromuscular re-education, therapeutic exercise and self care training for home management. First intend to develop bed mobility skills and sitting functional activity tolerance, as needed. Next, intend to develop transfer skills and standing functional activity tolerance, where necessary. Then, after standing is safe, intend to develop standing functional mobility skills, including ambulation. Patient mobility with nursing staff is an important component of care during this admission. Mobility homework for patient and nurse:  change position frequently sit at edge of bed 4-6 times daily out of bed for meals stand at bedside 4-6 times daily  use bedside commode for voiding accompany to bathroom for voiding  Patient requires minimal assist for mobility with nursing. Time in:  0853  Time out:  0928  Timed treatment minutes:  25  Total treatment time:  35    Electronically signed by:   Shabbir Lazaro PT  2/5/2022, 10:18 AM

## 2022-02-06 NOTE — PROGRESS NOTES
[address] [facility]  consulted by Dr. Gris Godwin for monitoring and adjustment. Indication for treatment: Aspiration pneumonia  Goal trough: [] 10-15 mcg/mL or [x] 15-20 mcg/ml  AUC/ONELIA: [] <500 or [x] 400-600    Pertinent Laboratory Values:   Temp Readings from Last 3 Encounters:   02/05/22 99.4 °F (37.4 °C) (Oral)   01/31/22 97.2 °F (36.2 °C)   01/14/22 97.4 °F (36.3 °C) (Infrared)     Recent Labs     02/05/22  1230   WBC 23.7*     Recent Labs     02/05/22  1230   BUN 14   CREATININE 0.7*     Estimated Creatinine Clearance: 156 mL/min (A) (based on SCr of 0.7 mg/dL (L)). Intake/Output Summary (Last 24 hours) at 2/6/2022 0114  Last data filed at 2/5/2022 2126  Gross per 24 hour   Intake 1079.92 ml   Output 2110 ml   Net -1030.08 ml       Pertinent Cultures:  Date    Source    Results  1/24   Covid-19   Positive  2/06   MRSA nasal screen  Ordered    Vancomycin level:   TROUGH:  No results for input(s): VANCOTROUGH in the last 72 hours. RANDOM:  No results for input(s): VANCORANDOM in the last 72 hours. Assessment:  SCr, BUN, and urine output: WNL  Day(s) of therapy: 1  Vancomycin concentration: Pending    Plan:  Start vancomycin 2000 mg x 1, followed by 1250 mg IVPB q12h  Predicted trough of 14.6 and AUC: 459 at steady-state  Pharmacy will continue to monitor patient and adjust therapy as indicated    Benito 3 02/07/22 @0600    Thank you for the consult.   Patel Arciniega, [facility]  2/6/2022 1:14 AM

## 2022-02-06 NOTE — PROGRESS NOTES
Pharmacy Consult for Tocilizumab Initiation per Dr. Uriel Bryan per [facility] P&T Committee  **All criteria need to be met to receive   Tocilizumab for COVID-19 patients**   Age    >22 years old   Yes   Ordering Provider    Restricted to ID, intensivists, or pulmonology  Hospitalist may order in ID absence      Yes   Laboratory Results    Confirmed positive COVID-19    CRP >75 mg/L       Yes   Clinical Status       Within 24 hours of vital organ support   (HFNC, heated vapotherm, biPAP, mechanical ventilation, or vasopressor)  OR   Rapidly increasing oxygen needs       Yes   Concomitant Therapy    Receiving systemic steroids for treatment of COVID 19     Yes   Oxygen Status     Requiring invasive or non-invasive mechanical ventilation   (>10L NC, HFNC, heated vapotherm, biPAP, mechanical ventilation)   Yes     Contraindications    1. Invasive active mycobacterial or fungal infection  2. Platelet <287,106 or active bleeding  3. Significant immunosuppression   4. Elevated AST/ALT >10x ULN   ? None     Platelets   Date Value Ref Range Status   02/06/2022 114 (L) 140 - 440 K/CU MM Final   02/05/2022 132 (L) 140 - 440 K/CU MM Final   02/02/2022 133 (L) 140 - 440 K/CU MM Final   02/01/2022 149 140 - 440 K/CU MM Final   01/24/2022 147 140 - 440 K/CU MM Final     WHITE BLOOD CELL COUNT  WBC   Date Value Ref Range Status   02/06/2022 12.6 (H) 4.0 - 10.5 K/CU MM Final   02/05/2022 23.7 (H) 4.0 - 10.5 K/CU MM Final   02/02/2022 17.4 (H) 4.0 - 10.5 K/CU MM Final   02/01/2022 15.7 (H) 4.0 - 10.5 K/CU MM Final   01/24/2022 4.5 4.0 - 10.5 K/CU MM Final     LIVER FUNCTION LAB EVALUATION  No results for input(s): AST, ALT, ALKPHOS, BILITOT, INR in the last 72 hours.    -no antifungal meds.     _______________________________________________________________________      Dosing Recommendations/ Give  (One dose maximum)    Dose when compounding from SQ vial:  810 mg ivpb: Patient weight >90 kg x 1 dose     Thank you for the consult,  -Gareth Russell, [facility]

## 2022-02-06 NOTE — PLAN OF CARE

## 2022-02-06 NOTE — PROGRESS NOTES
Dr. Keerthi Antonio and Dr. Cristy Ashford at bedside, pt spo2 89-90 supine, lung sounds on right side more audible than earlier assessment. Orders to increase ISE use and pickle device use. Encourage coughing and deep breathing.

## 2022-02-06 NOTE — FLOWSHEET NOTE
Spoke with Dr. Fito Unger and new orders obtained. Will bring pt down for stat CT of chest if SPO2 remains in the 80's.

## 2022-02-06 NOTE — PROGRESS NOTES
Pt's spO2 84% on high flow and NRB at 15L. ABGs sent to Dr. Pavithra Miller, RT getting vapotherm as ordered. Educated pt on need to cough and deep breathe. Pt verbalizes understanding. Will get up to chair and attempt other lung maneuvers to clear right lung.

## 2022-02-06 NOTE — FLOWSHEET NOTE
Pt educated on use of \"Pickle\" device, but is very weak with respirations at this time and not having a strong cough.

## 2022-02-06 NOTE — PROGRESS NOTES
Jimmie Agarwal MD, [address]                Internal Medicine Hospitalist             Daily Progress  Note   Subjective:   No chief complaint on file. Mr. Wanda Wallace of nil, patient is in ICU. Objective:    BP (!) 136/104   Pulse 102   Temp 98.7 °F (37.1 °C) (Oral)   Resp 24   Ht 5' 10\" (1.778 m)   Wt 255 lb 11.7 oz (116 kg)   SpO2 92%   BMI 36.69 kg/m²      Intake/Output Summary (Last 24 hours) at 2/6/2022 0926  Last data filed at 2/6/2022 2385  Gross per 24 hour   Intake 1389.92 ml   Output 2215 ml   Net -825.08 ml      Physical Exam:  Heart: Distant heart sounds  Lungs: Mostly clear to auscultation, decreased breath sounds at bases. No wheezes appreciated no crackles heard, coarse Rales heard both lung. Abdomen: Soft, non distended. Bowel sounds appreciated. No obvious liver or spleen enlargement. Non tender, no rebound noted. Extremities: Non tender, no swelling noted, strength 5/5 both legs. CNS: Grossly intact.     Labs:  CBC with Differential:    Lab Results   Component Value Date    WBC 12.6 02/06/2022    RBC 3.34 02/06/2022    HGB 11.0 02/06/2022    HCT 33.3 02/06/2022     02/06/2022    MCV 99.7 02/06/2022    MCH 32.9 02/06/2022    MCHC 33.0 02/06/2022    RDW 14.6 02/06/2022    SEGSPCT 65.5 01/24/2022    LYMPHOPCT 18.4 01/24/2022    MONOPCT 13.7 01/24/2022    EOSPCT 7.4 03/18/2012    BASOPCT 0.4 01/24/2022    MONOSABS 0.6 01/24/2022    LYMPHSABS 0.8 01/24/2022    EOSABS 0.1 01/24/2022    BASOSABS 0.0 01/24/2022    DIFFTYPE AUTOMATED DIFFERENTIAL 01/24/2022     CMP:    Lab Results   Component Value Date     02/06/2022    K 3.9 02/06/2022     02/06/2022    CO2 23 02/06/2022    BUN 23 02/06/2022    CREATININE 0.9 02/06/2022    GFRAA >60 02/06/2022    LABGLOM >60 02/06/2022    GLUCOSE 307 02/06/2022    PROT 6.3 01/14/2022    PROT 7.1 03/08/2013    LABALBU 4.0 01/14/2022    CALCIUM 8.5 02/06/2022    BILITOT 0.8 01/14/2022    ALKPHOS 93 01/14/2022    AST 15 01/14/2022 ALT 11 01/14/2022     No results for input(s): TROPONINT in the last 72 hours.   Lab Results   Component Value Date    [facility] 2.110 08/17/2016         sodium chloride      sodium chloride Stopped (02/05/22 2100)      vancomycin  1,250 mg IntraVENous Q12H    acetylcysteine  600 mg Inhalation BID    lidocaine PF  5 mL IntraDERmal Once    sodium chloride flush  5-40 mL IntraVENous 2 times per day    enoxaparin  40 mg SubCUTAneous BID    piperacillin-tazobactam  3,375 mg IntraVENous Q8H    carvedilol  12.5 mg Per J Tube BID WC    sertraline  100 mg Per J Tube Daily    potassium chloride  20 mEq IntraVENous Once    lidocaine  1 patch TransDERmal Daily    onabotulinumtoxin A  100 Units IntraMUSCular Once         Assessment:       Patient Active Problem List    Diagnosis Date Noted    Esophageal cancer ([facility] 01/31/2022    Encounter for preadmission testing     Tobacco dependence 10/20/2021    Gastroesophageal reflux disease with esophagitis without hemorrhage 10/13/2021    Malignant neoplasm of lower third of esophagus ([address] 10/12/2021    Ureteral stone 10/03/2020    Ureterolithiasis 10/02/2020    Diabetes ([address] 02/29/2020    Type 2 diabetes mellitus ([address] 12/23/2018    Essential hypertension 12/23/2018    Hyperlipidemia 12/23/2018    Depression 12/23/2018    Hypokalemia 06/01/2017    Thrombocytopenia ([address] 06/01/2017    DKA, type 2 ([address] 05/31/2017    COPD (chronic obstructive pulmonary disease) ([facility] 05/12/2017    Anxiety 05/12/2017    Migraine 05/12/2017    Wound infection     Infected sebaceous cyst     Wound infection following procedure 03/04/2017    Syncope 08/18/2016    Anxiety 04/26/2016    Asthma 04/26/2016    Cardiac septal defect 04/26/2016    Hemiparesis following cerebrovascular accident (CVA) ([address] 04/26/2016    Calculus of kidney 04/26/2016    Abnormal liver enzymes 04/26/2016    Memory impairment 04/26/2016    Infection of wound due to methicillin resistant Staphylococcus aureus (MRSA) 04/26/2016    Shortness of breath 04/26/2016    Sleep apnea 04/26/2016    Cerebral artery occlusion with cerebral infarction ([facility] 04/26/2016    Hypertensive emergency 04/26/2016    Seizures ([facility] 04/26/2016    Increased frequency of urination 11/04/2015    Urinary urgency 11/04/2015    Acute chest pain 10/15/2015    Hyperglycemia 01/09/2015    Migrainous headache without aura 04/17/2014    COPD with acute exacerbation ([facility] 04/17/2014    DM (diabetes mellitus), type 2, uncontrolled ([facility] 04/17/2014    Abdominal pain, acute, right upper quadrant 01/18/2014    Chronic back pain 01/18/2014    Chronic pain 01/18/2014    Cerebral infarction ([facility] 01/17/2014    Persistent headaches 07/16/2013    TIA (transient ischemic attack) 07/11/2013    Migraine     Diabetes mellitus (HCC)     Diastolic heart failure (HCC)     Displacement of lumbar intervertebral disc without myelopathy 01/11/2012    Herniated lumbar intervertebral disc 01/11/2012       Plan:     Problems being addressed this admission:   Acute hypoxemic respiratory failure r/o aspiration pna /COVID-19  2/6/2022 his CT scan of the lungs suggestive of possible aspiration could have aspirated saliva as well on Vanco and Zosyn for now which should cover. [facility] consult pulmonology as well. I suggest treat for COVID-19. Consult pharmacy for Tocilizumab. Meanwhile check CRP and start dexamethasone high dose. To keep a close eye on his platelets. He also has a chest tube in, CT surgery managing that. 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 Also has a J-tube with trickle feeds going on. DM 2  2/6/2022 sugar this morning was 307, will check A1c, will need to be on sliding scale plus Lantus. Consult endocrinology for better blood sugar control.      General Orders:  Repeat basic labs again in am.  I have explained to the patient and discussed with him/her the treatment plan. Also discussed with RN. Patient was seen by hospitalist last night as a consult awaiting transcription. The above chart was generated partly using Dragon dictation system, it may contain dictation errors given the limitations of this technology.      Karen Urias MD, [address]

## 2022-02-06 NOTE — FLOWSHEET NOTE
Notified Dr. Karina Leblanc of Pt having coarse crackles throughout lung fields. Pausing fluids momentarily for orders.

## 2022-02-06 NOTE — CONSULTS
[address] [place] [address], [address] [place] [id number]                                  CONSULTATION    PATIENT NAME: Lakhwinder Horner                     :        1970  MED REC NO:   [id number]                          ROOM:       [facility]  ACCOUNT NO:   [de-identified]                           ADMIT DATE: 2022  PROVIDER:     Cinthia Witt MD    CONSULT DATE:  2022    HISTORY OF PRESENT ILLNESS:  The patient is a 47-year-old gentleman with  multiple medical problems including COPD, congestive heart failure,  bronchial asthma, hypertension, hyperlipidemia, and obstructive sleep  apnea, who was diagnosed with esophageal cancer few months ago. The  patient completed chemotherapy and radiation therapy and subsequently  underwent elective esophageal resection. The patient tested positive  for COVID-19. Postoperatively, the patient's respiratory status has  deteriorated. He had a CT scan of the chest which showed no evidence of  PE, postsurgical changes and multifocal right lower lobe and right upper  lobe infiltrate consistent with pneumonia and small bilateral pleural  effusions. PAST MEDICAL HISTORY:  Significant for COPD, bronchial asthma, diabetes,  congestive heart failure, gastroesophageal reflux disease, hypertension,  hyperlipidemia, obstructive sleep apnea. PAST SURGICAL HISTORY:  Remarkable for abdominal surgery, carpal tunnel  release surgery, cholecystectomy, colonoscopy, cystoscopy esophagectomy  done last week, bilateral eye surgery, upper endoscopy. FAMILY HISTORY:  Reveals that his mother has diabetes. Father had  diabetes. SOCIAL HISTORY:  Reveals that he smokes a quarter to half pack per day  and has been smoking for several years. No history of alcohol or drug  abuse. MEDICATIONS:  Reviewed. ALLERGIES:  He has no known allergies.     REVIEW OF SYSTEMS:  The 10-to 14-point review of system were reviewed  and are negative except for what is mentioned in the history of present  illness. PHYSICAL EXAMINATION:  GENERAL:  The patient is awake and responsive on high-flow nasal cannula  at 100%. VITAL SIGNS:  His blood pressure is 144/56 mmHg, pulse of 99 per minute  and respiratory rate of 31 per minute. He is afebrile. His saturation  is 90% on high-flow nasal cannula. HEENT:  Exam is essentially unremarkable. There is no JVD. NECK:  The neck is supple. LUNGS:  Exam revealed decreased breath sounds on the right side and  basilar crackles. HEART:  Exam showed normal S1, S2.  ABDOMEN:  Exam is benign. NEUROLOGIC:  Exam reveals that he is awake and responsive, answering  questions and moving his extremities. LABORATORY DATA:  His ABGs yesterday showed a pH of 7.45, pCO2 of 29,  pO2 of 54 with 83% saturation. His CBC showed a white count of 12.6,  hemoglobin 11.0, hematocrit 33.3. His CT scan of the chest showed no  evidence of PE, postsurgical changes, multifocal right lower lobe and  upper lobe pneumonia, trace right pneumothorax. Chest tube in place. Pneumoperitoneum consistent with recent surgery. His electrolytes were  unremarkable. IMPRESSION:  1. Acute respiratory failure secondary to pneumonia secondary to  COVID-19 and possible bacterial pneumonia. 2.  Pneumonia secondary to COVID-19.  3.  Possible bacterial pneumonia. 4.  COPD.  5.  Obstructive sleep apnea. PLAN:  1. We will start the patient on Combivent 2 puffs four times a day. 2.  BiPAP while sleeping. 3.  We will add Mucinex. We will add Robitussin 400 mg four times a  day. 4.  Check mag and phos. 5.  Continue broad-spectrum antibiotic coverage. 6.  The patient has been started on Decadron. 7.  The patient has already received Actemra. 8.  As per orders.         Rodrigo Leiva MD    D: 02/06/2022 13:29:25       T: 02/06/2022 13:32:54     [email or web address]  Job#: [id number]     Doc#: [id number]    CC:

## 2022-02-06 NOTE — PROGRESS NOTES
PATIENT NAME: Olvin Cervantes    TODAY'S DATE: 02/06/22    SUBJECTIVE:    Pt is POD # 6 s/p esophagectomy. Had BM after suppository yesterday. Significant decline in respiratory status since yesterday afternoon. OBJECTIVE:   VITALS:    Vitals:    02/06/22 1200   BP: 83/71   Pulse: 99   Resp: (!) 35   Temp:    SpO2: 90%     INTAKE/OUTPUT:    Date 02/06/22 0000 - 02/06/22 2359   Shift 6800-2252 9379-6257 8228-5667 24 Hour Total   INTAKE   NG/   310   Shift Total(mL/kg) 310(2.7)   310(2.7)   OUTPUT   Urine(mL/kg/hr) 375(0.4) 125  500   Emesis/NG output 110 90  200   Chest Tube 260 100  360   Shift Total(mL/kg) 745(6.4) 315(2.7)  1060(9.1)   Weight (kg) 116 116 116 116      No data found. EXAM:  Blood pressure 83/71, pulse 99, temperature 98 °F (36.7 °C), temperature source Oral, resp. rate (!) 35, height 5' 10\" (1.778 m), weight 255 lb 11.7 oz (116 kg), SpO2 90 %. General appearance: No apparent distress, appears stated age and cooperative. Skin: unremarkable  HEENT Normocephalic, atraumatic without obvious deformity. Neck: Supple, Trachea midline   Lungs: Good respiratory effort. Decreased on right  Heart: Regular rate/ rhythm inc c/d/i  Abdomen: Soft, appropriately tender  non-distended   Extremities: min edema warm well perfused  Neurologic: Alert, grossly intact  Mental status: normal affect      Data:  CBC:   Recent Labs     02/05/22  1230 02/06/22  0605   WBC 23.7* 12.6*   HGB 9.9* 11.0*   HCT 28.9* 33.3*   * 114*     BMP:    Recent Labs     02/05/22  1230 02/06/22  0605    136   K 3.2* 3.9    104   CO2 21 23   BUN 14 23   CREATININE 0.7* 0.9   GLUCOSE 206* 307*     Hepatic: No results for input(s): AST, ALT, ALB, BILITOT, ALKPHOS in the last 72 hours. Mag:      Recent Labs     02/06/22  0905   MG 2.0      Phos:   No results for input(s): PHOS in the last 72 hours. INR: No results for input(s): INR in the last 72 hours.     Radiology Review:  CXR R infiltrate  CT with consolidation/infilitrate on right    ASSESSMENT AND PLAN:    Patient Active Problem List   Diagnosis    Migraine    Diabetes mellitus ([address]    Diastolic heart failure (HCC)    TIA (transient ischemic attack)    Persistent headaches    Cerebral infarction ([address]    Abdominal pain, acute, right upper quadrant    Chronic back pain    Chronic pain    Migrainous headache without aura    COPD with acute exacerbation ([address]    DM (diabetes mellitus), type 2, uncontrolled ([address]    Hyperglycemia    Acute chest pain    Anxiety    Asthma    Cardiac septal defect    Hemiparesis following cerebrovascular accident (CVA) ([address]    Displacement of lumbar intervertebral disc without myelopathy    Calculus of kidney    Abnormal liver enzymes    Herniated lumbar intervertebral disc    Memory impairment    Infection of wound due to methicillin resistant Staphylococcus aureus (MRSA)    Shortness of breath    Sleep apnea    Cerebral artery occlusion with cerebral infarction ([address]    Increased frequency of urination    Urinary urgency    Hypertensive emergency    Seizures ([address]    Syncope    Wound infection following procedure    Wound infection    Infected sebaceous cyst    COPD (chronic obstructive pulmonary disease) ([address]    Anxiety    Migraine    DKA, type 2 (HCC)    Hypokalemia    Thrombocytopenia (HCC)    Type 2 diabetes mellitus ([address]    Essential hypertension    Hyperlipidemia    Depression    Diabetes ([address]    Ureterolithiasis    Ureteral stone    Malignant neoplasm of lower third of esophagus (HCC)    Gastroesophageal reflux disease with esophagitis without hemorrhage    Tobacco dependence    Esophageal cancer ([address]    Encounter for preadmission testing       S/P esophagectomy    Cardio: stable, continue coreg. Pulm:push IS, continue steroids and abx, ? Component of COVID and bacterial pneumonia, wean O2 as tolerated  GI: tolerating trickle TF, continue at 10 for now.  Will hold off on titrating at this time. Continue NGT to LIWS. Continue strict NPO. Dulcolax suppository x 1.   Renal: stable, adequate UOP,   Wound: stable

## 2022-02-06 NOTE — CONSULTS
Endocrinology   Consult Note      Dear Doctor Kristi Hartmann     Thank You for the Consult     Pt. Was Admitted for : Malignant neoplasm of abdominal esophagus and underwent  And underwent esophagectomy    Reason for Consult: Better control of blood glucose      History Obtained From:  Patient/ EMR       HISTORY OF PRESENT ILLNESS:                The patient is a 46 y.o. male with significant past medical history of esophageal cancer earlier with radiation therapy, arthritis, atrial fibrillation, depression, diabetes mellitus had atrial septal defect and underwent cardiac surgery,, hypertension, hyperlipidemia was admitted to hospital for surgical esophageal cancer and underwent esophagectomy on January 31, 2022 has been running higher blood glucose after he got his dose of Decadron. I was  consulted for better control of blood glucose. ROS:   Pt's ROS done in detail. Abnormal ROS are noted in Medical and Surgical History Section below:      Other Medical History:        Diagnosis Date    Arthritis     Asthma     Atrial fibrillation (HCC)     Cancer (HCC)     CHF (congestive heart failure) (HCC)     COPD (chronic obstructive pulmonary disease) (HCC)     Depression     Diabetes mellitus (HCC)     Type 2    Diastolic heart failure (HCC)     Esophageal cancer (HCC)     GERD (gastroesophageal reflux disease)     History of external beam radiation therapy 2021    Esophagus 4,500 cGy [facility] Shaggy Melo [facility]    Hyperlipidemia     Hypertension     Kidney stone     Migraine     Other disorders of kidney and ureter     Pneumonia     Psychiatric problem     Sleep apnea     Unspecified cerebral artery occlusion with cerebral infarction     \"10% loss of use of right arm and leg\"     Surgical History:        Procedure Laterality Date    ABDOMEN SURGERY      ASD REPAIR  2005    ASD REPAIR      BACK SURGERY  01/2012    [facility] CARDIAC SURGERY  08/2013    REVEAL XT MONITOR #[id number]    CARPAL TUNNEL RELEASE      jeri    CHOLECYSTECTOMY      COLONOSCOPY      CYSTOSCOPY INSERTION / REMOVAL STENT / STONE Right 10/03/2020    CYSTOSCOPY RETROGRADE PYELOGRAM STENT INSERTION performed by Jossie Van MD at [address] ESOPHAGECTOMY N/A 1/31/2022    ESOPHAGECTOMY performed by Asha Todd MD at [address]      bilateral    OTHER SURGICAL HISTORY  02/16/2017    I & D mid upper back    PERICARDIUM SURGERY  2005    post ASD repair with window    UPPER GASTROINTESTINAL ENDOSCOPY  10/09/2021    with biopsy       Allergies:  Patient has no known allergies. Family History:       Problem Relation Age of Onset    Diabetes Mother     Other Mother     Alzheimer's Disease Mother     Diabetes Father     Heart Disease Father     Diabetes Paternal Uncle     Diabetes Maternal Grandmother     Diabetes Maternal Grandfather     Diabetes Paternal Grandmother     Diabetes Paternal Grandfather     Cancer Sister      REVIEW OF SYSTEMS:  Review of System Done as noted above     PHYSICAL EXAM:      Vitals:    BP 83/71   Pulse 99   Temp 98 °F (36.7 °C) (Oral)   Resp (!) 35   Ht 5' 10\" (1.778 m)   Wt 255 lb 11.7 oz (116 kg)   SpO2 90%   BMI 36.69 kg/m²     CONSTITUTIONAL:  awake, alert, cooperative, appears stated age   EYES:  vision intact Fundoscopic Exam not performed   ENT:Normal  NECK:  Supple, No JVD. Thyroid Exam:Normal   LUNGS:  Has Vesicular Breath Sounds,   CARDIOVASCULAR:  Normal apical impulse, regular rate and rhythm, normal S1 and S2, no S3 or S4, and has no  murmur   ABDOMEN:  No scars, normal bowel sounds, soft, non-distended, non-tender, no masses palpated, no hepatolienomegaly her NG tube  Musculoskeletal: Normal  Extremities: Normal, peripheral pulses normal, , has no edema   NEUROLOGIC:  Awake, alert, oriented to name, place and time. Cranial nerves II-XII are grossly intact. Motor is  intact. Sensory is intact.  ,  and gait is normal.    DATA:    CBC:   Recent Labs     02/05/22  1230 02/06/22  0605   WBC 23.7* 12.6*   HGB 9.9* 11.0*   * 114*    CMP:  Recent Labs     02/05/22  1230 02/06/22  0605    136   K 3.2* 3.9    104   CO2 21 23   BUN 14 23   CREATININE 0.7* 0.9   CALCIUM 8.4 8.5     Lipids:   Lab Results   Component Value Date    CHOL 162 03/07/2016    HDL 26 03/07/2016    TRIG 718 03/07/2016     Glucose:   Recent Labs     02/06/22  1023   POCGLU 280*     Hemoglobin A1C:   Lab Results   Component Value Date    LABA1C 10.2 03/02/2020     Free T4:   Lab Results   Component Value Date    T4FREE 0.91 05/15/2013     Free T3: No results found for: FT3  TSH High Sensitivity:   Lab Results   Component Value Date    TSHHS 2.110 08/17/2016       XR CHEST PORTABLE   Preliminary Result   Worsening right lung airspace consolidation. Postsurgical changes status post esophagectomy and gastric pull-through. CT CHEST PULMONARY EMBOLISM W CONTRAST   Preliminary Result   No evidence of pulmonary embolism. Recent postsurgical changes status post distal esophagectomy and gastric   pull-through. Likely postsurgical changes noted in the mediastinum with   fluid and air in the region of the recent surgery. Bilateral pleural effusions with bibasilar atelectasis. Multifocal right lower lobe and right upper lobe airspace disease with   tree-in-bud nodules. No evidence of airspace disease in the left lung. Findings suggest right lung pneumonia, possibly from aspiration. Trace right pneumothorax with right chest tubes present. Pneumoperitoneum consistent with recent surgery. XR CHEST PORTABLE   Final Result   1. Status post right chest tube removal.  No pneumothorax. 2. Right parahilar and bibasilar opacities compatible with atelectasis.    Aspiration and pneumonia are not excluded         XR CHEST PORTABLE   Final Result   Left basilar opacity could represent atelectasis or infection XR CHEST PORTABLE   Final Result   Stable exam.         XR ABDOMEN FOR NG/OG/NE TUBE PLACEMENT   Final Result   1. Gastric catheter tip is near the he a hemidiaphragm at the normal   expected location of the GE junction prior surgery. If further localization is required, small amount of water-soluble contrast   via the gastric catheter would be useful for localization. 2.  Free air likely related to recent esophagectomy. XR CHEST PORTABLE   Final Result   Status post esophagectomy with postoperative perihilar and bibasilar   atelectasis. XR CHEST PORTABLE   Final Result   Stable appearance of the chest with opacities at the central right lung and   left base. Gas-filled esophagus or gastric pull-through to the right of the   spine. Correlate with surgical history. The tip of the nasogastric tube is   in the left upper abdomen but the side port would overlie the lower thorax. If gastric pull through then correlate with surgery for desired position. XR CHEST PORTABLE   Final Result   Stable chest x-ray with right perihilar opacities. XR CHEST PORTABLE   Final Result   Cardiomegaly with scattered pulmonary infiltrates. XR CHEST PORTABLE   Final Result   Status post esophagectomy with gastric pull-through. Pneumomediastinum,   pneumoperitoneum and trace right pneumothorax consistent with recent surgery. Right chest tube present. Atelectasis noted in both lungs.          XR CHEST PORTABLE    (Results Pending)          Scheduled Medicines   Medications:    vancomycin  1,250 mg IntraVENous Q12H    acetylcysteine  600 mg Inhalation BID    dexamethasone  20 mg IntraVENous Daily    [START ON 2/11/2022] dexamethasone  10 mg IntraVENous Daily    tocilizumab (ACTEMRA) IVPB  810 mg IntraVENous Once    insulin lispro  0-12 Units SubCUTAneous Q4H    insulin glargine  25 Units SubCUTAneous Nightly    guaiFENesin  400 mg Oral 4x Daily    potassium chloride  20 hypertension    Hyperlipidemia    Depression    Diabetes ([facility]    Ureterolithiasis    Ureteral stone    Malignant neoplasm of lower third of esophagus (HCC)    Gastroesophageal reflux disease with esophagitis without hemorrhage    Tobacco dependence    [facility]    Encounter for preadmission testing         RECOMMENDATIONS:      1. Reviewed POC blood glucose . Labs and X ray results   2. Reviewed Home and Current Medicines   3. Will Start On Correction bolus Humalog/ Lantus Insulin regime   4. Monitor Blood glucose frequently   5. Modify  the dose of Insulin  as needed        Will follow with you  Again thank you for sharing pt's care with me.      Truly yours,       Jos Braxton MD

## 2022-02-06 NOTE — PROGRESS NOTES
Pt was repositioned from the bed into the chair. The patient only tolerated it fairly. His O2 sat dropped to the low 80s when he was up in the chair. Currently encouraging the patient to cough/deep breathe, use the IS,and use the pickle device. The patient can only reach about 250 on the IS. He is A&O x4. Call light in reach. His O2 sat is currently at 85%.

## 2022-02-06 NOTE — PROGRESS NOTES
Pt was returned to bed. His O2 sat is currently 92%. He gets short of breath easily. Still encouraging the use of IS and pickle device.

## 2022-02-07 NOTE — PROGRESS NOTES
Hanna GROSS here at bedside to insert SOURAV per protocol/sterile technique- see LDA- no issues noted

## 2022-02-07 NOTE — PROGRESS NOTES
Messaged Dr. Nelson Feeling in regards to pt's I/O's- pt is overloaded, swelling noted in both hands and feet- also urine output 400 cc- new orders received

## 2022-02-07 NOTE — PROGRESS NOTES
Hospitalist Progress Note      Name:  Darlene Leyva /Age/Sex: 1970  (46 y.o. male)   MRN & CSN:  [id number] & [id number] Admission Date/Time: 2022  5:38 AM   Location:  -A PCP: Mireya Goncalves Day: 8      Assessment and Plan:     #. Acute hypoxic respiratory failure  #. Viral pneumonia due to COVID-19  #. Suspected aspiration versus superimposed bacterial pneumonia  #. History of esophageal cancer status post esophagectomy  #. Diabetes mellitus, type II with hyperglycemia  #. Other medical problems include COPD, obstructive sleep apnea, hypertension, hyperlipidemia,? HF    Plan:  Patient remains intubated, sedated and mechanically ventilated  Status post Tocilizumab  Continue dexamethasone  Continue bronchodilators and wean of oxygen as tolerated  Continue broad-spectrum antibiotics  Continue other medication for chronic medical problems        DVT prophylaxis: Lovenox  CODE STATUS: Total support        Subjective. :     Patient was seen and examined today. Remains intubated and sedated. Objective:   Vitals:   Vitals:    22 1300   BP:    Pulse: 71   Resp: 11   Temp:    SpO2: (!) 87%         Physical Exam:   GEN: Intubated. Sedated. HEENT: Atraumatic, normocephalic, PERRLA, EOMI  NECK: Supple, no apparent thyromegaly or masses. RESP: Clear lungs to auscultation  CARDIO/VASC: Regular rate and rhythm, normal S1, S2, no murmurs  GI: Abdomen is soft, nontender, no significant organomegaly noted, bowel sounds present  MSK: No gross joint deformities. Skin: No significant rashes, skin lesions noted  Neuro: intubated and sedated.      Medications:   Medications:    chlorhexidine  15 mL Mouth/Throat BID    insulin glargine  40 Units SubCUTAneous Nightly    insulin lispro  0-18 Units SubCUTAneous Q4H    insulin NPH  20 Units SubCUTAneous QAM    norepinephrine-sodium chloride        albuterol sulfate HFA  4 puff Inhalation 6 times per day    And    ipratropium  4 puff Inhalation 6 times per day    albumin human  12.5 g IntraVENous Once    albumin human        pantoprazole  40 mg IntraVENous Daily    vancomycin  1,250 mg IntraVENous Q12H    acetylcysteine  600 mg Inhalation BID    dexamethasone  20 mg IntraVENous Daily    [START ON 2/11/2022] dexamethasone  10 mg IntraVENous Daily    guaiFENesin  400 mg Oral 4x Daily    sodium chloride flush  5-40 mL IntraVENous 2 times per day    enoxaparin  40 mg SubCUTAneous BID    piperacillin-tazobactam  3,375 mg IntraVENous Q8H    [Held by provider] carvedilol  12.5 mg Per J Tube BID WC    sertraline  100 mg Per J Tube Daily    lidocaine  1 patch TransDERmal Daily      Infusions:    fentaNYL 50 mcg/hr (02/07/22 1223)    propofol 20 mcg/kg/min (02/07/22 1223)    sodium chloride 150 mL/hr at 02/07/22 1223    norepinephrine 4 mcg/min (02/07/22 1223)    PN-Adult Premix  4.25/10 - Standard Electrolytes - Peripheral Line      dextrose      sodium chloride       PRN Meds: glucose, 15 g, PRN  dextrose, 12.5 g, PRN  glucagon (rDNA), 1 mg, PRN  dextrose, 100 mL/hr, PRN  albuterol sulfate HFA, 2 puff, Q6H PRN  HYDROmorphone, 0.5 mg, Q4H PRN  sodium chloride flush, 5-40 mL, PRN  sodium chloride, 25 mL, PRN  potassium chloride, 20 mEq, PRN  bisacodyl, 10 mg, Daily PRN  phenol, 1 spray, Q2H PRN  ipratropium, 2 puff, 4x Daily PRN  acetaminophen, 650 mg, Q4H PRN  HYDROcodone 5 mg - acetaminophen, 1 tablet, Q4H PRN   Or  HYDROcodone 5 mg - acetaminophen, 2 tablet, Q4H PRN  labetalol, 10 mg, Q2H PRN  hydrALAZINE, 10 mg, Q1H PRN  magnesium sulfate, 1,000 mg, PRN  ondansetron, 4 mg, Q6H PRN          Electronically signed by Faustina Grace MD on 2/7/2022 at 1:40 PM

## 2022-02-07 NOTE — PROGRESS NOTES
Attempted to call lor kaur  per patients request to notify of planned intubation no answer message left to return this nurses phone call.

## 2022-02-07 NOTE — PROGRESS NOTES
Dr. Dylan Hankins at bedside to do Anuja Iqbal RT, Madeleine RN and Dora Goodman RN all at bedside- bronch done- no mucous plugs noted- Dr. Dylan Hankins stated everything was clear.     Hanny hargrove RT also came to bedside- discussed possible vent changes that may help increase oxygenation for pt- see RT's charting

## 2022-02-07 NOTE — PROGRESS NOTES
Danielle GROSS in regards to restarting TF and consult IV therapy to place an additional IV or midline since pt only has a double lumen PICC- she stated ok- see new orders

## 2022-02-07 NOTE — PROGRESS NOTES
Informed RT of need to place chest vest on per Dr. Dex Osborn- stated ok we will do soon- plan is to do when Xray comes - they were called as well- stated they will be here soon

## 2022-02-07 NOTE — PROGRESS NOTES
Hanna GROSS at bedside- discussed BP being low( already had texted Dr. Rose Camilo- waiting on order) she stated to go ahead and start IV NOREPI GTT- also stated to get items together to insert ART LINE

## 2022-02-07 NOTE — PROGRESS NOTES
PATIENT NAME: Carlyn Peterson    TODAY'S DATE: 02/07/22    SUBJECTIVE:    Pt is POD # 7 s/p esophagectomy. Respiratory status declined more overnight and he was intubated this am. Currently sedated. BP has been labile. No BMs per RN. OBJECTIVE:   VITALS:    Vitals:    02/07/22 1230   BP:    Pulse: 72   Resp: 18   Temp:    SpO2: 93%     INTAKE/OUTPUT:    Date 02/07/22 0000 - 02/07/22 2359   Shift 2501-8563 3760-7798 7599-9575 24 Hour Total   INTAKE   I.V.(mL/kg/hr)  3635.2  3635.2   IV Piggyback  356.8  356.8   Shift Total(mL/kg)  6658(91.1)  0297(84.8)   OUTPUT   Shift Total(mL/kg)       Weight (kg) 116 116 116 116      No data found. EXAM:  Blood pressure (!) 129/97, pulse 72, temperature 98 °F (36.7 °C), temperature source Axillary, resp. rate 18, height 5' 10\" (1.778 m), weight 255 lb 11.7 oz (116 kg), SpO2 93 %. General appearance: sedated, intubated   Skin: unremarkable  HEENT Normocephalic, atraumatic without obvious deformity. Neck: Supple, Trachea midline   Lungs: Good respiratory effort. Course breath sounds bilat. Heart: Regular rate/ rhythm inc c/d/i  Abdomen: Soft, non-tender or non-distended   Extremities: 1+ edema warm well perfused  Neurologic: sedated  Mental status: unable to assess       Data:  CBC:   Recent Labs     02/05/22  1230 02/06/22  0605 02/07/22  0420   WBC 23.7* 12.6* 13.9*   HGB 9.9* 11.0* 10.8*   HCT 28.9* 33.3* 33.1*   * 114* 128*     BMP:    Recent Labs     02/05/22  1230 02/06/22  0605 02/07/22  0420    136 141   K 3.2* 3.9 3.5    104 107   CO2 21 23 22   BUN 14 23 50*   CREATININE 0.7* 0.9 1.3   GLUCOSE 206* 307* 295*     Hepatic:   Recent Labs     02/07/22  0420   AST 10*   ALT 9*   BILITOT 1.0   ALKPHOS 85     Mag:      Recent Labs     02/06/22  0905 02/07/22  0420   MG 2.0 2.3      Phos:     Recent Labs     02/07/22  0420   PHOS 3.9      INR: No results for input(s): INR in the last 72 hours.     Radiology Review:  CXR  Impression:     Slightly improved moderate right pneumothorax, estimated to be 20-30%. Unchanged chest tube. Unremarkable positioning of an endotracheal tube projecting 3 cm above the   jj.             ASSESSMENT AND PLAN:    Patient Active Problem List   Diagnosis    Migraine    Diabetes mellitus ([address]    Diastolic heart failure (HCC)    TIA (transient ischemic attack)    Persistent headaches    Cerebral infarction ([address]    Abdominal pain, acute, right upper quadrant    Chronic back pain    Chronic pain    Migrainous headache without aura    COPD with acute exacerbation ([address]    DM (diabetes mellitus), type 2, uncontrolled ([address]    Hyperglycemia    Acute chest pain    Anxiety    Asthma    Cardiac septal defect    Hemiparesis following cerebrovascular accident (CVA) ([address]    Displacement of lumbar intervertebral disc without myelopathy    Calculus of kidney    Abnormal liver enzymes    Herniated lumbar intervertebral disc    Memory impairment    Infection of wound due to methicillin resistant Staphylococcus aureus (MRSA)    Shortness of breath    Sleep apnea    Cerebral artery occlusion with cerebral infarction ([address]    Increased frequency of urination    Urinary urgency    Hypertensive emergency    Seizures ([address]    Syncope    Wound infection following procedure    Wound infection    Infected sebaceous cyst    COPD (chronic obstructive pulmonary disease) ([address]    Anxiety    Migraine    DKA, type 2 (HCC)    Hypokalemia    Thrombocytopenia (HCC)    Type 2 diabetes mellitus ([address]    Essential hypertension    Hyperlipidemia    Depression    Diabetes ([address]    Ureterolithiasis    Ureteral stone    Malignant neoplasm of lower third of esophagus ([address]    Gastroesophageal reflux disease with esophagitis without hemorrhage    Tobacco dependence    Esophageal cancer ([address]    Encounter for preadmission testing       S/P esophagectomy    Cardio: BP labile, hold BB and start levo   Pulm:

## 2022-02-07 NOTE — PROGRESS NOTES
Dr. Shabnam Arora at bedside- discussed plan of care(she is also speaking to Dr. Grabiel Anaya over the phone) see below for verbal orders.      1. Apply Chest Percussion Vest- Marielos at bedside and made aware- she stated she would look for one

## 2022-02-07 NOTE — PROGRESS NOTES
[id number]  consulted by Dr. Chanell Dillon for monitoring and adjustment. Indication for treatment: Aspiration pneumonia  Goal trough: [] 10-15 mcg/mL or [x] 15-20 mcg/ml  AUC/ONELIA: [] <500 or [x] 400-600    Pertinent Laboratory Values:   Temp Readings from Last 3 Encounters:   02/07/22 98 °F (36.7 °C) (Axillary)   01/31/22 97.2 °F (36.2 °C)   01/14/22 97.4 °F (36.3 °C) (Infrared)     Recent Labs     02/05/22  1230 02/06/22  0605 02/07/22  0420   WBC 23.7* 12.6* 13.9*     Recent Labs     02/05/22  1230 02/06/22  0605 02/07/22  0420   BUN 14 23 50*   CREATININE 0.7* 0.9 1.3     Estimated Creatinine Clearance: 84 mL/min (based on SCr of 1.3 mg/dL). Intake/Output Summary (Last 24 hours) at 2/7/2022 1631  Last data filed at 2/7/2022 1538  Gross per 24 hour   Intake 4706.23 ml   Output 120 ml   Net 4586.23 ml       Pertinent Cultures:  Date    Source    Results  1/24   Covid-19   Positive  2/06   MRSA nasal screen  Pending    Vancomycin level:   TROUGH:  No results for input(s): VANCOTROUGH in the last 72 hours. RANDOM:    Recent Labs     02/07/22  0420   VANCORANDOM 37.2       Assessment:  · SCr, BUN, and urine output:  · GUERA, Scr trending up  · Day(s) of therapy: 2  · Vancomycin concentration:   · 2/07: 37.2, collected 2h post-dose,  (above goal)    Plan:  · Adjust dosing from 1250 mg IVPB q12h to 1500 mg q24h  · Predicted trough: 10.2,   · Repeat next random level tomorrow AM  · Repeat renal trends tomorrow AM  · May need to adjust to intermittent dosing if GUERA worsens  · Pharmacy will continue to monitor patient and adjust therapy as indicated    Benito 3 02/08/22 @0600    Thank you for the consult.   Perfecto Gaviria [facility], PharmD  2/7/2022 4:31 PM

## 2022-02-07 NOTE — PROGRESS NOTES
02/07/22 1127   Vent Information   Vent Type 980   Vent Mode AC/PC   Pressure Ordered 18   Rate Set 18 bmp   Pressure Support 0 cmH20   FiO2  90 %   SpO2 95 %   SpO2/FiO2 ratio 105.56   Sensitivity 3   PEEP/CPAP 10   I Time/ I Time % 1 s   Humidification Source HME   Nitric Oxide/Epoprostenol In Use? No   Vent Patient Data   High Peep/I Pressure 18   Peak Inspiratory Pressure 28 cmH2O   Mean Airway Pressure 17 cmH20   Rate Measured 18 br/min   Vt Exhaled 754 mL   Minute Volume 13.6 Liters   I:E Ratio 1:1.70   Spontaneous Breathing Trial (SBT) RT Doc   Pulse 76   Breath Sounds   Right Upper Lobe Diminished   Right Middle Lobe Diminished   Right Lower Lobe Diminished   Left Upper Lobe Diminished   Left Lower Lobe Diminished   Additional Respiratory  Assessments   Resp (!) 0   Oral Care Mouth suctioned   Alarm Settings   High Pressure Alarm 40 cmH2O   Delay Alarm 20 sec(s)   Low Minute Volume Alarm 2.5 L/min   Apnea (secs) 20 secs   High Respiratory Rate 40 br/min   Low Exhaled Vt  250 mL   ETT (adult)   Placement Date/Time: 02/07/22 (c) 7740   Preoxygenation: Yes  Mask Ventilation: Mask ventilation not attempted (0)  Technique: Video laryngoscopy  Tube Size: 7.5 mm  Blade Size: 4  Grade View: Full view of the glottis  Insertion attempts: 1  Placement. ..    Secured at 24 cm   Measured From [address] [place] [address],[address] By [employer] tube dawn   Site Condition Dry

## 2022-02-07 NOTE — PROGRESS NOTES
Pulmonary and Critical Care  Progress Note    Subjective: The patient deteriorated and was intubated and placed on vent. Shortness of breath has worsened  Chest pain none  Addressing respiratory complaints Patient is negative for  hemoptysis and cyanosis  CONSTITUTIONAL:  negative for fevers and chills      Past Medical History:     has a past medical history of Arthritis, Asthma, Atrial fibrillation ([address] Cancer [address] CHF (congestive heart failure) ([facility] COPD (chronic obstructive pulmonary disease) ([address] Depression, Diabetes mellitus ([address] Diastolic heart failure ([address] Esophageal cancer ([facility] GERD (gastroesophageal reflux disease), History of external beam radiation therapy, Hyperlipidemia, Hypertension, Kidney stone, Migraine, Other disorders of kidney and ureter, Pneumonia, Psychiatric problem, Sleep apnea, and Unspecified cerebral artery occlusion with cerebral infarction. has a past surgical history that includes Cholecystectomy; Carpal tunnel release; Elbow surgery; ASD repair (2005); back surgery (01/2012); Pericardium surgery (2005); Abdomen surgery; Colonoscopy; eye surgery; eye surgery; ASD repair; other surgical history (02/16/2017); Cardiac surgery (08/2013); CYSTOSCOPY INSERTION / REMOVAL STENT / STONE (Right, 10/03/2020); Upper gastrointestinal endoscopy (10/09/2021); and Esophagectomy (N/A, 1/31/2022). reports that he has been smoking cigarettes. He has been smoking about 0.25 packs per day for the past 0.00 years. He has never used smokeless tobacco. He reports previous alcohol use. He reports that he does not use drugs. Family history:  family history includes Alzheimer's Disease in his mother; Cancer in his sister; Diabetes in his father, maternal grandfather, maternal grandmother, mother, paternal grandfather, paternal grandmother, and paternal uncle; Heart Disease in his father; Other in his mother.     No Known Allergies  Social History:    Reviewed; no changes    Objective: PHYSICAL EXAM:        VITALS:  BP (!) 129/97   Pulse 62   Temp 98 °F (36.7 °C) (Axillary)   Resp (!) 0   Ht 5' 10\" (1.778 m)   Wt 255 lb 11.7 oz (116 kg)   SpO2 98%   BMI 36.69 kg/m²     24HR INTAKE/OUTPUT:    Intake/Output Summary (Last 24 hours) at 2/7/2022 1038  Last data filed at 2/7/2022 1030  Gross per 24 hour   Intake 3725.82 ml   Output 405 ml   Net 3320.82 ml       CONSTITUTIONAL:  somnolent  LUNGS:  decreased breath sounds, basilar crackles. Evidence of recent surgery. CARDIOVASCULAR:  normal S1 and S2 and negative JVD  ABD:Abdomen soft, non-tender. BS normal. No masses,  No organomegaly  NEURO:Sedated on vent. DATA:    CBC:  Recent Labs     02/05/22  1230 02/06/22  0605 02/07/22  0420   WBC 23.7* 12.6* 13.9*   RBC 2.97* 3.34* 3.32*   HGB 9.9* 11.0* 10.8*   HCT 28.9* 33.3* 33.1*   * 114* 128*   MCV 97.3 99.7 99.7   MCH 33.3* 32.9* 32.5*   MCHC 34.3 33.0 32.6   RDW 14.6 14.6 14.7      BMP:  Recent Labs     02/05/22  1230 02/06/22  0605 02/07/22  0420    136 141   K 3.2* 3.9 3.5    104 107   CO2 21 23 22   BUN 14 23 50*   CREATININE 0.7* 0.9 1.3   CALCIUM 8.4 8.5 8.6   GLUCOSE 206* 307* 295*      ABG:  Recent Labs     02/06/22  0700 02/07/22  0430 02/07/22  0600   PH 7.37 7.42 7.21*   PO2ART 53* 45* 52*   DYG4CMV 35.0 33.0 54.0*   O2SAT 82.8* 76.3* 76.4*     Lab Results   Component Value Date    PROBNP 765.5 (H) 02/07/2022    PROBNP 37.81 02/29/2020    PROBNP 31.88 04/27/2018     No results found for: CULTRESP    Radiology Review:  Pertinent images / reports were reviewed as a part of this visit.     Assessment:     Patient Active Problem List   Diagnosis    Migraine    Diabetes mellitus ([address]    Diastolic heart failure (HCC)    TIA (transient ischemic attack)    Persistent headaches    Cerebral infarction ([address]    Abdominal pain, acute, right upper quadrant    Chronic back pain    Chronic pain    Migrainous headache without aura    COPD with acute exacerbation ([address]  DM (diabetes mellitus), type 2, uncontrolled (HCC)    Hyperglycemia    Acute chest pain    Anxiety    Asthma    Cardiac septal defect    Hemiparesis following cerebrovascular accident (CVA) ([address]    Displacement of lumbar intervertebral disc without myelopathy    Calculus of kidney    Abnormal liver enzymes    Herniated lumbar intervertebral disc    Memory impairment    Infection of wound due to methicillin resistant Staphylococcus aureus (MRSA)    Shortness of breath    Sleep apnea    Cerebral artery occlusion with cerebral infarction (HCC)    Increased frequency of urination    Urinary urgency    Hypertensive emergency    Seizures (HCC)    Syncope    Wound infection following procedure    Wound infection    Infected sebaceous cyst    COPD (chronic obstructive pulmonary disease) (HCC)    Anxiety    Migraine    DKA, type 2 (HCC)    Hypokalemia    Thrombocytopenia (HCC)    Type 2 diabetes mellitus ([address]    Essential hypertension    Hyperlipidemia    Depression    Diabetes ([address]    Ureterolithiasis    Ureteral stone    Malignant neoplasm of lower third of esophagus (HCC)    Gastroesophageal reflux disease with esophagitis without hemorrhage    Tobacco dependence    Esophageal cancer ([address]    Encounter for preadmission testing       Plan:   1. Overall the patient has worsened  2. Cont full vent support. Inc. A/C to 18. Repeat ABGs in 2 hrs. 3. Wean FiO2. 4. Start VEST therapy. Discussed with Dr Johanne Martinez.   Angelica Herrera MD   2/7/2022  10:38 AM

## 2022-02-07 NOTE — PROGRESS NOTES
Dr. Fito Unger at bedside- stated he wants to do a bedside bronch on pt- called [facility]- updated him.  Explained the schedule- stated the best time- informed Dr. Fito Unger, waiting for a repsonse

## 2022-02-07 NOTE — PROCEDURES
Arterial Line Placement Note    Indications: hemodynamic instability     Procedure: informed consent was obtained. The skin over the left radial artery was prepped using chlorhexidine. A mini-stick needle was used to puncture the artery under ultrasound guidance. A wire was passed through the needle. Seldinger technique was used to pass a catheter over the wire. The catheter was connected to pressure monitoring system. The patient tolerated the procedure well. No immediate complications.      Kenia Reddy PA-C

## 2022-02-07 NOTE — PROGRESS NOTES
Placed chest vest on pt with RT for percussion- will start the treatment once  she gathers all of the equipment for the machine

## 2022-02-07 NOTE — PROGRESS NOTES
Called lab informed them of new orders for blood cultures- stated she would inform the phlebot to come draw them

## 2022-02-07 NOTE — PROGRESS NOTES
02/07/22 1459   Vent Information   Vent Type 980   Vent Mode AC/PC   Pressure Ordered 18   Rate Set 18 bmp   Pressure Support 0 cmH20   FiO2  80 %   SpO2 94 %   SpO2/FiO2 ratio 117.5   Sensitivity 3   PEEP/CPAP 10   I Time/ I Time % 1 s   Vent Patient Data   High Peep/I Pressure 18   Peak Inspiratory Pressure 28 cmH2O   Mean Airway Pressure 17 cmH20   Rate Measured 18 br/min   Vt Exhaled 761 mL   Minute Volume 13.7 Liters   I:E Ratio 1:1.70   Cough/Sputum   Sputum How Obtained Endotracheal;Suctioned   $Obtained Sample $Induced Sputum   Cough Productive   Frequency Infrequent   Sputum Amount Small   Sputum Color Tan;Cloudy   Tenacity Thick   Spontaneous Breathing Trial (SBT) RT Doc   Pulse 81   Breath Sounds   Right Upper Lobe Diminished   Right Middle Lobe Diminished   Right Lower Lobe Diminished   Left Upper Lobe Diminished   Left Lower Lobe Diminished   Additional Respiratory  Assessments   Resp 11   Oral Care Mouth suctioned   Alarm Settings   High Pressure Alarm 40 cmH2O   Delay Alarm 20 sec(s)   Low Minute Volume Alarm 2.5 L/min   Apnea (secs) 20 secs   High Respiratory Rate 40 br/min   Low Exhaled Vt  250 mL   ETT (adult)   Placement Date/Time: 02/07/22 (c 0540   Preoxygenation: Yes  Mask Ventilation: Mask ventilation not attempted (0)  Technique: Video laryngoscopy  Tube Size: 7.5 mm  Blade Size: 4  Grade View: Full view of the glottis  Insertion attempts: 1  Placement. ..    Secured at 24 cm   Measured From [address] [place] [address],[address] By [employer] tube dawn   Site Condition Dry

## 2022-02-07 NOTE — PROGRESS NOTES
Spoke to JEFF Goff at this time and new order to obtain ABG's and if abg are consistent with previous abg's then to intubate patient bedside.

## 2022-02-07 NOTE — PROGRESS NOTES
BP down after bronch completed- pt turned repositioned onto left side- Dr. Baron Churchill felt pt extremely dry- 1 liter bolus of IV fluids hung- see MAR

## 2022-02-07 NOTE — SIGNIFICANT EVENT
Significant event documentation:    Patient assessed at bedside due to nursing report of hypoxia 77 to 79%. Patient has been mid to upper 80s however nurse states that when they turned him to change his linens he dropped into the 70s and has not recovered his SPO2. Patient states that his breathing feels unchanged and he does not feel short of breath, he complains of mild midsternal chest pain that is not new given recent esophageal resection patient normotensive. Heart rate 97 sinus rhythm. Afebrile. Will get stat CXR. Nurse to notify Dr. Aracelis Cueva of patient status change. Reason for call/time of call:   Hypoxia    Physical Exam:   Vitals:    02/07/22 0029   BP: 124/81   Pulse: 97   Resp: 29   Temp: 97.6 °F (36.4 °C)   SpO2:  79%      Mental status: Drowsy but arouses to verbal stimuli, no lateralizing weakness  CV:RRR  Lungs: Lung sounds diminished right upper and lower lobe, clear left upper lobe diminished left lower lobe, no rales rhonchi or wheezing.     Interventions/orders placed:  Stat CXR pending      TAMEKA Childers NP  Hospitalist

## 2022-02-07 NOTE — CONSULTS
IV Consult complete. Arrow Endurance 18g 8cm extended dwell PIv inserted in right FA cephalic vessel x1 attempt with ultrasound guidance , brisk blood return, flushes without resistance. Secure with WIngguard, biopatch and sterile occlusive dressing. Alcohol swabcap applied.

## 2022-02-07 NOTE — ANESTHESIA PROCEDURE NOTES
Airway  Date/Time: 2/7/2022 5:40 AM  Urgency: emergent    Difficult airway    General Information and Staff    Patient location during procedure: ICU  Anesthesiologist: Dulce Rowley DO  Resident/CRNA: TAMEKA Delgado CRNA  Performed: resident/CRNA     Consent for Airway (if performed for an anesthetic, see related documentation for consents)  Patient identity confirmed: per hospital policy  Consent: The procedure was performed in an emergent situation. Verbal consent obtained. Written consent not obtained.   Risks and benefits: risks, benefits and alternatives were not discussed  Consent given by: patient      Code status verified:yes  Indications and Patient Condition  Indications for airway management: hypoxemia  Spontaneous ventilation: present  Sedation level: deep  Preoxygenated: yes  Patient position: sniffing  MILS maintained throughout  Mask difficulty assessment: not attempted    Final Airway Details  Final airway type: endotracheal airway      Successful airway: ETT  Cuffed: yes   Successful intubation technique: video laryngoscopy  Blade size: #4  ETT size (mm): 7.5  Cormack-Lehane Classification: grade I - full view of glottis  Placement verified by: chest auscultation and capnometry   Measured from: gums  ETT to gums (cm): 23  Number of attempts at approach: 1  Ventilation between attempts: bag mask  Number of other approaches attempted: 0

## 2022-02-07 NOTE — PROGRESS NOTES
Spoke with patient in regards to being intubated with benefits and risks patient verbalized understanding and stated \"do whatever you have to do\".

## 2022-02-07 NOTE — CONSULTS
Comprehensive Nutrition Assessment    Type and Reason for Visit:  Consult    Nutrition Recommendations/Plan:   EN Order: Pivot 1.5 @ 10 ml/hr  Advance slowly to goal rate of 40 ml/hr which will provide ~2174 kcal (including kcal from propofol) and 90 g protein  Add Proteinex BID to help meet estimated protein needs   Will closely monitor GI tolerance, hemodynamic stability, nutrition status, poc    Nutrition Assessment:  pt now sedated on vent, s/p bronch, +NGT to suction, MAP varied, dietitian consult for tube feed order and manage, will follow at high nutrition risk    Malnutrition Assessment:  Malnutrition Status: At risk for malnutrition (Comment)    Context:  Acute Illness       Estimated Daily Nutrient Needs:  Energy (kcal):  [facility] 2003b); Weight Used for Energy Requirements:  Current     Protein (g):  150 (2.0 g/kg);  Weight Used for Protein Requirements:  Ideal            Nutrition Related Findings:  Labs: BUN 50, Glucose 295, .5      Wounds:  Surgical Incision       Current Nutrition Therapies:    Diet NPO  ADULT TUBE FEEDING; Jejunostomy; Standard with Fiber; Continuous; 10; No; 30; Q 1 hour  Additional Calorie Sources:   Pt is receiving 734 kcal from propofol    Anthropometric Measures:  · Height: 5' 10\" (177.8 cm)  · Current Body Weight: 255 lb 11.7 oz (116 kg)   · Admission Body Weight: 255 lb 11.7 oz (116 kg)    · Usual Body Weight: 278 lb (126.1 kg) ((10/21/21) per chart review)     · Ideal Body Weight: 166 lbs; % Ideal Body Weight 154.1 %   · BMI: 36.7  · BMI Categories: Obese Class 2 (BMI 35.0 -39.9)       Nutrition Diagnosis:   · Inadequate oral intake related to acute injury/trauma as evidenced by NPO or clear liquid status due to medical condition    Nutrition Interventions:   Food and/or Nutrient Delivery:  Modify Tube Feeding  Nutrition Education/Counseling:  No recommendation at this time   Coordination of Nutrition Care:  Continue to monitor while inpatient    Goals:  Pt will meet greater than 75% of estimated nutrient needs via EN       Nutrition Monitoring and Evaluation:   Behavioral-Environmental Outcomes:  None Identified   Food/Nutrient Intake Outcomes:  Enteral Nutrition Intake/Tolerance  Physical Signs/Symptoms Outcomes:  Biochemical Data,GI Status,Hemodynamic Status,Fluid Status or Edema,Weight,Skin     Discharge Planning:     Too soon to determine     Electronically signed by Emelia Atkinson MS, RD, LD on 2/7/22 at 9:33 AM EST    Contact: [phone]

## 2022-02-07 NOTE — PROGRESS NOTES
Per verbal order from Dr. Anel Guthrie, pt was suctioned with sterile suction catheter with little to no secretions

## 2022-02-07 NOTE — PROGRESS NOTES
HR down 30's- /100's- Hanna GROSS/Dr. ENRIQUE at bedside- not sure why pt had the episode but Dr. Dex Osborn adjust all the IV's until pt's BP stabilized- see MAR for all documentation and all additional meds given

## 2022-02-08 NOTE — PROGRESS NOTES
02/08/22 0714   Vent Information   $Ventilation $Subsequent Day   Vent Type 980   Vent Mode AC/PC   Pressure Ordered 18   Rate Set 18 bmp   Pressure Support 0 cmH20   FiO2  75 %   SpO2 95 %   SpO2/FiO2 ratio 126.67   Sensitivity 3   PEEP/CPAP 10   I Time/ I Time % 1 s   Humidification Source HME   Nitric Oxide/Epoprostenol In Use? No   Vent Patient Data   High Peep/I Pressure 18   Peak Inspiratory Pressure 30 cmH2O   Mean Airway Pressure 18 cmH20   Rate Measured 20 br/min   Vt Exhaled 747 mL   Minute Volume 15.2 Liters   I:E Ratio 1:1.40   Plateau Pressure 25 LYG90   Static Compliance 49 mL/cmH2O   Total PEEP 10 cmH20   Auto PEEP 0.4 cmH20   Cough/Sputum   Sputum How Obtained Endotracheal;Suctioned   $Obtained Sample $Induced Sputum   Cough Non-productive   Frequency Infrequent   Sputum Amount None   Sputum Color None   Tenacity None   Spontaneous Breathing Trial (SBT) RT Doc   Pulse 66   Breath Sounds   Right Upper Lobe Diminished   Right Middle Lobe Diminished   Right Lower Lobe Diminished   Left Upper Lobe Diminished   Left Lower Lobe Diminished   Additional Respiratory  Assessments   Resp 21   Oral Care Mouth suctioned   Alarm Settings   High Pressure Alarm 40 cmH2O   Delay Alarm 20 sec(s)   Low Minute Volume Alarm 3 L/min   Apnea (secs) 20 secs   High Respiratory Rate 40 br/min   Low Exhaled Vt  250 mL   ETT (adult)   Placement Date/Time: 02/07/22 (c) 0540   Preoxygenation: Yes  Mask Ventilation: Mask ventilation not attempted (0)  Technique: Video laryngoscopy  Tube Size: 7.5 mm  Blade Size: 4  Grade View: Full view of the glottis  Insertion attempts: 1  Placement. ..    Secured at 25 cm   Measured From [address] [place] [address],[address] By [employer] tube dawn   Site Condition Dry

## 2022-02-08 NOTE — PROGRESS NOTES
[address] [facility]  consulted by Dr. Peg Borges for monitoring and adjustment. Indication for treatment: Aspiration pneumonia  Goal trough: [] 10-15 mcg/mL or [x] 15-20 mcg/ml  AUC/ONELIA: [] <500 or [x] 400-600    Pertinent Laboratory Values:   Temp Readings from Last 3 Encounters:   02/08/22 97.5 °F (36.4 °C)   01/31/22 97.2 °F (36.2 °C)   01/14/22 97.4 °F (36.3 °C) (Infrared)     Recent Labs     02/06/22  0605 02/07/22  0420 02/08/22  0530   WBC 12.6* 13.9* 14.6*     Recent Labs     02/06/22  0605 02/07/22 0420 02/08/22  0530   BUN 23 50* 62*   CREATININE 0.9 1.3 1.4*     Estimated Creatinine Clearance: 78 mL/min (A) (based on SCr of 1.4 mg/dL (H)). Intake/Output Summary (Last 24 hours) at 2/8/2022 1418  Last data filed at 2/8/2022 1239  Gross per 24 hour   Intake 5133.62 ml   Output 1885 ml   Net 3248.62 ml       Pertinent Cultures:  Date    Source    Results  1/24   Covid-19   Positive  2/06   MRSA nasal screen  Negative  2/07   Urine    Pending    Vancomycin level:   TROUGH:  No results for input(s): VANCOTROUGH in the last 72 hours.   RANDOM:    Recent Labs     02/07/22  0420 02/08/22  0530   VANCORANDOM 37.2 21.9       Assessment:  · SCr, BUN, and urine output:  · GUERA, Scr continues to trend up slightly  · Baseline 0.7  · Day(s) of therapy: 3  · Vancomycin concentration:   · 2/07: 37.2, collected 2h post-dose,  (above goal)  · 2/08: 21.9, above goal for re-dosing    Plan:  · Intermittent vancomycin dosing while in GUERA  · Based on level, will hold vancomycin  · Repeat level tomorrow AM  · Pharmacy will continue to monitor patient and adjust therapy as indicated    VANCOMYCIN CONCENTRATION SCHEDULED FOR 02/09/22 @0600    Thank you for the consult,  Obey [facility], PharmD  2/8/2022 2:18 PM

## 2022-02-08 NOTE — PROGRESS NOTES
Pulmonary and Critical Care  Progress Note    Subjective: The patient is sedated on vent. ABGs better. Shortness of breath none. Chest pain none  Addressing respiratory complaints Patient is negative for  hemoptysis and cyanosis  CONSTITUTIONAL:  negative for fevers and chills      Past Medical History:     has a past medical history of Arthritis, Asthma, Atrial fibrillation ([address] Cancer ([address] CHF (congestive heart failure) ([address] COPD (chronic obstructive pulmonary disease) ([address] Depression, Diabetes mellitus ([address] Diastolic heart failure ([address] Es[address] [facility] [address] [address] GERD (gastroesophageal reflux disease), History of external beam radiation therapy, Hyperlipidemia, Hypertension, Kidney stone, Migraine, Other disorders of kidney and ureter, Pneumonia, Psychiatric problem, Sleep apnea, and Unspecified cerebral artery occlusion with cerebral infarction. has a past surgical history that includes Cholecystectomy; Carpal tunnel release; Elbow surgery; ASD repair (2005); back surgery (01/2012); Pericardium surgery (2005); Abdomen surgery; Colonoscopy; eye surgery; eye surgery; ASD repair; other surgical history (02/16/2017); Cardiac surgery (08/2013); CYSTOSCOPY INSERTION / REMOVAL STENT / STONE (Right, 10/03/2020); Upper gastrointestinal endoscopy (10/09/2021); and Esophagectomy (N/A, 1/31/2022). reports that he has been smoking cigarettes. He has been smoking about 0.25 packs per day for the past 0.00 years. He has never used smokeless tobacco. He reports previous alcohol use. He reports that he does not use drugs. Family history:  family history includes Alzheimer's Disease in his mother; Cancer in his sister; Diabetes in his father, maternal grandfather, maternal grandmother, mother, paternal grandfather, paternal grandmother, and paternal uncle; Heart Disease in his father; Other in his mother.     No Known Allergies  Social History:    Reviewed; no changes    Objective:   PHYSICAL EXAM:        VITALS: BP (!) 98/49   Pulse 66   Temp 97.4 °F (36.3 °C) (Axillary)   Resp 18   Ht 5' 10\" (1.778 m)   Wt 255 lb 11.7 oz (116 kg)   SpO2 95%   BMI 36.69 kg/m²     24HR INTAKE/OUTPUT:      Intake/Output Summary (Last 24 hours) at 2/8/2022 1043  Last data filed at 2/8/2022 0831  Gross per 24 hour   Intake 5786.78 ml   Output 1530 ml   Net 4256.78 ml       CONSTITUTIONAL:  Somnolent. LUNGS:  decreased breath sounds, basilar crackles. Evidence of recent surgery. CARDIOVASCULAR:  normal S1 and S2 and negative JVD  ABD:Abdomen soft, non-tender. BS normal. No masses,  No organomegaly  NEURO:Sedated on vent. DATA:    CBC:  Recent Labs     02/05/22  1230 02/05/22  1230 02/06/22  0605 02/07/22  0420 02/08/22  0530   WBC 23.7*   < > 12.6* 13.9* 14.6*   RBC 2.97*   < > 3.34* 3.32* 2.60*   HGB 9.9*   < > 11.0* 10.8* 8.5*   HCT 28.9*   < > 33.3* 33.1* 25.1*   *  --  114* 128*  --    MCV 97.3   < > 99.7 99.7 96.5   MCH 33.3*   < > 32.9* 32.5* 32.7*   MCHC 34.3   < > 33.0 32.6 33.9   RDW 14.6   < > 14.6 14.7 14.7    < > = values in this interval not displayed. BMP:  Recent Labs     02/06/22  0605 02/07/22  0420 02/08/22  0530    141 134*   K 3.9 3.5 3.3*    107 104   CO2 23 22 17*   BUN 23 50* 62*   CREATININE 0.9 1.3 1.4*   CALCIUM 8.5 8.6 7.9*   GLUCOSE 307* 295* 290*      ABG:  Recent Labs     02/07/22  0600 02/07/22  1100 02/08/22  0600   PH 7.21* 7.37 7.45   PO2ART 52* 76 82   UJG3ZXC 54.0* 33.0 26.0*   O2SAT 76.4* 92.3* 95.4*     Lab Results   Component Value Date    PROBNP 595.1 (H) 02/08/2022    PROBNP 765.5 (H) 02/07/2022    PROBNP 37.81 02/29/2020     No results found for: [facility]    Radiology Review:  Pertinent images / reports were reviewed as a part of this visit.     Assessment:     Patient Active Problem List   Diagnosis    Migraine    Diabetes mellitus ([address]    Diastolic heart failure (HCC)    TIA (transient ischemic attack)    Persistent headaches    Cerebral infarction ([address]    Abdominal pain, acute, right upper quadrant    Chronic back pain    Chronic pain    Migrainous headache without aura    COPD with acute exacerbation ([address]    DM (diabetes mellitus), type 2, uncontrolled (HCC)    Hyperglycemia    Acute chest pain    Anxiety    Asthma    Cardiac septal defect    Hemiparesis following cerebrovascular accident (CVA) ([address]    Displacement of lumbar intervertebral disc without myelopathy    Calculus of kidney    Abnormal liver enzymes    Herniated lumbar intervertebral disc    Memory impairment    Infection of wound due to methicillin resistant Staphylococcus aureus (MRSA)    Shortness of breath    Sleep apnea    Cerebral artery occlusion with cerebral infarction ([address]    Increased frequency of urination    Urinary urgency    Hypertensive emergency    Seizures ([address]    Syncope    Wound infection following procedure    Wound infection    Infected sebaceous cyst    COPD (chronic obstructive pulmonary disease) (HCC)    Anxiety    Migraine    DKA, type 2 (HCC)    Hypokalemia    Thrombocytopenia (HCC)    Type 2 diabetes mellitus ([address]    Essential hypertension    Hyperlipidemia    Depression    Diabetes ([address]    Ureterolithiasis    Ureteral stone    Malignant neoplasm of lower third of esophagus (HCC)    Gastroesophageal reflux disease with esophagitis without hemorrhage    Tobacco dependence    Esophageal cancer ([address]    Encounter for preadmission testing       Plan:   1. Overall the patient is sl better. 2. Wean FiO2.  3. Supp kcl.   Harrison Villegas MD   2/8/2022  10:43 AM

## 2022-02-08 NOTE — PLAN OF CARE

## 2022-02-08 NOTE — PROGRESS NOTES
PATIENT NAME: Andrés Jones    TODAY'S DATE: 02/08/22    SUBJECTIVE:    Pt is POD # 8 s/p esophagectomy. Pt remains sedated and intubated. No bowel function. BP has stabilized. OBJECTIVE:   VITALS:    Vitals:    02/08/22 0828   BP:    Pulse: 66   Resp: 18   Temp:    SpO2:      INTAKE/OUTPUT:    Date 02/08/22 0000 - 02/08/22 2359   Shift 1798-0631 4517-8353 7718-3514 24 Hour Total   INTAKE   I.V.(mL/kg/hr) 1370.3(1.5) 298.8  1669.2   NG/GT 96 50  146   IV Piggyback 70.3   70.3   .4 95.2  368.6   Shift Total(mL/kg) 1810(15.6) 444(3.8)  2254. 1(19.4)   OUTPUT   Urine(mL/kg/hr) 400(0.4) 125  525   Emesis/NG output 90 10  100   Chest Tube 195 0  195   Shift Total(mL/kg) 685(5.9) 135(1.2)  820(7.1)   Weight (kg) 116 116 116 116      No data found. EXAM:  Blood pressure (!) 102/53, pulse 66, temperature 97.6 °F (36.4 °C), temperature source Oral, resp. rate 18, height 5' 10\" (1.778 m), weight 255 lb 11.7 oz (116 kg), SpO2 95 %. General appearance: sedated, intubated   Skin: unremarkable  HEENT Normocephalic, atraumatic without obvious deformity. Neck: Supple, Trachea midline   Lungs: Good respiratory effort. Course breath sounds bilat. CT output serous. Heart: Regular rate/ rhythm inc c/d/i  Abdomen: Soft, non-tender or non-distended   Extremities: 1+ edema warm well perfused  Neurologic: sedated  Mental status: unable to assess       Data:  CBC:   Recent Labs     02/05/22  1230 02/05/22  1230 02/06/22  0605 02/07/22  0420 02/08/22  0530   WBC 23.7*   < > 12.6* 13.9* 14.6*   HGB 9.9*   < > 11.0* 10.8* 8.5*   HCT 28.9*   < > 33.3* 33.1* 25.1*   *  --  114* 128*  --     < > = values in this interval not displayed.      BMP:    Recent Labs     02/06/22  0605 02/07/22  0420 02/08/22  0530    141 134*   K 3.9 3.5 3.3*    107 104   CO2 23 22 17*   BUN 23 50* 62*   CREATININE 0.9 1.3 1.4*   GLUCOSE 307* 295* 290*     Hepatic:   Recent Labs     02/07/22  0420   AST 10*   ALT 9*   BILITOT 1.0 ALKPHOS 85     Mag:      Recent Labs     02/06/22  0905 02/07/22  0420 02/08/22  0530   MG 2.0 2.3 2.2      Phos:     Recent Labs     02/07/22  0420 02/08/22  0530   PHOS 3.9 4.3      INR: No results for input(s): INR in the last 72 hours. Radiology Review:  CXR  Impression:     Stable exam with bilateral airspace opacities, right much more than left.         ASSESSMENT AND PLAN:    Patient Active Problem List   Diagnosis    Migraine    Diabetes mellitus ([address]    Diastolic heart failure (HCC)    TIA (transient ischemic attack)    Persistent headaches    Cerebral infarction ([address]    Abdominal pain, acute, right upper quadrant    Chronic back pain    Chronic pain    Migrainous headache without aura    COPD with acute exacerbation ([address]    DM (diabetes mellitus), type 2, uncontrolled ([address]    Hyperglycemia    Acute chest pain    Anxiety    Asthma    Cardiac septal defect    Hemiparesis following cerebrovascular accident (CVA) ([address]    Displacement of lumbar intervertebral disc without myelopathy    Calculus of kidney    Abnormal liver enzymes    Herniated lumbar intervertebral disc    Memory impairment    Infection of wound due to methicillin resistant Staphylococcus aureus (MRSA)    Shortness of breath    Sleep apnea    Cerebral artery occlusion with cerebral infarction ([address]    Increased frequency of urination    Urinary urgency    Hypertensive emergency    Seizures ([address]    Syncope    Wound infection following procedure    Wound infection    Infected sebaceous cyst    COPD (chronic obstructive pulmonary disease) ([address]    Anxiety    Migraine    DKA, type 2 (HCC)    Hypokalemia    Thrombocytopenia (HCC)    Type 2 diabetes mellitus ([address]    Essential hypertension    Hyperlipidemia    Depression    Diabetes ([address]    Ureterolithiasis    Ureteral stone    Malignant neoplasm of lower third of esophagus (HCC)    Gastroesophageal reflux disease with esophagitis without hemorrhage    Tobacco dependence    [facility] ([facility]    Encounter for preadmission testing       S/P esophagectomy    Cardio: BP stable, off levo. Continue to hold BB. Pulm: suspect covid pneumonia. PO2 82 on FiO2 80%, wean FiO2 as tolerated. On decadron x 5 days. Daily CXR. GI: tolerating trickle TF, abd soft and nondistended. No BM. Continue TF at 10 for now. Renal: creatinine up to 1.4, BUN 62. Continue MIVF. Adequate UOP. Increase TPN to 75. Pan-cultures pending. On vanc/zosyn. WBC 14, afebrile.      Colleen Schwartz PA-C

## 2022-02-08 NOTE — PROGRESS NOTES
Hospitalist Progress Note      Name:  Iman Luke /Age/Sex: 1970  (46 y.o. male)   MRN & CSN:  [id number] & [id number] Admission Date/Time: 2022  5:38 AM   Location:  -A PCP: Rodrigue Siddiqi DO         Hospital Day: 9    Assessment and Plan:   Iman Luke is a 46 y.o.  male admitted with acute respiratory failure, has a past medical history of obstructive sleep apnea, hypertension, hyperlipidemia, type 2 diabetes mellitus, COPD excuse me    1. Acute respiratory failure with hypoxia secondary to COVID-19 pneumonia. 2. COVID-19 viral pneumonia  3. History of esophageal cancer status post esophagectomy  4. Suspected superimposed bacterial aspiration pneumonia  5. Type 2 diabetes mellitus with hyperglycemia  6. History of obstructive sleep apnea, COPD  7. Essential hypertension    His chest x-ray shows bilateral airspace opacities, right greater than left. Procalcitonin is elevated at 13.09. He is on IV antibiotics, Zosyn and vancomycin for suspected superimposed bacterial pneumonia. He does have hyperglycemia with blood sugar in the 200s to 300s. Continue subcutaneous insulin  Patient is mechanically ventilated and sedated      Interval History     Patient was seen and examined at the bedside. Respiratory therapist, gradually weaning down oxygen. FiO2 down to 65% from 75% earlier today. No acute events overnight. Objective: Intake/Output Summary (Last 24 hours) at 2022 1522  Last data filed at 2022 1239  Gross per 24 hour   Intake 4883.62 ml   Output 1885 ml   Net 2998.62 ml      Vitals:   Vitals:    22 1140   BP: (!) 103/50   Pulse: 60   Resp: 18   Temp: 97.5 °F (36.4 °C)   SpO2:      Physical Exam:   GEN on mechanical ventilation    EYES Pupils are equally round. No scleral erythema, discharge, or conjunctivitis. HENT Mucous membranes are moist. Oral pharynx without exudates, no evidence of thrush.     NECK Supple, no apparent thyromegaly or glucose, 15 g, PRN  dextrose, 12.5 g, PRN  glucagon (rDNA), 1 mg, PRN  dextrose, 100 mL/hr, PRN  albuterol sulfate HFA, 2 puff, Q6H PRN  HYDROmorphone, 0.5 mg, Q4H PRN  sodium chloride flush, 5-40 mL, PRN  sodium chloride, 25 mL, PRN  potassium chloride, 20 mEq, PRN  bisacodyl, 10 mg, Daily PRN  phenol, 1 spray, Q2H PRN  ipratropium, 2 puff, 4x Daily PRN  acetaminophen, 650 mg, Q4H PRN  HYDROcodone 5 mg - acetaminophen, 1 tablet, Q4H PRN  labetalol, 10 mg, Q2H PRN  hydrALAZINE, 10 mg, Q1H PRN  magnesium sulfate, 1,000 mg, PRN  ondansetron, 4 mg, Q6H PRN          Electronically signed by Eugenia Arnold MD on 2/8/2022 at 3:22 PM

## 2022-02-08 NOTE — PROGRESS NOTES
Progress Note( Dr. Debbie Lloyd)  2/7/2022  Subjective:   Admit Date: 1/31/2022  PCP: Monse Siddiqi DO    Admitted For : Malignant neoplasm of abdominal esophagus and underwent  esophagectomy    Consulted For:  Better control of blood glucose    Interval History: Patient had rapid response due to severe hypoxia. He was sedated intubated and placed on the ventilator morning on 2/7/2022           Intake/Output Summary (Last 24 hours) at 2/7/2022 2154  Last data filed at 2/7/2022 2011  Gross per 24 hour   Intake 6740.75 ml   Output 360 ml   Net 6380.75 ml       DATA    CBC:   Recent Labs     02/05/22  1230 02/06/22  0605 02/07/22  0420   WBC 23.7* 12.6* 13.9*   HGB 9.9* 11.0* 10.8*   * 114* 128*    CMP:  Recent Labs     02/05/22  1230 02/06/22  0605 02/07/22  0420    136 141   K 3.2* 3.9 3.5    104 107   CO2 21 23 22   BUN 14 23 50*   CREATININE 0.7* 0.9 1.3   CALCIUM 8.4 8.5 8.6   PROT  --   --  4.8*   LABALBU  --   --  2.6*   BILITOT  --   --  1.0   ALKPHOS  --   --  85   AST  --   --  10*   ALT  --   --  9*     Lipids:   Lab Results   Component Value Date    CHOL 162 03/07/2016    HDL 26 03/07/2016    TRIG 718 03/07/2016     Glucose:  Recent Labs     02/07/22  1216 02/07/22  1607 02/07/22 2013   POCGLU 233* 264* 234*     [id number]:  Lab Results   Component Value Date    LABA1C 5.9 02/06/2022     High Sensitivity TSH:   Lab Results   Component Value Date    TSHHS 2.110 08/17/2016     Free T3: No results found for: FT3  Free T4:  Lab Results   Component Value Date    T4FREE 0.91 05/15/2013       XR CHEST PORTABLE   Final Result   Right pneumothorax has resolved      Bilateral pulmonary opacities persist         XR CHEST PORTABLE   Final Result   Slightly improved moderate right pneumothorax, estimated to be 20-30%. Unchanged chest tube. Unremarkable positioning of an endotracheal tube projecting 3 cm above the   jj. XR CHEST PORTABLE   Final Result   1.   Interval development of moderate size right pneumothorax with chest tube   in place. 2.  Left basilar opacities have increased in the interval, which may   represent atelectasis. The findings were sent to the Radiology Results [address] at 5:14   am on 2/7/2022to be communicated to a licensed caregiver. XR CHEST PORTABLE   Final Result   Worsening right lung airspace consolidation. Postsurgical changes status post esophagectomy and gastric pull-through. CT CHEST PULMONARY EMBOLISM W CONTRAST   Final Result   No evidence of pulmonary embolism. Recent postsurgical changes status post distal esophagectomy and gastric   pull-through. Likely postsurgical changes noted in the mediastinum with   fluid and air in the region of the recent surgery. Bilateral pleural effusions with bibasilar atelectasis. Multifocal right lower lobe and right upper lobe airspace disease with   tree-in-bud nodules. No evidence of airspace disease in the left lung. Findings suggest right lung pneumonia, possibly from aspiration. Trace right pneumothorax with right chest tubes present. Pneumoperitoneum consistent with recent surgery. XR CHEST PORTABLE   Final Result   1. Status post right chest tube removal.  No pneumothorax. 2. Right parahilar and bibasilar opacities compatible with atelectasis. Aspiration and pneumonia are not excluded         XR CHEST PORTABLE   Final Result   Left basilar opacity could represent atelectasis or infection         XR CHEST PORTABLE   Final Result   Stable exam.         XR ABDOMEN FOR NG/OG/NE TUBE PLACEMENT   Final Result   1. Gastric catheter tip is near the he a hemidiaphragm at the normal   expected location of the GE junction prior surgery. If further localization is required, small amount of water-soluble contrast   via the gastric catheter would be useful for localization.       2.  Free air likely related to recent esophagectomy. XR CHEST PORTABLE   Final Result   Status post esophagectomy with postoperative perihilar and bibasilar   atelectasis. XR CHEST PORTABLE   Final Result   Stable appearance of the chest with opacities at the central right lung and   left base. Gas-filled esophagus or gastric pull-through to the right of the   spine. Correlate with surgical history. The tip of the nasogastric tube is   in the left upper abdomen but the side port would overlie the lower thorax. If gastric pull through then correlate with surgery for desired position. XR CHEST PORTABLE   Final Result   Stable chest x-ray with right perihilar opacities. XR CHEST PORTABLE   Final Result   Cardiomegaly with scattered pulmonary infiltrates. XR CHEST PORTABLE   Final Result   Status post esophagectomy with gastric pull-through. Pneumomediastinum,   pneumoperitoneum and trace right pneumothorax consistent with recent surgery. Right chest tube present. Atelectasis noted in both lungs.          XR CHEST PORTABLE    (Results Pending)        Scheduled Medicines   Medications:    chlorhexidine  15 mL Mouth/Throat BID    insulin glargine  40 Units SubCUTAneous Nightly    insulin lispro  0-18 Units SubCUTAneous Q4H    insulin NPH  20 Units SubCUTAneous QAM    albuterol sulfate HFA  4 puff Inhalation 6 times per day    And    ipratropium  4 puff Inhalation 6 times per day    albumin human        pantoprazole  40 mg IntraVENous Daily    [START ON 2/8/2022] vancomycin  1,500 mg IntraVENous Q24H    acetylcysteine  600 mg Inhalation BID    dexamethasone  20 mg IntraVENous Daily    [START ON 2/11/2022] dexamethasone  10 mg IntraVENous Daily    guaiFENesin  400 mg Oral 4x Daily    sodium chloride flush  5-40 mL IntraVENous 2 times per day    enoxaparin  40 mg SubCUTAneous BID    piperacillin-tazobactam  3,375 mg IntraVENous Q8H    [Held by provider] carvedilol  12.5 mg Per J Tube BID     sertraline  100 mg Per J Tube Daily    lidocaine  1 patch TransDERmal Daily      Infusions:    fentaNYL 50 mcg/hr (02/07/22 1806)    propofol 30 mcg/kg/min (02/07/22 2011)    sodium chloride 100 mL/hr at 02/07/22 1920    norepinephrine Stopped (02/07/22 1857)    PN-Adult Premix  4.25/10 - Standard Electrolytes - Peripheral Line 42 mL/hr at 02/07/22 1806    dextrose      sodium chloride           Objective:   Vitals: BP (!) 97/51   Pulse 68   Temp 98.6 °F (37 °C) (Oral)   Resp 18   Ht 5' 10\" (1.778 m)   Wt 255 lb 11.7 oz (116 kg)   SpO2 95%   BMI 36.69 kg/m²   General appearance: Patient is sedated, intubated and on ventilator   neck: no JVD or bruit//had esophagectomy  Thyroid : Normal lobes   Lungs: Has Vesicular Breath sounds   Heart:  regular rate and rhythm  Abdomen: soft, non-tender; bowel sounds normal; no masses,  no organomegaly  esophagectomy NG tube  Musculoskeletal: Normal  Extremities: extremities normal, , no edema  Neurologic:   Patient is sedated, intubated and on ventilator.     Assessment:     Patient Active Problem List:     Migraine     Diabetes mellitus (HCC)     Diastolic heart failure (HCC)     TIA (transient ischemic attack)     Persistent headaches     Cerebral infarction (HCC)     Abdominal pain, acute, right upper quadrant     Chronic back pain     Chronic pain     Migrainous headache without aura     COPD with acute exacerbation (HCC)     DM (diabetes mellitus), type 2, uncontrolled (HCC)     Hyperglycemia     Acute chest pain     Anxiety     Asthma     Cardiac septal defect     Hemiparesis following cerebrovascular accident (CVA) ([facility]     Displacement of lumbar intervertebral disc without myelopathy     Calculus of kidney     Abnormal liver enzymes     Herniated lumbar intervertebral disc     Memory impairment     Infection of wound due to methicillin resistant Staphylococcus aureus (MRSA)     Shortness of breath     Sleep apnea     Cerebral artery occlusion with cerebral infarction [facility])     Increased frequency of urination     Urinary urgency     Hypertensive emergency     Seizures ([facility]     Syncope     Wound infection following procedure     Wound infection     Infected sebaceous cyst     COPD (chronic obstructive pulmonary disease) (HCC)     Anxiety     Migraine     DKA, type 2 (HCC)     Hypokalemia     Thrombocytopenia (HCC)     Type 2 diabetes mellitus ([facility] [facility]     Essential hypertension     Hyperlipidemia     Depression     Diabetes ([facility]     Ureterolithiasis     Ureteral stone     Malignant neoplasm of lower third of esophagus ([facility])     Gastroesophageal reflux disease with esophagitis without hemorrhage     Tobacco dependence     Esophageal cancer ([facility]     Encounter for preadmission testing      Plan:     1. Reviewed POC blood glucose . Labs and X ray results   2. Reviewed Current Medicines   3. On  Correction bolus Humalog/ Basal Lantus Insulin regime Monitor Blood glucose freque  4. Modified  the dose of Insulin/ other medicines as needed   5. Will follow     .      Shonna Braxton MD, MD

## 2022-02-08 NOTE — PROGRESS NOTES
02/08/22 1526   Vent Information   Vent Type 980   Vent Mode AC/PC   Pressure Ordered 18   Rate Set 18 bmp   Pressure Support 0 cmH20   FiO2  60 %   SpO2 96 %   SpO2/FiO2 ratio 160   Sensitivity 3   PEEP/CPAP 10   I Time/ I Time % 1 s   Humidification Source HME   Nitric Oxide/Epoprostenol In Use? No   Vent Patient Data   High Peep/I Pressure 18   Peak Inspiratory Pressure 30 cmH2O   Mean Airway Pressure 17 cmH20   Rate Measured 19 br/min   Vt Exhaled 840 mL   Minute Volume 15.9 Liters   I:E Ratio 1:1.60   Cough/Sputum   Sputum How Obtained Endotracheal   $Obtained Sample $Induced Sputum   Cough Non-productive   Frequency Infrequent   Sputum Amount None   Sputum Color None   Tenacity None   Spontaneous Breathing Trial (SBT) RT Doc   Pulse 62   Breath Sounds   Right Upper Lobe Diminished   Right Middle Lobe Diminished   Right Lower Lobe Diminished   Left Upper Lobe Diminished   Left Lower Lobe Diminished   Additional Respiratory  Assessments   Resp 18   Alarm Settings   High Pressure Alarm 40 cmH2O   Delay Alarm 20 sec(s)   Low Minute Volume Alarm 3 L/min   Apnea (secs) 20 secs   High Respiratory Rate 40 br/min   Low Exhaled Vt  250 mL   ETT (adult)   Placement Date/Time: 02/07/22 (c) 0540   Preoxygenation: Yes  Mask Ventilation: Mask ventilation not attempted (0)  Technique: Video laryngoscopy  Tube Size: 7.5 mm  Blade Size: 4  Grade View: Full view of the glottis  Insertion attempts: 1  Placement. ..    Secured at 25 cm   Measured From Lips   ET Placement Left   Secured By Commercial tube dawn   Site Condition Dry

## 2022-02-09 NOTE — PROGRESS NOTES
Pulmonary and Critical Care  Progress Note    Subjective: The patient is sedated on vent. CXR:Reviewed. Shortness of breath none. Chest pain none  Addressing respiratory complaints Patient is negative for  hemoptysis and cyanosis  CONSTITUTIONAL:  negative for fevers and chills      Past Medical History:     has a past medical history of Arthritis, Asthma, Atrial fibrillation ([address] Cancer ([address] CHF (congestive heart failure) ([address] COPD (chronic obstructive pulmonary disease) ([address] Depression, Diabetes mellitus ([address] Diastolic heart failure ([address] Esophageal cancer ([facility] GERD (gastroesophageal reflux disease), History of external beam radiation therapy, Hyperlipidemia, Hypertension, Kidney stone, Migraine, Other disorders of kidney and ureter, Pneumonia, Psychiatric problem, Sleep apnea, and Unspecified cerebral artery occlusion with cerebral infarction. has a past surgical history that includes Cholecystectomy; Carpal tunnel release; Elbow surgery; ASD repair (2005); back surgery (01/2012); Pericardium surgery (2005); Abdomen surgery; Colonoscopy; eye surgery; eye surgery; ASD repair; other surgical history (02/16/2017); Cardiac surgery (08/2013); CYSTOSCOPY INSERTION / REMOVAL STENT / STONE (Right, 10/03/2020); Upper gastrointestinal endoscopy (10/09/2021); and Esophagectomy (N/A, 1/31/2022). reports that he has been smoking cigarettes. He has been smoking about 0.25 packs per day for the past 0.00 years. He has never used smokeless tobacco. He reports previous alcohol use. He reports that he does not use drugs. Family history:  family history includes Alzheimer's Disease in his mother; Cancer in his sister; Diabetes in his father, maternal grandfather, maternal grandmother, mother, paternal grandfather, paternal grandmother, and paternal uncle; Heart Disease in his father; Other in his mother.     No Known Allergies  Social History:    Reviewed; no changes    Objective:   PHYSICAL EXAM:        VITALS: BP (!) 116/57   Pulse 75   Temp 97.4 °F (36.3 °C) (Oral)   Resp 21   Ht 5' 10\" (1.778 m)   Wt 255 lb 11.7 oz (116 kg)   SpO2 96%   BMI 36.69 kg/m²     24HR INTAKE/OUTPUT:      Intake/Output Summary (Last 24 hours) at 2/9/2022 1036  Last data filed at 2/9/2022 0445  Gross per 24 hour   Intake 4141.49 ml   Output 2765 ml   Net 1376.49 ml       CONSTITUTIONAL:  Somnolent. LUNGS:  decreased breath sounds, basilar crackles. Evidence of recent surgery. CARDIOVASCULAR:  normal S1 and S2 and negative JVD  ABD:Abdomen soft, non-tender. BS normal. No masses,  No organomegaly  NEURO:Sedated on vent. DATA:    CBC:  Recent Labs     02/07/22 0420 02/08/22  0530 02/09/22  0430   WBC 13.9* 14.6* 15.8*   RBC 3.32* 2.60* 2.63*   HGB 10.8* 8.5* 8.8*   HCT 33.1* 25.1* 25.5*   *  --  100*   MCV 99.7 96.5 97.0   MCH 32.5* 32.7* 33.5*   MCHC 32.6 33.9 34.5   RDW 14.7 14.7 14.1   SEGSPCT  --  71.0*  --    BANDSPCT  --  19*  --       BMP:  Recent Labs     02/07/22  0420 02/08/22  0530 02/09/22  0430    134* 135   K 3.5 3.3* 3.4*    104 106   CO2 22 17* 18*   BUN 50* 62* 56*   CREATININE 1.3 1.4* 1.2   CALCIUM 8.6 7.9* 8.0*   GLUCOSE 295* 290* 311*      ABG:  Recent Labs     02/07/22  1100 02/08/22  0600 02/09/22  0700   PH 7.37 7.45 7.46*   PO2ART 76 82 70*   TIY5UIA 33.0 26.0* 27.0*   O2SAT 92.3* 95.4* 93.1*     Lab Results   Component Value Date    PROBNP 595.1 (H) 02/08/2022    PROBNP 765.5 (H) 02/07/2022    PROBNP 37.81 02/29/2020     No results found for: CULTRESP    Radiology Review:  Pertinent images / reports were reviewed as a part of this visit.     Assessment:     Patient Active Problem List   Diagnosis    Migraine    Diabetes mellitus ([facility]    Diastolic heart failure (HCC)    TIA (transient ischemic attack)    Persistent headaches    Cerebral infarction (HCC)    Abdominal pain, acute, right upper quadrant    Chronic back pain    Chronic pain    Migrainous headache without aura    COPD with acute exacerbation ([address]    DM (diabetes mellitus), type 2, uncontrolled ([address]    Hyperglycemia    Acute chest pain    Anxiety    Asthma    Cardiac septal defect    Hemiparesis following cerebrovascular accident (CVA) ([address]    Displacement of lumbar intervertebral disc without myelopathy    Calculus of kidney    Abnormal liver enzymes    Herniated lumbar intervertebral disc    Memory impairment    Infection of wound due to methicillin resistant Staphylococcus aureus (MRSA)    Shortness of breath    Sleep apnea    Cerebral artery occlusion with cerebral infarction (HCC)    Increased frequency of urination    Urinary urgency    Hypertensive emergency    Seizures (HCC)    Syncope    Wound infection following procedure    Wound infection    Infected sebaceous cyst    COPD (chronic obstructive pulmonary disease) (HCC)    Anxiety    Migraine    DKA, type 2 (HCC)    Hypokalemia    Thrombocytopenia (HCC)    Type 2 diabetes mellitus ([address]    Essential hypertension    Hyperlipidemia    Depression    Diabetes ([address]    Ureterolithiasis    Ureteral stone    Malignant neoplasm of lower third of esophagus (HCC)    Gastroesophageal reflux disease with esophagitis without hemorrhage    Tobacco dependence    Esophageal cancer ([address]    Encounter for preadmission testing       Plan:   1. Overall the patient is sl better. 2. Wean FiO2.  3. Supp kcl. 4. Adjust ET tube. 5. Discussed with the RN.   Sylvia Mooney MD   2/9/2022  10:36 AM

## 2022-02-09 NOTE — PROGRESS NOTES
02/09/22 0707   Vent Information   $Ventilation $Subsequent Day   Equipment Changed HME   Vent Type 980   Vent Mode AC/PC   Pressure Ordered 18   Rate Set 18 bmp   Pressure Support 0 cmH20   FiO2  60 %   SpO2 96 %   SpO2/FiO2 ratio 160   Sensitivity 3   PEEP/CPAP 10   I Time/ I Time % 1 s   Humidification Source HME   Vent Patient Data   High Peep/I Pressure 18   Peak Inspiratory Pressure 29 cmH2O   Mean Airway Pressure 17 cmH20   Rate Measured 18 br/min   Vt Exhaled 784 mL   Minute Volume 14.1 Liters   I:E Ratio 1:1.70   Plateau Pressure 26 [id number]   Static Compliance 50 mL/cmH2O   Total PEEP 11 cmH20   Auto PEEP 0.7 cmH20   Cough/Sputum   Sputum How Obtained Endotracheal;Suctioned   $Obtained Sample $Induced Sputum   Sputum Amount Small   Sputum Color Cloudy   Tenacity Thick   Spontaneous Breathing Trial (SBT) RT Doc   Pulse 56   Additional Respiratory  Assessments   Resp 18   Alarm Settings   High Pressure Alarm 40 cmH2O   Low Minute Volume Alarm 3 L/min   Apnea (secs) 20 secs   High Respiratory Rate 40 br/min   Low Exhaled Vt  250 mL   ETT (adult)   Placement Date/Time: 02/07/22 (c) 7849   Preoxygenation: Yes  Mask Ventilation: Mask ventilation not attempted (0)  Technique: Video laryngoscopy  Tube Size: 7.5 mm  Blade Size: 4  Grade View: Full view of the glottis  Insertion attempts: 1  Placement. ..    Secured at 25 cm   Measured From [address] [place] [address],[address] By Commercial tube dawn   Site Condition Dry

## 2022-02-09 NOTE — PROGRESS NOTES
Physical Therapy  Attempted to see patient @7780. YENNI Corrales states that the patient has been intubated since 2/7. Will follow up with evaluating PT.    Nick Sanders  2:07 PM  2/9/2022

## 2022-02-09 NOTE — PROGRESS NOTES
PATIENT NAME: Robyn Morris    TODAY'S DATE: 02/09/22    SUBJECTIVE:    Pt is POD # 9 s/p esophagectomy. Pt remains sedated and intubated. No BMs. He has woken up a little bit while on sedation, moves extremities. Minimal NGT output. Weaning vent settings slowly. OBJECTIVE:   VITALS:    Vitals:    02/09/22 1106   BP:    Pulse:    Resp: 18   Temp:    SpO2: 98%     INTAKE/OUTPUT:    Date 02/09/22 0000 - 02/09/22 2359   Shift 0684-8055 1969-8734 1418-5883 24 Hour Total   INTAKE   I.V.(mL/kg/hr) 1344. 1(1.4)   1344.1   NG/   148   .5   712.5   Shift Total(mL/kg) 2204.6(19)   2204.6(19)   OUTPUT   Urine(mL/kg/hr) 1250(1.3)   1250   Emesis/NG output 25   25   Chest Tube 120   120   Shift Total(mL/kg) 1395(12)   1395(12)   Weight (kg) 116 116 116 116      No data found. EXAM:  Blood pressure (!) 116/57, pulse 53, temperature 97.7 °F (36.5 °C), resp. rate 18, height 5' 10\" (1.778 m), weight 255 lb 11.7 oz (116 kg), SpO2 98 %. General appearance: sedated, intubated   Skin: unremarkable  HEENT Normocephalic, atraumatic without obvious deformity. Neck: Supple, Trachea midline   Lungs: Good respiratory effort. Course breath sounds bilat. CT output serous.    Heart: Regular rate/ rhythm inc c/d/i  Abdomen: Soft, non-tender or non-distended   Extremities: 1+ edema warm well perfused  Neurologic: sedated  Mental status: unable to assess       Data:  CBC:   Recent Labs     02/07/22  0420 02/08/22  0530 02/09/22  0430   WBC 13.9* 14.6* 15.8*   HGB 10.8* 8.5* 8.8*   HCT 33.1* 25.1* 25.5*   *  --  100*     BMP:    Recent Labs     02/07/22  0420 02/08/22  0530 02/09/22  0430    134* 135   K 3.5 3.3* 3.4*    104 106   CO2 22 17* 18*   BUN 50* 62* 56*   CREATININE 1.3 1.4* 1.2   GLUCOSE 295* 290* 311*     Hepatic:   Recent Labs     02/07/22  0420   AST 10*   ALT 9*   BILITOT 1.0   ALKPHOS 85     Mag:      Recent Labs     02/07/22  0420 02/08/22  0530   MG 2.3 2.2      Phos:     Recent Labs 02/07/22  0420 02/08/22  0530   PHOS 3.9 4.3      INR: No results for input(s): INR in the last 72 hours. Radiology Review:  CXR  Impression:     1.  Lines and tubes as described above. 2. Yvonne Megan is some improved aeration of the lungs since the earlier chest   x-ray.  Persistent bibasilar opacities and small effusions.           ASSESSMENT AND PLAN:    Patient Active Problem List   Diagnosis    Migraine    Diabetes mellitus ([address]    Diastolic heart failure (HCC)    TIA (transient ischemic attack)    Persistent headaches    Cerebral infarction ([address]    Abdominal pain, acute, right upper quadrant    Chronic back pain    Chronic pain    Migrainous headache without aura    COPD with acute exacerbation ([address]    DM (diabetes mellitus), type 2, uncontrolled ([address]    Hyperglycemia    Acute chest pain    Anxiety    Asthma    Cardiac septal defect    Hemiparesis following cerebrovascular accident (CVA) ([address]    Displacement of lumbar intervertebral disc without myelopathy    Calculus of kidney    Abnormal liver enzymes    Herniated lumbar intervertebral disc    Memory impairment    Infection of wound due to methicillin resistant Staphylococcus aureus (MRSA)    Shortness of breath    Sleep apnea    Cerebral artery occlusion with cerebral infarction ([address]    Increased frequency of urination    Urinary urgency    Hypertensive emergency    Seizures ([address]    Syncope    Wound infection following procedure    Wound infection    Infected sebaceous cyst    COPD (chronic obstructive pulmonary disease) ([address]    Anxiety    Migraine    DKA, type 2 (HCC)    Hypokalemia    Thrombocytopenia (HCC)    Type 2 diabetes mellitus ([address]    Essential hypertension    Hyperlipidemia    Depression    Diabetes ([address]    Ureterolithiasis    Ureteral stone    Malignant neoplasm of lower third of esophagus (HCC)    Gastroesophageal reflux disease with esophagitis without hemorrhage    Tobacco dependence    [facility]    Encounter for preadmission testing       S/P esophagectomy    Cardio: BP stable, hold BB. Pulm: PO2 70 on FiO2 60%. Vent management per pulmonology, wean as able. Daily ABG. RN has been unable to retrieve sputum culture specimen so far, she will try again today. On daily steroids. Percussion therapy vest in place. GI: tolerating trickle TF, abd soft and nondistended. No BM. Suppository today. Increase TF to 20. Prealbumin 8, recheck on Friday. Renal: creatinine down to 1.2, BUN 56. Continue MIVF. Adequate UOP. Increase TPN to 84. BC and urine cx NGTD. Sputum cx not collected yet. On vanc/zosyn. WBC 15, afebrile.      Faith Grief NIKITA

## 2022-02-09 NOTE — PROGRESS NOTES
Hospitalist Progress Note      Name:  Makeda Haynes /Age/Sex: 1970  (46 y.o. male)   MRN & CSN:  [id number] & [id number] Admission Date/Time: 2022  5:38 AM   Location:  -A PCP: Promise Siddiqi DO         Hospital Day: 10    Assessment and Plan:   Makeda Haynes is a 46 y.o.  male admitted with acute respiratory failure, has a past medical history of obstructive sleep apnea, hypertension, hyperlipidemia, type 2 diabetes mellitus, COPD excuse me    1. Acute respiratory failure with hypoxia secondary to COVID-19 pneumonia. 2. COVID-19 viral pneumonia: Patient remains mechanically ventilated, is currently at FiO2 of 45%, continue weaning as tolerated. Continue IV Decadron 10 mg daily. 3. History of esophageal cancer status post esophagectomy  4. Suspected superimposed bacterial aspiration pneumonia:  His chest x-ray showed bilateral airspace opacities right greater than left. Procalcitonin was elevated at 16.95. Patient was started on IV vancomycin and Zosyn. MRSA screen comes back negative today. We will discontinue IV vancomycin. 5. Type 2 diabetes mellitus with hyperglycemia: Continue sliding scale. 6. History of obstructive sleep apnea, COPD  7. Essential hypertension: Not on antihypertensives. Interval History     No acute events overnight. Objective: Intake/Output Summary (Last 24 hours) at 2022 1508  Last data filed at 2022 1402  Gross per 24 hour   Intake 4141.49 ml   Output 2510 ml   Net 1631.49 ml      Vitals:   Vitals:    22 1506   BP:    Pulse: 65   Resp: 18   Temp:    SpO2: 96%     Physical Exam:   GEN on mechanical ventilation    EYES Pupils are equally round. No scleral erythema, discharge, or conjunctivitis. HENT Mucous membranes are moist. Oral pharynx without exudates, no evidence of thrush. NECK Supple, no apparent thyromegaly or masses. RESP Clear to auscultation, anteriorly    CARD: S1/S2 auscultated.  Regular mild bilateral peripheral edema. GI Abdomen is soft without significant tenderness, masses, or guarding. Bowel sounds are normoactive. Rectal exam deferred.  No costovertebral angle tenderness. Normal appearing external genitalia. Herr catheter is not present. MSK No gross joint deformities. SKIN Normal coloration, warm, dry.     NEURO patient is on mechanical ventilation      Medications:   Medications:    insulin regular  0-18 Units SubCUTAneous Q6H    vancomycin (VANCOCIN) intermittent dosing (placeholder)   Other See Admin Instructions    chlorhexidine  15 mL Mouth/Throat BID    albuterol sulfate HFA  4 puff Inhalation 6 times per day    And    ipratropium  4 puff Inhalation 6 times per day    pantoprazole  40 mg IntraVENous Daily    acetylcysteine  600 mg Inhalation BID    dexamethasone  20 mg IntraVENous Daily    [START ON 2/11/2022] dexamethasone  10 mg IntraVENous Daily    guaiFENesin  400 mg Oral 4x Daily    sodium chloride flush  5-40 mL IntraVENous 2 times per day    enoxaparin  40 mg SubCUTAneous BID    piperacillin-tazobactam  3,375 mg IntraVENous Q8H    [Held by provider] carvedilol  12.5 mg Per J Tube BID WC    sertraline  100 mg Per J Tube Daily    lidocaine  1 patch TransDERmal Daily      Infusions:    PN-Adult Premix  4.25/10 - Standard Electrolytes - Peripheral Line      fentaNYL 50 mcg/hr (02/09/22 1343)    propofol 40 mcg/kg/min (02/09/22 1307)    sodium chloride 100 mL/hr at 02/09/22 0313    norepinephrine Stopped (02/08/22 0522)    dextrose      sodium chloride       PRN Meds: glucose, 15 g, PRN  dextrose, 12.5 g, PRN  glucagon (rDNA), 1 mg, PRN  dextrose, 100 mL/hr, PRN  albuterol sulfate HFA, 2 puff, Q6H PRN  HYDROmorphone, 0.5 mg, Q4H PRN  sodium chloride flush, 5-40 mL, PRN  sodium chloride, 25 mL, PRN  potassium chloride, 20 mEq, PRN  bisacodyl, 10 mg, Daily PRN  phenol, 1 spray, Q2H PRN  ipratropium, 2 puff, 4x Daily PRN  acetaminophen, 650 mg, Q4H PRN  HYDROcodone 5 mg - acetaminophen, 1 tablet, Q4H PRN  labetalol, 10 mg, Q2H PRN  hydrALAZINE, 10 mg, Q1H PRN  magnesium sulfate, 1,000 mg, PRN  ondansetron, 4 mg, Q6H PRN          Electronically signed by Nicky Crane MD on 2/9/2022 at 3:08 PM

## 2022-02-09 NOTE — PROGRESS NOTES
Comprehensive Nutrition Assessment    Type and Reason for Visit:  Reassess    Nutrition Recommendations/Plan:   Correct K+ levels  Recommend advancing EN to goal rate of 40 ml/hr which will provide 2174 kcal (including kcal from propofol) and 90 g protein  Recommend d/c PN once pt meeting estimated nutrient needs via EN consistently  Will continue to closely monitor GI tolerance, nutrition status, poc    Nutrition Assessment:  Pt remains sedated on vent, EN infusing @ 10 ml/hr, PN ordered, will continue to follow at high nutrition risk    Malnutrition Assessment:  Malnutrition Status: At risk for malnutrition (Comment)    Context:  Acute Illness       Estimated Daily Nutrient Needs:  Energy (kcal):  [address] [facility] 2003b); Weight Used for Energy Requirements:  Current     Protein (g):  150 (2.0 g/kg);  Weight Used for Protein Requirements:  Ideal        Fluid (ml/day):  2500; Method Used for Fluid Requirements:  1 ml/kcal      Nutrition Related Findings:  Labs: K+ 3.4 , Glucose 311    Wounds:  Surgical Incision       Current Nutrition Therapies:    Current Tube Feeding (TF) Orders:  · Feeding Route: Jejunostomy  · Formula: Immune Enhancing  · Schedule: Continuous (10 ml/hr)  · Water Flushes: 30 ml Q3H  · Current TF & Flush Orders Provides: 360 kcal and 23 g protein    Current Parenteral Nutrition Orders:  · Type and Formula:  (Clinimix 4.25/10)   · Lipids: None  · Duration: Continuous  · Rate/Volume: 75/1800  · Current PN Order Provides: 920 kcal and 77 g protein    Additional Calorie Sources:   Pt is receiving ~734 kcal from current propofol rate    Anthropometric Measures:  · Height: 5' 10\" (177.8 cm)  · Current Body Weight: 255 lb 11.7 oz (116 kg) (2/1/22 unknown weight source)   · Admission Body Weight: 255 lb 11.7 oz (116 kg)    · Usual Body Weight: 278 lb (126.1 kg) ((10/21/21) per chart review)     · Ideal Body Weight: 166 lbs; % Ideal Body Weight 154.1 %   · BMI: 36.7   · BMI Categories: Obese Class 2

## 2022-02-09 NOTE — PROGRESS NOTES
Progress Note( Dr. Oxana Null)  2/8/2022  Subjective:   Admit Date: 1/31/2022  PCP: Chito Siddiqi DO    Admitted For : Malignant neoplasm of abdominal esophagus and underwent  esophagectomy    Consulted For:  Better control of blood glucose    Interval History: Patient had rapid response due to severe hypoxia. He was sedated intubated and placed on the ventilator morning on 2/7/2022  Start on tube feeding just to stimulate the bowels  Started on TPN           Intake/Output Summary (Last 24 hours) at 2/8/2022 2307  Last data filed at 2/8/2022 1915  Gross per 24 hour   Intake 4571.79 ml   Output 2540 ml   Net 2031.79 ml       DATA    CBC:   Recent Labs     02/06/22  0605 02/07/22  0420 02/08/22  0530   WBC 12.6* 13.9* 14.6*   HGB 11.0* 10.8* 8.5*   * 128*  --     CMP:  Recent Labs     02/06/22  0605 02/07/22  0420 02/08/22  0530    141 134*   K 3.9 3.5 3.3*    107 104   CO2 23 22 17*   BUN 23 50* 62*   CREATININE 0.9 1.3 1.4*   CALCIUM 8.5 8.6 7.9*   PROT  --  4.8*  --    LABALBU  --  2.6* 2.2*   BILITOT  --  1.0  --    ALKPHOS  --  85  --    AST  --  10*  --    ALT  --  9*  --      Lipids:   Lab Results   Component Value Date    CHOL 162 03/07/2016    HDL 26 03/07/2016    TRIG 362 02/08/2022     Glucose:  Recent Labs     02/08/22  1131 02/08/22  1635 02/08/22  1921   POCGLU 300* 348* 355*     [id number]:  Lab Results   Component Value Date    LABA1C 5.9 02/06/2022     High Sensitivity TSH:   Lab Results   Component Value Date    TSHHS 2.110 08/17/2016     Free T3: No results found for: FT3  Free T4:  Lab Results   Component Value Date    T4FREE 0.91 05/15/2013       XR CHEST PORTABLE   Final Result   Stable exam with bilateral airspace opacities, right much more than left.          XR CHEST PORTABLE   Final Result   Right pneumothorax has resolved      Bilateral pulmonary opacities persist         XR CHEST PORTABLE   Final Result   Slightly improved moderate right pneumothorax, estimated to be 20-30%. Unchanged chest tube. Unremarkable positioning of an endotracheal tube projecting 3 cm above the   jj. XR CHEST PORTABLE   Final Result   1. Interval development of moderate size right pneumothorax with chest tube   in place. 2.  Left basilar opacities have increased in the interval, which may   represent atelectasis. The findings were sent to the Radiology Results [address] at 5:14   am on 2/7/2022to be communicated to a licensed caregiver. XR CHEST PORTABLE   Final Result   Worsening right lung airspace consolidation. Postsurgical changes status post esophagectomy and gastric pull-through. CT CHEST PULMONARY EMBOLISM W CONTRAST   Final Result   No evidence of pulmonary embolism. Recent postsurgical changes status post distal esophagectomy and gastric   pull-through. Likely postsurgical changes noted in the mediastinum with   fluid and air in the region of the recent surgery. Bilateral pleural effusions with bibasilar atelectasis. Multifocal right lower lobe and right upper lobe airspace disease with   tree-in-bud nodules. No evidence of airspace disease in the left lung. Findings suggest right lung pneumonia, possibly from aspiration. Trace right pneumothorax with right chest tubes present. Pneumoperitoneum consistent with recent surgery. XR CHEST PORTABLE   Final Result   1. Status post right chest tube removal.  No pneumothorax. 2. Right parahilar and bibasilar opacities compatible with atelectasis. Aspiration and pneumonia are not excluded         XR CHEST PORTABLE   Final Result   Left basilar opacity could represent atelectasis or infection         XR CHEST PORTABLE   Final Result   Stable exam.         XR ABDOMEN FOR NG/OG/NE TUBE PLACEMENT   Final Result   1. Gastric catheter tip is near the he a hemidiaphragm at the normal   expected location of the GE junction prior surgery.       If further localization is required, small amount of water-soluble contrast   via the gastric catheter would be useful for localization. 2.  Free air likely related to recent esophagectomy. XR CHEST PORTABLE   Final Result   Status post esophagectomy with postoperative perihilar and bibasilar   atelectasis. XR CHEST PORTABLE   Final Result   Stable appearance of the chest with opacities at the central right lung and   left base. Gas-filled esophagus or gastric pull-through to the right of the   spine. Correlate with surgical history. The tip of the nasogastric tube is   in the left upper abdomen but the side port would overlie the lower thorax. If gastric pull through then correlate with surgery for desired position. XR CHEST PORTABLE   Final Result   Stable chest x-ray with right perihilar opacities. XR CHEST PORTABLE   Final Result   Cardiomegaly with scattered pulmonary infiltrates. XR CHEST PORTABLE   Final Result   Status post esophagectomy with gastric pull-through. Pneumomediastinum,   pneumoperitoneum and trace right pneumothorax consistent with recent surgery. Right chest tube present. Atelectasis noted in both lungs.          XR CHEST PORTABLE    (Results Pending)        Scheduled Medicines   Medications:    insulin glargine  50 Units SubCUTAneous Nightly    insulin lispro  0-30 Units SubCUTAneous Q4H    insulin NPH  30 Units SubCUTAneous QAM    vancomycin (VANCOCIN) intermittent dosing (placeholder)   Other See Admin Instructions    chlorhexidine  15 mL Mouth/Throat BID    albuterol sulfate HFA  4 puff Inhalation 6 times per day    And    ipratropium  4 puff Inhalation 6 times per day    pantoprazole  40 mg IntraVENous Daily    acetylcysteine  600 mg Inhalation BID    dexamethasone  20 mg IntraVENous Daily    [START ON 2/11/2022] dexamethasone  10 mg IntraVENous Daily    guaiFENesin  400 mg Oral 4x Daily    sodium chloride flush  5-40 mL IntraVENous 2 times per day    enoxaparin  40 mg SubCUTAneous BID    piperacillin-tazobactam  3,375 mg IntraVENous Q8H    [Held by provider] carvedilol  12.5 mg Per J Tube BID     sertraline  100 mg Per J Tube Daily    lidocaine  1 patch TransDERmal Daily      Infusions:    PN-Adult Premix  4.25/10 - Standard Electrolytes - Peripheral Line 75 mL/hr at 02/08/22 1804    fentaNYL 75 mcg/hr (02/08/22 1808)    propofol 35 mcg/kg/min (02/08/22 2145)    sodium chloride 100 mL/hr at 02/08/22 1321    norepinephrine Stopped (02/08/22 0522)    dextrose      sodium chloride           Objective:   Vitals: BP (!) 101/48   Pulse 60   Temp 97.8 °F (36.6 °C) (Oral)   Resp 18   Ht 5' 10\" (1.778 m)   Wt 255 lb 11.7 oz (116 kg)   SpO2 98%   BMI 36.69 kg/m²   General appearance: Patient is sedated, intubated and on ventilator   neck: no JVD or bruit//had esophagectomy  Thyroid : Normal lobes   Lungs: Has Vesicular Breath sounds   Heart:  regular rate and rhythm  Abdomen: soft, non-tender; bowel sounds normal; no masses,  no organomegaly  esophagectomy NG tube  Musculoskeletal: Normal  Extremities: extremities normal, , no edema  Neurologic:   Patient is sedated, intubated and on ventilator.     Assessment:     Patient Active Problem List:     Migraine     Diabetes mellitus (HCC)     Diastolic heart failure (HCC)     TIA (transient ischemic attack)     Persistent headaches     Cerebral infarction (HCC)     Abdominal pain, acute, right upper quadrant     Chronic back pain     Chronic pain     Migrainous headache without aura     COPD with acute exacerbation (HCC)     DM (diabetes mellitus), type 2, uncontrolled (HCC)     Hyperglycemia     Acute chest pain     Anxiety     Asthma     Cardiac septal defect     Hemiparesis following cerebrovascular accident (CVA) ([facility] [address]     Displacement of lumbar intervertebral disc without myelopathy     Calculus of kidney     Abnormal liver enzymes     Herniated lumbar intervertebral disc     Memory impairment     Infection of wound due to methicillin resistant Staphylococcus aureus (MRSA)     Shortness of breath     Sleep apnea     Cerebral artery occlusion with cerebral infarction (HCC)     Increased frequency of urination     Urinary urgency     Hypertensive emergency     Seizures ([facility] [address]     Syncope     Wound infection following procedure     Wound infection     Infected sebaceous cyst     COPD (chronic obstructive pulmonary disease) (HCC)     Anxiety     Migraine     DKA, type 2 (HCC)     Hypokalemia     Thrombocytopenia (HCC)     Type 2 diabetes mellitus ([facility] [address]     Essential hypertension     Hyperlipidemia     Depression     Diabetes ([facility] [address]     Ureterolithiasis     Ureteral stone     Malignant neoplasm of lower third of esophagus (HCC)     Gastroesophageal reflux disease with esophagitis without hemorrhage     Tobacco dependence     Esophageal cancer ([facility] [address]     Encounter for preadmission testing      Plan:     1. Reviewed POC blood glucose . Labs and X ray results   2. Reviewed Current Medicines   3. On  Correction bolus Humalog/ Basal Lantus Insulin regime   4. On TPN now  5. Monitor Blood glucose freque  6. Modified  the dose of Insulin/ other medicines as needed   7. Will follow     .      Quinton Ventura MD, MD

## 2022-02-10 NOTE — PROGRESS NOTES
02/10/22 0820   Vent Information   $Ventilation $Subsequent Day   Vent Type 980   Vent Mode AC/PC   Vt Ordered   (-)   Pressure Ordered 16   Rate Set 18 bmp   Pressure Support 0 cmH20   FiO2  45 %   SpO2 95 %   SpO2/FiO2 ratio 211.11   Sensitivity 3   PEEP/CPAP 8   I Time/ I Time % 1 s   Humidification Source HME   Vent Patient Data   High Peep/I Pressure 16   Peak Inspiratory Pressure 24 cmH2O   Mean Airway Pressure 14 cmH20   Rate Measured 19 br/min   Vt Exhaled 747 mL   Minute Volume 14.4 Liters   I:E Ratio 1:1.70   Spontaneous Breathing Trial (SBT) RT Doc   Pulse 75   Additional Respiratory  Assessments   Resp 19   Position Semi-Willams's   Alarm Settings   High Pressure Alarm 40 cmH2O   Delay Alarm 20 sec(s)   Low Minute Volume Alarm 3 L/min   Apnea (secs) 20 secs   High Respiratory Rate 40 br/min   Low Exhaled Vt  350 mL   ETT (adult)   Placement Date/Time: 02/07/22 (c) 0540   Preoxygenation: Yes  Mask Ventilation: Mask ventilation not attempted (0)  Technique: Video laryngoscopy  Tube Size: 7.5 mm  Blade Size: 4  Grade View: Full view of the glottis  Insertion attempts: 1  Placement. ..    Secured at 27 cm   Measured From [address] [place] [address],[address] By [employer] tube dawn   Site Condition Dry   Cuff Pressure   (mov)

## 2022-02-10 NOTE — FLOWSHEET NOTE
Turned off Prop and started precedex. After about 20 min  Py woke up and started to try and pull out tube. Bp started going up as high at 190's Messaged both Dr Khanh Wagner and Dr Zakia Mclean.     New orders to restart prop and stop precedex

## 2022-02-10 NOTE — PROGRESS NOTES
Hospitalist Progress Note      Name:  Josette Todd /Age/Sex: 1970  (46 y.o. male)   MRN & CSN:  [id number] & [id number] Admission Date/Time: 2022  5:38 AM   Location:  -A PCP: Albaro Siddiqi DO         Hospital Day: 11    Assessment and Plan:   Josette Todd is a 46 y.o.  male admitted with acute respiratory failure, has a past medical history of obstructive sleep apnea, hypertension, hyperlipidemia, type 2 diabetes mellitus, COPD excuse me    1. Acute respiratory failure with hypoxia secondary to COVID-19 pneumonia. 2. COVID-19 viral pneumonia: Patient remains mechanically ventilated, is currently at FiO2 of 45%, continue weaning as tolerated. Continue IV Decadron 10 mg daily. He is planned for extubation tomorrow. 3. History of esophageal cancer status post esophagectomy  4. Suspected superimposed bacterial aspiration pneumonia:  His chest x-ray showed bilateral airspace opacities right greater than left. Procalcitonin was elevated at 16.95. Patient was started on IV vancomycin and Zosyn. MRSA screen comes back negative today. We will discontinue IV vancomycin. 5. Type 2 diabetes mellitus with hyperglycemia: Continue sliding scale. 6. History of obstructive sleep apnea, COPD  7. Essential hypertension: Not on antihypertensives. Plan discussed during multidisciplinary rounds  Appreciate follow-up by other specialists including pulmonary, CT surgery  He is planned for possible extubation tomorrow morning  Continue present management. Interval History     No acute events overnight. Patient remains stable. Objective: Intake/Output Summary (Last 24 hours) at 2/10/2022 1545  Last data filed at 2/10/2022 0545  Gross per 24 hour   Intake 5509.12 ml   Output 2510 ml   Net 2999.12 ml      Vitals:   Vitals:    02/10/22 1500   BP:    Pulse: 105   Resp: 24   Temp:    SpO2: (!) 89%     Physical Exam:   GEN on mechanical ventilation    EYES Pupils are equally round. 100 mL/hr, PRN  albuterol sulfate HFA, 2 puff, Q6H PRN  HYDROmorphone, 0.5 mg, Q4H PRN  sodium chloride flush, 5-40 mL, PRN  sodium chloride, 25 mL, PRN  potassium chloride, 20 mEq, PRN  bisacodyl, 10 mg, Daily PRN  phenol, 1 spray, Q2H PRN  ipratropium, 2 puff, 4x Daily PRN  acetaminophen, 650 mg, Q4H PRN  HYDROcodone 5 mg - acetaminophen, 1 tablet, Q4H PRN  labetalol, 10 mg, Q2H PRN  hydrALAZINE, 10 mg, Q1H PRN  magnesium sulfate, 1,000 mg, PRN  ondansetron, 4 mg, Q6H PRN          Electronically signed by Isabelle Lopes MD on 2/10/2022 at 3:45 PM

## 2022-02-10 NOTE — PROGRESS NOTES
Pulmonary and Critical Care  Progress Note    Subjective: The patient is better. Shortness of breath none. Chest pain none  Addressing respiratory complaints Patient is negative for  hemoptysis and cyanosis  CONSTITUTIONAL:  negative for fevers and chills      Past Medical History:     has a past medical history of Arthritis, Asthma, Atrial fibrillation ([facility] [address] Cancer ([facility] [address] CHF (congestive heart failure) ([facility] [address] COPD (chronic obstructive pulmonary disease) ([facility] [address] Depression, Diabetes mellitus ([facility] [address] Diastolic heart failure ([facility] [address] Esophageal cancer ([facility] [address] GERD (gastroesophageal reflux disease), History of external beam radiation therapy, Hyperlipidemia, Hypertension, Kidney stone, Migraine, Other disorders of kidney and ureter, Pneumonia, Psychiatric problem, Sleep apnea, and Unspecified cerebral artery occlusion with cerebral infarction. has a past surgical history that includes Cholecystectomy; Carpal tunnel release; Elbow surgery; ASD repair (2005); back surgery (01/2012); Pericardium surgery (2005); Abdomen surgery; Colonoscopy; eye surgery; eye surgery; ASD repair; other surgical history (02/16/2017); Cardiac surgery (08/2013); CYSTOSCOPY INSERTION / REMOVAL STENT / STONE (Right, 10/03/2020); Upper gastrointestinal endoscopy (10/09/2021); and Esophagectomy (N/A, 1/31/2022). reports that he has been smoking cigarettes. He has been smoking about 0.25 packs per day for the past 0.00 years. He has never used smokeless tobacco. He reports previous alcohol use. He reports that he does not use drugs. Family history:  family history includes Alzheimer's Disease in his mother; Cancer in his sister; Diabetes in his father, maternal grandfather, maternal grandmother, mother, paternal grandfather, paternal grandmother, and paternal uncle; Heart Disease in his father; Other in his mother.     No Known Allergies  Social History:    Reviewed; no changes    Objective:   PHYSICAL EXAM:        VITALS:  BP (!) 105/47 Pulse 75   Temp 97.5 °F (36.4 °C) (Rectal)   Resp 23   Ht 5' 10\" (1.778 m)   Wt 255 lb 11.7 oz (116 kg)   SpO2 93%   BMI 36.69 kg/m²     24HR INTAKE/OUTPUT:      Intake/Output Summary (Last 24 hours) at 2/10/2022 1150  Last data filed at 2/10/2022 0545  Gross per 24 hour   Intake 5509.12 ml   Output 2610 ml   Net 2899.12 ml       CONSTITUTIONAL:  Somnolent. LUNGS:  decreased breath sounds, basilar crackles. Evidence of recent surgery. CARDIOVASCULAR:  normal S1 and S2 and negative JVD  ABD:Abdomen soft, non-tender. BS normal. No masses,  No organomegaly  NEURO:Sedated on vent. DATA:    CBC:  Recent Labs     02/08/22  0530 02/09/22  0430 02/10/22  0530   WBC 14.6* 15.8* 22.8*   RBC 2.60* 2.63* 2.77*   HGB 8.5* 8.8* 9.3*   HCT 25.1* 25.5* 27.7*   PLT  --  100* 113*   MCV 96.5 97.0 100.0   MCH 32.7* 33.5* 33.6*   MCHC 33.9 34.5 33.6   RDW 14.7 14.1 14.6   SEGSPCT 71.0*  --   --    BANDSPCT 19*  --   --       BMP:  Recent Labs     02/08/22  0530 02/09/22  0430 02/10/22  0530   * 135 135   K 3.3* 3.4* 4.1    106 107   CO2 17* 18* 19*   BUN 62* 56* 54*   CREATININE 1.4* 1.2 1.2   CALCIUM 7.9* 8.0* 7.9*   GLUCOSE 290* 311* 276*      ABG:  Recent Labs     02/09/22  0700 02/09/22  1330 02/10/22  0700   PH 7.46* 7.43 7.42   PO2ART 70* 88 67*   DHR6MVU 27.0* 28.0* 30.0*   O2SAT 93.1* 96.2 90.5*     Lab Results   Component Value Date    PROBNP 595.1 (H) 02/08/2022    PROBNP 765.5 (H) 02/07/2022    PROBNP 37.81 02/29/2020     No results found for: CULTRESP    Radiology Review:  Pertinent images / reports were reviewed as a part of this visit.     Assessment:     Patient Active Problem List   Diagnosis    Migraine    Diabetes mellitus ([facility] [address]    Diastolic heart failure (HCC)    TIA (transient ischemic attack)    Persistent headaches    Cerebral infarction (HCC)    Abdominal pain, acute, right upper quadrant    Chronic back pain    Chronic pain    Migrainous headache without aura    COPD with acute exacerbation ([address]    DM (diabetes mellitus), type 2, uncontrolled ([address]    Hyperglycemia    Acute chest pain    Anxiety    Asthma    Cardiac septal defect    Hemiparesis following cerebrovascular accident (CVA) ([address]    Displacement of lumbar intervertebral disc without myelopathy    Calculus of kidney    Abnormal liver enzymes    Herniated lumbar intervertebral disc    Memory impairment    Infection of wound due to methicillin resistant Staphylococcus aureus (MRSA)    Shortness of breath    Sleep apnea    Cerebral artery occlusion with cerebral infarction (HCC)    Increased frequency of urination    Urinary urgency    Hypertensive emergency    Seizures (HCC)    Syncope    Wound infection following procedure    Wound infection    Infected sebaceous cyst    COPD (chronic obstructive pulmonary disease) (HCC)    Anxiety    Migraine    DKA, type 2 (HCC)    Hypokalemia    Thrombocytopenia (HCC)    Type 2 diabetes mellitus ([address]    Essential hypertension    Hyperlipidemia    Depression    Diabetes ([address]    Ureterolithiasis    Ureteral stone    Malignant neoplasm of lower third of esophagus (HCC)    Gastroesophageal reflux disease with esophagitis without hemorrhage    Tobacco dependence    Esophageal cancer ([address]    Encounter for preadmission testing       Plan:   1. Overall the patient is better. 2. Wean FiO2.  3. Wean sedation. 4. Discussed with the RN and RT.   Dali Harrison MD   2/10/2022  11:50 AM

## 2022-02-10 NOTE — PROGRESS NOTES
PATIENT NAME: Khalif Ivan    TODAY'S DATE: 02/10/22    SUBJECTIVE:    Pt is POD # 9 s/p esophagectomy. Pt remains sedated and intubated. No BMs. He has woken up a little bit while on sedation, moves extremities. Minimal NGT output. Weaning vent settings slowly. OBJECTIVE:   VITALS:    Vitals:    02/10/22 0900   BP:    Pulse: 88   Resp: 21   Temp:    SpO2: 91%     INTAKE/OUTPUT:    Date 02/10/22 0000 - 02/10/22 2359   Shift 5842-8375 9024-0637 0958-2362 24 Hour Total   INTAKE   I.V.(mL/kg/hr) 1625(1.8)   1625   NG/   300   TPN 1036.7   1036.7   Shift Total(mL/kg) 6247.5(73.2)   2961.7(25.5)   OUTPUT   Urine(mL/kg/hr) 850(0.9)   850   Emesis/NG output 35   35   Chest Tube 65   65   Shift Total(mL/kg) 950(8.2)   950(8.2)   Weight (kg) 116 116 116 116      No data found. EXAM:  Blood pressure (!) 105/47, pulse 88, temperature 97.5 °F (36.4 °C), temperature source Rectal, resp. rate 21, height 5' 10\" (1.778 m), weight 255 lb 11.7 oz (116 kg), SpO2 91 %. General appearance: sedated, intubated   Skin: unremarkable  HEENT Normocephalic, atraumatic without obvious deformity. Neck: Supple, Trachea midline   Lungs: Good respiratory effort. Course breath sounds bilat. CT output serous. Heart: Regular rate/ rhythm inc c/d/i  Abdomen: Soft, non-tender or non-distended   Extremities: 1+ edema warm well perfused  Neurologic: sedated  Mental status: unable to assess       Data:  CBC:   Recent Labs     02/08/22  0530 02/09/22  0430 02/10/22  0530   WBC 14.6* 15.8* 22.8*   HGB 8.5* 8.8* 9.3*   HCT 25.1* 25.5* 27.7*   PLT  --  100* 113*     BMP:    Recent Labs     02/08/22 0530 02/09/22  0430 02/10/22  0530   * 135 135   K 3.3* 3.4* 4.1    106 107   CO2 17* 18* 19*   BUN 62* 56* 54*   CREATININE 1.4* 1.2 1.2   GLUCOSE 290* 311* 276*     Hepatic:   No results for input(s): AST, ALT, ALB, BILITOT, ALKPHOS in the last 72 hours.   Mag:      Recent Labs     02/08/22  0530 02/10/22  0530   MG 2.2 2.4 Phos:     Recent Labs     02/08/22  0530 02/10/22  0530   PHOS 4.3 4.1      INR: No results for input(s): INR in the last 72 hours. Radiology Review:  CXR  Impression:     1.  Lines and tubes as described above. 2. Priscila Jane is some improved aeration of the lungs since the earlier chest   x-ray.  Persistent bibasilar opacities and small effusions.           ASSESSMENT AND PLAN:    Patient Active Problem List   Diagnosis    Migraine    Diabetes mellitus ([address]    Diastolic heart failure (HCC)    TIA (transient ischemic attack)    Persistent headaches    Cerebral infarction ([address]    Abdominal pain, acute, right upper quadrant    Chronic back pain    Chronic pain    Migrainous headache without aura    COPD with acute exacerbation ([address]    DM (diabetes mellitus), type 2, uncontrolled ([address]    Hyperglycemia    Acute chest pain    Anxiety    Asthma    Cardiac septal defect    Hemiparesis following cerebrovascular accident (CVA) ([address]    Displacement of lumbar intervertebral disc without myelopathy    Calculus of kidney    Abnormal liver enzymes    Herniated lumbar intervertebral disc    Memory impairment    Infection of wound due to methicillin resistant Staphylococcus aureus (MRSA)    Shortness of breath    Sleep apnea    Cerebral artery occlusion with cerebral infarction ([address]    Increased frequency of urination    Urinary urgency    Hypertensive emergency    Seizures ([address]    Syncope    Wound infection following procedure    Wound infection    Infected sebaceous cyst    COPD (chronic obstructive pulmonary disease) ([address]    Anxiety    Migraine    DKA, type 2 (HCC)    Hypokalemia    Thrombocytopenia (HCC)    Type 2 diabetes mellitus ([address]    Essential hypertension    Hyperlipidemia    Depression    Diabetes ([address]    Ureterolithiasis    Ureteral stone    Malignant neoplasm of lower third of esophagus (HCC)    Gastroesophageal reflux disease with esophagitis without hemorrhage    Tobacco dependence    Esophageal cancer ([facility] [address]    Encounter for preadmission testing       S/P esophagectomy    Cardio: BP stable, hold BB. Pulm: weaning vent settings, FiO2 45%, PEEP 8, and PO2 67. Continue to wean as tolerated. Will switch from propofol/fentanyl to precedex today and prepare for possible extubation tomorrow. GI: continue TF at 20, suppository PRN. abd soft. Renal: creatinine stable 1.2, BUN 54. Continue MIVF. Adequate UOP. Continue TPN. BC and urine cx NGTD. Sputum cx pending. On zosyn. WBC 22, afebrile.      Shakira Sorto PA-C

## 2022-02-10 NOTE — PROGRESS NOTES
Progress Note( Dr. David Wallace)  2/9/2022  Subjective:   Admit Date: 1/31/2022  PCP: Lesli Siddiqi DO    Admitted For : Malignant neoplasm of abdominal esophagus and underwent  esophagectomy    Consulted For:  Better control of blood glucose    Interval History: . He was sedated intubated and placed on the ventilator morning on 2/7/2022  Start on tube feeding just to stimulate the bowels  Started on TPN           Intake/Output Summary (Last 24 hours) at 2/9/2022 2333  Last data filed at 2/9/2022 1930  Gross per 24 hour   Intake 4752.01 ml   Output 3055 ml   Net 1697.01 ml       DATA    CBC:   Recent Labs     02/07/22  0420 02/08/22  0530 02/09/22  0430   WBC 13.9* 14.6* 15.8*   HGB 10.8* 8.5* 8.8*   *  --  100*    CMP:  Recent Labs     02/07/22  0420 02/08/22  0530 02/09/22  0430    134* 135   K 3.5 3.3* 3.4*    104 106   CO2 22 17* 18*   BUN 50* 62* 56*   CREATININE 1.3 1.4* 1.2   CALCIUM 8.6 7.9* 8.0*   PROT 4.8*  --   --    LABALBU 2.6* 2.2*  --    BILITOT 1.0  --   --    ALKPHOS 85  --   --    AST 10*  --   --    ALT 9*  --   --      Lipids:   Lab Results   Component Value Date    CHOL 162 03/07/2016    HDL 26 03/07/2016    TRIG 362 02/08/2022     Glucose:  Recent Labs     02/09/22  1358 02/09/22  1721 02/09/22  1926   POCGLU 316* 324* 302*     EesslijqwyI2V:  Lab Results   Component Value Date    LABA1C 5.9 02/06/2022     High Sensitivity TSH:   Lab Results   Component Value Date    TSHHS 2.110 08/17/2016     Free T3: No results found for: FT3  Free T4:  Lab Results   Component Value Date    T4FREE 0.91 05/15/2013       XR CHEST PORTABLE   Final Result   1. Lines and tubes as described above. 2.  There is some improved aeration of the lungs since the earlier chest   x-ray. Persistent bibasilar opacities and small effusions. XR CHEST PORTABLE   Final Result   Bilateral airspace opacities, not significantly changed from the prior study. Small left pleural effusion. Enteric tube tip likely projects at the level of the GE junction. Consider   an abdominal radiograph for better assessment. XR CHEST PORTABLE   Final Result   Stable exam with bilateral airspace opacities, right much more than left. XR CHEST PORTABLE   Final Result   Right pneumothorax has resolved      Bilateral pulmonary opacities persist         XR CHEST PORTABLE   Final Result   Slightly improved moderate right pneumothorax, estimated to be 20-30%. Unchanged chest tube. Unremarkable positioning of an endotracheal tube projecting 3 cm above the   jj. XR CHEST PORTABLE   Final Result   1. Interval development of moderate size right pneumothorax with chest tube   in place. 2.  Left basilar opacities have increased in the interval, which may   represent atelectasis. The findings were sent to the Radiology Results [address] at 5:14   am on 2/7/2022to be communicated to a licensed caregiver. XR CHEST PORTABLE   Final Result   Worsening right lung airspace consolidation. Postsurgical changes status post esophagectomy and gastric pull-through. CT CHEST PULMONARY EMBOLISM W CONTRAST   Final Result   No evidence of pulmonary embolism. Recent postsurgical changes status post distal esophagectomy and gastric   pull-through. Likely postsurgical changes noted in the mediastinum with   fluid and air in the region of the recent surgery. Bilateral pleural effusions with bibasilar atelectasis. Multifocal right lower lobe and right upper lobe airspace disease with   tree-in-bud nodules. No evidence of airspace disease in the left lung. Findings suggest right lung pneumonia, possibly from aspiration. Trace right pneumothorax with right chest tubes present. Pneumoperitoneum consistent with recent surgery. XR CHEST PORTABLE   Final Result   1. Status post right chest tube removal.  No pneumothorax.    2. Right parahilar and bibasilar opacities compatible with atelectasis. Aspiration and pneumonia are not excluded         XR CHEST PORTABLE   Final Result   Left basilar opacity could represent atelectasis or infection         XR CHEST PORTABLE   Final Result   Stable exam.         XR ABDOMEN FOR NG/OG/NE TUBE PLACEMENT   Final Result   1. Gastric catheter tip is near the he a hemidiaphragm at the normal   expected location of the GE junction prior surgery. If further localization is required, small amount of water-soluble contrast   via the gastric catheter would be useful for localization. 2.  Free air likely related to recent esophagectomy. XR CHEST PORTABLE   Final Result   Status post esophagectomy with postoperative perihilar and bibasilar   atelectasis. XR CHEST PORTABLE   Final Result   Stable appearance of the chest with opacities at the central right lung and   left base. Gas-filled esophagus or gastric pull-through to the right of the   spine. Correlate with surgical history. The tip of the nasogastric tube is   in the left upper abdomen but the side port would overlie the lower thorax. If gastric pull through then correlate with surgery for desired position. XR CHEST PORTABLE   Final Result   Stable chest x-ray with right perihilar opacities. XR CHEST PORTABLE   Final Result   Cardiomegaly with scattered pulmonary infiltrates. XR CHEST PORTABLE   Final Result   Status post esophagectomy with gastric pull-through. Pneumomediastinum,   pneumoperitoneum and trace right pneumothorax consistent with recent surgery. Right chest tube present. Atelectasis noted in both lungs.          XR CHEST PORTABLE    (Results Pending)        Scheduled Medicines   Medications:    insulin regular  0-18 Units SubCUTAneous Q6H    chlorhexidine  15 mL Mouth/Throat BID    albuterol sulfate HFA  4 puff Inhalation 6 times per day    And    ipratropium  4 puff Inhalation 6 times per day    pantoprazole  40 mg IntraVENous Daily    acetylcysteine  600 mg Inhalation BID    dexamethasone  20 mg IntraVENous Daily    [START ON 2/11/2022] dexamethasone  10 mg IntraVENous Daily    guaiFENesin  400 mg Oral 4x Daily    sodium chloride flush  5-40 mL IntraVENous 2 times per day    enoxaparin  40 mg SubCUTAneous BID    piperacillin-tazobactam  3,375 mg IntraVENous Q8H    sertraline  100 mg Per J Tube Daily    lidocaine  1 patch TransDERmal Daily      Infusions:    PN-Adult Premix  4.25/10 - Standard Electrolytes - Peripheral Line 84 mL/hr at 02/09/22 1724    fentaNYL 50 mcg/hr (02/09/22 1724)    propofol 40 mcg/kg/min (02/09/22 2224)    sodium chloride 100 mL/hr at 02/09/22 1724    norepinephrine Stopped (02/08/22 0522)    dextrose      sodium chloride           Objective:   Vitals: BP (!) 110/56   Pulse 75   Temp 98.2 °F (36.8 °C) (Rectal)   Resp 19   Ht 5' 10\" (1.778 m)   Wt 255 lb 11.7 oz (116 kg)   SpO2 93%   BMI 36.69 kg/m²   General appearance: Patient is sedated, intubated and on ventilator   neck: no JVD or bruit//had esophagectomy  Thyroid : Normal lobes   Lungs: Has Vesicular Breath sounds   Heart:  regular rate and rhythm  Abdomen: soft, non-tender; bowel sounds normal; no masses,  no organomegaly  esophagectomy NG tube  Musculoskeletal: Normal  Extremities: extremities normal, , no edema  Neurologic:   Patient is sedated, intubated and on ventilator.     Assessment:     Patient Active Problem List:     Migraine     Diabetes mellitus (HCC)     Diastolic heart failure (HCC)     TIA (transient ischemic attack)     Persistent headaches     Cerebral infarction (HCC)     Abdominal pain, acute, right upper quadrant     Chronic back pain     Chronic pain     Migrainous headache without aura     COPD with acute exacerbation (HCC)     DM (diabetes mellitus), type 2, uncontrolled (HCC)     Hyperglycemia     Acute chest pain     Anxiety     Asthma     Cardiac septal defect     Hemiparesis following cerebrovascular accident (CVA) ([facility] [address]     Displacement of lumbar intervertebral disc without myelopathy     Calculus of kidney     Abnormal liver enzymes     Herniated lumbar intervertebral disc     Memory impairment     Infection of wound due to methicillin resistant Staphylococcus aureus (MRSA)     Shortness of breath     Sleep apnea     Cerebral artery occlusion with cerebral infarction (HCC)     Increased frequency of urination     Urinary urgency     Hypertensive emergency     Seizures ([facility] [address]     Syncope     Wound infection following procedure     Wound infection     Infected sebaceous cyst     COPD (chronic obstructive pulmonary disease) (HCC)     Anxiety     Migraine     DKA, type 2 (HCC)     Hypokalemia     Thrombocytopenia (HCC)     Type 2 diabetes mellitus ([facility] [address]     Essential hypertension     Hyperlipidemia     Depression     Diabetes ([facility] [address]     Ureterolithiasis     Ureteral stone     Malignant neoplasm of lower third of esophagus (HCC)     Gastroesophageal reflux disease with esophagitis without hemorrhage     Tobacco dependence     Esophageal cancer ([facility] [address]     Encounter for preadmission testing      Plan:     1. Reviewed POC blood glucose . Labs and X ray results   2. Reviewed Current Medicines   3. On  Correction bolus Humalog/ Basal Lantus Insulin regime   4. On TPN now  5. Monitor Blood glucose freque  6. Modified  the dose of Insulin/ other medicines as needed   7. Will follow     .      Germán Gomez MD, MD

## 2022-02-11 NOTE — PROGRESS NOTES
Weaning sedation this AM for possible extubation. Sent ABG results to Dr. Mandy Garcia. Results for Nasir Freed (MRN [id number]) as of 2/11/2022 08:48   Ref.  Range 2/11/2022 07:00   pH, Bld Latest Ref Range: 7.34 - 7.45  7.41   Base Excess Latest Ref Range: 0 - 3.3  3   O2 Sat Latest Ref Range: 96 - 97 % 89.2 (L)   Carbon Monoxide, Blood Latest Ref Range: 0 - 5 % 2.2   CO2 Content Latest Ref Range: 19 - 24 MMOL/L 21.3   pCO2, Arterial Latest Ref Range: 32 - 45 MMHG 32.0   pO2, Arterial Latest Ref Range: 75 - 100 MMHG 61 (L)   HCO3, Arterial Latest Ref Range: 18 - 23 MMOL/L 20.3   Methemoglobin, Arterial Latest Ref Range: <1.5 % 1.1

## 2022-02-11 NOTE — PROGRESS NOTES
02/11/22 0701   Vent Information   $Ventilation $Subsequent Day   Vent Type 980   Vent Mode AC/VC   Vt Ordered 550 mL   Rate Set 18 bmp   Peak Flow 60 L/min   Pressure Support 0 cmH20   FiO2  45 %   SpO2 95 %   SpO2/FiO2 ratio 211.11   Sensitivity 4   PEEP/CPAP 8   Vent Patient Data   Peak Inspiratory Pressure 20 cmH2O   Mean Airway Pressure 13 cmH20   Rate Measured 21 br/min   Vt Exhaled 377 mL   Minute Volume 11.2 Liters   I:E Ratio 1:2.30   Cough/Sputum   Sputum How Obtained Endotracheal;Suctioned   $Obtained Sample $Induced Sputum   Cough None   Spontaneous Breathing Trial (SBT) RT Doc   Pulse 63   Additional Respiratory  Assessments   Resp 15   Alarm Settings   High Pressure Alarm 40 cmH2O   Low Minute Volume Alarm 2.5 L/min   Apnea (secs) 20 secs   High Respiratory Rate 40 br/min   Low Exhaled Vt  250 mL   ETT (adult)   Placement Date/Time: 02/07/22 (c) 0540   Preoxygenation: Yes  Mask Ventilation: Mask ventilation not attempted (0)  Technique: Video laryngoscopy  Tube Size: 7.5 mm  Blade Size: 4  Grade View: Full view of the glottis  Insertion attempts: 1  Placement. ..    Secured at 27 cm   Measured From Lips   ET Placement Right   Secured By Commercial tube dawn   Site Condition Cool;Dry

## 2022-02-11 NOTE — PROGRESS NOTES
Pt currently on 15L HFNC and 91% O2 sat, RR 22. Pt does not appear to be in distress. Pt is awake and answering questions. Notified Dr. Kassandra Campbell and RT aware. Dr. Kassandra Campbell is okay with heated high flow nasal cannula if needed but NO BiPAP.

## 2022-02-11 NOTE — PLAN OF CARE
Nutrition Problem #1: Inadequate oral intake  Intervention: Food and/or Nutrient Delivery:  (restart EN as soon as possible)  Nutritional Goals: Pt will meet greater than 75% of estimated nutrient needs via EN

## 2022-02-11 NOTE — PROGRESS NOTES
Pt c/o 10/10 sharp pain on ride side. O2 sat 93% on 15L HFNC, RR 24, no respiratory distress. ABG sent and pO2 69- improved from prior ABG while on vent. Notified Dr. Amalia Botello to clarify which pain medication he prefers pt to have. S/w Dr. Amalia Botello via telephone and updated on pt condition. Orders to give 1 time dose of 0.5mg IVP Dilaudid and keep PRN order also. Orders to restart tube feeds at 20 mL/hr also.

## 2022-02-11 NOTE — PROGRESS NOTES
Pt passed SAT/SBT and extubated by RT Jaden with assistance of this nurse at [phone]. Mouth suctioned and oral care provided. 6L HFNC applied and O2 sat 88%, RR 22. Pt complaining of pain on right side where chest tube/surgical incision is. PRN Dilaudid given for pain. Pt tolerated Dilaudid well and pain improved. Pt remains awake and alert.

## 2022-02-11 NOTE — PROGRESS NOTES
02/11/22 1112   Vent Information   Vent Mode CPAP   Pressure Support 10 cmH20   FiO2  40 %   SpO2 93 %   SpO2/FiO2 ratio 232.5   Sensitivity 4   PEEP/CPAP 6   Vent Patient Data   Peak Inspiratory Pressure 17 cmH2O   Mean Airway Pressure 9.4 cmH20   Rate Measured 18 br/min   Vt Exhaled 829 mL   Minute Volume 16 Liters   I:E Ratio 1:2.00   Cough/Sputum   Sputum How Obtained Endotracheal;Suctioned   $Obtained Sample $Induced Sputum   Cough None   Spontaneous Breathing Trial (SBT) RT Doc   Pulse 71   Additional Respiratory  Assessments   Resp 20   Alarm Settings   High Pressure Alarm 40 cmH2O   Low Minute Volume Alarm 2.5 L/min   Apnea (secs) 20 secs   High Respiratory Rate 40 br/min   Low Exhaled Vt  0.83 mL

## 2022-02-11 NOTE — PROGRESS NOTES
Progress Note( Dr. Fabrice Chau)  2/10/2022  Subjective:   Admit Date: 1/31/2022  PCP: Yobany Siddiqi DO    Admitted For : Malignant neoplasm of abdominal esophagus and underwent  esophagectomy    Consulted For:  Better control of blood glucose    Interval History: . He was sedated intubated and placed on the ventilator morning on 2/7/2022  Start on tube feeding just to stimulate the bowels  Started on TPN           Intake/Output Summary (Last 24 hours) at 2/10/2022 2235  Last data filed at 2/10/2022 2120  Gross per 24 hour   Intake 5935.17 ml   Output 3150 ml   Net 2785.17 ml       DATA    CBC:   Recent Labs     02/08/22  0530 02/09/22  0430 02/10/22  0530   WBC 14.6* 15.8* 22.8*   HGB 8.5* 8.8* 9.3*   PLT  --  100* 113*    CMP:  Recent Labs     02/08/22  0530 02/09/22  0430 02/10/22  0530   * 135 135   K 3.3* 3.4* 4.1    106 107   CO2 17* 18* 19*   BUN 62* 56* 54*   CREATININE 1.4* 1.2 1.2   CALCIUM 7.9* 8.0* 7.9*   LABALBU 2.2*  --   --      Lipids:   Lab Results   Component Value Date    CHOL 162 03/07/2016    HDL 26 03/07/2016    TRIG 362 02/08/2022     Glucose:  Recent Labs     02/10/22  0845 02/10/22  1251 02/10/22  1654   POCGLU 252* 257* 255*     [id number]:  Lab Results   Component Value Date    LABA1C 5.9 02/06/2022     High Sensitivity TSH:   Lab Results   Component Value Date    TSHHS 2.110 08/17/2016     Free T3: No results found for: FT3  Free T4:  Lab Results   Component Value Date    T4FREE 0.91 05/15/2013       XR CHEST PORTABLE   Final Result   1. Tip of the enteric tube likely just past the gastroesophageal junction. The side port is not well visualized but is likely in the distal esophagus. 2. Right chest tube in place. No pneumothorax. 3. Small bilateral pleural effusions and mild bibasilar opacities without   significant change. XR CHEST PORTABLE   Final Result   Stable chest.  Consider advancing the enteric tube by at least 5 cm.          XR CHEST PORTABLE No evidence of airspace disease in the left lung. Findings suggest right lung pneumonia, possibly from aspiration. Trace right pneumothorax with right chest tubes present. Pneumoperitoneum consistent with recent surgery. XR CHEST PORTABLE   Final Result   1. Status post right chest tube removal.  No pneumothorax. 2. Right parahilar and bibasilar opacities compatible with atelectasis. Aspiration and pneumonia are not excluded         XR CHEST PORTABLE   Final Result   Left basilar opacity could represent atelectasis or infection         XR CHEST PORTABLE   Final Result   Stable exam.         XR ABDOMEN FOR NG/OG/NE TUBE PLACEMENT   Final Result   1. Gastric catheter tip is near the he a hemidiaphragm at the normal   expected location of the GE junction prior surgery. If further localization is required, small amount of water-soluble contrast   via the gastric catheter would be useful for localization. 2.  Free air likely related to recent esophagectomy. XR CHEST PORTABLE   Final Result   Status post esophagectomy with postoperative perihilar and bibasilar   atelectasis. XR CHEST PORTABLE   Final Result   Stable appearance of the chest with opacities at the central right lung and   left base. Gas-filled esophagus or gastric pull-through to the right of the   spine. Correlate with surgical history. The tip of the nasogastric tube is   in the left upper abdomen but the side port would overlie the lower thorax. If gastric pull through then correlate with surgery for desired position. XR CHEST PORTABLE   Final Result   Stable chest x-ray with right perihilar opacities. XR CHEST PORTABLE   Final Result   Cardiomegaly with scattered pulmonary infiltrates. XR CHEST PORTABLE   Final Result   Status post esophagectomy with gastric pull-through. Pneumomediastinum,   pneumoperitoneum and trace right pneumothorax consistent with recent surgery. Right chest tube present. Atelectasis noted in both lungs. XR CHEST PORTABLE    (Results Pending)        Scheduled Medicines   Medications:    [START ON 2/11/2022] fat emulsion  250 mL IntraVENous Once per day on Mon Tue Thu Fri    insulin regular  0-18 Units SubCUTAneous Q6H    chlorhexidine  15 mL Mouth/Throat BID    albuterol sulfate HFA  4 puff Inhalation 6 times per day    And    ipratropium  4 puff Inhalation 6 times per day    pantoprazole  40 mg IntraVENous Daily    acetylcysteine  600 mg Inhalation BID    [START ON 2/11/2022] dexamethasone  10 mg IntraVENous Daily    guaiFENesin  400 mg Oral 4x Daily    sodium chloride flush  5-40 mL IntraVENous 2 times per day    enoxaparin  40 mg SubCUTAneous BID    piperacillin-tazobactam  3,375 mg IntraVENous Q8H    sertraline  100 mg Per J Tube Daily    lidocaine  1 patch TransDERmal Daily      Infusions:    PN-Adult Premix  4.25/10 - Standard Electrolytes - Peripheral Line 84 mL/hr at 02/10/22 1825    dexmedetomidine (PRECEDEX) IV infusion Stopped (02/10/22 1509)    propofol 30 mcg/kg/min (02/10/22 1854)    fentaNYL 50 mcg/hr (02/10/22 1545)    sodium chloride 100 mL/hr at 02/10/22 1831    norepinephrine Stopped (02/08/22 0522)    dextrose      sodium chloride           Objective:   Vitals: BP (!) 88/45   Pulse 70   Temp 97.3 °F (36.3 °C) (Rectal)   Resp 15   Ht 5' 10\" (1.778 m)   Wt 255 lb 11.7 oz (116 kg)   SpO2 97%   BMI 36.69 kg/m²   General appearance: Patient is sedated, intubated and on ventilator   neck: no JVD or bruit//had esophagectomy  Thyroid : Normal lobes   Lungs: Has Vesicular Breath sounds   Heart:  regular rate and rhythm  Abdomen: soft, non-tender; bowel sounds normal; no masses,  no organomegaly  esophagectomy NG tube  Musculoskeletal: Normal  Extremities: extremities normal, , no edema  Neurologic:   Patient is sedated, intubated and on ventilator.     Assessment:     Patient Active Problem List:

## 2022-02-11 NOTE — PROGRESS NOTES
CXR results sent to Dr. Car De Anda and GINNY Vuong. Orders to continue weaning at this time per protocol if pt tolerates. Impression   1. Slightly larger right sub pulmonic pneumothorax.  Right chest tubes are   again noted.    2. Bibasilar lung infiltrates with associated small bilateral pleural   effusions, not appreciably changed.

## 2022-02-11 NOTE — PROGRESS NOTES
Hospitalist Progress Note      Name:  Emelyn Alexander /Age/Sex: 1970  (46 y.o. male)   MRN & CSN:  [id number] & [id number] Admission Date/Time: 2022  5:38 AM   Location:  -A PCP: Timo Siddiqi DO         Hospital Day: 12    Assessment and Plan:   Emelyn Alexander is a 46 y.o.  male admitted with acute respiratory failure, has a past medical history of obstructive sleep apnea, hypertension, hyperlipidemia, type 2 diabetes mellitus, COPD excuse me    1. Acute respiratory failure with hypoxia secondary to COVID-19 pneumonia. 2. COVID-19 viral pneumonia: Patient remains mechanically ventilated, is currently at FiO2 of 45%, continue weaning as tolerated. Continue IV Decadron 10 mg daily. Planned for possible extubation today. 3. History of esophageal cancer status post esophagectomy  4. Suspected superimposed bacterial aspiration pneumonia:  His chest x-ray showed bilateral airspace opacities right greater than left. Procalcitonin was elevated at 16.95. Patient was started on IV vancomycin and Zosyn. MRSA screen comes back negative today. We will discontinue IV vancomycin. 5. Right Pneumothorax s/p chest tube. CXR today shows slightly larger right subpulmonic pneumothorax, and small bilateral pleural effusioons. 6. Type 2 diabetes mellitus with hyperglycemia: Continue sliding scale. 7. History of obstructive sleep apnea, COPD  8. Essential hypertension: Not on antihypertensives. Blood cultures from  negative  Continue IV Zosyn  Planned for possible extubation today  Continue present management. Interval History     No acute events overnight. Patient remains stable. Objective:        Intake/Output Summary (Last 24 hours) at 2022 1120  Last data filed at 2022 0800  Gross per 24 hour   Intake 5750.8 ml   Output 4570 ml   Net 1180.8 ml      Vitals:   Vitals:    22 1112   BP:    Pulse: 71   Resp: 20   Temp:    SpO2: 93%     Physical Exam:   GEN on mechanical ventilation    EYES Pupils are equally round. No scleral erythema, discharge, or conjunctivitis. HENT Mucous membranes are moist. Oral pharynx without exudates, no evidence of thrush. NECK Supple, no apparent thyromegaly or masses. RESP Clear to auscultation, anteriorly    CARD: S1/S2 auscultated. Regular mild bilateral peripheral edema. GI Abdomen is soft without significant tenderness, masses, or guarding. Bowel sounds are normoactive. Rectal exam deferred.  No costovertebral angle tenderness. Normal appearing external genitalia. Herr catheter is not present. MSK No gross joint deformities. SKIN Normal coloration, warm, dry.     NEURO patient is on mechanical ventilation      Medications:   Medications:    fat emulsion  250 mL IntraVENous Once per day on Mon Tue Thu Fri    insulin regular  0-18 Units SubCUTAneous Q6H    chlorhexidine  15 mL Mouth/Throat BID    albuterol sulfate HFA  4 puff Inhalation 6 times per day    And    ipratropium  4 puff Inhalation 6 times per day    pantoprazole  40 mg IntraVENous Daily    acetylcysteine  600 mg Inhalation BID    dexamethasone  10 mg IntraVENous Daily    guaiFENesin  400 mg Oral 4x Daily    sodium chloride flush  5-40 mL IntraVENous 2 times per day    enoxaparin  40 mg SubCUTAneous BID    piperacillin-tazobactam  3,375 mg IntraVENous Q8H    sertraline  100 mg Per J Tube Daily    lidocaine  1 patch TransDERmal Daily      Infusions:    PN-Adult Premix  4.25/10 - Standard Electrolytes - Peripheral Line      PN-Adult Premix  4.25/10 - Standard Electrolytes - Peripheral Line 84 mL/hr at 02/10/22 1825    dexmedetomidine (PRECEDEX) IV infusion 0.2 mcg/kg/hr (02/11/22 1051)    propofol Stopped (02/11/22 0852)    fentaNYL Stopped (02/11/22 0922)    sodium chloride 100 mL/hr at 02/11/22 0745    norepinephrine 3 mcg/min (02/11/22 0917)    dextrose      sodium chloride       PRN Meds: glucose, 15 g, PRN  dextrose, 12.5 g, PRN  glucagon (rDNA), 1 mg, PRN  dextrose, 100 mL/hr, PRN  albuterol sulfate HFA, 2 puff, Q6H PRN  HYDROmorphone, 0.5 mg, Q4H PRN  sodium chloride flush, 5-40 mL, PRN  sodium chloride, 25 mL, PRN  potassium chloride, 20 mEq, PRN  bisacodyl, 10 mg, Daily PRN  phenol, 1 spray, Q2H PRN  ipratropium, 2 puff, 4x Daily PRN  acetaminophen, 650 mg, Q4H PRN  HYDROcodone 5 mg - acetaminophen, 1 tablet, Q4H PRN  labetalol, 10 mg, Q2H PRN  hydrALAZINE, 10 mg, Q1H PRN  magnesium sulfate, 1,000 mg, PRN  ondansetron, 4 mg, Q6H PRN          Electronically signed by Nida Dobbs MD on 2/11/2022 at 11:20 AM

## 2022-02-11 NOTE — PROGRESS NOTES
Pulmonary and Critical Care  Progress Note    Subjective: The patient has improved. Shortness of breath none. Chest pain none. Addressing respiratory complaints Patient is negative for  hemoptysis and cyanosis  CONSTITUTIONAL:  negative for fevers and chills      Past Medical History:     has a past medical history of Arthritis, Asthma, Atrial fibrillation ([address] Cancer ([facility] [address] CHF (congestive heart failure) ([facility] [address] COPD (chronic obstructive pulmonary disease) ([facility] [address] Depression, Diabetes mellitus ([facility] [address] Diastolic heart failure ([facility] [address] Esophageal cancer ([facility] [address] GERD (gastroesophageal reflux disease), History of external beam radiation therapy, Hyperlipidemia, Hypertension, Kidney stone, Migraine, Other disorders of kidney and ureter, Pneumonia, Psychiatric problem, Sleep apnea, and Unspecified cerebral artery occlusion with cerebral infarction. has a past surgical history that includes Cholecystectomy; Carpal tunnel release; Elbow surgery; ASD repair (2005); back surgery (01/2012); Pericardium surgery (2005); Abdomen surgery; Colonoscopy; eye surgery; eye surgery; ASD repair; other surgical history (02/16/2017); Cardiac surgery (08/2013); CYSTOSCOPY INSERTION / REMOVAL STENT / STONE (Right, 10/03/2020); Upper gastrointestinal endoscopy (10/09/2021); and Esophagectomy (N/A, 1/31/2022). reports that he has been smoking cigarettes. He has been smoking about 0.25 packs per day for the past 0.00 years. He has never used smokeless tobacco. He reports previous alcohol use. He reports that he does not use drugs. Family history:  family history includes Alzheimer's Disease in his mother; Cancer in his sister; Diabetes in his father, maternal grandfather, maternal grandmother, mother, paternal grandfather, paternal grandmother, and paternal uncle; Heart Disease in his father; Other in his mother.     No Known Allergies  Social History:    Reviewed; no changes    Objective:   PHYSICAL EXAM:        VITALS:  BP (!) 86/45 Pulse 71   Temp 97 °F (36.1 °C) (Rectal)   Resp 20   Ht 5' 10\" (1.778 m)   Wt 255 lb 11.7 oz (116 kg)   SpO2 93%   BMI 36.69 kg/m²     24HR INTAKE/OUTPUT:      Intake/Output Summary (Last 24 hours) at 2/11/2022 1159  Last data filed at 2/11/2022 0800  Gross per 24 hour   Intake 5750.8 ml   Output 4570 ml   Net 1180.8 ml       CONSTITUTIONAL:  Somnolent. LUNGS:  decreased breath sounds, basilar crackles. Evidence of recent surgery. CARDIOVASCULAR:  normal S1 and S2 and negative JVD  ABD:Abdomen soft, non-tender. BS normal. No masses,  No organomegaly  NEURO:Sedated on vent. DATA:    CBC:  Recent Labs     02/09/22  0430 02/10/22  0530 02/11/22  0440   WBC 15.8* 22.8* 25.2*   RBC 2.63* 2.77* 2.97*   HGB 8.8* 9.3* 9.8*   HCT 25.5* 27.7* 30.2*   * 113* 128*   MCV 97.0 100.0 101.7*   MCH 33.5* 33.6* 33.0*   MCHC 34.5 33.6 32.5   RDW 14.1 14.6 14.6      BMP:  Recent Labs     02/09/22  0430 02/10/22  0530 02/11/22  0440    135 141   K 3.4* 4.1 4.0    107 111*   CO2 18* 19* 21   BUN 56* 54* 49*   CREATININE 1.2 1.2 0.9   CALCIUM 8.0* 7.9* 8.2*   GLUCOSE 311* 276* 132*      ABG:  Recent Labs     02/10/22  0700 02/10/22  1245 02/11/22  0700   PH 7.42 7.42 7.41   PO2ART 67* 63* 61*   THM3GXV 30.0* 30.0* 32.0   O2SAT 90.5* 89.4* 89.2*     Lab Results   Component Value Date    PROBNP 595.1 (H) 02/08/2022    PROBNP 765.5 (H) 02/07/2022    PROBNP 37.81 02/29/2020     No results found for: CULTRESP    Radiology Review:  Pertinent images / reports were reviewed as a part of this visit.     Assessment:     Patient Active Problem List   Diagnosis    Migraine    Diabetes mellitus ([address]    Diastolic heart failure (HCC)    TIA (transient ischemic attack)    Persistent headaches    Cerebral infarction ([address]    Abdominal pain, acute, right upper quadrant    Chronic back pain    Chronic pain    Migrainous headache without aura    COPD with acute exacerbation ([address]    DM (diabetes mellitus), type 2, uncontrolled ([address]    Hyperglycemia    Acute chest pain    Anxiety    Asthma    Cardiac septal defect    Hemiparesis following cerebrovascular accident (CVA) ([address]    Displacement of lumbar intervertebral disc without myelopathy    Calculus of kidney    Abnormal liver enzymes    Herniated lumbar intervertebral disc    Memory impairment    Infection of wound due to methicillin resistant Staphylococcus aureus (MRSA)    Shortness of breath    Sleep apnea    Cerebral artery occlusion with cerebral infarction (HCC)    Increased frequency of urination    Urinary urgency    Hypertensive emergency    Seizures (HCC)    Syncope    Wound infection following procedure    Wound infection    Infected sebaceous cyst    COPD (chronic obstructive pulmonary disease) (HCC)    Anxiety    Migraine    DKA, type 2 (HCC)    Hypokalemia    Thrombocytopenia (HCC)    Type 2 diabetes mellitus ([address]    Essential hypertension    Hyperlipidemia    Depression    Diabetes ([address]    Ureterolithiasis    Ureteral stone    Malignant neoplasm of lower third of esophagus (HCC)    Gastroesophageal reflux disease with esophagitis without hemorrhage    Tobacco dependence    Esophageal cancer ([address]    Encounter for preadmission testing       Plan:   1. Overall the patient is better. 2. Wean FiO2.  3. Wean sedation. 4. Discussed with the RN and CT surg. 5. Wean per protocol.   Sylvia Mooney MD   2/11/2022  11:59 AM

## 2022-02-11 NOTE — PROGRESS NOTES
Results for Elba Murray (MRN [id number]) as of 2/11/2022 12:42   Ref.  Range 2/11/2022 12:30   pH, Bld Latest Ref Range: 7.34 - 7.45  7.43   Base Excess Latest Ref Range: 0 - 3.3  3   O2 Sat Latest Ref Range: 96 - 97 % 90.8 (L)   Carbon Monoxide, Blood Latest Ref Range: 0 - 5 % 2.4   CO2 Content Latest Ref Range: 19 - 24 MMOL/L 21.6   pCO2, Arterial Latest Ref Range: 32 - 45 MMHG 31.0 (L)   pO2, Arterial Latest Ref Range: 75 - 100 MMHG 65 (L)   HCO3, Arterial Latest Ref Range: 18 - 23 MMOL/L 20.6   Methemoglobin, Arterial Latest Ref Range: <1.5 % 1.5 (H)

## 2022-02-11 NOTE — PROGRESS NOTES
Pt on 0.2 of Precedex currently on vent. Pt responds to voice and follows commands in all extremities. Jaden, RT at bedside and switched pt over to CPAP per orders from Dr. Rusty Maldonado to wean pt from ventilator. Orders for repeat ABG in 1 hr per Dr. Rusty Maldonado.

## 2022-02-11 NOTE — PROGRESS NOTES
Comprehensive Nutrition Assessment    Type and Reason for Visit:  Reassess    Nutrition Recommendations/Plan:   · Restart EN as soon as possible  · Recommend below EN regimen to help meet estimated kcal/protein needs: Pivot 1.5 @ 55 ml/hr which would provide ~1980 kcal and 124 g  protein    Nutrition Assessment:  POD #10 s/p esophagectomy, pt remains intubated, +precedex and levophed ggt, noted propofol stopped and possible plan for extubation this morning, PN infusing @ 84 ml/hr per order, current PN regimen only meeting ~50% of estimated kcal needs and 57% of estimated protein needs, EN not running at this time noted, will follow at high nutrition risk    Malnutrition Assessment:  Malnutrition Status: At risk for malnutrition (Comment)    Context:  Acute Illness       Estimated Daily Nutrient Needs:  Energy (kcal):  [address] [facility] 2003b); Weight Used for Energy Requirements:  Current     Protein (g):  150 (2.0 g/kg);  Weight Used for Protein Requirements:  Ideal          Wounds:  Surgical Incision       Current Nutrition Therapies:    Current Parenteral Nutrition Orders:  · Type and Formula:  (Clinimix 4.25/10)   · Lipids: None  · Duration: Continuous  · Rate/Volume: 84/2016  · Current PN Order Provides: ~1029 kcal and 86 g protein    Anthropometric Measures:  · Height: 5' 10\" (177.8 cm)  · Current Body Weight: 255 lb 11.7 oz (116 kg) ((2/1) unknown weight source)   · Admission Body Weight: 255 lb 11.7 oz (116 kg)    · Usual Body Weight: 278 lb (126.1 kg) ((10/21/21) per chart review)     · Ideal Body Weight: 166 lbs; % Ideal Body Weight 154.1 %   · BMI: 36.7  · BMI Categories: Obese Class 2 (BMI 35.0 -39.9)       Nutrition Diagnosis:   · Inadequate oral intake related to acute injury/trauma as evidenced by NPO or clear liquid status due to medical condition    Nutrition Interventions:   Food and/or Nutrient Delivery:   (restart EN as soon as possible)  Nutrition Education/Counseling:  No recommendation at this time   Coordination of Nutrition Care:  Continue to monitor while inpatient    Goals:  Pt will meet greater than 75% of estimated nutrient needs via EN       Nutrition Monitoring and Evaluation:   Behavioral-Environmental Outcomes:  None Identified   Food/Nutrient Intake Outcomes:  Enteral Nutrition Intake/Tolerance,Parenteral Nutrition Intake/Tolerance  Physical Signs/Symptoms Outcomes:  Biochemical Data,GI Status,Hemodynamic Status,Fluid Status or Edema,Weight,Skin     Discharge Planning:     Too soon to determine     Electronically signed by Wander Wilson, MS, RD, LD on 2/11/22 at 9:20 AM EST    Contact: [phone]

## 2022-02-12 NOTE — PROGRESS NOTES
Gurpreet sent to Dr. Diaz Scriver regarding pt worsening condition. S/w Dr. Diaz Scriver via telephone.

## 2022-02-12 NOTE — PROGRESS NOTES
Patient has labored respirations with crackles throughout. Patient appears to be worn out with weak effort on acapella. Just placed on heated high flow with improvements to saturations. Will monitor closely for other improvement or deterioration.

## 2022-02-12 NOTE — PROGRESS NOTES
PATIENT NAME: Qamar Gooden    TODAY'S DATE: 02/11/22    SUBJECTIVE:    Pt was intubated this morning however extubated this afternoon. Change of status: No dramatic change when he was seen this morning. Throughout the day he was extubated. I have seen him in discussed this case with the nurses and consultants throughout the day. He was also seen by my partner earlier this morning. OBJECTIVE:   VITALS:    Vitals:    02/11/22 2145   BP:    Pulse: 111   Resp: 28   Temp:    SpO2: (!) 89%     INTAKE/OUTPUT:    Date 02/11/22 0000 - 02/11/22 2359   Shift 6922-8212 4724-1028 9519-6189 24 Hour Total   INTAKE   I.V.(mL/kg/hr) 1599.8(1.7)  178.9 1778.7   NG/GT   60 60   IV Piggyback 65.9  51.7 117.5   TPN 1111.6  830.3 1942   Shift Total(mL/kg) 2777. 3(23.9)  1120.9(9.7) 3898. 2(33.6)   OUTPUT   Urine(mL/kg/hr) 1525(1.6) 800(0.9) 900 3225   Emesis/NG output 230 110 60 400   Chest Tube  305 100 405   Shift Total(mL/kg) 1755(15.1) 1215(10.5) 1060(9.1) 4030(34.7)   Weight (kg) 116 116 116 116        EXAM:  Blood pressure (!) 107/103, pulse 111, temperature 99.1 °F (37.3 °C), temperature source Rectal, resp. rate 28, height 5' 10\" (1.778 m), weight 255 lb 11.7 oz (116 kg), SpO2 (!) 89 %. General appearance: No apparent distress, appears stated age and cooperative despite being intubated  Skin: unremarkable  HEENT Normocephalic, atraumatic without obvious deformity.    Neck: Supple, Trachea midline   Lungs: Rhonchi right side  Heart: Regular rate/ rhythm chest and abdomen inc c/d/i  Abdomen: Soft appropriately tender nondistended  Extremities: No lower extremity edema warm well perfused  Neurologic: Following commands despite being intubated  Mental status: Intubated      Data:  CBC:   Recent Labs     02/09/22  0430 02/10/22  0530 02/11/22  0440   WBC 15.8* 22.8* 25.2*   HGB 8.8* 9.3* 9.8*   HCT 25.5* 27.7* 30.2*   * 113* 128*     BMP:    Recent Labs     02/09/22  0430 02/10/22  0530 02/11/22  0440    135 141   K 3.4* 4.1 4.0    107 111*   CO2 18* 19* 21   BUN 56* 54* 49*   CREATININE 1.2 1.2 0.9   GLUCOSE 311* 276* 132*     Hepatic: No results for input(s): AST, ALT, ALB, BILITOT, ALKPHOS in the last 72 hours. Mag:      Recent Labs     02/10/22  0530   MG 2.4      Phos:     Recent Labs     02/10/22  0530   PHOS 4.1      INR: No results for input(s): INR in the last 72 hours.   Radiology Review:  CXR small subpulmonic right basilar pneumothorax with some improvement in the right lung space      ASSESSMENT AND PLAN:  S/P [place] Kaleb esophagectomy    Postoperative day #11  Patient Active Problem List   Diagnosis    Migraine    Diabetes mellitus ([address]    Diastolic heart failure (HCC)    TIA (transient ischemic attack)    Persistent headaches    Cerebral infarction ([address]    Abdominal pain, acute, right upper quadrant    Chronic back pain    Chronic pain    Migrainous headache without aura    COPD with acute exacerbation ([address]    DM (diabetes mellitus), type 2, uncontrolled ([address]    Hyperglycemia    Acute chest pain    Anxiety    Asthma    Cardiac septal defect    Hemiparesis following cerebrovascular accident (CVA) ([address]    Displacement of lumbar intervertebral disc without myelopathy    Calculus of kidney    Abnormal liver enzymes    Herniated lumbar intervertebral disc    Memory impairment    Infection of wound due to methicillin resistant Staphylococcus aureus (MRSA)    Shortness of breath    Sleep apnea    Cerebral artery occlusion with cerebral infarction ([address]    Increased frequency of urination    Urinary urgency    Hypertensive emergency    Seizures ([address]    Syncope    Wound infection following procedure    Wound infection    Infected sebaceous cyst    COPD (chronic obstructive pulmonary disease) ([address]    Anxiety    Migraine    DKA, type 2 (HCC)    Hypokalemia    Thrombocytopenia (HCC)    Type 2 diabetes mellitus ([address]    Essential hypertension    Hyperlipidemia  Depression    Diabetes [facility]    Ureterolithiasis    Ureteral stone    Malignant neoplasm of lower third of esophagus (HCC)    Gastroesophageal reflux disease with esophagitis without hemorrhage    Tobacco dependence    Esophageal cancer ([facility]    Encounter for preadmission testing       1. Cardio: Patient had sinus tachycardia, we will obtain a 12-lead EKG   Current drips: He was on Levophed and is now off of it. 2. Pulm: Discussed plan with pulmonary. We will work towards extubation. We cannot use BiPAP or CPAP. 3. GI: Restart tube feeds per J tube  4. ID: Patient has Pseudomonas in his sputum. He is on Zosyn  5. Heme: Hemoglobin is 9.8 and platelets 896  6. Neuro: He is following commands and after extubation was answering questions mostly appropriately per the RN  7. Renal: Receiving TPN  8. Activity:  PT/OT    Patient is on steroids for questionable aspiration pneumonia versus COVID-19. This is per pulmonary. Once his respiratory status has stabilized, I would like to increase his tube feeds to goal and give him suppositories. We will plan to do an esophageal swallow once his bowels are functioning well and his respiratory status is improved. I did contact anesthesia this evening to evaluate the patient as well to assist with his respiratory status. In our discussion, Dr. Alyssa Rizzo felt that the patient was more comfortable and does not require reintubation.       Cumulative CC time: 35 min  Procedures performed : none

## 2022-02-12 NOTE — PROGRESS NOTES
Dr. Keith De La Cruz placed a total of 2 chest tubes during rapid/code for pt and pt was reintubated with double tube. See doctor and anesthesia notes.

## 2022-02-12 NOTE — PROGRESS NOTES
This is a note for patient was managed earlier this morning. PATIENT NAME: Olvin Cervantes    TODAY'S DATE: 02/12/22    SUBJECTIVE:    Pt had a decline overnight secondary to his respiratory status   Change of status: I was contacted last night around 3:00. Prior to this the nurse and I have been conversing back-and-forth about the patient's respiratory status. An ABG was performed overnight which showed his PO2 was 50 however his saturation had improved on the monitor. The concern was that the patient was having anxiety and rapid shallow breathing. I had asked Dr. Amari Melo to evaluate the patient which she did in the evening. At that time things were fairly stable. I was then contacted by the hospitalist who was called to the bedside to assess the patient's respiratory status as it had worsened around 4 AM.  I discussed the case back-and-forth with him. I then contacted anesthesia who is also at the bedside. We discussed the situation and the patient's airway was being monitored very closely by anesthesia. OBJECTIVE:   VITALS: Vital signs when I arrived to the bedside were heart rate approximately 115 and blood pressure approximately 666 systolic, saturations were approximately 70% and the patient was rapidly shallow breathing. Vitals:    02/12/22 1039   BP:    Pulse: 94   Resp: 28   Temp:    SpO2:      INTAKE/OUTPUT:    Date 02/12/22 0000 - 02/12/22 2359(Discharged)   Shift 0023-7419 8424-2714 8296-4589 24 Hour Total   INTAKE   I.V.(mL/kg/hr) 414(0.4)   414   TPN 1304   1304   Shift Total(mL/kg) 1718(13.9)   1718(13.9)   OUTPUT   Urine(mL/kg/hr) 650(0.7)   650   Emesis/NG output 110   110   Chest Tube 450   450   Shift Total(mL/kg) 1210(9.8)   1210(9.8)   Weight (kg) 123.5 123.5 123.5 123.5        EXAM:  Blood pressure (!) 91/49, pulse 94, temperature 101.3 °F (38.5 °C), temperature source Rectal, resp. rate 28, height 5' 10\" (1.778 m), weight 272 lb 4.3 oz (123.5 kg), SpO2 (!) 87 %.      This is the exam for this patient at the time that I entered the room. General appearance: He was doing rapid shallow breathing and was not responding to stimuli. Skin: unremarkable  HEENT Normocephalic, atraumatic without obvious deformity. Neck: Supple, Trachea midline, minimal amount of crepitus noted in the anterior chest and a little in the neck. Lungs: Rhonchorous through the right side with breath sounds on the left side  Heart: Tachycardic rhythm. This was regular. Right chest incision was clean dry and intact  Abdomen: Softly distended with no rigidity  Extremities: No lower extremity edema warm well perfused  Neurologic: The patient was not responding to stimuli or commands  Mental status: Not responding to stimuli or commands    Right chest tubes now have a continuous grade 1 airleak, purulence out of the right chest tube which was not present previously      Data:  CBC:   Recent Labs     02/10/22  0530 02/11/22  0440 02/12/22  0545   WBC 22.8* 25.2* 75.3*   HGB 9.3* 9.8* 13.6   HCT 27.7* 30.2* 41.9*   * 128* 260     BMP:    Recent Labs     02/10/22  0530 02/11/22  0440 02/12/22  0545    141 139   K 4.1 4.0 5.2*    111* 111*   CO2 19* 21 15*   BUN 54* 49* 58*   CREATININE 1.2 0.9 1.4*   GLUCOSE 276* 132* 200*     Hepatic: No results for input(s): AST, ALT, ALB, BILITOT, ALKPHOS in the last 72 hours. Mag:      Recent Labs     02/10/22  0530 02/12/22  0545   MG 2.4 2.4      Phos:     Recent Labs     02/10/22  0530 02/12/22  0545   PHOS 4.1 6.2*      INR: No results for input(s): INR in the last 72 hours.   Radiology Review:  CXR increased right-sided pneumothorax with no left-sided concerns      ASSESSMENT AND PLAN:  S/P [place] Kaleb esophagectomy postoperative day #12      Patient Active Problem List   Diagnosis    Migraine    Diabetes mellitus ([address]    Diastolic heart failure (HCC)    TIA (transient ischemic attack)    Persistent headaches    Cerebral infarction ([address]    Abdominal pain, acute, right upper quadrant    Chronic back pain    Chronic pain    Migrainous headache without aura    COPD with acute exacerbation ([address]    DM (diabetes mellitus), type 2, uncontrolled (HCC)    Hyperglycemia    Acute chest pain    Anxiety    Asthma    Cardiac septal defect    Hemiparesis following cerebrovascular accident (CVA) ([address]    Displacement of lumbar intervertebral disc without myelopathy    Calculus of kidney    Abnormal liver enzymes    Herniated lumbar intervertebral disc    Memory impairment    Infection of wound due to methicillin resistant Staphylococcus aureus (MRSA)    Shortness of breath    Sleep apnea    Cerebral artery occlusion with cerebral infarction ([address]    Increased frequency of urination    Urinary urgency    Hypertensive emergency    Seizures ([address]    Syncope    Wound infection following procedure    Wound infection    Infected sebaceous cyst    COPD (chronic obstructive pulmonary disease) (HCC)    Anxiety    Migraine    DKA, type 2 (HCC)    Hypokalemia    Thrombocytopenia (HCC)    Type 2 diabetes mellitus ([address]    Essential hypertension    Hyperlipidemia    Depression    Diabetes ([address]    Ureterolithiasis    Ureteral stone    Malignant neoplasm of lower third of esophagus (HCC)    Gastroesophageal reflux disease with esophagitis without hemorrhage    Tobacco dependence    Esophageal cancer ([address]    Encounter for preadmission testing       I initially presented to the bedside after discussion with the morning nurses. The patient was fairly obtunded and not responding. His saturations were in the 70s. He had a new air leak in his chest tube which was continuous. I contacted anesthesia who presented to the bedside fairly expeditiously. At this point we discussed reintubation. Dr. Shaka Montenegro performed reintubation using a glide scope with excellent exposure of the cords.   The patient only required increase of his Precedex drip to perform this. Once this occurred, we noted that when the patient was breathing on his own his saturations were in the 80s. When we were ventilating for the patient his saturations dropped into the 40s. Myself and Dr. Cheyenne Fitzgerald along with 2 respiratory therapists and many nurses spent approximately the next 3 hours attending to this patient. During that time we attempted to troubleshoot why the cuff leak was so significant. On x-ray ET tube was in good position however we did slightly manipulated to try to improve the cuff leak. . When we had the ET tube in a better position, the patient had a significant air leak in his chest tubes. We repeated a chest x-ray at which point we noted that the pneumothorax on the right side was larger. I placed an emergent right chest tube (procedure note separate). We noted a grade 4 airleak in all of her chest tubes at this point. Our concern was very high for a tracheal injury due to the the appearance of the chest tubes. Endotracheal intubation was not difficult. We then obtained the disposable bronchoscope and attempted to perform bronchoscopy. The patient saturations were between the 60s and 40s despite being on 100% FiO2 and PEEP of 10 all the way up to 15. He also was being continuously bagged ventilated for a good portion of our time. During this bronchoscopy our concern for a tracheal fistula was even higher. We felt like we saw a region that was very concerning on the right side. At this point we attempted to advance the endotracheal tube into the left mainstem bronchus using bronchoscopic guidance. This was not successful as the tube was not long enough. At this point, anesthesia and and retrieved a pediatric bronchoscope and double-lumen endotracheal tube. The patient was then intubated using a double-lumen endotracheal tube. We performed this under bronchoscopic guidance.   Once we performed this we noted that we were able to stop the air leak from the right side if we are ventilating the left side only. We also performed bronchoscopy and obtain better images of a fistula in the posterior wall of the trachea that was just above the right mainstem bronchus. The airways were extremely edematous and inflamed. The patient remained hypoxic. We then attempted to lay the patient up on his left side. This was also unsuccessful. Throughout this entire time, the patient would have episodes of bradycardia, hypotension, continuous hypoxia. He required multiple boluses of epinephrine and calcium. Periodically. Need to perform chest compressions to assist with circulation as his pressures were in the 40s. I did contact both of my partners to discuss the situation with them. If he could be stabilized we could discuss potentially returning to the operating room for an intercostal muscle flap or transferring him for a covered stent. Unfortunately he never was stabilized. He then became very bradycardic and hypotensive and was refractory to epinephrine. At this point he arrested. Excellent CPR was performed generating systolic blood pressure above 150 during this time. I contacted the patient's cousin and stepfather multiple times throughout this time. The patient's nurse also discussed the situation with them as well. We kept him apprised on the critical nature of what was happening. Once the patient had the arrest that was fairly refractory to medications I did discuss with them that at this point he would not survive this event. We performed a chest x-ray at this point and there was question whether he had a left effusion and/or pneumothorax so I placed an emergent chest tube on the left side (separate procedure note)    The hospitalist also presented to the bedside at which time she assisted with managing the code with myself and the chief of anesthesia.   All 3 of us felt that further resuscitative efforts would be futile as he has been hypoxic for approximately 3 hours and the tracheal issue is nonsurvivable and the patient is ill. The patient  at 10:45 AM.  The family was on their way when this occurred.     Cumulative CC time: 7 45-10 45 -180 minutes  Procedures performed : Bilateral chest tube placement along with bronchoscopy and intubation by anesthesia

## 2022-02-12 NOTE — PROCEDURES
Preprocedure diagnosis: Right pneumothorax and left large pleural effusion    Postprocedure diagnosis same    Procedure: Emergency right 28 Telugu chest tube placement, left 28 Telugu chest tube placement    Preprocedure: This is a 78-year-old male who underwent [place] esophagectomy and has become extremely ill. He has a large right pneumothorax and subsequently we identified a left pleural effusion. Emergency chest tubes were placed. Procedure: We initially began by performing a right chest tube placement. The right area was prepped and draped in an emergency fashion. A timeout was performed. Incision was made using a 15 blade and we entered into the chest cavity fairly easily using a Petra clamp. There was a large evacuation of air. Finger sweep noted that the lung was away from our chest wall. Chest tube was placed without difficulty. This was sutured into place and placed to suction. The patient had a large grade 4 air leak. After multiple x-rays we noted a left pleural effusion and the patient was extremely hypoxic. At this point the left chest was prepped and draped in normal sterile fashion. An incision was made and we entered into the pleural cavity. 800 cc of serous fluid was removed after replaced a 28 Telugu chest tube without difficulty. This was being performed while the patient was undergoing CPR resuscitative efforts. This was sutured in place and placed to suction. No air leak was noted. Multiple chest x-rays were performed during this time. Estimated blood loss was minimal.    The patient was critically ill throughout this procedure but no immediate complications from the chest tubes were present.

## 2022-02-12 NOTE — PROGRESS NOTES
Patient continues to have labored breathing and decreased saturations. RN continues to be in contact with physician about patient status.

## 2022-02-12 NOTE — PROGRESS NOTES
Disposable Bronch used with Dr. Zena Stockton for visualizing double lumen ETT and suctioned out Lung.   No complications were noted

## 2022-02-12 NOTE — PROGRESS NOTES
Patient not maintaining saturations and audibly grunting at this time. Pt. Not opening eyes to name or answering questions. Per Dr. Gayathri Barrera, contact Dr. Galina Cantu and get a new set of ABG's. ABG's sent. Updated charge.

## 2022-02-12 NOTE — ANESTHESIA PROCEDURE NOTES
Airway  Urgency: emergent    Airway not difficult    General Information and Staff    Patient location during procedure: patient floor  Anesthesiologist: Kev Peck MD  Resident/CRNA: Severo Antonio, APRN - CRNA    Consent for Airway (if performed for an anesthetic, see related documentation for consents)  Patient identity confirmed: per hospital policy  Consent: The procedure was performed in an emergent situation. Verbal consent not obtained. Written consent not obtained. Risks and benefits: risks, benefits and alternatives were not discussed      Code status verified:yes  Indications and Patient Condition  Indications for airway management: hypoxemia and cardiovascular instability  Spontaneous Ventilation: absent  Expected sedation level: Dexmetatomidine infusion running. Rocuronium 50 mg administered for neuromuscular blockade. Patient position: 45% recline/sitting. Mask difficulty assessment: vent by bag mask (while performed it was not noticeable effective in increasing SpO2)    Final Airway Details  Final airway type: endotracheal airway      Successful airway: ETT - double lumen left  Cuffed: yes   Successful intubation technique: video laryngoscopy  Endotracheal tube insertion site: oral  Blade: Ashanti  Blade size: #3  ETT DL size (fr): 41  Cormack-Lehane Classification: grade I - full view of glottis  Placement verified by: chest auscultation, bronchoscopy, capnometry and radiography   Measured from: teeth  Number of attempts at approach: 1  Ventilation between attempts: bag mask  Number of other approaches attempted: 0    Additional Comments  Proper position and isolation for ventilation of only the L lung was obtained and confirmed with fiberoptic bronchoscopy. Many maneuvers were performed - including but not limited to: suctioning, fiberoptic inspection, different patient positioning, ventilation modes (hand Ambu and Vent), insufflation of 100% into Right MARY ANN lumen.  Additionally a left chest tube was placed and aprx 500 mL drainage obtained. ACLS drugs and chest compressions were performed whenever indicated by patient vitals. See documentation from Dr. Tank Huerta for additional information. We were both present throughout all events this morning.   no

## 2022-02-12 NOTE — PROGRESS NOTES
Anesthesia at bedside requesting to use ambu bag to hyperinflate patient. This method was used for several minutes. Saturations remain in 50-60's. Patient then placed back on non rebreather and heated high flow at 60L and 100%. Sats then continue to remain in 60's. Anesthesia remains at bedside.

## 2022-02-12 NOTE — DISCHARGE SUMMARY
Discharge Summary    Name:  Emelyn Alexander /Age/Sex: 1970  (46 y.o. male)   MRN & CSN:  [id number] & [id number] Admission Date/Time: 2022  5:38 AM   Attending:  Donna Choudhary MD Discharging Physician: Donna Choudhary MD     Hospital Course:   Emelyn Alexander is a 46 y.o.  male  who presents with <principal problem not specified>    57-year-old male with a history of malignant neoplasm of the abdominal esophagus who underwent esophagectomy on 2022. He also has a past medical history of type 2 diabetes mellitus, hypertension, history of seizures, tobacco dependence, depression, obstructive sleep apnea. Patient was admitted by ID cardiothoracic surgery, unclear at what point the hospitalist service to go over his management as primary. On 2022, patient was noted to be hypoxic. On 2022, he was diagnosed with acute hypoxic respiratory failure secondary to possible aspiration pneumonia and was diagnosed with COVID-19. He was started on IV antibiotics including IV vancomycin and Zosyn. He continued to decompensate, remained hypoxic, he was intubated on 2022. He had a bronchoscopy that did not show any mucous plugging. Procalcitonin was elevated to 16.9. He was maintained on TPN, he was also started on Decadron for Covid pneumonia. He developed hypoglycemia, was on insulin regimen, that was managed by endocrinology. Afterwards his MRSA test came back negative, vancomycin was discontinued. He had a right-sided chest tube for pneumothorax. Patient was continued on antibiotics, eventually got extubated on 2022. He was placed on 6 L high flow nasal cannula. He said complain of pain in the right side of his chest where he had a chest tube and was ordered as needed Dilaudid for pain. Shortly after extubation he started becoming uncomfortable. CT surgery consulted with anesthesia.   Overnight patient continued to decompensate, was very restless, had bilateral crackles and distended abdomen. He received IV Lasix, was on Precedex drip for agitation, was also on Dilaudid. Hospitalist did discuss with anesthesiologist and CT surgeon. Around 7 AM on 2022, preparations were made to reintubate patient. He was reintubated, however O2 sats were in the 46s. Patient developed cardiac arrest, and so ACLS protocol was initiated. Patient received about 17 rounds of epinephrine, he also had 2 chest tubes during the code. His hypoxia never improved. Despite all attempts at resuscitation and despite chest tube placement and intubation, patient did not improve. Family was contacted and informed about his deterioration. CODE BLUE was called off. Patient was pronounced dead at 10:45 AM on 2022. Cause of death:  Acute respiratory failure secondary to combination of right tension pneumothorax, right bronchopleural fistula, COVID-19 pneumonia    Secondary cause of death   Esophageal malignancy status post esophagectomy  Type 2 diabetes mellitus  Essential hypertension  Obesity with BMI of 39  History of obstructive sleep apnea  History of COPD      Consults this admission:  IP CONSULT TO HOSPITALIST  IP CONSULT TO PHARMACY  IP CONSULT TO PULMONOLOGY  IP CONSULT TO ENDOCRINOLOGY  IP CONSULT TO DIETITIAN  IP CONSULT TO IV TEAM    Discharge Instruction:       Patient . Discharge Medications:        Medication List      ASK your doctor about these medications    acarbose 50 MG tablet  Commonly known as: PRECOSE     albuterol sulfate  (90 Base) MCG/ACT inhaler  Commonly known as: Proventil HFA  Inhale 2 puffs into the lungs every 6 hours as needed for Wheezing or Shortness of Breath. aspirin 81 MG EC tablet     atorvastatin 40 MG tablet  Commonly known as: LIPITOR     carvedilol 12.5 MG tablet  Commonly known as: COREG  Take 2 tablets by mouth 2 times daily (with meals).      clopidogrel 75 MG tablet  Commonly known as: PLAVIX     CPAP Machine Misc     insulin regular 100 UNIT/ML injection  Commonly known as: HUMULIN R;NOVOLIN R     losartan 100 MG tablet  Commonly known as: COZAAR     Lovaza 1 g capsule  Generic drug: omega-3 acid ethyl esters     Maxalt 10 MG tablet  Generic drug: rizatriptan     metFORMIN 500 MG extended release tablet  Commonly known as: GLUCOPHAGE-XR     nitroGLYCERIN 0.4 MG SL tablet  Commonly known as: NITROSTAT  up to max of 3 total doses. If no relief after 1 dose, call 911. Nutritional Supplement Liqd  Take 1 Can by mouth 2 times daily     ondansetron 8 MG Tbdp disintegrating tablet  Commonly known as: ZOFRAN-ODT  Place 1 tablet under the tongue every 8 hours as needed for Nausea or Vomiting (Chemo/radiation induced)     pantoprazole 40 MG tablet  Commonly known as: Protonix  Take 1 tablet by mouth daily     pioglitazone 45 MG tablet  Commonly known as: ACTOS     sertraline 100 MG tablet  Commonly known as: ZOLOFT     SITagliptin 100 MG tablet  Commonly known as: JANUVIA     sucralfate 1 GM/10ML suspension  Commonly known as: Carafate  Take 10 mLs by mouth 4 times daily     terazosin 2 MG capsule  Commonly known as: HYTRIN     traZODone 50 MG tablet  Commonly known as: DESYREL     zonisamide 100 MG capsule  Commonly known as: ZONEGRAN            Objective Findings at Discharge:   BP (!) 91/49   Pulse 94   Temp 101.3 °F (38.5 °C) (Rectal)   Resp 28   Ht 5' 10\" (1.778 m)   Wt 272 lb 4.3 oz (123.5 kg)   SpO2 (!) 87%   BMI 39.07 kg/m²            Patient .       BMP/CBC  Recent Labs     02/10/22  0530 22  0440 22  0545    141 139   K 4.1 4.0 5.2*    111* 111*   CO2 19* 21 15*   BUN 54* 49* 58*   CREATININE 1.2 0.9 1.4*   WBC 22.8* 25.2* 75.3*   HCT 27.7* 30.2* 41.9*   * 128* 260       IMAGING:      Discharge Time of 45 minutes    Electronically signed by Samaria Roe MD on 2022 at 3:05 PM

## 2022-02-12 NOTE — PROGRESS NOTES
Occupational Therapy    Chart reviewed. Pt currently not medically stable for OT evaluation. Will discharge from caseload at this time. Please re-consult when pt is medically appropriate for evaluation.      Smiley Judge, OT

## 2022-02-12 NOTE — ANESTHESIA PROCEDURE NOTES
Airway  Date/Time: 2/12/2022 8:20 AM  Urgency: emergent    Airway not difficult    General Information and Staff    Patient location during procedure: ICU  Anesthesiologist: Lilia Lee MD  Performed: anesthesiologist     Consent for Airway (if performed for an anesthetic, see related documentation for consents)  Patient identity confirmed: per hospital policy  Consent: The procedure was performed in an emergent situation. Verbal consent obtained. Written consent not obtained. Risks and benefits: risks, benefits and alternatives were not discussed  Consent given by: patient (discussed but minimallyt responsive)      Code status verified:yes  Indications and Patient Condition  Indications for airway management: hypoxemia and cardiovascular instability  Spontaneous ventilation: present  Sedation level: deep (increased dexmetatomidine infusion from 0.4 to 1 1 minute prior to procedure. Immediately retuned to 0.4 post intubation.)  Preoxygenated: yes (not effective in increasing SpO2, remains 40-50)  Patient position: 45 % recline/sitting. Mask difficulty assessment: not attempted    Final Airway Details  Final airway type: endotracheal airway      Successful airway: ETT  Cuffed: yes (Increased air leak noted from R chest tubes)   Successful intubation technique: video laryngoscopy  Endotracheal tube insertion site: oral  Blade: Ashanti  Blade size: #3  ETT size (mm): 7.5  Cormack-Lehane Classification: grade I - full view of glottis  Placement verified by: capnometry   Number of attempts at approach: 1  Ventilation between attempts: spontaneous  Number of other approaches attempted: 0    Additional Comments  Without neuromuscular blockade and with continued spontaneous ventilation with SpO2 unresponsive to several  mitigations  I performed an intubation in < 10-15 s  with continued spontaneous ventilation.  Despite + ETCO2 via chemical spirometer no improvement occurred with spontaneous, Ambu or via ventilator and 100% FIO2. Hemodynamic instability ensued and R tension pneumothorax suspected which was promptly relieved with chest tubes placed by Dr. Kassandra Campbell. 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 Dr. Kassandra Campbell and I utilized bronchoscope to advance ETT and selectively intubate L mainstem bronchus. While this was achieved SpO2 remained without significant improvement. Attempted different ventilatory modes/pressures and L lateral position to decrease V/Q and shunt which was also not successful. Additional CXR obtained and reviewed.

## 2022-02-12 NOTE — PROGRESS NOTES
02/12/22 1113   Vent Information   $Ventilation $Initial Day   ETT (adult)   Placement Date/Time: 02/12/22 0820   Preoxygenation: Yes  Location: Oral   $ Intubation emergent  $ Yes   Measured From [address] [place] [address],[address] By Commercial tube dawn   Site Condition Dry   patient intubated with a double lumen ETT per Dr. Ofe Camargo

## 2022-02-12 NOTE — PROGRESS NOTES
Dr. Sydnie Kam and Dr. Bonnie Huerta at bedside with Patrick Grider, RT, this RN, and YENNI Ibarra(primary RN). Pt intubated at this time. O2 sats 50s, RT bagging pt. All staff remain at bedside.

## 2022-02-12 NOTE — FLOWSHEET NOTE
Patient declined through the night. Upon receiving AM report, patient tachycardic, tachypenic in the 30's, hypoxic in the 70's and pressor had been started. Patient was responsive to pain but tiring quickly. Dr. Jana Emery notified of patients decline by charge RN at 0809 and was on his way to the bedside to assess patient. Upon Dr. Jana Emery arriving, he consulted with anesthesiologist Dr. Romayne Gaba and the decision was made to re-intubate patient. Patient was intubated at [address] [facility] per Dr. Romayne Gaba. This RN, Mendez Melendrez, YENNI, Leslye Slater, RT and David Minor, RT all at bedside. After intubation patient continued to deteriorate. Dr. Jana Emery placed another CT to the right lung. Patients oxygen saturation stayed in the 50's. Team continued to work on patient to bring up his oxygen. Patients bp also dropping, Levophed increased to 30mcg per Dr. Jana Emery. Dr. Jana Emery and Dr. Romayne Gaba decided to make a tube exchange using a double lumen ETT. [facility] SRINI, JULIO CESAR assisting. Patients oxygenation still not improved. Patient coded at 21  while team at bedside. Compressions initiated. Patient was given a total of 23 mg Epinephrine and 4 gm Calcium Chloride during coding per Dr. Godwin Velasquez orders. Levophed increased to 100mcg per Dr. Godwin Velasquez orders. Additional team members, Dk Cifuentes, YENNI, Marielle Seo RN at beside to assist during code with meds and compressions. Family notified by Dr. Jana Emery during code of events. Family wishes for team to proceed with coding. Dr. Jana Emery placed a left sided chest tube which immediately dumped 900ml serosanguinous fluid. Coding resumed for 45 minutes, patient did not regain pulse, respirations or consciousness. Time of death was called at [address] [facility].      [address] [facility] [address] arrived at bedside, was notified of patients death. Belongings were given to [facility]. 1130   notified of patients death, spoke with Carli Brooks. Patient will not be a 's case and body can be released.

## 2022-02-12 NOTE — PROGRESS NOTES
Progress Note( Dr. Matthew Edmondson)  2/11/2022  Subjective:   Admit Date: 1/31/2022  PCP: Olivia Siddiqi DO    Admitted For : Malignant neoplasm of abdominal esophagus and underwent  esophagectomy    Consulted For:  Better control of blood glucose    Interval History: . Patient is extubated and off ventilator few hours ago. He will be starting back on tube feeding  On TPN continue on for a while to make sure he is able to tolerate tube feeding           Intake/Output Summary (Last 24 hours) at 2/11/2022 2043  Last data filed at 2/11/2022 2012  Gross per 24 hour   Intake 3848.2 ml   Output 4030 ml   Net -181.8 ml       DATA    CBC:   Recent Labs     02/09/22  0430 02/10/22  0530 02/11/22  0440   WBC 15.8* 22.8* 25.2*   HGB 8.8* 9.3* 9.8*   * 113* 128*    CMP:  Recent Labs     02/09/22  0430 02/10/22  0530 02/11/22  0440    135 141   K 3.4* 4.1 4.0    107 111*   CO2 18* 19* 21   BUN 56* 54* 49*   CREATININE 1.2 1.2 0.9   CALCIUM 8.0* 7.9* 8.2*     Lipids:   Lab Results   Component Value Date    CHOL 162 03/07/2016    HDL 26 03/07/2016    TRIG 362 02/08/2022     Glucose:  Recent Labs     02/11/22  0525 02/11/22  1222 02/11/22  1744   POCGLU 126* 116* 120*     [id number]:  Lab Results   Component Value Date    LABA1C 5.9 02/06/2022     High Sensitivity TSH:   Lab Results   Component Value Date    TSHHS 2.110 08/17/2016     Free T3: No results found for: FT3  Free T4:  Lab Results   Component Value Date    T4FREE 0.91 05/15/2013       XR CHEST PORTABLE   Final Result   1. Slightly larger right sub pulmonic pneumothorax. Right chest tubes are   again noted. 2. Bibasilar lung infiltrates with associated small bilateral pleural   effusions, not appreciably changed. XR CHEST PORTABLE   Final Result   1. Tip of the enteric tube likely just past the gastroesophageal junction. The side port is not well visualized but is likely in the distal esophagus. 2. Right chest tube in place.   No pneumothorax. 3. Small bilateral pleural effusions and mild bibasilar opacities without   significant change. XR CHEST PORTABLE   Final Result   Stable chest.  Consider advancing the enteric tube by at least 5 cm. XR CHEST PORTABLE   Final Result   1. Lines and tubes as described above. 2.  There is some improved aeration of the lungs since the earlier chest   x-ray. Persistent bibasilar opacities and small effusions. XR CHEST PORTABLE   Final Result   Bilateral airspace opacities, not significantly changed from the prior study. Small left pleural effusion. Enteric tube tip likely projects at the level of the GE junction. Consider   an abdominal radiograph for better assessment. XR CHEST PORTABLE   Final Result   Stable exam with bilateral airspace opacities, right much more than left. XR CHEST PORTABLE   Final Result   Right pneumothorax has resolved      Bilateral pulmonary opacities persist         XR CHEST PORTABLE   Final Result   Slightly improved moderate right pneumothorax, estimated to be 20-30%. Unchanged chest tube. Unremarkable positioning of an endotracheal tube projecting 3 cm above the   jj. XR CHEST PORTABLE   Final Result   1. Interval development of moderate size right pneumothorax with chest tube   in place. 2.  Left basilar opacities have increased in the interval, which may   represent atelectasis. The findings were sent to the Radiology Results [address] at 5:14   am on 2/7/2022to be communicated to a licensed caregiver. XR CHEST PORTABLE   Final Result   Worsening right lung airspace consolidation. Postsurgical changes status post esophagectomy and gastric pull-through. CT CHEST PULMONARY EMBOLISM W CONTRAST   Final Result   No evidence of pulmonary embolism. Recent postsurgical changes status post distal esophagectomy and gastric   pull-through.   Likely postsurgical changes noted in the mediastinum with   fluid and air in the region of the recent surgery. Bilateral pleural effusions with bibasilar atelectasis. Multifocal right lower lobe and right upper lobe airspace disease with   tree-in-bud nodules. No evidence of airspace disease in the left lung. Findings suggest right lung pneumonia, possibly from aspiration. Trace right pneumothorax with right chest tubes present. Pneumoperitoneum consistent with recent surgery. XR CHEST PORTABLE   Final Result   1. Status post right chest tube removal.  No pneumothorax. 2. Right parahilar and bibasilar opacities compatible with atelectasis. Aspiration and pneumonia are not excluded         XR CHEST PORTABLE   Final Result   Left basilar opacity could represent atelectasis or infection         XR CHEST PORTABLE   Final Result   Stable exam.         XR ABDOMEN FOR NG/OG/NE TUBE PLACEMENT   Final Result   1. Gastric catheter tip is near the he a hemidiaphragm at the normal   expected location of the GE junction prior surgery. If further localization is required, small amount of water-soluble contrast   via the gastric catheter would be useful for localization. 2.  Free air likely related to recent esophagectomy. XR CHEST PORTABLE   Final Result   Status post esophagectomy with postoperative perihilar and bibasilar   atelectasis. XR CHEST PORTABLE   Final Result   Stable appearance of the chest with opacities at the central right lung and   left base. Gas-filled esophagus or gastric pull-through to the right of the   spine. Correlate with surgical history. The tip of the nasogastric tube is   in the left upper abdomen but the side port would overlie the lower thorax. If gastric pull through then correlate with surgery for desired position. XR CHEST PORTABLE   Final Result   Stable chest x-ray with right perihilar opacities.          XR CHEST PORTABLE   Final Result Cardiomegaly with scattered pulmonary infiltrates. XR CHEST PORTABLE   Final Result   Status post esophagectomy with gastric pull-through. Pneumomediastinum,   pneumoperitoneum and trace right pneumothorax consistent with recent surgery. Right chest tube present. Atelectasis noted in both lungs. XR CHEST PORTABLE    (Results Pending)        Scheduled Medicines   Medications:    fat emulsion  250 mL IntraVENous Once per day on Mon Tue Thu Fri    insulin regular  0-18 Units SubCUTAneous Q6H    albuterol sulfate HFA  4 puff Inhalation 6 times per day    And    ipratropium  4 puff Inhalation 6 times per day    pantoprazole  40 mg IntraVENous Daily    dexamethasone  10 mg IntraVENous Daily    guaiFENesin  400 mg Oral 4x Daily    sodium chloride flush  5-40 mL IntraVENous 2 times per day    enoxaparin  40 mg SubCUTAneous BID    piperacillin-tazobactam  3,375 mg IntraVENous Q8H    sertraline  100 mg Per J Tube Daily    lidocaine  1 patch TransDERmal Daily      Infusions:    PN-Adult Premix  4.25/10 - Standard Electrolytes - Peripheral Line 84 mL/hr at 02/11/22 1741    sodium chloride 100 mL/hr at 02/11/22 1727    norepinephrine Stopped (02/11/22 1600)    dextrose      sodium chloride           Objective:   Vitals: BP (!) 112/57   Pulse 108   Temp 97.3 °F (36.3 °C) (Rectal)   Resp 28   Ht 5' 10\" (1.778 m)   Wt 255 lb 11.7 oz (116 kg)   SpO2 (!) 88%   BMI 36.69 kg/m²   General appearance: Patient awake and alert.   He was extubated and off ventilator  neck: no JVD or bruit//had esophagectomy  Thyroid : Normal lobes   Lungs: Has Vesicular Breath sounds chest tube  Heart:  regular rate and rhythm  Abdomen: soft, non-tender; bowel sounds normal; no masses,  no organomegaly  esophagectomy NG tube  Musculoskeletal: Normal  Extremities: extremities normal, , no edema  Neurologic:   Patient is extubated and off ventilator a  Assessment:     Patient Active Problem List:     Migraine Diabetes mellitus (HCC)     Diastolic heart failure (HCC)     TIA (transient ischemic attack)     Persistent headaches     Cerebral infarction (HCC)     Abdominal pain, acute, right upper quadrant     Chronic back pain     Chronic pain     Migrainous headache without aura     COPD with acute exacerbation (HCC)     DM (diabetes mellitus), type 2, uncontrolled (HCC)     Hyperglycemia     Acute chest pain     Anxiety     Asthma     Cardiac septal defect     Hemiparesis following cerebrovascular accident (CVA) ([address]     Displacement of lumbar intervertebral disc without myelopathy     Calculus of kidney     Abnormal liver enzymes     Herniated lumbar intervertebral disc     Memory impairment     Infection of wound due to methicillin resistant Staphylococcus aureus (MRSA)     Shortness of breath     Sleep apnea     Cerebral artery occlusion with cerebral infarction (HCC)     Increased frequency of urination     Urinary urgency     Hypertensive emergency     Seizures ([address]     Syncope     Wound infection following procedure     Wound infection     Infected sebaceous cyst     COPD (chronic obstructive pulmonary disease) (HCC)     Anxiety     Migraine     DKA, type 2 (HCC)     Hypokalemia     Thrombocytopenia (HCC)     Type 2 diabetes mellitus ([address]     Essential hypertension     Hyperlipidemia     Depression     Diabetes ([address]     Ureterolithiasis     Ureteral stone     Malignant neoplasm of lower third of esophagus ([address]     Gastroesophageal reflux disease with esophagitis without hemorrhage     Tobacco dependence     Esophageal cancer ([address]     Encounter for preadmission testing      Plan:     1. Reviewed POC blood glucose . Labs and X ray results   2. Reviewed Current Medicines   3. On  Correction bolus Humalog/ Basal Lantus Insulin regime   4. On TPN now  5. Starting tube feeding and see how he tolerated  6. Monitor Blood glucose freque  7. Modified  the dose of Insulin/ other medicines as needed   8.  Will follow Timur Clark MD, MD

## 2022-02-13 LAB
CULTURE: ABNORMAL
CULTURE: ABNORMAL
GRAM SMEAR: ABNORMAL
Lab: ABNORMAL
SPECIMEN: ABNORMAL

## 2022-02-21 LAB
BANDED NEUTROPHILS ABSOLUTE COUNT: 2.77 K/CU MM
BANDED NEUTROPHILS RELATIVE PERCENT: 19 % (ref 5–11)
DIFFERENTIAL TYPE: ABNORMAL
HCT VFR BLD CALC: 25.1 % (ref 42–52)
HEMOGLOBIN: 8.5 GM/DL (ref 13.5–18)
LYMPHOCYTES ABSOLUTE: 0.3 K/CU MM
LYMPHOCYTES RELATIVE PERCENT: 2 % (ref 24–44)
MCH RBC QN AUTO: 32.7 PG (ref 27–31)
MCHC RBC AUTO-ENTMCNC: 33.9 % (ref 32–36)
MCV RBC AUTO: 96.5 FL (ref 78–100)
MONOCYTES ABSOLUTE: 1.2 K/CU MM
MONOCYTES RELATIVE PERCENT: 8 % (ref 0–4)
PDW BLD-RTO: 14.7 % (ref 11.7–14.9)
PLATELET # BLD: ABNORMAL K/CU MM (ref 140–440)
PMV BLD AUTO: 12.2 FL (ref 7.5–11.1)
RBC # BLD: 2.6 M/CU MM (ref 4.6–6.2)
RBC # BLD: ABNORMAL 10*6/UL
SEGMENTED NEUTROPHILS ABSOLUTE COUNT: 10.3 K/CU MM
SEGMENTED NEUTROPHILS RELATIVE PERCENT: 71 % (ref 36–66)
TOXIC GRANULATION: PRESENT
WBC # BLD: 14.6 K/CU MM (ref 4–10.5)

## 2022-04-07 NOTE — OP NOTE
Operative Note      Operation 1/31/2022    Preop diagnosis carcinoma of the lower esophagus  Status post chemoradiation    Postop diagnosis: Same      Name of the operation    Laparotomy, mobilization of the stomach for the purpose of subsequent thoracotomy and esophagogastrectomy    Pyloromyotomy    J-tube jejunostomy      Intraoperative findings    The stomach was normal size and quality until the GE junction which appeared to be scarred down from a radiation possibly sequela of the tumor      Surgeon: Elham Birmingham MD [facility]    Assistant Zelda Wyman PA-C      Details of operation    Patient brought the operative room prepared and represents entire abdomen from mid chest to mid thigh      Timeout is performed to check list    Midline decision made the upper abdomen abdominal cavity is entered      Inspection liver spleen rest of the bowel appeared to be within normal limits      The stomach was mobilized preserving the gastro epiploic artery on the right side on the stomach side the entire omentum was released all the way to the short gastric vessels    The stomach was  from the spleen by transecting the vessels    The left carotid artery identified and circumferentially isolated and transected and make sure hemostasis confirmed    The GE junction was dissected circumferentially around the hiatus of the pain to the lower chest until it was totally free    Within a postpyloric junction the pyloric muscle was identified and incision made across the pelvis releasing the circular muscle fibers    Botulin  injection 1 cc was given the four-quadrant of the pylorus    There was part of the chest was identified letter to the loop    Between double pursing suture of silk jejunostomy tube was introduced which was previously passed through the left upper quadrant of the abdominal wall    Description the bowel also included to the abdominal wall circumferentially around the entry point of the jejunostomy tube    After confirming complete hemostasis abdominal cavity was then closed in layers    Lap sponge instrument needle counts were confirmed to be correct    Blood loss was less than 50 mL    Patient tolerated well  Subsequent to thoracotomy and esophagogastrectomy performed per Dr. Dk Schwab and Dr Lance Lim

## 2022-04-07 NOTE — OP NOTE
Operative Note      Patient: Josette Todd  YOB: 1970  MRN: [id number]    Date of Procedure: 1/31/2022       PREOPERATIVE DIAGNOSIS:  GE junction esophageal cancer. POSTOPERATIVE DIAGNOSIS:  GE junction esophageal cancer. OPERATION PERFORMED:  1. Ashvin Kaleb esophagectomy. 2.  J-tube placement. 3.  Intercostal muscle flap placement. 4.  A pyloromyotomy with Botox injection. 5.  Resection of esophageal stump  6. Pleural tent     SURGEON:  Jorge Rincon MD     CO-SURGEON:  Radha Adrian MD    ASSISTANT Surgeon: Dr. Louis Berry     ASSISTANT:  Perfecto Hoover PA-C    FINDINGS: margin was positive on first specimen. Second specimen with negative margin     DESCRIPTION OF PROCEDURE:  Dr. Radha Adrian was primary surgeon on abdominal portion. Please see his opnote for further details. The patient was identified, brought to the operating room and laid in supine position. After successful induction of general anesthesia, the patient was intubated by Anesthesia staff, prepped and draped in normal sterile fashion. Prior to incision, a timeout was performed and antibiotics were given. We began by performing an upper midline abdominal incision, dissection down through subcutaneous tissue to identify the rectus fascia and the linea alba was incised. We entered the peritoneum and we did not identify metastatic disease in the liver or omental tissues. We began by entering into the lesser sac between the transverse colon and the stomach. Our goal is to preserve the gastroepiploic arteries. We entered the lesser sac and using an EnSeal device, we took down the short gastric in the greater curvature of the stomach. We then went to the lesser curve. We took down the hepatic ligament, the inferior phrenic ligament that is attached to the left lobe of the liver to the diaphragm and the liver was folded off to the side.   We identified the crura and the esophagus was encircled and a Penrose drain was placed around it. We then resected both the right and left gastric arteries. Once we had complete mobility of the stomach, we then identified the pylorus. We carefully  transected the muscles of the pylorus to perform pyloromyotomy. We did not feel that there was any invasion into the intraluminal structures. We did inject the pylorus with Botox as well. At this point, we then identified the ligament of Treitz and placed a J-tube at a distance away from the ligament of Treitz and this was sutured to the abdominal wall. Once we had completely freed the stomach and felt that it was adequately mobilized, we then closed the abdomen in a multilayer fashion. At this point, we then placed the patient in a left lateral decubitus right-side up position, prepped and draped in a normal sterile fashion. We did create a thoracotomy incision and entered into the pleural cavity in its fifth intercostal space. While entering the pleural cavity, we harvested the intercostal muscle later to be used after anastomosis. Once we entered the pleural cavity, we manipulated the lung anteriorly and dissected out the esophagus in the posterior mediastinum. As we did this, we did require clipping and cautery of multiple vessels coming from the aorta to the esophagus. This was dissected out just below the azygos vein and we have completely mobilized up to the point that we felt was an appropriate location where the anastomosis was a distance from the GE junction. We then pulled the stomach up into the chest and had no difficulty doing this. We felt  that there was no torsion and that the conduit was completely straight. We transected at the GE junction, a portion of the fundus of the stomach and we tubularized the stomach while stapling this using EndoGIA. We then transected the esophagus and as we began to prepare for our anastomosis. Frozen section was sent and superior margin noted to positive. Therefore the azygos vein was then transected with a vascular stapler and the esophagus was further mobilized and resent for frozen section which was negative. Stay sutures were placed full-thickness and pursestring suture was placed at the distal stump using 2-0 Prolene. 25 mm of the EEA stapler then placed into the opening pursestring was tied. 25 mm was chosen due to the size of the esophagus. Then an opening was made on the mobilized stomach in preparation for anastomosis. Through the gastrotomy the EEA stapler was inserted and the pin was deployed after the orientation of the stomach was checked. our anastomosis, we placed multiple silk sutures at the posterior wall of the anastomosis. The anastomosis was tested then a pleural flap used to reinforce anastomosis. At this point, we brought our azygos vein down to the right medial side of the anastomosis and then we used a pleural tent/flap  around the anterior wall and tucked down into the posterior left lateral side of the anastomosis. At this point, the pleural cavity was copiously irrigated. We were satisfied that this was hemostatic. Drains were placed. The lung was allowed to ventilate and pericostal sutures were placed. Muscle layers of the chest wall were re-approximated as well and   subcutaneous tissue and skin were re-approximated. Sponge count, needle count and instrument count were correct. We did bridle the NG tube into place prior to extubating the patient. The patient was taken to the PACU and extubated in the CVICU. Co -Surgeon helped with exposure and facilitated key portions of the procedure. PA assisted with opening and closing.       Estimated Blood Loss (mL): 241     Complications: None    Specimens:   ID Type Source Tests Collected by Time Destination   A : esophagus - check suture esophageal margins Specimen Esophagus SURGICAL PATHOLOGY Johanny Sandoval MD 1/31/2022 1252    B : esophagus - suture marks proximal line Specimen Esophagus SURGICAL PATHOLOGY Jayashree Mcduffie MD 1/31/2022 1335    C : esophageal anastomosis donut  Specimen Esophagus SURGICAL PATHOLOGY Jayashree Mcduffie MD 1/31/2022 1438    D : ESOPHAGUS  Tissue Esophagus SURGICAL PATHOLOGY Jayashree Mcduffie MD 1/31/2022 1445    E : PARAESOPHAGEAL LYMPH NODE #1  Tissue Lymph Node SURGICAL PATHOLOGY Jayashree Mcduffie MD 1/31/2022 1505    F : PARAESOPHAGEAL LYMPH NODE #2 Tissue Lymph Node SURGICAL PATHOLOGY Jayashree Mcduffie MD 1/31/2022 1514        Implants:  * No implants in log *      Drains:   Chest Tube 2 Right 24 Cuban (Active)   Suction -40 cm H2O 02/12/22 0625   Chest Tube Airleak Yes 02/12/22 0625   Drainage Description Yellow;Serous 02/12/22 5061   Dressing Status Clean;Dry; Intact 02/12/22 0625   Dressing Type Dry dressing;Split gauze 02/12/22 0625   Dressing Change Due 02/08/22 02/09/22 0400   Site Assessment Tender 02/12/22 0625   Surrounding Skin Unable to view 02/12/22 0625   Patency Intervention Tip/Tilt 02/12/22 0625   Output (ml) 450 ml 02/12/22 0625       NG/OG/NJ/NE Tube Nasogastric 16 fr Left nostril (Active)   Surrounding Skin Dry; Intact 02/11/22 1600   Securement device Yes 02/11/22 1600   Status Suction-low intermittent 02/11/22 1600   Placement Verified by External Catheter Length 02/11/22 1600   NG/OG/NJ/NE External Measurement (cm) 55 cm 02/11/22 1600   Drainage Appearance Bile;Green 02/11/22 1600   Tube Feeding Status Stopped 02/11/22 1200   Rate/Schedule 0 mL/hr 02/11/22 1200   Tube Feeding Intake (mL) 131 ml 02/07/22 1810   Output (mL) 110 ml 02/12/22 0625       Gastrostomy/Enterostomy/Jejunostomy Gastrostomy LUQ 20 fr (Active)   Drainage Appearance Bile 02/11/22 1200   Site Description Healing 02/11/22 2145   Drain Status Other (Comment) 02/11/22 2145   Surrounding Skin Dry; Intact 02/11/22 2145   Dressing Status Clean;Dry; Intact 02/11/22 2145   Dressing Type Split gauze 02/11/22 2145   Dressing Change Due 02/11/22 02/10/22 0559   Tube Feeding Immune Enhancing 02/11/22 2145   Rate/Schedule 20 mL/hr 02/11/22 2145   Tube Feeding Intake (mL) 234 ml 02/10/22 1834   Free Water Flush (mL) 60 mL 02/11/22 2145   Free Water Rate 30 every 4 hours 02/10/22 0545   Output (mL) 0 ml 02/07/22 2000   Residual Volume (ml) 0 ml 02/11/22 2145       Urethral Catheter Latex (Active)   Catheter Indications Need for fluid volume management of the critically ill patient in a critical care setting 02/12/22 0400   Site Assessment Pink 02/12/22 0400   Urine Color Heather 02/12/22 0642   Urine Appearance Hazy 02/12/22 0642   Urine Odor Malodorous 02/12/22 0642   Output (mL) 650 mL 02/12/22 0642       [REMOVED] Chest Tube 2 Right 24 Latvian (Removed)   Suction -20 cm H2O 02/04/22 2040   Chest Tube Airleak No 02/05/22 0411   Drainage Description Serosanguinous 02/05/22 0411   Dressing Status Clean;Dry; Intact 02/05/22 0411   Dressing Type Dry dressing 02/05/22 0411   Site Assessment Clean;Dry; Intact 02/05/22 0411   Surrounding Skin Dry; Intact 02/05/22 0411       [REMOVED] Urethral Catheter Non-latex 16 fr (Removed)   Catheter Indications Need for fluid volume management of the critically ill patient in a critical care setting 02/02/22 1600   Securement Device Date Changed 01/31/22 01/31/22 1642   Site Assessment Pink; No urethral drainage 02/02/22 1600   Urine Color Heather 02/02/22 1600   Urine Appearance Clear 02/02/22 1600   Output (mL) 400 mL 02/02/22 1600         Electronically signed by Nahomy Gardiner MD on 4/7/2022 at 2:05 PM

## 2024-12-04 NOTE — PROGRESS NOTES
Patient arrived to treatment suite for pre-medications and chemotherapy infusion. PIV started in left arm and blood drawn from site for labs, but not needed as labs were drawn yesterday. Platelets 69.  Spoke with Dr. Brenna Dillard, who decided to eliminate this last treatment due to low platelet numbers. Magnesium also 1.6, so patient was instructed to take 400mg of magnesium at home daily. Patient left treatment suite ambulatory. Discharge instructions provided. None

## (undated) DEVICE — SYRINGE IRRIG 60ML SFT PLIABLE BLB EZ TO GRP 1 HND USE W/

## (undated) DEVICE — CONNECTOR DRNGE W3/8-0.5XH3/16XL3/16IN 2:1 SIL CARD STR

## (undated) DEVICE — ADHESIVE SKIN CLSR 0.7ML TOP DERMBND ADV

## (undated) DEVICE — RELOAD STPL L75MM OPN H3.8MM CLS 1.5MM WIRE DIA0.2MM REG

## (undated) DEVICE — GLOVE SURG SZ 6 THK91MIL LTX FREE SYN POLYISOPRENE ANTI

## (undated) DEVICE — TOWEL,OR,DSP,ST,BLUE,STD,6/PK,12PK/CS: Brand: MEDLINE

## (undated) DEVICE — STAPLER INT L34CM 60MM LNG ENDOSCP ARTC PWR + ECHELON FLX

## (undated) DEVICE — STRL PENROSE DRAIN 18" X 1/2": Brand: CARDINAL HEALTH

## (undated) DEVICE — STAPLER INT L75MM CUT LN L73MM STPL LN L77MM BLU B FRM 8

## (undated) DEVICE — SUTURE PDS II SZ 1 L96IN ABSRB VLT TP-1 L65MM 1/2 CIR Z880G

## (undated) DEVICE — PACK,BASIC,SIRUS,V: Brand: MEDLINE

## (undated) DEVICE — LOOP VES W25MM THK1MM MAXI RED SIL FLD REPELLENT 100 PER

## (undated) DEVICE — GLOVE ORANGE PI 7   MSG9070

## (undated) DEVICE — CLIP INT SM WIDE RED TI TRNSVRS GRV CHEVRON SHP W PRECIS

## (undated) DEVICE — NEEDLE SYR 18GA L1.5IN RED PLAS HUB S STL BLNT FILL W/O

## (undated) DEVICE — MONOJECT MAGELLAN 1 ML TUBERCULIN SAFETY SYRINGE PERMANENT NEEDLE 27G X1/2 IN. (0.4 X 13 MM): Brand: MONOJECT

## (undated) DEVICE — TUBE ET 39FR DIA5.3MM L POLYUR TRACH CUF SNAP LOK CONN DISP

## (undated) DEVICE — ELECTRODE ES L4IN PTFE INSUL BLDE W/ SFTY SL DISP EDGE

## (undated) DEVICE — INTENDED FOR TISSUE SEPARATION, AND OTHER PROCEDURES THAT REQUIRE A SHARP SURGICAL BLADE TO PUNCTURE OR CUT.: Brand: BARD-PARKER ® STAINLESS STEEL BLADES

## (undated) DEVICE — CLIP INT M L GRN TI TRNSVRS GRV CHEVRON SHP W/ PRECIS TIP

## (undated) DEVICE — SYRINGE TB 1ML NDL 25GA L0.625IN PLAS SLIP TIP CONVENTIONAL

## (undated) DEVICE — STAPLER SKIN H3.9MM WIRE DIA0.58MM CRWN 6.9MM 35 STPL ROT

## (undated) DEVICE — SUTURE MCRYL SZ 3-0 L27IN ABSRB UD L24MM PS-1 3/8 CIR PRIM Y936H

## (undated) DEVICE — STAPLER SKIN L320MM 35MM VASC TISS 12 FIRING B FRM PWR

## (undated) DEVICE — STAPLER INT L37CM STPL 25MM CIR ENDOSCP CRV INTLUMN B FRM

## (undated) DEVICE — SUTURE VCRL SZ 3-0 L27IN ABSRB UD L26MM SH 1/2 CIR J416H

## (undated) DEVICE — YANKAUER,BULB TIP,W/O VENT,RIGID,STERILE: Brand: MEDLINE

## (undated) DEVICE — GAUZE,SPONGE,4"X4",16PLY,XRAY,STRL,LF: Brand: MEDLINE

## (undated) DEVICE — SPONGE LAP W18XL18IN WHT COT 4 PLY FLD STRUNG RADPQ DISP ST

## (undated) DEVICE — SUTURE PERMAHAND SZ 2-0 L30IN NONABSORBABLE BLK SH L26MM C016D

## (undated) DEVICE — YANKAUER,FLEXIBLE HANDLE,REGLR CAPACITY: Brand: MEDLINE INDUSTRIES, INC.

## (undated) DEVICE — SYRINGE MED 30ML STD CLR PLAS LUERLOCK TIP N CTRL DISP

## (undated) DEVICE — SPONGE DRN W4XL4IN RAYON/POLYESTER 6 PLY NONWOVEN PRECUT

## (undated) DEVICE — SUTURE PROL SZ 2-0 L30IN NONABSORBABLE BLU L26MM SH 1/2 CIR 8833H

## (undated) DEVICE — MARKER SURG SKIN UTIL BLK REG TIP NONSMEARING W/ 6IN RUL

## (undated) DEVICE — DRESSING TRNSPAR W5XL4.5IN FLM SHT SEMIPERMEABLE WIND

## (undated) DEVICE — COVER,MAYO STAND,STERILE: Brand: MEDLINE

## (undated) DEVICE — SUTURE PERMAHAND SZ 2-0 L17X18IN NONABSORBABLE BLK SILK SA65H

## (undated) DEVICE — PENCIL ES CRD L10FT HND SWCHING ROCK SWCH W/ EDGE COAT BLDE

## (undated) DEVICE — SUTURE 3-0 VCRL CTD SH-1 J219H

## (undated) DEVICE — SPONGE GZ W4XL8IN COT WVN 12 PLY

## (undated) DEVICE — GLOVE SURG SZ 65 THK91MIL LTX FREE SYN POLYISOPRENE

## (undated) DEVICE — SUTURE VCRL SZ 0 L27IN ABSRB UD L36MM CT-1 1/2 CIR J260H

## (undated) DEVICE — COUNTER NDL 30 COUNT FOAM STRP SGL MAG

## (undated) DEVICE — CLIP INT L ORNG TI TRNSVRS GRV CHEVRON SHP W/ PRECIS TIP TO

## (undated) DEVICE — SEALER ENDOSCP NANO COAT OPN DIV CRV L JAW LIGASURE IMPACT

## (undated) DEVICE — WAX SURG 2.5GM HEMSTAT BNE BEESWAX PARAFFIN ISO PALMITATE

## (undated) DEVICE — ELECTRODE ES AD CRDLSS PT RET REM POLYHESIVE

## (undated) DEVICE — CLIP LIG M BLU TI HRT SHP WIRE HORZ 600 PER BX

## (undated) DEVICE — ENDOPATH ECHELON VASCULAR  RELOADS, WHITE, 35MM: Brand: ECHELON ENDOPATH

## (undated) DEVICE — CLEANER,CAUTERY TIP,2X2",STERILE: Brand: MEDLINE

## (undated) DEVICE — TUBING, SUCTION, 9/32" X 10', STRAIGHT: Brand: MEDLINE

## (undated) DEVICE — SUTURE ABSORBABLE BRAIDED 2-0 CT-1 27 IN UD VICRYL J259H

## (undated) DEVICE — STERILE POLYISOPRENE POWDER-FREE SURGICAL GLOVES: Brand: PROTEXIS

## (undated) DEVICE — 3M™ STERI-DRAPE™ INSTRUMENT POUCH 1018: Brand: STERI-DRAPE™

## (undated) DEVICE — GLOVE ORANGE PI 8   MSG9080

## (undated) DEVICE — GOWN,SIRUS,FABRNF,RAGLAN,L,ST,30/CS: Brand: MEDLINE

## (undated) DEVICE — TOWEL,OR,DSP,ST,WHITE,DLX,XR,4/PK,20PK/C: Brand: MEDLINE

## (undated) DEVICE — DRAIN,WOUND,ROUND,24FR,5/16",FULL-FLUTED: Brand: MEDLINE

## (undated) DEVICE — DRAPE,LAPAROTOMY,PCH,STERILE: Brand: MEDLINE

## (undated) DEVICE — SYRINGE 20ML LL S/C 50

## (undated) DEVICE — DRAPE SHEET ULTRAGARD: Brand: MEDLINE

## (undated) DEVICE — PAD GZ BORD ST 4INX10IN 2X8IN

## (undated) DEVICE — DECANTER BAG 9": Brand: MEDLINE INDUSTRIES, INC.

## (undated) DEVICE — CONTAINER,SPECIMEN,OR STERILE,4OZ: Brand: MEDLINE

## (undated) DEVICE — SSC BONE WAX: Brand: SSC BONE WAX

## (undated) DEVICE — SUTURE VCRL SZ 2 L27IN ABSRB UD L65MM TP-1 1/2 CIR J849G

## (undated) DEVICE — SYRINGE MED 3ML CLR PLAS STD N CTRL LUERLOCK TIP DISP

## (undated) DEVICE — 3M™ IOBAN™ 2 ANTIMICROBIAL INCISE DRAPE 6650EZ: Brand: IOBAN™ 2

## (undated) DEVICE — RELOAD STPL L60MM H1.5-3.6MM REG TISS BLU GRIPPING SURF B

## (undated) DEVICE — TAPE MED W1/8XL30IN WHT POLY

## (undated) DEVICE — SUTURE PERMAHAND SZ 2-0 L18IN NONABSORBABLE BLK L26MM SH C012D

## (undated) DEVICE — SUTURE VCRL SZ 3-0 L18IN ABSRB VLT L26MM SH 1/2 CIR J774D